# Patient Record
Sex: MALE | Race: WHITE | NOT HISPANIC OR LATINO | Employment: OTHER | URBAN - METROPOLITAN AREA
[De-identification: names, ages, dates, MRNs, and addresses within clinical notes are randomized per-mention and may not be internally consistent; named-entity substitution may affect disease eponyms.]

---

## 2018-07-23 ENCOUNTER — TRANSFERRED RECORDS (OUTPATIENT)
Dept: HEALTH INFORMATION MANAGEMENT | Facility: CLINIC | Age: 58
End: 2018-07-23

## 2018-08-08 ENCOUNTER — OFFICE VISIT (OUTPATIENT)
Dept: FAMILY MEDICINE | Facility: CLINIC | Age: 58
End: 2018-08-08
Payer: COMMERCIAL

## 2018-08-08 VITALS
DIASTOLIC BLOOD PRESSURE: 69 MMHG | RESPIRATION RATE: 17 BRPM | TEMPERATURE: 98.9 F | OXYGEN SATURATION: 96 % | HEIGHT: 69 IN | BODY MASS INDEX: 42.8 KG/M2 | HEART RATE: 92 BPM | WEIGHT: 289 LBS | SYSTOLIC BLOOD PRESSURE: 118 MMHG

## 2018-08-08 DIAGNOSIS — Z79.4 TYPE 2 DIABETES MELLITUS WITH OTHER SPECIFIED COMPLICATION, WITH LONG-TERM CURRENT USE OF INSULIN (H): Primary | ICD-10-CM

## 2018-08-08 DIAGNOSIS — E66.01 MORBID OBESITY (H): ICD-10-CM

## 2018-08-08 DIAGNOSIS — E11.69 TYPE 2 DIABETES MELLITUS WITH OTHER SPECIFIED COMPLICATION, WITH LONG-TERM CURRENT USE OF INSULIN (H): Primary | ICD-10-CM

## 2018-08-08 DIAGNOSIS — E55.9 VITAMIN D DEFICIENCY: ICD-10-CM

## 2018-08-08 DIAGNOSIS — N18.30 CKD (CHRONIC KIDNEY DISEASE) STAGE 3, GFR 30-59 ML/MIN (H): ICD-10-CM

## 2018-08-08 DIAGNOSIS — E78.1 HIGH BLOOD TRIGLYCERIDES: ICD-10-CM

## 2018-08-08 DIAGNOSIS — E78.00 HIGH BLOOD CHOLESTEROL: ICD-10-CM

## 2018-08-08 DIAGNOSIS — Z12.11 SCREEN FOR COLON CANCER: ICD-10-CM

## 2018-08-08 DIAGNOSIS — G47.33 OSA (OBSTRUCTIVE SLEEP APNEA): ICD-10-CM

## 2018-08-08 DIAGNOSIS — I10 HYPERTENSION GOAL BP (BLOOD PRESSURE) < 140/90: ICD-10-CM

## 2018-08-08 LAB
ERYTHROCYTE [DISTWIDTH] IN BLOOD BY AUTOMATED COUNT: 15.9 % (ref 10–15)
HBA1C MFR BLD: 9.7 % (ref 0–5.6)
HCT VFR BLD AUTO: 37.1 % (ref 40–53)
HGB BLD-MCNC: 12.5 G/DL (ref 13.3–17.7)
MCH RBC QN AUTO: 30.4 PG (ref 26.5–33)
MCHC RBC AUTO-ENTMCNC: 33.7 G/DL (ref 31.5–36.5)
MCV RBC AUTO: 90 FL (ref 78–100)
PLATELET # BLD AUTO: 207 10E9/L (ref 150–450)
RBC # BLD AUTO: 4.11 10E12/L (ref 4.4–5.9)
WBC # BLD AUTO: 7.1 10E9/L (ref 4–11)

## 2018-08-08 PROCEDURE — 85027 COMPLETE CBC AUTOMATED: CPT | Performed by: FAMILY MEDICINE

## 2018-08-08 PROCEDURE — 84443 ASSAY THYROID STIM HORMONE: CPT | Performed by: FAMILY MEDICINE

## 2018-08-08 PROCEDURE — 83721 ASSAY OF BLOOD LIPOPROTEIN: CPT | Mod: 59 | Performed by: FAMILY MEDICINE

## 2018-08-08 PROCEDURE — 82306 VITAMIN D 25 HYDROXY: CPT | Performed by: FAMILY MEDICINE

## 2018-08-08 PROCEDURE — 82043 UR ALBUMIN QUANTITATIVE: CPT | Performed by: FAMILY MEDICINE

## 2018-08-08 PROCEDURE — 36415 COLL VENOUS BLD VENIPUNCTURE: CPT | Performed by: FAMILY MEDICINE

## 2018-08-08 PROCEDURE — 80061 LIPID PANEL: CPT | Performed by: FAMILY MEDICINE

## 2018-08-08 PROCEDURE — 80053 COMPREHEN METABOLIC PANEL: CPT | Performed by: FAMILY MEDICINE

## 2018-08-08 PROCEDURE — 99203 OFFICE O/P NEW LOW 30 MIN: CPT | Performed by: FAMILY MEDICINE

## 2018-08-08 PROCEDURE — 83036 HEMOGLOBIN GLYCOSYLATED A1C: CPT | Performed by: FAMILY MEDICINE

## 2018-08-08 RX ORDER — LANCETS 30 GAUGE
EACH MISCELLANEOUS
COMMUNITY
Start: 2016-02-08 | End: 2019-01-02

## 2018-08-08 RX ORDER — BLOOD-GLUCOSE METER
1 EACH MISCELLANEOUS
COMMUNITY
Start: 2017-01-19 | End: 2019-08-12

## 2018-08-08 RX ORDER — CHOLECALCIFEROL (VITAMIN D3) 50 MCG
2000 TABLET ORAL
COMMUNITY
End: 2024-09-19

## 2018-08-08 RX ORDER — CARVEDILOL 12.5 MG/1
12.5 TABLET ORAL
COMMUNITY
Start: 2017-09-18 | End: 2019-04-08

## 2018-08-08 RX ORDER — INSULIN GLARGINE 100 [IU]/ML
38-48 INJECTION, SOLUTION SUBCUTANEOUS
COMMUNITY
Start: 2018-04-11 | End: 2018-12-06

## 2018-08-08 RX ORDER — FENOFIBRATE 145 MG/1
TABLET, COATED ORAL
COMMUNITY
Start: 2018-05-07 | End: 2019-01-31

## 2018-08-08 RX ORDER — ASPIRIN 81 MG/1
81 TABLET ORAL
COMMUNITY
Start: 2011-04-26 | End: 2023-02-17

## 2018-08-08 RX ORDER — AMOXICILLIN 250 MG
CAPSULE ORAL
COMMUNITY
Start: 2018-05-07 | End: 2019-08-26

## 2018-08-08 RX ORDER — ATORVASTATIN CALCIUM 40 MG/1
80 TABLET, FILM COATED ORAL
COMMUNITY
Start: 2017-01-12 | End: 2019-01-07

## 2018-08-08 RX ORDER — FENOFIBRATE 145 MG/1
1 TABLET, COATED ORAL
COMMUNITY
Start: 2017-08-21 | End: 2018-08-08

## 2018-08-08 RX ORDER — SYRINGE-NEEDLE,INSULIN,0.5 ML 28GX1/2"
SYRINGE, EMPTY DISPOSABLE MISCELLANEOUS
COMMUNITY
Start: 2016-01-13 | End: 2018-12-28

## 2018-08-08 RX ORDER — ERGOCALCIFEROL 1.25 MG/1
50000 CAPSULE, LIQUID FILLED ORAL
COMMUNITY
End: 2019-01-02

## 2018-08-08 RX ORDER — ERGOCALCIFEROL 1.25 MG/1
CAPSULE, LIQUID FILLED ORAL
COMMUNITY
Start: 2017-10-17 | End: 2019-01-02

## 2018-08-08 RX ORDER — GLUCOSAMINE HCL/CHONDROITIN SU 500-400 MG
CAPSULE ORAL
COMMUNITY
Start: 2016-02-08 | End: 2019-01-02

## 2018-08-08 RX ORDER — LANCETS 33 GAUGE
EACH MISCELLANEOUS
COMMUNITY
Start: 2018-02-12 | End: 2019-08-12

## 2018-08-08 RX ORDER — INSULIN GLARGINE 100 [IU]/ML
32 INJECTION, SOLUTION SUBCUTANEOUS
COMMUNITY
End: 2018-12-28

## 2018-08-08 RX ORDER — FUROSEMIDE 20 MG
20 TABLET ORAL
COMMUNITY
Start: 2018-05-05 | End: 2019-04-08

## 2018-08-08 RX ORDER — AMLODIPINE BESYLATE 10 MG/1
10 TABLET ORAL
COMMUNITY
Start: 2017-09-26 | End: 2019-04-08

## 2018-08-08 NOTE — PROGRESS NOTES
"  SUBJECTIVE:   Roberto Thompson is a 57 year old male who presents to clinic today for the following health issues:        Establish care, transfer from [facility]     Pt with diabetes, HTN, KELBY, high cholesterol and other issues listed in problem list  Problem list, meds updated.   Does not need refills at this time  Will update labs since he is fasting  He is on statin and asa  Not sure why he is not on an ace/arb. Will try to get old records    He is due for colonoscopy and is agreeable to scheduling    He has had two toes amputated on his right foot. He is followed by vascular for this.     Problem list and histories reviewed & adjusted, as indicated.  Additional history: as documented    Labs reviewed in EPIC    Reviewed and updated as needed this visit by clinical staff  Tobacco  Med Hx  Surg Hx  Fam Hx  Soc Hx      Reviewed and updated as needed this visit by Provider         ROS:  Constitutional, HEENT, cardiovascular, pulmonary, gi and gu systems are negative, except as otherwise noted.    OBJECTIVE:     /69  Pulse 92  Temp 98.9  F (37.2  C) (Oral)  Resp 17  Ht 5' 9\" (1.753 m)  Wt 289 lb (131.1 kg)  SpO2 96%  BMI 42.68 kg/m2  Body mass index is 42.68 kg/(m^2).  GENERAL: healthy, alert and no distress  NECK: no adenopathy, no asymmetry, masses, or scars and thyroid normal to palpation  RESP: lungs clear to auscultation - no rales, rhonchi or wheezes  CV: regular rate and rhythm, normal S1 S2, no S3 or S4, no murmur, click or rub, no peripheral edema and peripheral pulses strong  ABDOMEN: soft, nontender, no hepatosplenomegaly, no masses and bowel sounds normal  MS: no gross musculoskeletal defects noted, no edema    Diagnostic Test Results:  none     ASSESSMENT/PLAN:     1. Type 2 diabetes mellitus with other specified complication, with long-term current use of insulin (H)  As above, await labs, follow-up in 3-6 months depending on A1c at goal or not, pt to call for refills when needed  - " Hemoglobin A1c  - Albumin Random Urine Quantitative with Creat Ratio  - Comprehensive metabolic panel  - Lipid panel reflex to direct LDL Fasting  - TSH with free T4 reflex  - CBC with platelets    2. Morbid obesity (H)  Encouraged regular activity/ healthy diet    3. Toe amputation status, right (H)  Per vascular    4. Hypertension goal BP (blood pressure) < 140/90  At goal on meds    5. CKD (chronic kidney disease) stage 3, GFR 30-59 ml/min  ? Not sure why not on ace. Will need to try to get old records    6. High blood cholesterol  On statin    7. Vitamin D deficiency  Due for rechekc  - Vitamin D Deficiency    8. High blood triglycerides  On fenofibrate    9. KELBY (obstructive sleep apnea)  Pt with cpap machine    10. Screen for colon cancer  Pt agreeable to colonoscopy  - GASTROENTEROLOGY ADULT REF PROCEDURE ONLY        Jaylyn Conte MD  [facility]

## 2018-08-08 NOTE — LETTER
August 13, 2018    Roberto Thompson  [address] [place]  [place] [address]          Dear Roberto,    Your diabetes is not well controlled with an A1c of 9.7, Goal A1c should be < 7.0  I have placed an order for a visit with the diabetic educator. They will call you to set up an appointment  Your electrolytes, liver enzymes, thyroid and hemoglobin are normal  Your kidneys have been affected by your diabetes as you have protein in your urine and decreased kidney function  Your triglycerides are way too high. This is probably due to your uncontrolled diabetes.  You are still deficient in vitamin D. I have sent a prescription for vitamin D to your pharmacy. Take one tablet weekly x 8 weeks.  After you have finished your medication, please make lab appointment to have it rechecked.     Please let me know if you have any questions.     If you have any questions or concerns, please call myself or my nurse at [phone].    Sincerely,    Jaylyn Conte MD/jeniffer    Results for orders placed or performed in visit on 08/08/18   Hemoglobin A1c   Result Value Ref Range    Hemoglobin A1C 9.7 (H) 0 - 5.6 %   Albumin Random Urine Quantitative with Creat Ratio   Result Value Ref Range    Creatinine Urine 68 mg/dL    Albumin Urine mg/L 217 mg/L    Albumin Urine mg/g Cr 316.79 (H) 0 - 17 mg/g Cr   Comprehensive metabolic panel   Result Value Ref Range    Sodium 134 133 - 144 mmol/L    Potassium 4.3 3.4 - 5.3 mmol/L    Chloride 99 94 - 109 mmol/L    Carbon Dioxide 23 20 - 32 mmol/L    Anion Gap 12 3 - 14 mmol/L    Glucose 194 (H) 70 - 99 mg/dL    Urea Nitrogen 37 (H) 7 - 30 mg/dL    Creatinine 1.92 (H) 0.66 - 1.25 mg/dL    GFR Estimate 36 (L) >60 mL/min/1.7m2    GFR Estimate If Black 43 (L) >60 mL/min/1.7m2    Calcium 9.4 8.5 - 10.1 mg/dL    Bilirubin Total 0.5 0.2 - 1.3 mg/dL    Albumin 3.8 3.4 - 5.0 g/dL    Protein Total 8.8 6.8 - 8.8 g/dL    Alkaline Phosphatase 55 40 - 150 U/L    ALT 36 0 - 70 U/L    AST 31 0 - 45 U/L    Lipid panel reflex to direct LDL Fasting   Result Value Ref Range    Cholesterol 309 (H) <200 mg/dL    Triglycerides 1413 (H) <150 mg/dL    HDL Cholesterol 35 (L) >39 mg/dL    LDL Cholesterol Calculated  <100 mg/dL     Cannot estimate LDL when triglyceride exceeds 400 mg/dL    Non HDL Cholesterol 274 (H) <130 mg/dL   TSH with free T4 reflex   Result Value Ref Range    TSH 1.04 0.40 - 4.00 mU/L   CBC with platelets   Result Value Ref Range    WBC 7.1 4.0 - 11.0 10e9/L    RBC Count 4.11 (L) 4.4 - 5.9 10e12/L    Hemoglobin 12.5 (L) 13.3 - 17.7 g/dL    Hematocrit 37.1 (L) 40.0 - 53.0 %    MCV 90 78 - 100 fl    MCH 30.4 26.5 - 33.0 pg    MCHC 33.7 31.5 - 36.5 g/dL    RDW 15.9 (H) 10.0 - 15.0 %    Platelet Count 207 150 - 450 10e9/L   Vitamin D Deficiency   Result Value Ref Range    Vitamin D Deficiency screening 17 (L) 20 - 75 ug/L   LDL cholesterol direct   Result Value Ref Range    LDL Cholesterol Direct 110 (H) <100 mg/dL

## 2018-08-08 NOTE — MR AVS SNAPSHOT
After Visit Summary   8/8/2018    Roberto Thompson    MRN: [id number]           Patient Information     Date Of Birth          1960        Visit Information        Provider Department      8/8/2018 3:25 PM Jaylyn Minor MD; [place] [facility]        Today's Diagnoses     Type 2 diabetes mellitus with other specified complication, with long-term current use of insulin (H)    -  1    Morbid obesity (H)        Toe amputation status, right (H)        Hypertension goal BP (blood pressure) < 140/90        CKD (chronic kidney disease) stage 3, GFR 30-59 ml/min        High blood cholesterol        Vitamin D deficiency        High blood triglycerides        KELBY (obstructive sleep apnea)        Screen for colon cancer           Follow-ups after your visit        Additional Services     GASTROENTEROLOGY ADULT REF PROCEDURE ONLY       Last Lab Result: No results found for: CR  Body mass index is 42.68 kg/(m^2).     Needed:  Yes  Language:  Belarusian    Patient will be contacted to schedule procedure.     Please be aware that coverage of these services is subject to the terms and limitations of your health insurance plan.  Call member services at your health plan with any benefit or coverage questions.  Any procedures must be performed at a [facility] facility OR coordinated by your clinic's referral office.    Please bring the following with you to your appointment:    (1) Any X-Rays, CTs or MRIs which have been performed.  Contact the facility where they were done to arrange for  prior to your scheduled appointment.    (2) List of current medications   (3) This referral request   (4) Any documents/labs given to you for this referral                  Who to contact     If you have questions or need follow up information about today's clinic visit or your schedule please contact [facility] directly at [phone].  Normal or non-critical lab  "and imaging results will be communicated to you by MyChart, letter or phone within 4 business days after the clinic has received the results. If you do not hear from us within 7 days, please contact the clinic through MyChart or phone. If you have a critical or abnormal lab result, we will notify you by phone as soon as possible.  Submit refill requests through [employer] or call your pharmacy and they will forward the refill request to us. Please allow 3 business days for your refill to be completed.          Additional Information About Your Visit        Care EveryWhere ID     This is your Care EveryWhere ID. This could be used by other organizations to access your [facility] medical records  [id number]        Your Vitals Were     Pulse Temperature Respirations Height Pulse Oximetry BMI (Body Mass Index)    92 98.9  F (37.2  C) (Oral) 17 5' 9\" (1.753 m) 96% 42.68 kg/m2       Blood Pressure from Last 3 Encounters:   08/08/18 118/69    Weight from Last 3 Encounters:   08/08/18 289 lb (131.1 kg)              We Performed the Following     Albumin Random Urine Quantitative with Creat Ratio     CBC with platelets     Comprehensive metabolic panel     GASTROENTEROLOGY ADULT REF PROCEDURE ONLY     Hemoglobin A1c     Lipid panel reflex to direct LDL Fasting     TSH with free T4 reflex     Vitamin D Deficiency          Today's Medication Changes          These changes are accurate as of 8/8/18 11:59 PM.  If you have any questions, ask your nurse or doctor.               These medicines have changed or have updated prescriptions.        Dose/Directions    aspirin 81 MG EC tablet   This may have changed:  Another medication with the same name was removed. Continue taking this medication, and follow the directions you see here.   Changed by:  Jaylyn Minor MD        Dose:  81 mg   Take 81 mg by mouth   Refills:  0       fenofibrate 145 MG tablet   This may have changed:  Another medication with the same name was removed. " Continue taking this medication, and follow the directions you see here.   Changed by:  Jaylyn Minor MD        TAKE 1 TABLET BY MOUTH DAILY.   Refills:  0       insulin aspart 100 UNIT/ML injection   Commonly known as:  NovoLOG PEN   This may have changed:  Another medication with the same name was removed. Continue taking this medication, and follow the directions you see here.   Changed by:  Jaylyn Minor MD        Dose:  50 Units   Inject 50 Units Subcutaneous   Refills:  0                Primary Care Provider Office Phone # Fax #    Jaylyn Minor [phone] [id number] [address] [facility] [address]        Equal Access to Services     [facility]: Hadii aad ku hadasho Soomaali, waaxda luqadaha, qaybta kaalmada adeegyada, waxay idiin hayaan adeeg mouna cruz . So [facility] [phone].    ATENCIÓN: Si habla español, tiene a chino disposición servicios gratuitos de asistencia lingüística. [facility] [phone].    We comply with applicable federal civil rights laws and [place] laws. We do not discriminate on the basis of race, color, national origin, age, disability, sex, sexual orientation, or gender identity.            Thank you!     Thank you for choosing [facility]  for your care. Our goal is always to provide you with excellent care. Hearing back from our patients is one way we can continue to improve our services. Please take a few minutes to complete the written survey that you may receive in the mail after your visit with us. Thank you!             Your Updated Medication List - Protect others around you: Learn how to safely use, store and throw away your medicines at www.disposemymeds.org.          This list is accurate as of 8/8/18 11:59 PM.  Always use your most recent med list.                   Brand Name Dispense Instructions for use Diagnosis    amLODIPine 10 MG tablet    NORVASC     Take 10 mg by mouth        aspirin 81 MG EC tablet      Take 81 mg by mouth  "       atorvastatin 40 MG tablet    LIPITOR     Take 80 mg by mouth        * BASAGLAR 100 UNIT/ML injection      Inject 32 Units Subcutaneous        * BASAGLAR 100 UNIT/ML injection      Inject 38-48 Units Subcutaneous        blood glucose calibration solution    NO BRAND SPECIFIED     once as needed (With every new bottle of strips) for up to 1 dose.        blood glucose monitoring meter device kit      1 each        carvedilol 12.5 MG tablet    COREG     Take 12.5 mg by mouth        fenofibrate 145 MG tablet      TAKE 1 TABLET BY MOUTH DAILY.        furosemide 20 MG tablet    LASIX     Take 20 mg by mouth        GLOBAL EASE INJECT PEN NEEDLES 31G X 5 MM   Generic drug:  insulin pen needle      USE TO INJECT INSULIN 5 TIMES A DAY        insulin aspart 100 UNIT/ML injection    NovoLOG PEN     Inject 50 Units Subcutaneous        Insulin Syringe 30G X 1/2\" 0.5 ML Misc      Use to Inject insulin SQ four times daily as directed        * Lancets Misc      Testing blood sugars 4 time(s) a day. Pharmacist may substitute meter/supplies if insurance or patient requires specific equipment or model        * ONETOUCH DELICA LANCETS 33G Misc      TEST 4 TIMES DAILY        * BLOOD GLUCOSE TEST STRIPS Strp      4 times a day. Test strips for glucose meter qid  or as directed  insulin titration Verify type with patient        * ONETOUCH ULTRA test strip   Generic drug:  blood glucose monitoring      USE TO TEST 4 TIMES A DAY. USE AS DIRECTED. PHARMACY DISPENSE BRAND BASED ON INSURANCE.        SENEXON-S 8.6-50 MG per tablet   Generic drug:  senna-docusate      TAKE 1 TAB BY MOUTH TWO TIMES DAILY AS NEEDED FOR CONSTIPATION.        vitamin D 2000 units tablet      Take 2,000 Units by mouth        * vitamin D 25701 UNIT capsule    ERGOCALCIFEROL     Take 50,000 Units by mouth        * vitamin D 55915 UNIT capsule    ERGOCALCIFEROL     TAKE 1 CAPSULE BY MOUTH ONCE EVERY WEEK FOR 84 DAYS.        * Notice:  This list has 8 medication(s) " that are the same as other medications prescribed for you. Read the directions carefully, and ask your doctor or other care provider to review them with you.

## 2018-08-09 LAB
ALBUMIN SERPL-MCNC: 3.8 G/DL (ref 3.4–5)
ALP SERPL-CCNC: 55 U/L (ref 40–150)
ALT SERPL W P-5'-P-CCNC: 36 U/L (ref 0–70)
ANION GAP SERPL CALCULATED.3IONS-SCNC: 12 MMOL/L (ref 3–14)
AST SERPL W P-5'-P-CCNC: 31 U/L (ref 0–45)
BILIRUB SERPL-MCNC: 0.5 MG/DL (ref 0.2–1.3)
BUN SERPL-MCNC: 37 MG/DL (ref 7–30)
CALCIUM SERPL-MCNC: 9.4 MG/DL (ref 8.5–10.1)
CHLORIDE SERPL-SCNC: 99 MMOL/L (ref 94–109)
CHOLEST SERPL-MCNC: 309 MG/DL
CO2 SERPL-SCNC: 23 MMOL/L (ref 20–32)
CREAT SERPL-MCNC: 1.92 MG/DL (ref 0.66–1.25)
CREAT UR-MCNC: 68 MG/DL
DEPRECATED CALCIDIOL+CALCIFEROL SERPL-MC: 17 UG/L (ref 20–75)
GFR SERPL CREATININE-BSD FRML MDRD: 36 ML/MIN/1.7M2
GLUCOSE SERPL-MCNC: 194 MG/DL (ref 70–99)
HDLC SERPL-MCNC: 35 MG/DL
LDLC SERPL CALC-MCNC: ABNORMAL MG/DL
LDLC SERPL DIRECT ASSAY-MCNC: 110 MG/DL
MICROALBUMIN UR-MCNC: 217 MG/L
MICROALBUMIN/CREAT UR: 316.79 MG/G CR (ref 0–17)
NONHDLC SERPL-MCNC: 274 MG/DL
POTASSIUM SERPL-SCNC: 4.3 MMOL/L (ref 3.4–5.3)
PROT SERPL-MCNC: 8.8 G/DL (ref 6.8–8.8)
SODIUM SERPL-SCNC: 134 MMOL/L (ref 133–144)
TRIGL SERPL-MCNC: 1413 MG/DL
TSH SERPL DL<=0.005 MIU/L-ACNC: 1.04 MU/L (ref 0.4–4)

## 2018-08-10 ENCOUNTER — TELEPHONE (OUTPATIENT)
Dept: FAMILY MEDICINE | Facility: CLINIC | Age: 58
End: 2018-08-10

## 2018-08-10 RX ORDER — ERGOCALCIFEROL 1.25 MG/1
50000 CAPSULE, LIQUID FILLED ORAL
Qty: 8 CAPSULE | Refills: 0 | Status: SHIPPED | OUTPATIENT
Start: 2018-08-10 | End: 2018-11-06

## 2018-08-10 NOTE — TELEPHONE ENCOUNTER
I faxed an order that the PCP filled out after patients 8/8/18 appointment. Faxed to [facility] @ [phone].  Daisy Roth,

## 2018-08-31 ENCOUNTER — TRANSFERRED RECORDS (OUTPATIENT)
Dept: HEALTH INFORMATION MANAGEMENT | Facility: CLINIC | Age: 58
End: 2018-08-31

## 2018-09-05 DIAGNOSIS — Z79.4 TYPE 2 DIABETES MELLITUS WITH OTHER SPECIFIED COMPLICATION, WITH LONG-TERM CURRENT USE OF INSULIN (H): Primary | ICD-10-CM

## 2018-09-05 DIAGNOSIS — I73.9 PERIPHERAL VASCULAR DISEASE (H): ICD-10-CM

## 2018-09-05 DIAGNOSIS — G63 POLYNEUROPATHY ASSOCIATED WITH UNDERLYING DISEASE (H): ICD-10-CM

## 2018-09-05 DIAGNOSIS — E11.69 TYPE 2 DIABETES MELLITUS WITH OTHER SPECIFIED COMPLICATION, WITH LONG-TERM CURRENT USE OF INSULIN (H): Primary | ICD-10-CM

## 2018-09-05 PROBLEM — G62.9 PERIPHERAL NEUROPATHY: Status: ACTIVE | Noted: 2018-09-05

## 2018-10-05 ENCOUNTER — TRANSFERRED RECORDS (OUTPATIENT)
Dept: HEALTH INFORMATION MANAGEMENT | Facility: CLINIC | Age: 58
End: 2018-10-05

## 2018-10-12 ENCOUNTER — TRANSFERRED RECORDS (OUTPATIENT)
Dept: HEALTH INFORMATION MANAGEMENT | Facility: CLINIC | Age: 58
End: 2018-10-12

## 2018-10-22 PROBLEM — E11.3313: Status: ACTIVE | Noted: 2018-10-22

## 2018-11-06 DIAGNOSIS — E55.9 VITAMIN D DEFICIENCY: ICD-10-CM

## 2018-11-07 RX ORDER — ERGOCALCIFEROL 1.25 MG/1
CAPSULE, LIQUID FILLED ORAL
Qty: 8 CAPSULE | Refills: 0 | Status: SHIPPED | OUTPATIENT
Start: 2018-11-07 | End: 2019-01-23

## 2018-11-07 NOTE — TELEPHONE ENCOUNTER
Routing refill request to provider for review/approval because:  Drug not on the [facility] refill protocol   Kaitlyn Enriquez RN

## 2018-12-02 ENCOUNTER — MEDICAL CORRESPONDENCE (OUTPATIENT)
Dept: HEALTH INFORMATION MANAGEMENT | Facility: CLINIC | Age: 58
End: 2018-12-02

## 2018-12-06 DIAGNOSIS — E11.69 TYPE 2 DIABETES MELLITUS WITH OTHER SPECIFIED COMPLICATION, WITH LONG-TERM CURRENT USE OF INSULIN (H): Primary | ICD-10-CM

## 2018-12-06 DIAGNOSIS — Z79.4 TYPE 2 DIABETES MELLITUS WITH OTHER SPECIFIED COMPLICATION, WITH LONG-TERM CURRENT USE OF INSULIN (H): Primary | ICD-10-CM

## 2018-12-06 NOTE — TELEPHONE ENCOUNTER
Routing refill request to provider for review/approval because:  Requested medication is a historical/pt reported medication.  Hgb A1C is 9.7, future office visit scheduled 12/18/18      Requested Prescriptions   Pending Prescriptions Disp Refills     insulin glargine (BASAGLAR KWIKPEN) 100 UNIT/ML pen       Sig: Inject 38-48 Units Subcutaneous    Long Acting Insulin Protocol Failed    12/6/2018 10:00 AM       Failed - HgbA1C in past 3 or 6 months    If HgbA1C is 8 or greater, it needs to be on file within the past 3 months.  If less than 8, must be on file within the past 6 months.     Recent Labs   Lab Test  08/08/18   1740   A1C  9.7*        Ronel Grubbs RN

## 2018-12-07 RX ORDER — INSULIN GLARGINE 100 [IU]/ML
38-48 INJECTION, SOLUTION SUBCUTANEOUS DAILY
Qty: 15 ML | Refills: 3 | Status: SHIPPED | OUTPATIENT
Start: 2018-12-07 | End: 2018-12-28

## 2018-12-18 ENCOUNTER — ANCILLARY PROCEDURE (OUTPATIENT)
Dept: GENERAL RADIOLOGY | Facility: CLINIC | Age: 58
End: 2018-12-18
Attending: FAMILY MEDICINE
Payer: COMMERCIAL

## 2018-12-18 ENCOUNTER — OFFICE VISIT (OUTPATIENT)
Dept: FAMILY MEDICINE | Facility: CLINIC | Age: 58
End: 2018-12-18
Payer: COMMERCIAL

## 2018-12-18 VITALS
HEART RATE: 75 BPM | WEIGHT: 300 LBS | BODY MASS INDEX: 44.43 KG/M2 | SYSTOLIC BLOOD PRESSURE: 115 MMHG | OXYGEN SATURATION: 95 % | HEIGHT: 69 IN | DIASTOLIC BLOOD PRESSURE: 64 MMHG | TEMPERATURE: 98.2 F | RESPIRATION RATE: 18 BRPM

## 2018-12-18 DIAGNOSIS — M79.671 RIGHT FOOT PAIN: ICD-10-CM

## 2018-12-18 DIAGNOSIS — R60.0 PEDAL EDEMA: ICD-10-CM

## 2018-12-18 DIAGNOSIS — E78.00 HIGH BLOOD CHOLESTEROL: ICD-10-CM

## 2018-12-18 DIAGNOSIS — N18.30 CKD (CHRONIC KIDNEY DISEASE) STAGE 3, GFR 30-59 ML/MIN (H): ICD-10-CM

## 2018-12-18 DIAGNOSIS — E66.01 MORBID OBESITY (H): ICD-10-CM

## 2018-12-18 DIAGNOSIS — G63 POLYNEUROPATHY ASSOCIATED WITH UNDERLYING DISEASE (H): ICD-10-CM

## 2018-12-18 DIAGNOSIS — E78.1 HIGH BLOOD TRIGLYCERIDES: ICD-10-CM

## 2018-12-18 DIAGNOSIS — Z79.4 ENCOUNTER FOR LONG-TERM (CURRENT) INSULIN USE (H): ICD-10-CM

## 2018-12-18 DIAGNOSIS — E11.69 TYPE 2 DIABETES MELLITUS WITH OTHER SPECIFIED COMPLICATION, WITH LONG-TERM CURRENT USE OF INSULIN (H): Primary | ICD-10-CM

## 2018-12-18 DIAGNOSIS — Z79.4 TYPE 2 DIABETES MELLITUS WITH OTHER SPECIFIED COMPLICATION, WITH LONG-TERM CURRENT USE OF INSULIN (H): Primary | ICD-10-CM

## 2018-12-18 DIAGNOSIS — I10 HYPERTENSION GOAL BP (BLOOD PRESSURE) < 140/90: ICD-10-CM

## 2018-12-18 LAB
ALBUMIN SERPL-MCNC: 3.7 G/DL (ref 3.4–5)
ALP SERPL-CCNC: 76 U/L (ref 40–150)
ALT SERPL W P-5'-P-CCNC: 31 U/L (ref 0–70)
ANION GAP SERPL CALCULATED.3IONS-SCNC: 6 MMOL/L (ref 3–14)
AST SERPL W P-5'-P-CCNC: 21 U/L (ref 0–45)
BILIRUB SERPL-MCNC: 0.4 MG/DL (ref 0.2–1.3)
BUN SERPL-MCNC: 35 MG/DL (ref 7–30)
CALCIUM SERPL-MCNC: 8.7 MG/DL (ref 8.5–10.1)
CHLORIDE SERPL-SCNC: 101 MMOL/L (ref 94–109)
CO2 SERPL-SCNC: 29 MMOL/L (ref 20–32)
CREAT SERPL-MCNC: 2.03 MG/DL (ref 0.66–1.25)
GFR SERPL CREATININE-BSD FRML MDRD: 34 ML/MIN/1.7M2
GLUCOSE SERPL-MCNC: 238 MG/DL (ref 70–99)
HBA1C MFR BLD: 10.6 % (ref 0–5.6)
NT-PROBNP SERPL-MCNC: 162 PG/ML (ref 0–125)
POTASSIUM SERPL-SCNC: 4.6 MMOL/L (ref 3.4–5.3)
PROT SERPL-MCNC: 8.3 G/DL (ref 6.8–8.8)
SODIUM SERPL-SCNC: 136 MMOL/L (ref 133–144)

## 2018-12-18 PROCEDURE — 80053 COMPREHEN METABOLIC PANEL: CPT | Performed by: FAMILY MEDICINE

## 2018-12-18 PROCEDURE — 83880 ASSAY OF NATRIURETIC PEPTIDE: CPT | Performed by: FAMILY MEDICINE

## 2018-12-18 PROCEDURE — 99214 OFFICE O/P EST MOD 30 MIN: CPT | Performed by: FAMILY MEDICINE

## 2018-12-18 PROCEDURE — 73630 X-RAY EXAM OF FOOT: CPT | Mod: RT

## 2018-12-18 PROCEDURE — 99207 C FOOT EXAM  NO CHARGE: CPT | Performed by: FAMILY MEDICINE

## 2018-12-18 PROCEDURE — 83036 HEMOGLOBIN GLYCOSYLATED A1C: CPT | Performed by: FAMILY MEDICINE

## 2018-12-18 PROCEDURE — 36415 COLL VENOUS BLD VENIPUNCTURE: CPT | Performed by: FAMILY MEDICINE

## 2018-12-18 ASSESSMENT — MIFFLIN-ST. JEOR: SCORE: 2161.17

## 2018-12-18 NOTE — PROGRESS NOTES
SUBJECTIVE:   Roberto Thompson is a 58 year old male who presents to clinic today for the following health issues:        Concern - foot pain/ diabetic  Onset: on going for about a year     Description:   Right foot pain on outside of foot and ball of foot, swelling, hard to walk, back pain and thickening of foot     Intensity: severe    Progression of Symptoms:  same    Previous history of similar problem:   2 Toe amputation earlier this year  At different times     Precipitating factors:   Worsened by: walking and standing     Alleviating factors:  Improved by: rest and elevate     Therapies Tried and outcome: rest and elevation    Would like handicap sticker       Daily headaches, informed he may need to make additional appointment.  He will follow-up for this    Pt with diabetes, poorly controlled. Worsening.   Pt is agreeable to seeing diabetes ed and has appt made end of this month  He is on statin/ace/asa    Pt with right foot pain which has been hurting since the amputation  It does not hurt when he is resting but hurts worse when he is on it and gets worse the longer he is on it  Had middle toe removed first, then big one at beginning of this year  Now with pain in foot every since the second surgery.  Pain on lateral side of foot. As well as over 2nd metatarsal  Reviewing care everywhere can see he had MRI of right foot back in June 2018 before 2nd surgery I believe.   Rad reading of todays xray ? Osteomyelitis but MRI of right foot back in June also questioned it.   Will refer to podiatry    Pt also with pedal edema in same foot. Worse at end of day, better when he elevates it. No chest pain or sob.   Is taking lasix    Problem list and histories reviewed & adjusted, as indicated.  Additional history: as documented    Labs reviewed in EPIC    Reviewed and updated as needed this visit by clinical staff       Reviewed and updated as needed this visit by Provider         ROS:  Constitutional, HEENT,  "cardiovascular, pulmonary, gi and gu systems are negative, except as otherwise noted.    OBJECTIVE:     /64   Pulse 75   Temp 98.2  F (36.8  C) (Oral)   Resp 18   Ht 1.753 m (5' 9\")   Wt 136.1 kg (300 lb)   SpO2 95%   BMI 44.30 kg/m    Body mass index is 44.3 kg/m .  GENERAL: healthy, alert and no distress  RESP: lungs clear to auscultation - no rales, rhonchi or wheezes  CV: regular rate and rhythm, normal S1 S2, no S3 or S4, no murmur, click or rub, no peripheral edema and peripheral pulses strong  ABDOMEN: soft, nontender, no hepatosplenomegaly, no masses and bowel sounds normal  MS: right foot with pedal edema 1+, 1st and 3rd toe missing, pain to palpation over 5th metatarsal and over medial top of right foot  No ulcerations/open wounds or signs of infection    Diagnostic Test Results:  none     ASSESSMENT/PLAN:     1. Type 2 diabetes mellitus with other specified complication, with long-term current use of insulin (H)  Not at goal, agreeable to seeing diab ed  - FOOT EXAM  - Hemoglobin A1c  - Comprehensive metabolic panel  - DIABETES EDUCATOR REFERRAL    2. Encounter for long-term (current) insulin use (H)      3. Right foot pain  ? Neuropathy vs structural after surgery  - XR Foot Right G/E 3 Views; Future  - PODIATRY/FOOT & ANKLE SURGERY REFERRAL    4. Pedal edema  Suspect venous insufficiency  - BNP-N terminal pro    5. Morbid obesity (H)      6. CKD (chronic kidney disease) stage 3, GFR 30-59 ml/min (H)  On acei    7. Polyneuropathy associated with underlying disease (H)      8. High blood cholesterol  On statin    9. Hypertension goal BP (blood pressure) < 140/90  At goal on meds    10. High blood triglycerides  On statin          Jaylyn Conte MD  [facility]    "

## 2018-12-28 ENCOUNTER — ALLIED HEALTH/NURSE VISIT (OUTPATIENT)
Dept: EDUCATION SERVICES | Facility: CLINIC | Age: 58
End: 2018-12-28
Payer: COMMERCIAL

## 2018-12-28 DIAGNOSIS — E11.9 DIABETES MELLITUS WITHOUT COMPLICATION (H): ICD-10-CM

## 2018-12-28 DIAGNOSIS — Z79.4 TYPE 2 DIABETES MELLITUS WITH OTHER SPECIFIED COMPLICATION, WITH LONG-TERM CURRENT USE OF INSULIN (H): Primary | ICD-10-CM

## 2018-12-28 DIAGNOSIS — E11.69 TYPE 2 DIABETES MELLITUS WITH OTHER SPECIFIED COMPLICATION, WITH LONG-TERM CURRENT USE OF INSULIN (H): Primary | ICD-10-CM

## 2018-12-28 PROCEDURE — G0108 DIAB MANAGE TRN  PER INDIV: HCPCS

## 2018-12-28 NOTE — PATIENT INSTRUCTIONS
Diabetes Support Resources:  1. Toujeo take 100 units starting on Tuesday Jan. 1 with breakfast.  Then stop taking Basaglar insulin.    2. Novolog take 25 units with breakfast and 25 units with lunch, and 25 units with dinner    Plus the scale with each meal:  Plus the following coverage scale:  120-149=1 units  150-199=2 units  200-249=3 units  250-299=5 units  300-349=7 units  350 or more =8 units     Bring blood glucose meter and logbook with you to all doctor and follow-up appointments.     [place] [facility] for the [facility]:  For Your Diabetes Education and Nutrition Appointments Call:  [phone]   For Diabetes Education or Nutrition Related Questions:   Phone: [phone]  E-mail: [email or web address]  Fax: [phone]   If you need a medication refill please contact your pharmacy. Please allow 3 business days for your refills to be completed.    Instructions for emailing the Diabetes Educators    If you need to communicate a non-urgent message to a Diabetes Educator via email, please send to [email or web address].    Please follow the following email guidelines:    Subject line: Secure: your clinic name (example: Secure: [facility])  In the email please include: First name, middle initial, last name and date of birth.    We will be in touch with you within one (1) business day.

## 2018-12-28 NOTE — PROGRESS NOTES
"Diabetes Self-Management Education & Support    Diabetes Education Self Management & Training    SUBJECTIVE/OBJECTIVE:  Presents for: Individual review  Accompanied by: Self, Spouse  Diabetes education in the past 24mo: No  Focus of Visit: Patient Unsure, Monitoring, Taking Medication, Healthy Eating  Diabetes type: Type 2  Disease course: Worsening  Transportation concerns: No  Other concerns:: None  Cultural Influences/Ethnic Background:  [facility]      Diabetes Symptoms & Complications  Blurred vision: Yes  Fatigue: Yes  Neuropathy: Yes  Foot ulcerations: No  Polydipsia: Yes  Polyphagia: No  Polyuria: Yes  Visual change: Yes  Weakness: Yes  Weight loss: No  Slow healing wounds: Yes  Recent Infection(s): Yes  Symptom course: Worsening  Weight trend: Stable  Autonomic neuropathy: Yes  CVA: No  Heart disease: No  Nephropathy: Yes  Peripheral neuropathy: Yes  Peripheral Vascular Disease: Yes  Retinopathy: Yes    Patient Problem List and Family Medical History reviewed for relevant medical history, current medical status, and diabetes risk factors.    Vitals:  There were no vitals taken for this visit.  Estimated body mass index is 44.3 kg/m  as calculated from the following:    Height as of 12/18/18: 1.753 m (5' 9\").    Weight as of 12/18/18: 136.1 kg (300 lb).   Last 3 BP:   BP Readings from Last 3 Encounters:   12/18/18 115/64   08/08/18 118/69       History   Smoking Status     Never Smoker   Smokeless Tobacco     Never Used       Labs:  Lab Results   Component Value Date    A1C 10.6 12/18/2018     Lab Results   Component Value Date     12/18/2018     Lab Results   Component Value Date    LDL  08/08/2018     Cannot estimate LDL when triglyceride exceeds 400 mg/dL     08/08/2018     HDL Cholesterol   Date Value Ref Range Status   08/08/2018 35 (L) >39 mg/dL Final   ]  GFR Estimate   Date Value Ref Range Status   12/18/2018 34 (L) >60 mL/min/1.7m2 Final     Comment:     Non  GFR Calc "     GFR Estimate If Black   Date Value Ref Range Status   12/18/2018 41 (L) >60 mL/min/1.7m2 Final     Comment:      GFR Calc     Lab Results   Component Value Date    CR 2.03 12/18/2018     No results found for: MICROALBUMIN    Healthy Eating  Healthy Eating Assessed Today: Yes  Cultural/Adventist diet restrictions?: No  Patient on a regular basis: Eats 3 meals a day  Meal planning: Avoiding sweets  Meals include: Breakfast, Lunch, Dinner, Snacks  Breakfast: skipping , will not anymore, meat only  Lunch: skipping,   Dinner: loves potatoes  Beverages: Water, Milk, Juice, Diet soda  Has patient met with a dietitian in the past?: No    Being Active  Being Active Assessed Today: Yes  Exercise:: Currently not exercising  Barrier to exercise: Physical limitation    Monitoring  Monitoring Assessed Today: Yes  Did patient bring glucose meter to appointment? : No  Blood Glucose Meter: One Touch  Home Glucose (Sugar) Monitoring: 3-4 times per week  Low Glucose Range (mg/dL): 180-200  High Glucose Range (mg/dL): >200  Overall Range (mg/dL): >200    Did not bring in his meter    Taking Medications  Diabetes Medication(s)     Insulin Sig    insulin aspart (NOVOLOG PEN) 100 UNIT/ML pen Inject 25 Units Subcutaneous 3 times daily (with meals) Plus scale:  See scale below    insulin glargine U-300 (TOUJEO) 300 UNIT/ML injection Inject 100 Units Subcutaneous daily          Taking Medication Assessed Today: Yes  Current Treatments: Diet, Insulin Injections  Dose schedule: pre-breakfast  Given by: Patient  Injection/Infusion sites: Abdomen  Problems taking diabetes medications regularly?: No  Diabetes medication side effects?: No  Treatment Compliance: All of the time    Problem Solving  Problem Solving Assessed Today: Yes  Hypoglycemia Frequency: Never     My Goal: I will be more consistent with medications    What I need to meet my goal: Diabetes Support Resources:  1. Toujeo take 100 units starting on Tuesday Jan. 1  "with breakfast.  Then stop taking Basaglar insulin.    2. Novolog take 25 units with breakfast and 25 units with lunch, and 25 units with dinner    Plus the scale with each meal:  Plus the following coverage scale:  120-149=1 units  150-199=2 units  200-249=3 units  250-299=5 units  300-349=7 units  350 or more =8 units    I plan to meet my goal by this date: 1 months         Reducing Risks  Diabetes Risks: None, Family History, Hyperlipidemia, Sedentary Lifestyle, Ethnicity  CAD Risks: Diabetes Mellitus, Family history, Male sex, Hypertension, Obesity, Sedentary lifestyle, Dyslipidemia    Healthy Coping  Stage of change: MAINTENANCE (Working to maintain change, with risk of relapse)  Patient Activation Measure Survey Score:  No flowsheet data found.    ASSESSMENT:  Roberto comes today for diabetes, noted he does not eat all day, then eats \"large\" plate of mashed potatoes and some meat, no fruits, and can be snacking all day.  Gives large amounts of insulin if his BG is in the 4-500's and repeats injections after 1-2 hrs. If BG is not coming down.  Reviewed with him, eating 3 meals per day, follow up in Jan. And adjust insulin accordingly        Patient's most recent   Lab Results   Component Value Date    A1C 10.6 12/18/2018    is not meeting goal of <7.0    INTERVENTION:   Diabetes knowledge and skills assessment:     Patient is knowledgeable in diabetes management concepts related to: monitoring    Patient needs further education on the following diabetes management concepts: Healthy Eating, Being Active, Monitoring and Taking Medication    Based on learning assessment above, most appropriate setting for further diabetes education would be: Individual setting.    Education provided today on:  AADE Self-Care Behaviors:  Diabetes Pathophysiology  Healthy Eating: carbohydrate counting, consistency in amount, composition, and timing of food intake, weight reduction, heart healthy diet, eating out, portion control, " plate planning method and label reading  Being Active: relationship to blood glucose and describe appropriate activity program  Monitoring: purpose, proper technique, log and interpret results, individual blood glucose targets and frequency of monitoring  Taking Medication: action of prescribed medication, drawing up, administering and storing injectable diabetes medications, proper site selection and rotation for injections, side effects of prescribed medications and when to take medications    Opportunities for ongoing education and support in diabetes-self management were discussed.    Pt verbalized understanding of concepts discussed and recommendations provided today.       Education Materials Provided:  My Plate Planner    PLAN:  See Patient Instructions for co-developed, patient-stated behavior change goals.  AVS printed and provided to patient today. See Follow-Up section for recommended follow-up.    Salena Abraham RN/DEYSI [facility] Diabetes Educator    Time Spent: 60 minutes  Encounter Type: Individual    Any diabetes medication dose changes were made via the [facility] Protocol and Collaborative Practice Agreement with the patient's primary care provider. A copy of this encounter was shared with the provider.

## 2018-12-30 DIAGNOSIS — E55.9 VITAMIN D DEFICIENCY: ICD-10-CM

## 2018-12-31 ENCOUNTER — TELEPHONE (OUTPATIENT)
Dept: FAMILY MEDICINE | Facility: CLINIC | Age: 58
End: 2018-12-31

## 2018-12-31 NOTE — TELEPHONE ENCOUNTER
Prior Authorization Retail Medication Request    Medication/Dose: insulin glargine U-300 (TOUJEO) 300 UNIT/ML injection  ICD code (if different than what is on RX):    Previously Tried and Failed:    Rationale:      Insurance Name:  [employer] [phone]  Insurance ID:  [id number]      Pharmacy Information (if different than what is on RX)  Name:    Phone:

## 2019-01-02 ENCOUNTER — TELEPHONE (OUTPATIENT)
Dept: FAMILY MEDICINE | Facility: CLINIC | Age: 59
End: 2019-01-02

## 2019-01-02 ENCOUNTER — OFFICE VISIT (OUTPATIENT)
Dept: PODIATRY | Facility: CLINIC | Age: 59
End: 2019-01-02
Payer: COMMERCIAL

## 2019-01-02 VITALS
WEIGHT: 300 LBS | BODY MASS INDEX: 44.3 KG/M2 | HEART RATE: 86 BPM | DIASTOLIC BLOOD PRESSURE: 74 MMHG | SYSTOLIC BLOOD PRESSURE: 142 MMHG

## 2019-01-02 DIAGNOSIS — S92.331A CLOSED DISPLACED FRACTURE OF THIRD METATARSAL BONE OF RIGHT FOOT, INITIAL ENCOUNTER: ICD-10-CM

## 2019-01-02 DIAGNOSIS — Z79.4 TYPE 2 DIABETES MELLITUS WITH OTHER SPECIFIED COMPLICATION, WITH LONG-TERM CURRENT USE OF INSULIN (H): Primary | ICD-10-CM

## 2019-01-02 DIAGNOSIS — Z79.4 TYPE 2 DIABETES MELLITUS WITH OTHER SPECIFIED COMPLICATION, WITH LONG-TERM CURRENT USE OF INSULIN (H): ICD-10-CM

## 2019-01-02 DIAGNOSIS — E11.69 TYPE 2 DIABETES MELLITUS WITH OTHER SPECIFIED COMPLICATION, WITH LONG-TERM CURRENT USE OF INSULIN (H): ICD-10-CM

## 2019-01-02 DIAGNOSIS — E11.69 TYPE 2 DIABETES MELLITUS WITH OTHER SPECIFIED COMPLICATION, WITH LONG-TERM CURRENT USE OF INSULIN (H): Primary | ICD-10-CM

## 2019-01-02 DIAGNOSIS — E11.610 CHARCOT FOOT DUE TO DIABETES MELLITUS (H): ICD-10-CM

## 2019-01-02 DIAGNOSIS — G63 POLYNEUROPATHY ASSOCIATED WITH UNDERLYING DISEASE (H): ICD-10-CM

## 2019-01-02 PROCEDURE — 99243 OFF/OP CNSLTJ NEW/EST LOW 30: CPT | Performed by: PODIATRIST

## 2019-01-02 RX ORDER — BLOOD PRESSURE TEST KIT
1 KIT MISCELLANEOUS DAILY
Qty: 100 EACH | Refills: 11 | Status: SHIPPED | OUTPATIENT
Start: 2019-01-02 | End: 2019-12-08

## 2019-01-02 RX ORDER — INSULIN GLARGINE 100 [IU]/ML
38-48 INJECTION, SOLUTION SUBCUTANEOUS DAILY
Qty: 15 ML | Refills: 3 | Status: CANCELLED | OUTPATIENT
Start: 2019-01-02

## 2019-01-02 RX ORDER — ERGOCALCIFEROL 1.25 MG/1
CAPSULE, LIQUID FILLED ORAL
Qty: 8 CAPSULE | Refills: 0 | OUTPATIENT
Start: 2019-01-02

## 2019-01-02 NOTE — TELEPHONE ENCOUNTER
Elijah states that patient's insurance will not cover the new diabetic medication you prescribed and patient would like to go back on Novalog and Basagler and would like a prescription for those.    Thank you.

## 2019-01-02 NOTE — LETTER
1/2/2019         RE: Roberto Thomspon  [address]  [place] [address]        Dear Colleague,    Thank you for referring your patient, Roberto Thompson, to the [facility]. Please see a copy of my visit note below.    Subjective:    Pt is seen today in consult from Dr. Minor with the c/c of a full swollen right foot.  Patient has poorly controlled diabetes with peripheral neuropathy and loss of protective sensation in his foot.  Earlier this year he had a right third toe amputation and a right first ray resection.  This eventually healed.  He was written a prescription for diabetic shoes and inserts which he has been wearing.  Despite wearing these all the time he is having pain and swelling in his right foot.  Pain is aggravated by activity and relieved by rest.  Patient not working.  He has chronic kidney disease stage III.  Both his father and mother had diabetes.  He has immigrated from [place] to the [place].  He has never smoked    ROS:  A 10-point review of systems was performed and is positive for that noted in the HPI and as seen below.  All other areas are negative.          Allergies   Allergen Reactions     Doxycycline      Facial swelling     Influenza Vac Split [Flu Virus Vaccine] Itching     And red /blue arms       Current Outpatient Medications   Medication Sig Dispense Refill     ACE/ARB/ARNI NOT PRESCRIBED, INTENTIONAL, Please choose reason not prescribed, below       amLODIPine (NORVASC) 10 MG tablet Take 10 mg by mouth       aspirin 81 MG EC tablet Take 81 mg by mouth       atorvastatin (LIPITOR) 40 MG tablet Take 80 mg by mouth        blood glucose calibration (NO BRAND SPECIFIED) solution once as needed (With every new bottle of strips) for up to 1 dose.       blood glucose monitoring (ONE TOUCH ULTRA 2) meter device kit 1 each       blood glucose monitoring (ONETOUCH ULTRA) test strip USE TO TEST 4 TIMES A DAY. USE AS DIRECTED. PHARMACY DISPENSE  BRAND BASED ON INSURANCE.       carvedilol (COREG) 12.5 MG tablet Take 12.5 mg by mouth       Cholecalciferol (VITAMIN D) 2000 units tablet Take 2,000 Units by mouth       fenofibrate 145 MG tablet TAKE 1 TABLET BY MOUTH DAILY.       furosemide (LASIX) 20 MG tablet Take 20 mg by mouth       insulin aspart (NOVOLOG PEN) 100 UNIT/ML pen Inject 25 Units Subcutaneous 3 times daily (with meals) Plus scale:  See scale below 15 mL 1     insulin glargine U-300 (TOUJEO) 300 UNIT/ML injection Inject 100 Units Subcutaneous daily 15 mL 3     insulin pen needle (GLOBAL EASE INJECT PEN NEEDLES) 31G X 5 MM USE TO INJECT INSULIN 5 TIMES A DAY       ONETOUCH DELICA LANCETS 33G MISC TEST 4 TIMES DAILY       senna-docusate (SENEXON-S) 8.6-50 MG per tablet TAKE 1 TAB BY MOUTH TWO TIMES DAILY AS NEEDED FOR CONSTIPATION.       vitamin D2 (ERGOCALCIFEROL) 88702 UNIT capsule TAKE 1 CAPSULE (50,000 UNITS) BY MOUTH EVERY 7 DAYS FOR 8 DOSES 8 capsule 0     Alcohol Swabs PADS 1 Device daily 100 each 11       Patient Active Problem List   Diagnosis     Morbid obesity (H)     Type 2 diabetes mellitus with other specified complication, with long-term current use of insulin (H)     Hypertension goal BP (blood pressure) < 140/90     High blood cholesterol     Vitamin D deficiency     High blood triglycerides     CKD (chronic kidney disease) stage 3, GFR 30-59 ml/min (H)     Toe amputation status, right (H)     KELBY (obstructive sleep apnea)     Peripheral neuropathy     Peripheral vascular disease (H)     Moderate nonproliferative diabetic retinopathy of both eyes with macular edema (H)       No past medical history on file.    Past Surgical History:   Procedure Laterality Date     APPENDECTOMY       ENT SURGERY      tonsils     ORTHOPEDIC SURGERY      thumb     ORTHOPEDIC SURGERY      2 toe amputations       Family History   Problem Relation Age of Onset     Diabetes Mother      Hypertension Mother      Cerebrovascular Disease Mother      Kidney  Disease Mother      Osteoporosis Mother      Diabetes Father      Prostate Cancer Father      Dementia Father        Social History     Tobacco Use     Smoking status: Never Smoker     Smokeless tobacco: Never Used   Substance Use Topics     Alcohol use: No         Exam:    Vitals: /74 (BP Location: Right arm, Patient Position: Sitting)   Pulse 86   Wt 136.1 kg (300 lb)   BMI 44.30 kg/m     BMI: Body mass index is 44.3 kg/m .  Height: Data Unavailable    Constitutional/ general:  Pt is in no apparent distress, appears well-nourished.  Cooperative with history and physical exam.  Patient seen with wife and  today    Psych:  The patient answered questions appropriately.  Normal affect.  Seems to have reasonable expectations, in terms of treatment.     Eyes:  Visual scanning/ tracking without deficit.     Ears:  Response to auditory stimuli is normal.  negative hearing aid devices.  Auricles in proper alignment.     Lymphatic:  Popliteal lymph nodes not enlarged.     Lungs:  Non labored breathing, non labored speech. No cough.  No audible wheezing. Even, quiet breathing.       Vascular: Right ankle and foot have significant edema difficult to palpate pulses.  Much less edema on the left foot and ankle and palpable pulses noted.  Scant hair growth noted    Clinical History: Claudication.    Technique: Bilateral lower extremity arterial Duplex ultrasound with Color flow and spectral Doppler waveform analysis is performed.    Comparison: None.    Findings:    The waveforms are as follows:  Right CFA: 125 cm/s, multiphasic  Right SFA/POP:   cm/s, multiphasic  Right PTA/OZZIE: multiphasic    Left CFA: 101 cm/s, multiphasic  Left SFA/POP:   cm/s, multiphasic  Left PTA/OZZIE: multiphasic    Right ANN: 1.12   Left ANN: 1.16  ANN Range: Normal greater than 1.0; Mild 0.95-0.75; Moderate 75-0.50; Severe less than 0.50    Impression: Normal bilateral lower extremity arterial Duplex ultrasound.    Neuro:   Alert and oriented x 3. Coordinated gait.  Light touch sensation is intact to the L4, L5, S1 distributions. No obvious deficits.  No evidence of neurological-based weakness, spasticity, or contracture in the lower extremities.  Monofilament absent to knees bilaterally    Derm: Normal texture and turgor.  No erythema, ecchymosis, or cyanosis.      Musculoskeletal:    Lower extremity muscle strength is normal.  Patient is ambulatory without an assistive device or brace.  Left foot exam is unremarkable.  The skin is healthy with no signs of any breakdown.  On his right foot is an amputation of his third toe which is well-healed.  He has had a right first ray resection which is well-healed.  He has significant edema on his foot.  Hard to localize pain in any one area.  No pain with palpation around his ankle.  No pain in his calcaneus.  No signs of breakdown in the skin anywhere on his right foot.    Radiographic Exam:  X-Ray Findings:  I personally reviewed the films.  First ray resection and third toe amputation noted.  There is a fracture of the second metatarsal with heterotopic bone formation surrounding this.  Periosteal reactions are noted on all the remaining metatarsals.  Patient has a plantarflexion of his tarsal bones compared to his metatarsals.    A:  Diabetes mellitus with  LOPS  Right second metatarsal fracture  Right Charcot foot    P: X-rays of right foot personally reviewed.  Explained to patient and wife a loss of the first metatarsal has now caused overuse of his lesser metatarsals resulting in a fracture of the second metatarsal and increased stress, periosteal elevation, and subluxation of lateral 3 metatarsals.  Explained that this is what is causing his constant pain and swelling.  We will try to offload these metatarsals more now with an AFO and explained to the patient how this works.  He is in agreement with this.  We wrote him a prescription for an AFO, a new insert on his left foot and new  diabetic shoes.  Explained to him that once he gets his he needs to wear this at all the times even inside the house.  Discussed with patient that he is lost his protective sensation.  He will watch his foot daily for any erythema increased edema blistering or any new ulcerations.  If he notices any of these he will contact someone immediately.  RTC as needed.  Thank you for allowing me to participate in the care of this patient.    Bienvenido Flores DPM, FACFAS            Again, thank you for allowing me to participate in the care of your patient.        Sincerely,        Bienvenido Flores DPM

## 2019-01-02 NOTE — PATIENT INSTRUCTIONS
We wish you continued good healing. If you have any questions or concerns, please do not hesitate to contact us at [phone]    Please remember to call and schedule a follow up appointment if one was recommended at your earliest convenience.   PODIATRY CLINIC HOURS  TELEPHONE NUMBER    Dr. Bienvenido Flores D.P.M [facility]    Clinics:  [facility]    Alisa Doan [facility]   Tuesday 1PM-6PM  [employer]  Wednesday 7AM-2PM  [facility]  Thursday 10AM-6PM  [employer]  Friday 7AM-3PM  [place]  Specialty schedulers:   [phone] to make an appointment with any Specialty Provider.        Urgent Care locations:    [facility] Monday-Friday 5 pm - 9 pm. Saturday-Sunday 9 am -5pm    Monday-Friday 11 am - 9 pm Saturday 9 am - 5 pm     Monday-Sunday 12 noon-8PM [phone]     If you need a medication refill, please contact us you may need lab work and/or a follow up visit prior to your refill (i.e. Antifungal medications).    [facility] (secure e-mail communication and access to your chart) to send a message or to make an appointment.    If MRI needed please call [facility] at [phone]        Weight management plan: Patient was referred to their PCP to discuss a diet and exercise plan.   Patient to follow up with Primary Care provider regarding elevated blood pressure.

## 2019-01-02 NOTE — TELEPHONE ENCOUNTER
Prior Authorization Approval    Authorization Effective Date: 12/3/2018  Authorization Expiration Date: 1/2/2020  Medication: insulin glargine U-300 (TOUJEO) 300 UNIT/ML injection  Approved Dose/Quantity: 15ml  Reference #: [id number]   Insurance Company: [employer] - Phone [phone] Fax [phone]  Which Pharmacy is filling the prescription (Not needed for infusion/clinic administered): [facility] [place], [place] - [address] [address]  Pharmacy Notified: Yes  Patient Notified: Yes

## 2019-01-02 NOTE — TELEPHONE ENCOUNTER
PA Initiation    Medication: insulin glargine U-300 (TOUJEO) 300 UNIT/ML injection  Insurance Company: [employer] - Phone [phone] Fax [phone]  Pharmacy Filling the Rx: [facility] [place], [place] - [address]   Filling Pharmacy Phone: [phone]  Filling Pharmacy Fax:    Start Date: 1/2/2019    Central Prior Authorization Team   Phone: [phone]

## 2019-01-03 NOTE — TELEPHONE ENCOUNTER
He has an appointment with you on 1/7/19, can he have his labs drawn then?  Please advise.  Thank you.  Daisy Roth,

## 2019-01-03 NOTE — PROGRESS NOTES
Subjective:    Pt is seen today in consult from Dr. Minor with the c/c of a full swollen right foot.  Patient has poorly controlled diabetes with peripheral neuropathy and loss of protective sensation in his foot.  Earlier this year he had a right third toe amputation and a right first ray resection.  This eventually healed.  He was written a prescription for diabetic shoes and inserts which he has been wearing.  Despite wearing these all the time he is having pain and swelling in his right foot.  Pain is aggravated by activity and relieved by rest.  Patient not working.  He has chronic kidney disease stage III.  Both his father and mother had diabetes.  He has immigrated from [place] to the [place].  He has never smoked    ROS:  A 10-point review of systems was performed and is positive for that noted in the HPI and as seen below.  All other areas are negative.          Allergies   Allergen Reactions     Doxycycline      Facial swelling     Influenza Vac Split [Flu Virus Vaccine] Itching     And red /blue arms       Current Outpatient Medications   Medication Sig Dispense Refill     ACE/ARB/ARNI NOT PRESCRIBED, INTENTIONAL, Please choose reason not prescribed, below       amLODIPine (NORVASC) 10 MG tablet Take 10 mg by mouth       aspirin 81 MG EC tablet Take 81 mg by mouth       atorvastatin (LIPITOR) 40 MG tablet Take 80 mg by mouth        blood glucose calibration (NO BRAND SPECIFIED) solution once as needed (With every new bottle of strips) for up to 1 dose.       blood glucose monitoring (ONE TOUCH ULTRA 2) meter device kit 1 each       blood glucose monitoring (ONETOUCH ULTRA) test strip USE TO TEST 4 TIMES A DAY. USE AS DIRECTED. PHARMACY DISPENSE BRAND BASED ON INSURANCE.       carvedilol (COREG) 12.5 MG tablet Take 12.5 mg by mouth       Cholecalciferol (VITAMIN D) 2000 units tablet Take 2,000 Units by mouth       fenofibrate 145 MG tablet TAKE 1 TABLET BY MOUTH DAILY.       furosemide  (LASIX) 20 MG tablet Take 20 mg by mouth       insulin aspart (NOVOLOG PEN) 100 UNIT/ML pen Inject 25 Units Subcutaneous 3 times daily (with meals) Plus scale:  See scale below 15 mL 1     insulin glargine U-300 (TOUJEO) 300 UNIT/ML injection Inject 100 Units Subcutaneous daily 15 mL 3     insulin pen needle (GLOBAL EASE INJECT PEN NEEDLES) 31G X 5 MM USE TO INJECT INSULIN 5 TIMES A DAY       ONETOUCH DELICA LANCETS 33G MISC TEST 4 TIMES DAILY       senna-docusate (SENEXON-S) 8.6-50 MG per tablet TAKE 1 TAB BY MOUTH TWO TIMES DAILY AS NEEDED FOR CONSTIPATION.       vitamin D2 (ERGOCALCIFEROL) 32465 UNIT capsule TAKE 1 CAPSULE (50,000 UNITS) BY MOUTH EVERY 7 DAYS FOR 8 DOSES 8 capsule 0     Alcohol Swabs PADS 1 Device daily 100 each 11       Patient Active Problem List   Diagnosis     Morbid obesity (H)     Type 2 diabetes mellitus with other specified complication, with long-term current use of insulin (H)     Hypertension goal BP (blood pressure) < 140/90     High blood cholesterol     Vitamin D deficiency     High blood triglycerides     CKD (chronic kidney disease) stage 3, GFR 30-59 ml/min (H)     Toe amputation status, right (H)     KELBY (obstructive sleep apnea)     Peripheral neuropathy     Peripheral vascular disease (H)     Moderate nonproliferative diabetic retinopathy of both eyes with macular edema (H)       No past medical history on file.    Past Surgical History:   Procedure Laterality Date     APPENDECTOMY       ENT SURGERY      tonsils     ORTHOPEDIC SURGERY      thumb     ORTHOPEDIC SURGERY      2 toe amputations       Family History   Problem Relation Age of Onset     Diabetes Mother      Hypertension Mother      Cerebrovascular Disease Mother      Kidney Disease Mother      Osteoporosis Mother      Diabetes Father      Prostate Cancer Father      Dementia Father        Social History     Tobacco Use     Smoking status: Never Smoker     Smokeless tobacco: Never Used   Substance Use Topics      Alcohol use: No         Exam:    Vitals: /74 (BP Location: Right arm, Patient Position: Sitting)   Pulse 86   Wt 136.1 kg (300 lb)   BMI 44.30 kg/m    BMI: Body mass index is 44.3 kg/m .  Height: Data Unavailable    Constitutional/ general:  Pt is in no apparent distress, appears well-nourished.  Cooperative with history and physical exam.  Patient seen with wife and  today    Psych:  The patient answered questions appropriately.  Normal affect.  Seems to have reasonable expectations, in terms of treatment.     Eyes:  Visual scanning/ tracking without deficit.     Ears:  Response to auditory stimuli is normal.  negative hearing aid devices.  Auricles in proper alignment.     Lymphatic:  Popliteal lymph nodes not enlarged.     Lungs:  Non labored breathing, non labored speech. No cough.  No audible wheezing. Even, quiet breathing.       Vascular: Right ankle and foot have significant edema difficult to palpate pulses.  Much less edema on the left foot and ankle and palpable pulses noted.  Scant hair growth noted    Clinical History: Claudication.    Technique: Bilateral lower extremity arterial Duplex ultrasound with Color flow and spectral Doppler waveform analysis is performed.    Comparison: None.    Findings:    The waveforms are as follows:  Right CFA: 125 cm/s, multiphasic  Right SFA/POP:   cm/s, multiphasic  Right PTA/OZZIE: multiphasic    Left CFA: 101 cm/s, multiphasic  Left SFA/POP:   cm/s, multiphasic  Left PTA/OZZIE: multiphasic    Right ANN: 1.12   Left ANN: 1.16  ANN Range: Normal greater than 1.0; Mild 0.95-0.75; Moderate 75-0.50; Severe less than 0.50    Impression: Normal bilateral lower extremity arterial Duplex ultrasound.    Neuro:  Alert and oriented x 3. Coordinated gait.  Light touch sensation is intact to the L4, L5, S1 distributions. No obvious deficits.  No evidence of neurological-based weakness, spasticity, or contracture in the lower extremities.  Monofilament  absent to knees bilaterally    Derm: Normal texture and turgor.  No erythema, ecchymosis, or cyanosis.      Musculoskeletal:    Lower extremity muscle strength is normal.  Patient is ambulatory without an assistive device or brace.  Left foot exam is unremarkable.  The skin is healthy with no signs of any breakdown.  On his right foot is an amputation of his third toe which is well-healed.  He has had a right first ray resection which is well-healed.  He has significant edema on his foot.  Hard to localize pain in any one area.  No pain with palpation around his ankle.  No pain in his calcaneus.  No signs of breakdown in the skin anywhere on his right foot.    Radiographic Exam:  X-Ray Findings:  I personally reviewed the films.  First ray resection and third toe amputation noted.  There is a fracture of the second metatarsal with heterotopic bone formation surrounding this.  Periosteal reactions are noted on all the remaining metatarsals.  Patient has a plantarflexion of his tarsal bones compared to his metatarsals.    A:  Diabetes mellitus with  LOPS  Right second metatarsal fracture  Right Charcot foot    P: X-rays of right foot personally reviewed.  Explained to patient and wife a loss of the first metatarsal has now caused overuse of his lesser metatarsals resulting in a fracture of the second metatarsal and increased stress, periosteal elevation, and subluxation of lateral 3 metatarsals.  Explained that this is what is causing his constant pain and swelling.  We will try to offload these metatarsals more now with an AFO and explained to the patient how this works.  He is in agreement with this.  We wrote him a prescription for an AFO, a new insert on his left foot and new diabetic shoes.  Explained to him that once he gets his he needs to wear this at all the times even inside the house.  Discussed with patient that he is lost his protective sensation.  He will watch his foot daily for any erythema increased  edema blistering or any new ulcerations.  If he notices any of these he will contact someone immediately.  RTC as needed.  Thank you for allowing me to participate in the care of this patient.    Bienvenido Flores DPM, FACFAS

## 2019-01-03 NOTE — TELEPHONE ENCOUNTER
Needs to have vitamin D level rechecked before refill to see if he still needs that much  Orders are in please have him make a lab appt.    Jaylyn Conte

## 2019-01-04 NOTE — TELEPHONE ENCOUNTER
I verified that yes he has the same insurance as his wife, Ray/MA.  Please note that we have his current 2019 insurance information and his wifes 2018 insurance card information.  Chances are good that she is keeping the same insurance for 2019.  Daisy Roth,

## 2019-01-07 ENCOUNTER — ANCILLARY PROCEDURE (OUTPATIENT)
Dept: GENERAL RADIOLOGY | Facility: CLINIC | Age: 59
End: 2019-01-07
Payer: COMMERCIAL

## 2019-01-07 ENCOUNTER — OFFICE VISIT (OUTPATIENT)
Dept: FAMILY MEDICINE | Facility: CLINIC | Age: 59
End: 2019-01-07
Payer: COMMERCIAL

## 2019-01-07 VITALS
SYSTOLIC BLOOD PRESSURE: 112 MMHG | HEIGHT: 69 IN | HEART RATE: 77 BPM | BODY MASS INDEX: 45.18 KG/M2 | DIASTOLIC BLOOD PRESSURE: 63 MMHG | WEIGHT: 305 LBS | RESPIRATION RATE: 18 BRPM | OXYGEN SATURATION: 93 % | TEMPERATURE: 97 F

## 2019-01-07 DIAGNOSIS — E78.00 HIGH BLOOD CHOLESTEROL: ICD-10-CM

## 2019-01-07 DIAGNOSIS — G89.29 CHRONIC MIDLINE LOW BACK PAIN WITH LEFT-SIDED SCIATICA: ICD-10-CM

## 2019-01-07 DIAGNOSIS — Z79.4 TYPE 2 DIABETES MELLITUS WITH OTHER SPECIFIED COMPLICATION, WITH LONG-TERM CURRENT USE OF INSULIN (H): ICD-10-CM

## 2019-01-07 DIAGNOSIS — M54.42 CHRONIC MIDLINE LOW BACK PAIN WITH LEFT-SIDED SCIATICA: ICD-10-CM

## 2019-01-07 DIAGNOSIS — E11.69 TYPE 2 DIABETES MELLITUS WITH OTHER SPECIFIED COMPLICATION, WITH LONG-TERM CURRENT USE OF INSULIN (H): ICD-10-CM

## 2019-01-07 DIAGNOSIS — G89.29 CHRONIC MIDLINE LOW BACK PAIN WITH LEFT-SIDED SCIATICA: Primary | ICD-10-CM

## 2019-01-07 DIAGNOSIS — M54.42 CHRONIC MIDLINE LOW BACK PAIN WITH LEFT-SIDED SCIATICA: Primary | ICD-10-CM

## 2019-01-07 PROCEDURE — 99214 OFFICE O/P EST MOD 30 MIN: CPT | Performed by: FAMILY MEDICINE

## 2019-01-07 PROCEDURE — 72100 X-RAY EXAM L-S SPINE 2/3 VWS: CPT

## 2019-01-07 RX ORDER — ATORVASTATIN CALCIUM 40 MG/1
80 TABLET, FILM COATED ORAL DAILY
Qty: 180 TABLET | Refills: 3 | Status: SHIPPED | OUTPATIENT
Start: 2019-01-07 | End: 2020-04-03

## 2019-01-07 ASSESSMENT — MIFFLIN-ST. JEOR: SCORE: 2183.85

## 2019-01-07 NOTE — PROGRESS NOTES
SUBJECTIVE:   Roberto Thompson is a 58 year old male who presents to clinic today for the following health issues:        Back Pain       Duration: long time several years        Specific cause: none    Description:   Location of pain: low back both  Character of pain: shooting and dull, nagging   Pain radiation: both legs   New numbness or weakness in legs, not attributed to pain:  YES- numbness for a long time and in hands at time and muscles cramps     Intensity: varies     History:   Pain interferes with job: YES  History of back problems: recurrent self limited episodes of low back pain in the past, falls from scaffolding  Any previous MRI or X-rays: None  Sees a specialist for back pain:  No  Therapies tried without relief: chiropractor     Alleviating factors:   Improved by: none       Precipitating factors:  Worsened by: Lifting, not standing but after, better with walking around, bending is painful          Accompanying Signs & Symptoms:  Risk of Fracture:  None  Risk of Cauda Equina:  None  Risk of Infection:  None  Risk of Cancer:  None  Risk of Ankylosing Spondylitis:  Onset at age <35, male, AND morning back stiffness. no       Pain is dull in buttocks and shoots down to just above knee. Pain in lumbar area and radiates down posterior part of leg.   Does have neuropathy. 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 Was never evaluated for this.   Pt also morbidly obese, gets very little exercise. He does not work anymore    Pt recently seen for diabetes. Stated the tuojeo was not covered. RN called pharmacy and apparently rx never went through. It was now ordered.  Will reorder short acting insulin as pt will be out as he is using more than he was prescribed since unable to get the long acting insulin right away    Pt also needs refill of lipitor.     Headaches      Duration: long time     Description  Location: bilateral in the frontal area,  "bilateral in the temporal area   Character: throbbing pain  Frequency:  Almost on a daily basis   Duration:  10 minutes  To the full day     Visual changes: blurred vision      History  Head trauma: YES  Family history of migraines: no    Any changes in: no       Therapies tried and outcome: meds from [facility]     Outcome - not always   Frequent/daily pain medication use: YES    History of fall, unable to feel feet and was not looking       Will address headaches at upcoming visit    Problem list and histories reviewed & adjusted, as indicated.  Additional history: as documented    Labs reviewed in EPIC    Reviewed and updated as needed this visit by clinical staff       Reviewed and updated as needed this visit by Provider         ROS:  Constitutional, HEENT, cardiovascular, pulmonary, gi and gu systems are negative, except as otherwise noted.    OBJECTIVE:     /63   Pulse 77   Temp 97  F (36.1  C) (Oral)   Resp 18   Ht 1.753 m (5' 9\")   Wt 138.3 kg (305 lb)   SpO2 93%   BMI 45.04 kg/m    Body mass index is 45.04 kg/m .  GENERAL: healthy, alert and no distress  BACK: pain to palpation over mid lumbar spine. NO SI joint pain. Negative SLR test    Diagnostic Test Results:  Xray - lumbar spine: looks normal vertebrae and disc spaces    ASSESSMENT/PLAN:     1. Chronic midline low back pain with left-sided sciatica  Trial of PT, pt agreeable.   - XR Lumbar Spine 2/3 Views; Future  - [facility] PT, HAND, AND CHIROPRACTIC REFERRAL; Future    2. Type 2 diabetes mellitus with other specified complication, with long-term current use of insulin (H)  Pt to start this insulin to get better control  - insulin aspart (NOVOLOG PEN) 100 UNIT/ML pen; Inject 50 Units Subcutaneous 3 times daily (with meals) Plus scale:  See scale below  Dispense: 45 mL; Refill: 1    3. High blood cholesterol  Refill lipitor,    - atorvastatin (LIPITOR) 40 MG tablet; Take 2 tablets (80 mg) by mouth daily  Dispense: 180 tablet; Refill: 3  - XR " Lumbar Spine 2/3 Views; Future        Jaylyn Conte MD  [facility]

## 2019-01-07 NOTE — TELEPHONE ENCOUNTER
Per PCP, patient is being told by pharmacy that his insulin is not covered by his insurance.     This RN called [facility] pharmacy and spoke with Juju.  She said that the Toujeo IS covered.   Per verbal order from Dr Minor, this RN sent in order fro 3 month supply, 0 RF to pharmacy.     Patient going to pharmacy to  Rx after appointment.    Arianna Blum, BARONN RN

## 2019-01-21 ENCOUNTER — ALLIED HEALTH/NURSE VISIT (OUTPATIENT)
Dept: EDUCATION SERVICES | Facility: CLINIC | Age: 59
End: 2019-01-21
Payer: COMMERCIAL

## 2019-01-21 DIAGNOSIS — E11.22 TYPE 2 DIABETES MELLITUS WITH DIABETIC CHRONIC KIDNEY DISEASE (H): ICD-10-CM

## 2019-01-21 DIAGNOSIS — Z79.4 TYPE 2 DIABETES MELLITUS WITH OTHER SPECIFIED COMPLICATION, WITH LONG-TERM CURRENT USE OF INSULIN (H): ICD-10-CM

## 2019-01-21 DIAGNOSIS — I10 ESSENTIAL HYPERTENSION, MALIGNANT: ICD-10-CM

## 2019-01-21 DIAGNOSIS — N18.30 CHRONIC KIDNEY DISEASE, STAGE III (MODERATE) (H): ICD-10-CM

## 2019-01-21 DIAGNOSIS — E11.9 DIABETES MELLITUS WITHOUT COMPLICATION (H): Primary | ICD-10-CM

## 2019-01-21 DIAGNOSIS — E55.9 VITAMIN D DEFICIENCY: ICD-10-CM

## 2019-01-21 DIAGNOSIS — E11.69 TYPE 2 DIABETES MELLITUS WITH OTHER SPECIFIED COMPLICATION, WITH LONG-TERM CURRENT USE OF INSULIN (H): ICD-10-CM

## 2019-01-21 DIAGNOSIS — N17.9 ACUTE KIDNEY FAILURE, UNSPECIFIED (H): ICD-10-CM

## 2019-01-21 DIAGNOSIS — D63.8 ANEMIA IN OTHER CHRONIC DISEASES CLASSIFIED ELSEWHERE: ICD-10-CM

## 2019-01-21 LAB
ALBUMIN SERPL-MCNC: 3.8 G/DL (ref 3.4–5)
ALBUMIN UR-MCNC: ABNORMAL MG/DL
ANION GAP SERPL CALCULATED.3IONS-SCNC: 10 MMOL/L (ref 3–14)
APPEARANCE UR: CLEAR
BILIRUB UR QL STRIP: NEGATIVE
BUN SERPL-MCNC: 34 MG/DL (ref 7–30)
CALCIUM SERPL-MCNC: 8.8 MG/DL (ref 8.5–10.1)
CHLORIDE SERPL-SCNC: 99 MMOL/L (ref 94–109)
CO2 SERPL-SCNC: 28 MMOL/L (ref 20–32)
COLOR UR AUTO: YELLOW
CREAT SERPL-MCNC: 2.24 MG/DL (ref 0.66–1.25)
CREAT UR-MCNC: 32 MG/DL
ERYTHROCYTE [DISTWIDTH] IN BLOOD BY AUTOMATED COUNT: 14.1 % (ref 10–15)
GFR SERPL CREATININE-BSD FRML MDRD: 31 ML/MIN/{1.73_M2}
GLUCOSE SERPL-MCNC: 260 MG/DL (ref 70–99)
GLUCOSE UR STRIP-MCNC: >=1000 MG/DL
HCT VFR BLD AUTO: 39.8 % (ref 40–53)
HGB BLD-MCNC: 13.3 G/DL (ref 13.3–17.7)
HGB UR QL STRIP: ABNORMAL
KETONES UR STRIP-MCNC: NEGATIVE MG/DL
LEUKOCYTE ESTERASE UR QL STRIP: NEGATIVE
MCH RBC QN AUTO: 30.6 PG (ref 26.5–33)
MCHC RBC AUTO-ENTMCNC: 33.4 G/DL (ref 31.5–36.5)
MCV RBC AUTO: 92 FL (ref 78–100)
NITRATE UR QL: NEGATIVE
NON-SQ EPI CELLS #/AREA URNS LPF: ABNORMAL /LPF
PH UR STRIP: 6 PH (ref 5–7)
PHOSPHATE SERPL-MCNC: 2.6 MG/DL (ref 2.5–4.5)
PLATELET # BLD AUTO: 193 10E9/L (ref 150–450)
POTASSIUM SERPL-SCNC: 4.3 MMOL/L (ref 3.4–5.3)
PROT UR-MCNC: 0.29 G/L
PROT/CREAT 24H UR: 0.91 G/G CR (ref 0–0.2)
PTH-INTACT SERPL-MCNC: 156 PG/ML (ref 18–80)
RBC # BLD AUTO: 4.34 10E12/L (ref 4.4–5.9)
RBC #/AREA URNS AUTO: ABNORMAL /HPF
SODIUM SERPL-SCNC: 137 MMOL/L (ref 133–144)
SOURCE: ABNORMAL
SP GR UR STRIP: 1.01 (ref 1–1.03)
UROBILINOGEN UR STRIP-ACNC: 0.2 EU/DL (ref 0.2–1)
WBC # BLD AUTO: 7 10E9/L (ref 4–11)
WBC #/AREA URNS AUTO: ABNORMAL /HPF

## 2019-01-21 PROCEDURE — 36415 COLL VENOUS BLD VENIPUNCTURE: CPT | Performed by: INTERNAL MEDICINE

## 2019-01-21 PROCEDURE — G0108 DIAB MANAGE TRN  PER INDIV: HCPCS

## 2019-01-21 PROCEDURE — 80069 RENAL FUNCTION PANEL: CPT | Performed by: INTERNAL MEDICINE

## 2019-01-21 PROCEDURE — 82306 VITAMIN D 25 HYDROXY: CPT | Performed by: INTERNAL MEDICINE

## 2019-01-21 PROCEDURE — 83970 ASSAY OF PARATHORMONE: CPT | Performed by: INTERNAL MEDICINE

## 2019-01-21 PROCEDURE — 81001 URINALYSIS AUTO W/SCOPE: CPT | Performed by: INTERNAL MEDICINE

## 2019-01-21 PROCEDURE — 85027 COMPLETE CBC AUTOMATED: CPT | Performed by: INTERNAL MEDICINE

## 2019-01-21 PROCEDURE — 84156 ASSAY OF PROTEIN URINE: CPT | Performed by: INTERNAL MEDICINE

## 2019-01-21 NOTE — PROGRESS NOTES
"Diabetes Self-Management Education & Support    Diabetes Education Self Management & Training    SUBJECTIVE/OBJECTIVE:  Presents for: Individual review  Accompanied by: Self, Spouse  Diabetes education in the past 24mo: No  Focus of Visit: Patient Unsure, Monitoring, Taking Medication, Healthy Eating  Diabetes type: Type 2  Disease course: Worsening  Transportation concerns: No  Other concerns:: Language barrier  Cultural Influences/Ethnic Background:  [facility]        Diabetes Symptoms & Complications  Blurred vision: Yes  Fatigue: Yes  Neuropathy: Yes  Foot ulcerations: No  Polydipsia: Yes  Polyphagia: No  Polyuria: No  Visual change: Yes  Weakness: Yes  Weight loss: No  Slow healing wounds: Yes  Recent Infection(s): No  Symptom course: Worsening  Weight trend: Stable  Autonomic neuropathy: Yes  CVA: No  Heart disease: No  Nephropathy: Yes  Peripheral neuropathy: Yes  Peripheral Vascular Disease: Yes  Retinopathy: Yes    Patient Problem List and Family Medical History reviewed for relevant medical history, current medical status, and diabetes risk factors.    Vitals:  There were no vitals taken for this visit.  Estimated body mass index is 45.04 kg/m  as calculated from the following:    Height as of 1/7/19: 1.753 m (5' 9\").    Weight as of 1/7/19: 138.3 kg (305 lb).   Last 3 BP:   BP Readings from Last 3 Encounters:   01/07/19 112/63   01/02/19 142/74   12/18/18 115/64       History   Smoking Status     Never Smoker   Smokeless Tobacco     Never Used       Labs:  Lab Results   Component Value Date    A1C 10.6 12/18/2018     Lab Results   Component Value Date     12/18/2018     Lab Results   Component Value Date    LDL  08/08/2018     Cannot estimate LDL when triglyceride exceeds 400 mg/dL     08/08/2018     HDL Cholesterol   Date Value Ref Range Status   08/08/2018 35 (L) >39 mg/dL Final   ]  GFR Estimate   Date Value Ref Range Status   12/18/2018 34 (L) >60 mL/min/1.7m2 Final     Comment:     Non "  GFR Calc     GFR Estimate If Black   Date Value Ref Range Status   12/18/2018 41 (L) >60 mL/min/1.7m2 Final     Comment:      GFR Calc     Lab Results   Component Value Date    CR 2.03 12/18/2018     No results found for: MICROALBUMIN    Healthy Eating  Healthy Eating Assessed Today: Yes  Cultural/Presybeterian diet restrictions?: No  Meal planning: Avoiding sweets  Meals include: Breakfast, Lunch, Dinner, Snacks  Breakfast: skipping , will not anymore, meat only/ at follow up eats breakfast  Lunch: skipping,   Dinner: loves potatoes  Beverages: Water, Milk, Juice, Diet soda  Has patient met with a dietitian in the past?: No    Being Active  Being Active Assessed Today: Yes  Exercise:: Currently not exercising  Barrier to exercise: Physical limitation    Monitoring  Monitoring Assessed Today: Yes  Did patient bring glucose meter to appointment? : No  Blood Glucose Meter: One Touch  Home Glucose (Sugar) Monitoring: 3-4 times per week  Low Glucose Range (mg/dL): 180-200  High Glucose Range (mg/dL): >200  Overall Range (mg/dL): >200        Taking Medications  Diabetes Medication(s)     Insulin Sig    insulin glargine U-300 (TOUJEO) 300 UNIT/ML injection Inject 120 Units Subcutaneous daily    insulin aspart (NOVOLOG PEN) 100 UNIT/ML pen Inject 50 Units Subcutaneous 3 times daily (with meals) Plus scale:  See scale below          Taking Medication Assessed Today: Yes  Current Treatments: Diet, Insulin Injections  Dose schedule: pre-breakfast  Given by: Patient  Injection/Infusion sites: Abdomen  Problems taking diabetes medications regularly?: No  Diabetes medication side effects?: No  Treatment Compliance: All of the time    Problem Solving  Problem Solving Assessed Today: Yes  Hypoglycemia Frequency: Never              Reducing Risks  Diabetes Risks: None, Family History, Hyperlipidemia, Sedentary Lifestyle, Ethnicity  CAD Risks: Diabetes Mellitus, Family history, Male sex, Hypertension,  "Obesity, Sedentary lifestyle, Dyslipidemia    Healthy Coping  Stage of change: MAINTENANCE (Working to maintain change, with risk of relapse)  Patient Activation Measure Survey Score:  No flowsheet data found.    ASSESSMENT:  Roberto is here and has not changed any of his ways.  He has not been taking his insulin as ordered, he began taking 50 units with meals.  But he does not always eat.  His wife states he lye's in bed all day long, then gets up to eat a LARGE dinner.  He gives off the attitude that this is a joke, and will make light of the issue and make jokes.  I pulled up the heart and circulation system on the computer so he could see the damage he is doing to his heart and vessels and organs by having high triglycerides and high BG.  \"do not scare me.\"  He said, I told him I am letting him know the truth of how he is destroying his body by not following a better eating plan.  Will follow up in March    Goals Addressed as of 1/21/2019 at 5:21 PM                 Today      Healthy Eating (pt-stated)       Added 1/21/19 by Oneida Abraham RN     My Goal:f I will follow a better meal plan    What I need to meet my goal: 1. Eat 3 meals per day, and only 1 plate, not 3    I plan to meet my goal by this date: 2 months          Medication 1 (pt-stated)   20%    Added 12/28/18 by Oneida Abraham, RN     My Goal: I will be more consistent with medications    What I need to meet my goal: Diabetes Support Resources:  1. Toujeo take 100 units starting on Tuesday Jan. 1 with breakfast.  Then stop taking Basaglar insulin.    2. Novolog take 25 units with breakfast and 25 units with lunch, and 25 units with dinner    Plus the scale with each meal:  Plus the following coverage scale:  120-149=1 units  150-199=2 units  200-249=3 units  250-299=5 units  300-349=7 units  350 or more =8 units    I plan to meet my goal by this date: 1 months            Patient's most recent   Lab Results   Component Value Date    A1C 10.6 " 12/18/2018    is not meeting goal of <7.0    INTERVENTION:   Diabetes knowledge and skills assessment:     Patient is knowledgeable in diabetes management concepts related to: Monitoring    Patient needs further education on the following diabetes management concepts: Healthy Eating, Being Active, Monitoring, Taking Medication, Problem Solving, Reducing Risks and Healthy Coping    Based on learning assessment above, most appropriate setting for further diabetes education would be: Individual setting.    Education provided today on:  AADE Self-Care Behaviors:  Diabetes Pathophysiology  Healthy Eating: carbohydrate counting, consistency in amount, composition, and timing of food intake, weight reduction, heart healthy diet, eating out, portion control, plate planning method and label reading  Being Active: relationship to blood glucose and describe appropriate activity program  Taking Medication: action of prescribed medication, drawing up, administering and storing injectable diabetes medications, proper site selection and rotation for injections, side effects of prescribed medications and when to take medications  Reducing Risks: major complications of diabetes and A1C - goals, relating to blood glucose levels, how often to check  Healthy Coping: recognize feelings about diagnosis, benefits of making appropriate lifestyle changes and utilize support systems    Opportunities for ongoing education and support in diabetes-self management were discussed.    Pt verbalized understanding of concepts discussed and recommendations provided today.       Education Materials Provided:  No new materials provided today    PLAN:  See Patient Instructions for co-developed, patient-stated behavior change goals.  AVS printed and provided to patient today. See Follow-Up section for recommended follow-up.    Salena Abraham RN/DEYSI [place] Diabetes Educator    Time Spent: 60 minutes  Encounter Type: Individual    Any diabetes medication  dose changes were made via the CDE Protocol and Collaborative Practice Agreement with the patient's primary care provider. A copy of this encounter was shared with the provider.

## 2019-01-21 NOTE — LETTER
Dear Kidney specialists,    Roberto is very insulin resistant and does not portion his meals as he should.  At some point we will need to make some intense insulin changes, and then will need an Endocrinologist to follow him.    In the meantime, I am looking at starting him on a GLP-1 such as Victoza or Trulicity or Ozempic, depending on the insurance coverage.  This may be of great benefit, unfortunately I do not know how this will effect his kidney function. Since he coming to see you, could you please advise if this is an option.  Do not wish to further his kidney damage.  With the high triglycerides and high BG , fear of kidney and heart failure are in the near future.  I have pointed this out to Roberto as well.    Please advise of the appropriate medications he can use and let me or his primary DrRena Be aware of this.  Dr. Minor.      Thank you so very much    Salena Abraham RN/JHONATANE  [place] Diabetes Educator  [phone]  [facility]

## 2019-01-21 NOTE — PATIENT INSTRUCTIONS
Plus the following coverage scale:  120-149=1 units  150-199=2 units  200-249=3 units  250-299=5 units  300-349=7 units  350 or more =8 units  BREAKFAST TAKE 25 UNITS + THE ABOVE SCALE    LUNCH TAKE 25 UNITS + THE ABOVE SCALE    DINNER TAKE 25 UNITS + THE ABOVE SCALE    TOUJEO INSULIN TAKE 120 UNITS DAILY    Please take note to the kidney DOCTOR. For advise.

## 2019-01-23 DIAGNOSIS — E55.9 VITAMIN D DEFICIENCY: ICD-10-CM

## 2019-01-23 NOTE — TELEPHONE ENCOUNTER
Routing refill request to provider for review/approval because:  Drug not on the [facility] refill protocol   Mara Alfaro BSN, RN

## 2019-01-25 LAB
DEPRECATED CALCIDIOL+CALCIFEROL SERPL-MC: 55 UG/L (ref 20–75)
VITAMIN D2 SERPL-MCNC: 47 UG/L
VITAMIN D3 SERPL-MCNC: 8 UG/L

## 2019-01-25 RX ORDER — ERGOCALCIFEROL 1.25 MG/1
50000 CAPSULE, LIQUID FILLED ORAL WEEKLY
Qty: 1 CAPSULE | Refills: 0 | Status: SHIPPED | OUTPATIENT
Start: 2019-01-25 | End: 2020-10-27

## 2019-01-30 ENCOUNTER — TELEPHONE (OUTPATIENT)
Dept: FAMILY MEDICINE | Facility: CLINIC | Age: 59
End: 2019-01-30

## 2019-01-30 NOTE — TELEPHONE ENCOUNTER
Dipti at the [facility] in [place] is calling stating that Roberto is refilling both insulin glargine U-300 (TOUJEO) 300 UNIT/ML injection and basaglar.  They are worried because they are the same concentration of the same insulin and a big dose difference.  Please call to advise.  Thank you  Caller informed that calls received after 3pm may not be returned same day.

## 2019-01-31 ENCOUNTER — TRANSFERRED RECORDS (OUTPATIENT)
Dept: HEALTH INFORMATION MANAGEMENT | Facility: CLINIC | Age: 59
End: 2019-01-31

## 2019-01-31 DIAGNOSIS — E78.00 HIGH BLOOD CHOLESTEROL: Primary | ICD-10-CM

## 2019-01-31 RX ORDER — FENOFIBRATE 145 MG/1
145 TABLET, COATED ORAL DAILY
Qty: 90 TABLET | Refills: 0 | Status: SHIPPED | OUTPATIENT
Start: 2019-01-31 | End: 2019-04-08

## 2019-01-31 NOTE — TELEPHONE ENCOUNTER
Will discontinue the Basaglar, called the pharmacy to notify.    Salena Abraham RN/DEYSI  [place] Diabetes Educator

## 2019-02-05 ENCOUNTER — THERAPY VISIT (OUTPATIENT)
Dept: PHYSICAL THERAPY | Facility: CLINIC | Age: 59
End: 2019-02-05
Payer: COMMERCIAL

## 2019-02-05 DIAGNOSIS — M54.50 LUMBAGO: ICD-10-CM

## 2019-02-05 DIAGNOSIS — G89.29 CHRONIC MIDLINE LOW BACK PAIN WITH LEFT-SIDED SCIATICA: ICD-10-CM

## 2019-02-05 DIAGNOSIS — M54.42 CHRONIC MIDLINE LOW BACK PAIN WITH LEFT-SIDED SCIATICA: ICD-10-CM

## 2019-02-05 PROBLEM — M54.41 CHRONIC BILATERAL LOW BACK PAIN WITH SCIATICA: Status: ACTIVE | Noted: 2019-02-05

## 2019-02-05 PROBLEM — M54.40 CHRONIC BILATERAL LOW BACK PAIN WITH SCIATICA: Status: ACTIVE | Noted: 2019-02-05

## 2019-02-05 PROCEDURE — 97112 NEUROMUSCULAR REEDUCATION: CPT | Mod: GP | Performed by: PHYSICAL THERAPIST

## 2019-02-05 PROCEDURE — 97161 PT EVAL LOW COMPLEX 20 MIN: CPT | Mod: GP | Performed by: PHYSICAL THERAPIST

## 2019-02-05 PROCEDURE — 97110 THERAPEUTIC EXERCISES: CPT | Mod: GP | Performed by: PHYSICAL THERAPIST

## 2019-02-05 NOTE — PROGRESS NOTES
"[facility] Initial Evaluation  Subjective:  The history is provided by the patient. The history is limited by a language barrier. A  was used.   Roberto Thompson is a 58 year old male with a lumbar condition.  Condition occurred with:  Insidious onset.  Condition occurred: for unknown reasons.  This is a chronic condition  Patient was referred to physical therapy 1/07/19.    Patient reports pain:  Central lumbar spine, lumbar spine right and lumbar spine left.  Radiates to:  Gluteals left, gluteals right, thigh left and thigh right.  Pain is described as aching and is intermittent and reported as 4/10.  Associated symptoms:  Loss of motion/stiffness and other (\"occasional weakness\" in legs). Worse during: no diurnal pattern noted; symptoms variable throughout the day.  Symptoms are exacerbated by lifting, bending, standing and other (sometimes sitting; sit to stand transitions) and relieved by activity/movement and rest (laying down).  Since onset symptoms are gradually worsening (LBP episodes have increased in frequency ).  Special tests:  X-ray (negative per patient   ).  Previous treatment includes chiropractic.  There was significant improvement following previous treatment.  General health as reported by patient is poor.  Pertinent medical history includes:  Diabetes, high blood pressure, history of fractures, kidney disease, migraines/headaches, overweight, sleep disorder/apnea and other (peripheral neuropathy bilateral LE's).  Medical allergies: yes (flu vaccine).  Surgical history: 2 toes amputated on right foot due to infection   Current medications:  High blood pressure medication and other (Insulin, cholesterol meds, baby Aspirin).  Current occupation is Currently unemployed; previously worked construction for many years.        Barriers include:  Requires assistance with ADL's (wife assists with putting sock on right foot).    Red flags:  Chest pain (patient " "reports hx of occasional chest pain; had echocardiogram and is currently under care of MD for this).    Patient's Goal: \"to get better\"                    Objective:  System         Lumbar/SI Evaluation    Lumbar Myotomes:    T12-L3 (Hip Flex):  Left: 3+    Right: 3  L2-4 (Quads):  Left:  5    Right:  5  L4 (Ankle DF):  Left:  5    Right:  3+  L5 (Great Toe Ext): Left: 5              Neural Tension/Mobility:  Lumbar:  Normal        Lumbar Palpation:  not assessed                                                           Mi Lumbar Evaluation    Posture:  Sitting: poor  Standing: fair  Lordosis: Reduced  Lateral Shift: no  Correction of Posture: better    Movement Loss:  Flexion (Flex): mod and pain  Extension (EXT): major and pain  Side [place] R (SG R): major and pain  Side [place] L (SG L): major and pain  Test Movements:        EIL: During: produces    Repeat EIL: During: produces  After: better  Mechanical Response: IncROM        Conclusion: derangement  Principle of Treatment:  Posture Correction: Use of lumbar roll when sitting    Extension: Press-ups 10x every 2-3 hrs                                           ROS    Assessment/Plan:    Patient is a 58 year old male with lumbar complaints.    Patient has the following significant findings with corresponding treatment plan.                Diagnosis 1:  LBP   Pain -  self management, education, directional preference exercise and home program  Decreased ROM/flexibility - therapeutic exercise  Decreased strength - therapeutic exercise and therapeutic activities  Decreased function - home program  Impaired posture - neuro re-education    Therapy Evaluation Codes:   1) History comprised of:   Personal factors that impact the plan of care:      Social history/culture and Time since onset of symptoms.    Comorbidity factors that impact the plan of care are:      Chest pain, Diabetes, High blood pressure, Overweight and peripheral neuropathy.     Medications " impacting care: Cardiac, High blood pressure and Insulin.  2) Examination of Body Systems comprised of:   Body structures and functions that impact the plan of care:      Lumbar spine.   Activity limitations that impact the plan of care are:      Bending, Sitting and Standing.  3) Clinical presentation characteristics are:   Stable/Uncomplicated.  4) Decision-Making    Low complexity using standardized patient assessment instrument and/or measureable assessment of functional outcome.  Cumulative Therapy Evaluation is: Low complexity.    Previous and current functional limitations:  (See Goal Flow Sheet for this information)    Short term and Long term goals: (See Goal Flow Sheet for this information)     Communication ability:  Patient has an  for communication clarity.  Treatment Explanation - The following has been discussed with the patient:    RX ordered/plan of care  Anticipated outcomes  Possible risks and side effects  This patient would benefit from PT intervention to resume normal activities.   Rehab potential is fair/good    Frequency:  1 X week, once daily  Duration:  for 6 weeks  Discharge Plan:  Achieve all LTG.  Independent in home treatment program.  Reach maximal therapeutic benefit.    Please refer to the daily flowsheet for treatment today, total treatment time and time spent performing 1:1 timed codes.

## 2019-02-05 NOTE — PROGRESS NOTES
[facility] Initial Evaluation  Subjective:  HPI                    Objective:  System    Physical Exam    General     ROS    Assessment/Plan:    {REHAB NOTES:[id number]}

## 2019-02-13 ENCOUNTER — TELEPHONE (OUTPATIENT)
Dept: FAMILY MEDICINE | Facility: CLINIC | Age: 59
End: 2019-02-13

## 2019-02-13 NOTE — TELEPHONE ENCOUNTER
Panel Management Review      Patient has the following on his problem list:     Diabetes    ASA: Passed    Last A1C  Lab Results   Component Value Date    A1C 10.6 12/18/2018    A1C 9.7 08/08/2018     A1C tested: FAILED    Last LDL:    Lab Results   Component Value Date    CHOL 309 08/08/2018     Lab Results   Component Value Date    HDL 35 08/08/2018     Lab Results   Component Value Date    LDL  08/08/2018     Cannot estimate LDL when triglyceride exceeds 400 mg/dL     08/08/2018     Lab Results   Component Value Date    TRIG 1,413 08/08/2018     No results found for: CHOLHDLRATIO  Lab Results   Component Value Date    NHDL 274 08/08/2018       Is the patient on a Statin? YES             Is the patient on Aspirin? YES    Medications     HMG CoA Reductase Inhibitors    atorvastatin (LIPITOR) 40 MG tablet    Salicylates    aspirin 81 MG EC tablet          Last three blood pressure readings:  BP Readings from Last 3 Encounters:   01/07/19 112/63   01/02/19 142/74   12/18/18 115/64       Date of last diabetes office visit: 1/7/19     Tobacco History:     History   Smoking Status     Never Smoker   Smokeless Tobacco     Never Used           Composite cancer screening  Chart review shows that this patient is due/due soon for the following None  Summary:    Patient is due/failing the following:   A1C    Action needed:   None, just seen     Type of outreach:    none    Questions for provider review:    None                                                                                                                                    Solange Harris MA       Chart routed to Care Team .

## 2019-02-18 ENCOUNTER — MEDICAL CORRESPONDENCE (OUTPATIENT)
Dept: HEALTH INFORMATION MANAGEMENT | Facility: CLINIC | Age: 59
End: 2019-02-18

## 2019-02-25 ENCOUNTER — ALLIED HEALTH/NURSE VISIT (OUTPATIENT)
Dept: EDUCATION SERVICES | Facility: CLINIC | Age: 59
End: 2019-02-25
Payer: COMMERCIAL

## 2019-02-25 ENCOUNTER — TELEPHONE (OUTPATIENT)
Dept: FAMILY MEDICINE | Facility: CLINIC | Age: 59
End: 2019-02-25

## 2019-02-25 DIAGNOSIS — E11.9 DIABETES MELLITUS WITHOUT COMPLICATION (H): ICD-10-CM

## 2019-02-25 DIAGNOSIS — Z79.4 TYPE 2 DIABETES MELLITUS WITH OTHER SPECIFIED COMPLICATION, WITH LONG-TERM CURRENT USE OF INSULIN (H): Primary | ICD-10-CM

## 2019-02-25 DIAGNOSIS — E11.69 TYPE 2 DIABETES MELLITUS WITH OTHER SPECIFIED COMPLICATION, WITH LONG-TERM CURRENT USE OF INSULIN (H): Primary | ICD-10-CM

## 2019-02-25 PROCEDURE — G0108 DIAB MANAGE TRN  PER INDIV: HCPCS

## 2019-02-25 RX ORDER — LIRAGLUTIDE 6 MG/ML
1.2 INJECTION SUBCUTANEOUS DAILY
Qty: 6 ML | Refills: 3 | Status: SHIPPED | OUTPATIENT
Start: 2019-02-25 | End: 2019-03-25

## 2019-02-25 RX ORDER — LIRAGLUTIDE 6 MG/ML
1.2 INJECTION SUBCUTANEOUS DAILY
Qty: 6 ML | Refills: 3 | Status: SHIPPED | OUTPATIENT
Start: 2019-02-25 | End: 2019-02-25

## 2019-02-25 NOTE — TELEPHONE ENCOUNTER
Reason for Call:  Form, our goal is to have forms completed with 72 hours, however, some forms may require a visit or additional information.    Type of letter, form or note:  disability    Who is the form from?: Patient    Where did the form come from: Patient or family brought in       What clinic location was the form placed at?: [facility]    Where the form was placed: 's Box    What number is listed as a contact on the form?: Daughter- Nakita [phone] or can call patient's# and leave a message.       Additional comments: Patient is asking for a call when ready to  form. You can call daughter or leave a message at patient's cell#.  Thank you    Call taken on 2/25/2019 at 12:24 PM by Mercedes Lara

## 2019-02-25 NOTE — PROGRESS NOTES
"Diabetes Self-Management Education & Support    Diabetes Education Self Management & Training    SUBJECTIVE/OBJECTIVE:  Presents for: Individual review  Accompanied by: Self, Spouse  Diabetes education in the past 24mo: No  Focus of Visit: Patient Unsure, Monitoring, Taking Medication, Healthy Eating  Diabetes type: Type 2  Disease course: Worsening  Transportation concerns: No  Other concerns:: Language barrier  Cultural Influences/Ethnic Background:  [facility]        Diabetes Symptoms & Complications  Blurred vision: Yes  Fatigue: Yes  Neuropathy: Yes  Foot ulcerations: No  Polydipsia: Yes  Polyphagia: No  Polyuria: No  Visual change: Yes  Weakness: Yes  Weight loss: No  Slow healing wounds: Yes  Recent Infection(s): No  Symptom course: Worsening  Weight trend: Stable  Autonomic neuropathy: Yes  CVA: No  Heart disease: No  Nephropathy: Yes  Peripheral neuropathy: Yes  Peripheral Vascular Disease: Yes  Retinopathy: Yes    Patient Problem List and Family Medical History reviewed for relevant medical history, current medical status, and diabetes risk factors.    Vitals:  There were no vitals taken for this visit.  Estimated body mass index is 45.04 kg/m  as calculated from the following:    Height as of 1/7/19: 1.753 m (5' 9\").    Weight as of 1/7/19: 138.3 kg (305 lb).   Last 3 BP:   BP Readings from Last 3 Encounters:   01/07/19 112/63   01/02/19 142/74   12/18/18 115/64       History   Smoking Status     Never Smoker   Smokeless Tobacco     Never Used       Labs:  Lab Results   Component Value Date    A1C 10.6 12/18/2018     Lab Results   Component Value Date     01/21/2019     Lab Results   Component Value Date    LDL  08/08/2018     Cannot estimate LDL when triglyceride exceeds 400 mg/dL     08/08/2018     HDL Cholesterol   Date Value Ref Range Status   08/08/2018 35 (L) >39 mg/dL Final   ]  GFR Estimate   Date Value Ref Range Status   01/21/2019 31 (L) >60 mL/min/[1.73_m2] Final     Comment:     " Non  GFR Calc  Starting 12/18/2018, serum creatinine based estimated GFR (eGFR) will be   calculated using the Chronic Kidney Disease Epidemiology Collaboration   (CKD-EPI) equation.       GFR Estimate If Black   Date Value Ref Range Status   01/21/2019 36 (L) >60 mL/min/[1.73_m2] Final     Comment:      GFR Calc  Starting 12/18/2018, serum creatinine based estimated GFR (eGFR) will be   calculated using the Chronic Kidney Disease Epidemiology Collaboration   (CKD-EPI) equation.       Lab Results   Component Value Date    CR 2.24 01/21/2019     No results found for: MICROALBUMIN    Healthy Eating  Healthy Eating Assessed Today: Yes  Cultural/Yazidi diet restrictions?: No  Meal planning: Avoiding sweets  Meals include: Breakfast, Lunch, Dinner, Snacks  Breakfast: now eating breakfast, fish , and bread.   Lunch: sausage, bread  Dinner: loves potatoes  Other: he does not eat 3 meals per day , and it appears he will not change in his ways  Beverages: Water, Milk, Juice, Diet soda  Has patient met with a dietitian in the past?: No    Being Active  Being Active Assessed Today: Yes  Exercise:: Currently not exercising  Barrier to exercise: Physical limitation    Monitoring  Monitoring Assessed Today: Yes  Did patient bring glucose meter to appointment? : No  Blood Glucose Meter: One Touch  Home Glucose (Sugar) Monitoring: 3-4 times per week  Low Glucose Range (mg/dL): 110-130  High Glucose Range (mg/dL): >200  Overall Range (mg/dL): >200        Taking Medications  Diabetes Medication(s)     Insulin       insulin aspart (NOVOLOG PEN) 100 UNIT/ML pen    Inject 30 Units Subcutaneous 3 times daily (with meals) Plus scale:  See scale below     insulin glargine U-300 (TOUJEO) 300 UNIT/ML injection    Inject 132 Units Subcutaneous every morning    Incretin Mimetic Agents (GLP-1 Receptor Agonists)       liraglutide (VICTOZA) 18 MG/3ML solution    Inject 1.2 mg Subcutaneous daily     Semaglutide  0.25 or 0.5 MG/DOSE SOPN    Inject 0.25 mg Subcutaneous once a week          Taking Medication Assessed Today: Yes  Current Treatments: Diet, Insulin Injections(Takes 120 units of Toujeo and Novolog 25 units with meals plus coverage.)  Dose schedule: pre-breakfast  Given by: Patient  Injection/Infusion sites: Abdomen  Problems taking diabetes medications regularly?: No  Diabetes medication side effects?: No  Treatment Compliance: All of the time    Problem Solving  Problem Solving Assessed Today: Yes  Hypoglycemia Frequency: Never              Reducing Risks  Diabetes Risks: None, Family History, Hyperlipidemia, Sedentary Lifestyle, Ethnicity, Age over 45 years  CAD Risks: Diabetes Mellitus, Family history, Male sex, Hypertension, Obesity, Sedentary lifestyle, Dyslipidemia  Has dilated eye exam at least once a year?: Yes    Healthy Coping  Emotional response to diabetes: Acceptance, Uncertain  Stage of change: RELAPSE (Returned to unhealthy behavior)  Difficulty affording diabetes management supplies?: No  Patient Activation Measure Survey Score:  No flowsheet data found.    ASSESSMENT:  Roberto clearly is not interested in making any life changes.  He does not eat as he has been instructed.  It does not seem to upset him when his BG are in the 500's. I have taught him all I can about eating and how to eat.  He refuses to exercise.  Spent most of the hr. On his cell phone as if he is checked out.  I did increase his insulin per protocol and start him on a GLP-1 , Victoza 0.6 mg X 7 days and on day 8 increase to 1.2 mg daily.  Will evaluate in 1 month    Goals Addressed as of 2/25/2019 at 5:49 PM                 Today    1/21/19      Healthy Eating (pt-stated)   10%      Added 1/21/19 by Oneida Abraham, RN     My Goal:f I will follow a better meal plan    What I need to meet my goal: 1. Eat 3 meals per day, and only 1 plate, not 3    I plan to meet my goal by this date: 2 months          Medication 1 (pt-stated)    80%  20%    Added 12/28/18 by Oneida Abraham, RN     My Goal: I will be more consistent with medications    What I need to meet my goal: Diabetes Support Resources:  1. Toujeo take 132 units @ 10:00 am    2. Novolog take 30 units with breakfast and 30 units with lunch, and 30 units with dinner    Plus the scale with each meal:  Plus the following coverage scale:  120-149=1 units  150-199=2 units  200-249=3 units  250-299=5 units  300-349=7 units  350 or more =8 units    3. Take Victoza 0.6 mg every day X 7 days then on day 8 increase to 1.2 mg daily    I plan to meet my goal by this date: 1 months            Patient's most recent   Lab Results   Component Value Date    A1C 10.6 12/18/2018    is not meeting goal of <7.0    INTERVENTION:   Diabetes knowledge and skills assessment:     Patient is knowledgeable in diabetes management concepts related to: Monitoring and Taking Medication    Patient needs further education on the following diabetes management concepts: Healthy Eating, Being Active and Healthy Coping    Based on learning assessment above, most appropriate setting for further diabetes education would be: Individual setting.    Education provided today on:  AADE Self-Care Behaviors:  Diabetes Pathophysiology  Healthy Eating: carbohydrate counting, consistency in amount, composition, and timing of food intake, weight reduction, heart healthy diet, eating out, portion control, plate planning method and label reading  Being Active: relationship to blood glucose and describe appropriate activity program  Taking Medication: action of prescribed medication, drawing up, administering and storing injectable diabetes medications, proper site selection and rotation for injections, side effects of prescribed medications and when to take medications  Healthy Coping: recognize feelings about diagnosis, benefits of making appropriate lifestyle changes, utilize support systems and methods for coping with stress  GLP-1  administration technique taught today. Patient verbalized understanding and was able to perform an accurate return demonstration of administration technique. Side effects were discussed, if patient has any abdominal pain, with or without nausea and/or vomiting, stop medication, call provider, clinic or go to the emergency room.    Opportunities for ongoing education and support in diabetes-self management were discussed.    Pt verbalized understanding of concepts discussed and recommendations provided today.       Education Materials Provided:  No new materials provided today    PLAN:  See Patient Instructions for co-developed, patient-stated behavior change goals.  AVS printed and provided to patient today. See Follow-Up section for recommended follow-up.    Salena Abraham RN/DEYSI  [facility] Educator    Time Spent: 60 minutes  Encounter Type: Individual    Any diabetes medication dose changes were made via the CDE Protocol and Collaborative Practice Agreement with the patient's primary care provider. A copy of this encounter was shared with the provider.

## 2019-02-25 NOTE — PATIENT INSTRUCTIONS
Diabetes Support Resources:  Take Victoza dose at about the same time every day.     Keep unused Victoza pens in the refrigerator. After first priming dose and starting to use pen, can keep at room temperature for 30 days. Throw pen away after 30 days, even if there is still medicine left in the pen.      Call your doctor if you experience abdominal pain that is severe and will not go away while using Victoza.               Bring blood glucose meter and logbook with you to all doctor and follow-up appointments.    Diabetes Education Telephone Visit Follow-up:    We realize your time is valuable and your health is important! We offer a convenient Telephone Visit follow up! It s a quick way to check in for a medication dose adjustment without having to come back to clinic as soon.    Telephone Visits are often covered by insurance. Please check with your insurance plan to see if this type of visit is covered. If not, the cost is less expensive than an office visit:      Up to 10 minutes (Code 38198): $30    11-20 minutes (Code 54298): $59    More than 20 minutes (Code 23079): $85    Talk with your Diabetes Educator if you want to learn more.      [facility]:  For Your Diabetes Education and Nutrition Appointments Call:  [phone]   For Diabetes Education or Nutrition Related Questions:   Phone: [phone]  E-mail: [email or web address]  Fax: [phone]   If you need a medication refill please contact your pharmacy. Please allow 3 business days for your refills to be completed.

## 2019-02-26 ENCOUNTER — THERAPY VISIT (OUTPATIENT)
Dept: PHYSICAL THERAPY | Facility: CLINIC | Age: 59
End: 2019-02-26
Payer: COMMERCIAL

## 2019-02-26 ENCOUNTER — TELEPHONE (OUTPATIENT)
Dept: FAMILY MEDICINE | Facility: CLINIC | Age: 59
End: 2019-02-26

## 2019-02-26 DIAGNOSIS — G89.29 CHRONIC BILATERAL LOW BACK PAIN WITH BILATERAL SCIATICA: ICD-10-CM

## 2019-02-26 DIAGNOSIS — M54.41 CHRONIC BILATERAL LOW BACK PAIN WITH BILATERAL SCIATICA: ICD-10-CM

## 2019-02-26 DIAGNOSIS — Z79.4 TYPE 2 DIABETES MELLITUS WITH OTHER SPECIFIED COMPLICATION, WITH LONG-TERM CURRENT USE OF INSULIN (H): ICD-10-CM

## 2019-02-26 DIAGNOSIS — E11.69 TYPE 2 DIABETES MELLITUS WITH OTHER SPECIFIED COMPLICATION, WITH LONG-TERM CURRENT USE OF INSULIN (H): ICD-10-CM

## 2019-02-26 DIAGNOSIS — M54.42 CHRONIC BILATERAL LOW BACK PAIN WITH BILATERAL SCIATICA: ICD-10-CM

## 2019-02-26 PROCEDURE — 97110 THERAPEUTIC EXERCISES: CPT | Mod: GP | Performed by: PHYSICAL THERAPIST

## 2019-02-26 PROCEDURE — 97112 NEUROMUSCULAR REEDUCATION: CPT | Mod: GP | Performed by: PHYSICAL THERAPIST

## 2019-02-26 PROCEDURE — 97530 THERAPEUTIC ACTIVITIES: CPT | Mod: GP | Performed by: PHYSICAL THERAPIST

## 2019-02-26 NOTE — TELEPHONE ENCOUNTER
The form requires the patient to sign it, not the provider.  The letter states that the application for disability parking certificates the patient submitted was not signed by the patient.  I called the  Service and they called and LM for the patient informing him of this.  I called and LM for his daughter Nakita as well.  I mailed the letter back to the patient at his home address on file.  Daisy Roth,

## 2019-02-26 NOTE — TELEPHONE ENCOUNTER
"Medication clarification request.  Rx states \"see scale below\" but nothing is included. What is Roberto's sliding scale?  "

## 2019-02-26 NOTE — TELEPHONE ENCOUNTER
Prior Authorization Retail Medication Request    Medication/Dose: liraglutide (VICTOZA) 18 MG/3ML solution  ICD code (if different than what is on RX):     Previously Tried and Failed:    Rationale:      Insurance Name: [employer] [phone] Help Desk [phone]   Insurance ID:  [id number]      Pharmacy Information (if different than what is on RX)  Name:    Phone:

## 2019-02-28 NOTE — TELEPHONE ENCOUNTER
2. Novolog take 30 units with breakfast and 30 units with lunch, and 30 units with dinner     Plus the scale with each meal:  Plus the following coverage scale:  120-149=1 units  150-199=2 units  200-249=3 units  250-299=5 units  300-349=7 units  350 or more =8 units      Salena Abraham RN/CDE  [facility] Educ[facility]

## 2019-03-03 NOTE — TELEPHONE ENCOUNTER
PA Initiation    Medication: liraglutide (VICTOZA) 18 MG/3ML solution  Insurance Company: [facility] - Phone [phone] Fax [phone]  Pharm[facility] Filling the Rx: [facility] [place]  [place], [place] - [address]  Filling Pharmacy Phone: [phone]  Filling Pharmacy Fax:    Start Date: 3/2/2019      Key: [id number]

## 2019-03-04 DIAGNOSIS — E11.69 TYPE 2 DIABETES MELLITUS WITH OTHER SPECIFIED COMPLICATION, WITH LONG-TERM CURRENT USE OF INSULIN (H): ICD-10-CM

## 2019-03-04 DIAGNOSIS — Z79.4 TYPE 2 DIABETES MELLITUS WITH OTHER SPECIFIED COMPLICATION, WITH LONG-TERM CURRENT USE OF INSULIN (H): ICD-10-CM

## 2019-03-04 NOTE — TELEPHONE ENCOUNTER
Prior Authorization Approval    Authorization Effective Date: 1/31/2019  Authorization Expiration Date: 3/1/2022  Medication: liraglutide (VICTOZA) 18 MG/3ML-APPROVED  Approved Dose/Quantity:    Reference #:     Insurance Company: [employer] - Phone [phone] Fax [phone]  Expected CoPay:       CoPay Card Available:      Foundation Assistance Needed:    Which Pharmacy is filling the prescription (Not needed for infusion/clinic administered): [facility], [place] - [address]  Pharmacy Notified: Yes  Patient Notified: Yes

## 2019-03-11 ENCOUNTER — THERAPY VISIT (OUTPATIENT)
Dept: PHYSICAL THERAPY | Facility: CLINIC | Age: 59
End: 2019-03-11
Payer: COMMERCIAL

## 2019-03-11 DIAGNOSIS — M54.41 CHRONIC BILATERAL LOW BACK PAIN WITH BILATERAL SCIATICA: ICD-10-CM

## 2019-03-11 DIAGNOSIS — M54.42 CHRONIC BILATERAL LOW BACK PAIN WITH BILATERAL SCIATICA: ICD-10-CM

## 2019-03-11 DIAGNOSIS — G89.29 CHRONIC BILATERAL LOW BACK PAIN WITH BILATERAL SCIATICA: ICD-10-CM

## 2019-03-11 PROBLEM — M54.40 CHRONIC BILATERAL LOW BACK PAIN WITH SCIATICA: Status: RESOLVED | Noted: 2019-02-05 | Resolved: 2019-03-11

## 2019-03-11 PROCEDURE — 97112 NEUROMUSCULAR REEDUCATION: CPT | Mod: GP | Performed by: PHYSICAL THERAPIST

## 2019-03-11 PROCEDURE — 97110 THERAPEUTIC EXERCISES: CPT | Mod: GP | Performed by: PHYSICAL THERAPIST

## 2019-03-25 ENCOUNTER — ALLIED HEALTH/NURSE VISIT (OUTPATIENT)
Dept: EDUCATION SERVICES | Facility: CLINIC | Age: 59
End: 2019-03-25
Payer: COMMERCIAL

## 2019-03-25 DIAGNOSIS — E11.69 TYPE 2 DIABETES MELLITUS WITH OTHER SPECIFIED COMPLICATION, WITH LONG-TERM CURRENT USE OF INSULIN (H): ICD-10-CM

## 2019-03-25 DIAGNOSIS — E11.9 DIABETES MELLITUS WITHOUT COMPLICATION (H): Primary | ICD-10-CM

## 2019-03-25 DIAGNOSIS — Z79.4 TYPE 2 DIABETES MELLITUS WITH OTHER SPECIFIED COMPLICATION, WITH LONG-TERM CURRENT USE OF INSULIN (H): ICD-10-CM

## 2019-03-25 PROCEDURE — G0108 DIAB MANAGE TRN  PER INDIV: HCPCS

## 2019-03-25 RX ORDER — LIRAGLUTIDE 6 MG/ML
1.8 INJECTION SUBCUTANEOUS DAILY
Qty: 6 ML | Refills: 3 | Status: SHIPPED | OUTPATIENT
Start: 2019-03-25 | End: 2019-06-22

## 2019-03-25 NOTE — PROGRESS NOTES
"Diabetes Self-Management Education & Support    Diabetes Education Self Management & Training    SUBJECTIVE/OBJECTIVE:  Presents for: Individual review  Accompanied by: Self, Spouse  Diabetes education in the past 24mo: No  Focus of Visit: Patient Unsure, Monitoring, Taking Medication, Healthy Eating  Diabetes type: Type 2  Disease course: Worsening  Transportation concerns: No  Other concerns:: Language barrier  Cultural Influences/Ethnic Background:  [facility]      Diabetes Symptoms & Complications  Blurred vision: Yes  Fatigue: Yes  Neuropathy: Yes  Foot ulcerations: No  Polydipsia: Yes  Polyphagia: No  Polyuria: No  Visual change: Yes  Weakness: Yes  Weight loss: No  Slow healing wounds: Yes  Recent Infection(s): No  Symptom course: Worsening  Weight trend: Stable  Autonomic neuropathy: Yes  CVA: No  Heart disease: No  Nephropathy: Yes  Peripheral neuropathy: Yes  Peripheral Vascular Disease: Yes  Retinopathy: Yes    Patient Problem List and Family Medical History reviewed for relevant medical history, current medical status, and diabetes risk factors.    Vitals:  There were no vitals taken for this visit.  Estimated body mass index is 45.04 kg/m  as calculated from the following:    Height as of 1/7/19: 1.753 m (5' 9\").    Weight as of 1/7/19: 138.3 kg (305 lb).   Last 3 BP:   BP Readings from Last 3 Encounters:   01/07/19 112/63   01/02/19 142/74   12/18/18 115/64       History   Smoking Status     Never Smoker   Smokeless Tobacco     Never Used       Labs:  Lab Results   Component Value Date    A1C 10.6 12/18/2018     Lab Results   Component Value Date     01/21/2019     Lab Results   Component Value Date    LDL  08/08/2018     Cannot estimate LDL when triglyceride exceeds 400 mg/dL     08/08/2018     HDL Cholesterol   Date Value Ref Range Status   08/08/2018 35 (L) >39 mg/dL Final   ]  GFR Estimate   Date Value Ref Range Status   01/21/2019 31 (L) >60 mL/min/[1.73_m2] Final     Comment:     Non "  GFR Calc  Starting 12/18/2018, serum creatinine based estimated GFR (eGFR) will be   calculated using the Chronic Kidney Disease Epidemiology Collaboration   (CKD-EPI) equation.       GFR Estimate If Black   Date Value Ref Range Status   01/21/2019 36 (L) >60 mL/min/[1.73_m2] Final     Comment:      GFR Calc  Starting 12/18/2018, serum creatinine based estimated GFR (eGFR) will be   calculated using the Chronic Kidney Disease Epidemiology Collaboration   (CKD-EPI) equation.       Lab Results   Component Value Date    CR 2.24 01/21/2019     No results found for: MICROALBUMIN    Healthy Eating  Healthy Eating Assessed Today: Yes  Cultural/Faith diet restrictions?: No  Patient on a regular basis: Eats 3 meals a day  Meal planning: Avoiding sweets  Meals include: Breakfast, Lunch, Dinner, Snacks  Breakfast: now eating breakfast, fish , and bread.   Lunch: sausage, bread  Dinner: loves potatoes  Other: he does not eat 3 meals per day , and it appears he will not change in his ways  Beverages: Water, Milk, Juice, Diet soda  Has patient met with a dietitian in the past?: No    Being Active  Being Active Assessed Today: Yes  Exercise:: Currently not exercising  Barrier to exercise: Physical limitation    Monitoring  Monitoring Assessed Today: Yes  Did patient bring glucose meter to appointment? : No  Blood Glucose Meter: One Touch  Home Glucose (Sugar) Monitoring: 3-4 times per week  Low Glucose Range (mg/dL): 140-180  High Glucose Range (mg/dL): >200  Overall Range (mg/dL): >200        Taking Medications  Diabetes Medication(s)     Insulin       insulin aspart (NOVOLOG PEN) 100 UNIT/ML pen    Inject 40 Units Subcutaneous 3 times daily (with meals) Plus scale: . Novolog take 30 units with breakfast and 30 units with lunch, and 30 units with dinner     Plus the scale with each meal:  Plus the following coverage scale:  120-149=1 units  150-199=2 units  200-249=3 units  250-299=5  "units  300-349=7 units  350 or more =8 units     insulin glargine U-300 (TOUJEO) 300 UNIT/ML injection    Inject 140 Units Subcutaneous every morning     insulin aspart (NOVOLOG PEN) 100 UNIT/ML pen    Inject 30 Units Subcutaneous 3 times daily (with meals) Plus scale:  See scale below    Incretin Mimetic Agents (GLP-1 Receptor Agonists)       liraglutide (VICTOZA) 18 MG/3ML solution    Inject 1.8 mg Subcutaneous daily     Semaglutide 0.25 or 0.5 MG/DOSE SOPN    Inject 0.25 mg Subcutaneous once a week          Taking Medication Assessed Today: Yes  Current Treatments: Diet, Insulin Injections(Takes 130 units of Toujeo and Novolog 50 units with meals plus coverage.)  Dose schedule: pre-breakfast  Given by: Patient  Injection/Infusion sites: Abdomen  Problems taking diabetes medications regularly?: No  Diabetes medication side effects?: No  Treatment Compliance: All of the time    Problem Solving  Problem Solving Assessed Today: Yes  Hypoglycemia Frequency: Never  Patient carries a carbohydrate source: No  Medical alert: No  Severe weather/disaster plan for diabetes management?: No  DKA prevention plan?: No              Reducing Risks  Diabetes Risks: None, Family History, Hyperlipidemia, Sedentary Lifestyle, Ethnicity, Age over 45 years  CAD Risks: Diabetes Mellitus, Family history, Male sex, Hypertension, Obesity, Sedentary lifestyle, Dyslipidemia  Has dilated eye exam at least once a year?: Yes    Healthy Coping  Emotional response to diabetes: Acceptance, Uncertain  Stage of change: RELAPSE (Returned to unhealthy behavior)  Difficulty affording diabetes management supplies?: No  Patient Activation Measure Survey Score:  No flowsheet data found.    ASSESSMENT:  Roberto comes today for evaluation .  I fear that he does not follow the directions for his insulin, he was to take 132 units of insulin and he states he took 130 units \"it is easier for 130\"  Then last night instead of taking 30 units and scale, he took 40 " units. He refuses to walk or exercise.  I did increase his medications and will follow up in another month.  I did let him know that if he continued to not follow directions, he really did not need to follow up , and he is fighting with his wife when she lets us know what he does and does not do.      Goals Addressed as of 3/25/2019 at 4:10 PM                 2/25/19 1/21/19      Medication 1 (pt-stated)   80%  20%    Added 12/28/18 by Oneida Abraham RN     My Goal: I will be more consistent with medications    What I need to meet my goal: Diabetes Support Resources:  1. Toujeo take 140 units @ 10:00 am    2. Novolog take 40 units with breakfast and 40 units with lunch, and 40 units with dinner    Plus the scale with each meal:  Plus the following coverage scale:  120-149=1 units  150-199=2 units  200-249=3 units  250-299=5 units  300-349=7 units  350 or more =8 units    3. Take Victoza 0.6 mg every day X 7 days then on day 8 increase to 1.2 mg daily    I plan to meet my goal by this date: 1 months            Patient's most recent   Lab Results   Component Value Date    A1C 10.6 12/18/2018    is not meeting goal of <7.0    INTERVENTION:   Diabetes knowledge and skills assessment:     Patient is knowledgeable in diabetes management concepts related to: Healthy Eating, Being Active, Monitoring and Taking Medication    Patient needs further education on the following diabetes management concepts: Healthy Eating, Being Active, Monitoring, Taking Medication and Reducing Risks    Based on learning assessment above, most appropriate setting for further diabetes education would be: Individual setting.    Education provided today on:  AADE Self-Care Behaviors:  Diabetes Pathophysiology  Healthy Eating: carbohydrate counting, consistency in amount, composition, and timing of food intake, weight reduction, heart healthy diet, eating out, portion control, plate planning method and label reading  Being Active: relationship  to blood glucose and describe appropriate activity program  Monitoring: purpose, proper technique, log and interpret results, individual blood glucose targets and frequency of monitoring  Taking Medication: action of prescribed medication, drawing up, administering and storing injectable diabetes medications, proper site selection and rotation for injections, side effects of prescribed medications and when to take medications  Reducing Risks: A1C - goals, relating to blood glucose levels, how often to check and lipids levels and goals    Opportunities for ongoing education and support in diabetes-self management were discussed.    Pt verbalized understanding of concepts discussed and recommendations provided today.       Education Materials Provided:  No new materials provided today    PLAN:  See Patient Instructions for co-developed, patient-stated behavior change goals.  AVS printed and provided to patient today. See Follow-Up section for recommended follow-up.    Salena Abraham RN/DEYSI [facility] Diabetes Educator    Time Spent: 60 minutes  Encounter Type: Individual    Any diabetes medication dose changes were made via the [facility] Protocol and Collaborative Practice Agreement with the patient's primary care provider. A copy of this encounter was shared with the provider.

## 2019-03-25 NOTE — PATIENT INSTRUCTIONS
Diabetes Support Resources:       Bring blood glucose meter and logbook with you to all doctor and follow-up appointments.    Diabetes Education Telephone Visit Follow-up:    We realize your time is valuable and your health is important! We offer a convenient Telephone Visit follow up! It s a quick way to check in for a medication dose adjustment without having to come back to clinic as soon.    Telephone Visits are often covered by insurance. Please check with your insurance plan to see if this type of visit is covered. If not, the cost is less expensive than an office visit:      Up to 10 minutes (Code 33096): $30    11-20 minutes (Code 25768): $59    More than 20 minutes (Code 72525): $85    Talk with your Diabetes Educator if you want to learn more.      [facility]:  For Your Diabetes Education and Nutrition Appointments Call:  [phone]   For Diabetes Education or Nutrition Related Questions:   Phone: [phone]  E-mail: [email or web address]  Fax: [phone]   If you need a medication refill please contact your pharmacy. Please allow 3 business days for your refills to be completed.

## 2019-03-26 ENCOUNTER — DOCUMENTATION ONLY (OUTPATIENT)
Dept: FAMILY MEDICINE | Facility: CLINIC | Age: 59
End: 2019-03-26

## 2019-03-26 DIAGNOSIS — Z11.59 ENCOUNTER FOR HCV SCREENING TEST FOR LOW RISK PATIENT: ICD-10-CM

## 2019-03-26 DIAGNOSIS — Z79.4 TYPE 2 DIABETES MELLITUS WITH OTHER SPECIFIED COMPLICATION, WITH LONG-TERM CURRENT USE OF INSULIN (H): ICD-10-CM

## 2019-03-26 DIAGNOSIS — E11.69 TYPE 2 DIABETES MELLITUS WITH OTHER SPECIFIED COMPLICATION, WITH LONG-TERM CURRENT USE OF INSULIN (H): Primary | ICD-10-CM

## 2019-03-26 DIAGNOSIS — E11.69 TYPE 2 DIABETES MELLITUS WITH OTHER SPECIFIED COMPLICATION, WITH LONG-TERM CURRENT USE OF INSULIN (H): ICD-10-CM

## 2019-03-26 DIAGNOSIS — Z79.4 TYPE 2 DIABETES MELLITUS WITH OTHER SPECIFIED COMPLICATION, WITH LONG-TERM CURRENT USE OF INSULIN (H): Primary | ICD-10-CM

## 2019-04-01 DIAGNOSIS — Z79.4 TYPE 2 DIABETES MELLITUS WITH OTHER SPECIFIED COMPLICATION, WITH LONG-TERM CURRENT USE OF INSULIN (H): ICD-10-CM

## 2019-04-01 DIAGNOSIS — Z11.59 ENCOUNTER FOR HCV SCREENING TEST FOR LOW RISK PATIENT: ICD-10-CM

## 2019-04-01 DIAGNOSIS — E11.69 TYPE 2 DIABETES MELLITUS WITH OTHER SPECIFIED COMPLICATION, WITH LONG-TERM CURRENT USE OF INSULIN (H): ICD-10-CM

## 2019-04-01 LAB
HBA1C MFR BLD: 10.1 % (ref 0–5.6)
HCV AB SERPL QL IA: NONREACTIVE

## 2019-04-01 PROCEDURE — 86803 HEPATITIS C AB TEST: CPT | Performed by: FAMILY MEDICINE

## 2019-04-01 PROCEDURE — 83036 HEMOGLOBIN GLYCOSYLATED A1C: CPT | Performed by: FAMILY MEDICINE

## 2019-04-01 PROCEDURE — 36415 COLL VENOUS BLD VENIPUNCTURE: CPT | Performed by: FAMILY MEDICINE

## 2019-04-05 NOTE — PROGRESS NOTES
SUBJECTIVE:   Roberto Thompson is a 58 year old male who presents to clinic today for the following health issues:    Due to language barrier, an  was present during the history-taking and subsequent discussion (and for part of the physical exam) with this patient.        Diabetes Follow-up      Patient is checking blood sugars: 4-6 times a day 180-500    Diabetic concerns: None and blood sugar frequently over 200     Symptoms of hypoglycemia (low blood sugar): none     Paresthesias (numbness or burning in feet) or sores: Yes numbness      Date of last diabetic eye exam: fall 2018    Diabetes Management Resources    Hyperlipidemia Follow-Up      Rate your low fat/cholesterol diet?: unsure     Taking statin?  Yes,     Other lipid medications/supplements?:  none    Hypertension Follow-up      Outpatient blood pressures are being checked at home.  Results are above normal range.    Low Salt Diet: not monitoring salt    BP Readings from Last 2 Encounters:   04/08/19 150/68   01/07/19 112/63     Hemoglobin A1C (%)   Date Value   04/01/2019 10.1 (H)   12/18/2018 10.6 (H)     LDL Cholesterol Calculated (mg/dL)   Date Value   08/08/2018     Cannot estimate LDL when triglyceride exceeds 400 mg/dL     LDL Cholesterol Direct (mg/dL)   Date Value   08/08/2018 110 (H)       Amount of exercise or physical activity: none     Problems taking medications regularly: No    Medication side effects: none    Diet: regular (no restrictions)    Pt with uncontrolled diabetes, working with diab ed but not engaging in changes to improve blood sugars  Is on statin and asa, intolerant of ace  Has seen renal due to CKD 4 levels gfrs    Pt c/o bilateral low back pain for months to years  Dull ache, no radicular pain, no trauma or causes for pain  No bowel or bladder dysfunction,   Not willing to do PT        Problem list and histories reviewed & adjusted, as indicated.  Additional history: as documented    Labs reviewed in  "EPIC    Reviewed and updated as needed this visit by clinical staff  Tobacco  Allergies  Med Hx  Surg Hx  Fam Hx  Soc Hx      Reviewed and updated as needed this visit by Provider         ROS:  Constitutional, HEENT, cardiovascular, pulmonary, gi and gu systems are negative, except as otherwise noted.    OBJECTIVE:     /68   Pulse 83   Temp 98.7  F (37.1  C) (Oral)   Resp 17   Ht 1.753 m (5' 9\")   Wt 137.4 kg (303 lb)   SpO2 93%   BMI 44.75 kg/m    Body mass index is 44.75 kg/m .  GENERAL: healthy, alert and no distress  NECK: no adenopathy, no asymmetry, masses, or scars and thyroid normal to palpation  RESP: lungs clear to auscultation - no rales, rhonchi or wheezes  CV: regular rate and rhythm, normal S1 S2, no S3 or S4, no murmur, click or rub, no peripheral edema and peripheral pulses strong  ABDOMEN: soft, nontender, no hepatosplenomegaly, no masses and bowel sounds normal  MS: no gross musculoskeletal defects noted, no edema  BACK: no lumbar spine tenderness, does have pain to palpation bilaterally over both SI joints    Diagnostic Test Results:  none     ASSESSMENT/PLAN:     1. Type 2 diabetes mellitus with other specified complication, with long-term current use of insulin (H)  Poorly controlled, poor compliance, continue working with diab ed    2. Encounter for long-term (current) insulin use (H)      3. Morbid obesity (H)  Encourage exercsise as able    4. CKD (chronic kidney disease) stage 3, GFR 30-59 ml/min (H)  Intolerant of ace    5. Hypertension goal BP (blood pressure) < 140/90  Usually at goal, a bit elevated today  - amLODIPine (NORVASC) 10 MG tablet; Take 1 tablet (10 mg) by mouth daily  Dispense: 90 tablet; Refill: 3  - carvedilol (COREG) 12.5 MG tablet; Take 1 tablet (12.5 mg) by mouth 2 times daily (with meals)  Dispense: 180 tablet; Refill: 3    6. High blood triglycerides  On statin    7. High blood cholesterol  On statin  - fenofibrate (TRICOR) 145 MG tablet; Take 1 " tablet (145 mg) by mouth daily  Dispense: 90 tablet; Refill: 3    8. SI (sacroiliac) joint dysfunction  Recommend PT if willing          Jaylyn Conte MD  [facility] [place]

## 2019-04-06 PROBLEM — Z79.4 ENCOUNTER FOR LONG-TERM (CURRENT) INSULIN USE (H): Status: ACTIVE | Noted: 2019-04-06

## 2019-04-08 ENCOUNTER — OFFICE VISIT (OUTPATIENT)
Dept: FAMILY MEDICINE | Facility: CLINIC | Age: 59
End: 2019-04-08
Payer: COMMERCIAL

## 2019-04-08 ENCOUNTER — TELEPHONE (OUTPATIENT)
Dept: FAMILY MEDICINE | Facility: CLINIC | Age: 59
End: 2019-04-08

## 2019-04-08 VITALS
WEIGHT: 303 LBS | TEMPERATURE: 98.7 F | OXYGEN SATURATION: 93 % | SYSTOLIC BLOOD PRESSURE: 150 MMHG | DIASTOLIC BLOOD PRESSURE: 68 MMHG | BODY MASS INDEX: 44.88 KG/M2 | HEIGHT: 69 IN | RESPIRATION RATE: 17 BRPM | HEART RATE: 83 BPM

## 2019-04-08 DIAGNOSIS — E66.01 MORBID OBESITY (H): ICD-10-CM

## 2019-04-08 DIAGNOSIS — M53.3 SI (SACROILIAC) JOINT DYSFUNCTION: ICD-10-CM

## 2019-04-08 DIAGNOSIS — Z79.4 ENCOUNTER FOR LONG-TERM (CURRENT) INSULIN USE (H): ICD-10-CM

## 2019-04-08 DIAGNOSIS — I10 HYPERTENSION GOAL BP (BLOOD PRESSURE) < 140/90: ICD-10-CM

## 2019-04-08 DIAGNOSIS — E11.69 TYPE 2 DIABETES MELLITUS WITH OTHER SPECIFIED COMPLICATION, WITH LONG-TERM CURRENT USE OF INSULIN (H): Primary | ICD-10-CM

## 2019-04-08 DIAGNOSIS — N18.30 CKD (CHRONIC KIDNEY DISEASE) STAGE 3, GFR 30-59 ML/MIN (H): ICD-10-CM

## 2019-04-08 DIAGNOSIS — E78.1 HIGH BLOOD TRIGLYCERIDES: ICD-10-CM

## 2019-04-08 DIAGNOSIS — E78.00 HIGH BLOOD CHOLESTEROL: ICD-10-CM

## 2019-04-08 DIAGNOSIS — Z79.4 TYPE 2 DIABETES MELLITUS WITH OTHER SPECIFIED COMPLICATION, WITH LONG-TERM CURRENT USE OF INSULIN (H): Primary | ICD-10-CM

## 2019-04-08 PROCEDURE — 99215 OFFICE O/P EST HI 40 MIN: CPT | Performed by: FAMILY MEDICINE

## 2019-04-08 RX ORDER — CARVEDILOL 12.5 MG/1
12.5 TABLET ORAL 2 TIMES DAILY WITH MEALS
Qty: 180 TABLET | Refills: 3 | Status: SHIPPED | OUTPATIENT
Start: 2019-04-08 | End: 2020-04-03

## 2019-04-08 RX ORDER — AMLODIPINE BESYLATE 10 MG/1
10 TABLET ORAL DAILY
Qty: 90 TABLET | Refills: 3 | Status: SHIPPED | OUTPATIENT
Start: 2019-04-08 | End: 2019-08-26

## 2019-04-08 RX ORDER — FENOFIBRATE 145 MG/1
145 TABLET, COATED ORAL DAILY
Qty: 90 TABLET | Refills: 3 | Status: SHIPPED | OUTPATIENT
Start: 2019-04-08 | End: 2020-04-03

## 2019-04-08 RX ORDER — FUROSEMIDE 20 MG
20 TABLET ORAL DAILY
Qty: 180 TABLET | Refills: 1 | Status: SHIPPED | OUTPATIENT
Start: 2019-04-08 | End: 2019-04-08

## 2019-04-08 RX ORDER — FUROSEMIDE 20 MG
20 TABLET ORAL 2 TIMES DAILY
Qty: 180 TABLET | Refills: 1 | Status: SHIPPED | OUTPATIENT
Start: 2019-04-08 | End: 2019-08-26

## 2019-04-08 ASSESSMENT — MIFFLIN-ST. JEOR: SCORE: 2174.78

## 2019-04-08 NOTE — TELEPHONE ENCOUNTER
Pharmacy calling received a script for furosemide, directions and qty do not match. Please call to advise.

## 2019-04-08 NOTE — TELEPHONE ENCOUNTER
RN not able to tell what patient is supposed to take.     Rx states 1 tab daily but quantity is for 2 tabs daily.    Routing to PCP to reorder.  Arianna Blum RN, BSN

## 2019-05-01 ENCOUNTER — TELEPHONE (OUTPATIENT)
Dept: FAMILY MEDICINE | Facility: CLINIC | Age: 59
End: 2019-05-01

## 2019-05-01 NOTE — TELEPHONE ENCOUNTER
Per pharmacy, wife walked in trying to get a refill of patient's Novolog Pen. Pharmacy states it is too soon to fill, most recent prescription on file is for Novolog pen 40 units TID sent 3/26/19.  Per wife dose was increased to 50 units TID as that is what patient has been doing and running out.  Patient seen diabetic ed    Will forward to diabetic ed to contact patient and pharmacy for further clarification      Katie DRAPERN, RN, CPN

## 2019-05-02 DIAGNOSIS — E11.69 TYPE 2 DIABETES MELLITUS WITH OTHER SPECIFIED COMPLICATION, WITH LONG-TERM CURRENT USE OF INSULIN (H): ICD-10-CM

## 2019-05-02 DIAGNOSIS — Z79.4 TYPE 2 DIABETES MELLITUS WITH OTHER SPECIFIED COMPLICATION, WITH LONG-TERM CURRENT USE OF INSULIN (H): ICD-10-CM

## 2019-05-07 ENCOUNTER — TELEPHONE (OUTPATIENT)
Dept: FAMILY MEDICINE | Facility: CLINIC | Age: 59
End: 2019-05-07

## 2019-05-07 ENCOUNTER — ALLIED HEALTH/NURSE VISIT (OUTPATIENT)
Dept: EDUCATION SERVICES | Facility: CLINIC | Age: 59
End: 2019-05-07
Payer: COMMERCIAL

## 2019-05-07 DIAGNOSIS — Z79.4 TYPE 2 DIABETES MELLITUS WITH OTHER SPECIFIED COMPLICATION, WITH LONG-TERM CURRENT USE OF INSULIN (H): Primary | ICD-10-CM

## 2019-05-07 DIAGNOSIS — E11.69 TYPE 2 DIABETES MELLITUS WITH OTHER SPECIFIED COMPLICATION, WITH LONG-TERM CURRENT USE OF INSULIN (H): ICD-10-CM

## 2019-05-07 DIAGNOSIS — Z79.4 TYPE 2 DIABETES MELLITUS WITH OTHER SPECIFIED COMPLICATION, WITH LONG-TERM CURRENT USE OF INSULIN (H): ICD-10-CM

## 2019-05-07 DIAGNOSIS — E11.9 DIABETES MELLITUS WITHOUT COMPLICATION (H): Primary | ICD-10-CM

## 2019-05-07 DIAGNOSIS — E11.69 TYPE 2 DIABETES MELLITUS WITH OTHER SPECIFIED COMPLICATION, WITH LONG-TERM CURRENT USE OF INSULIN (H): Primary | ICD-10-CM

## 2019-05-07 PROCEDURE — G0108 DIAB MANAGE TRN  PER INDIV: HCPCS

## 2019-05-07 NOTE — PATIENT INSTRUCTIONS
Diabetes Support Resources:    Must be on schedule with meals, breakfast , lunch and dinner    Take Toujeo Max 150 units daily  Victoza 1.8 mg daily  Novolog 60 units with breakfast, lunch and dinner plus the scale coverage.         Bring blood glucose meter and logbook with you to all doctor and follow-up appointments.    Diabetes Education Telephone Visit Follow-up:    We realize your time is valuable and your health is important! We offer a convenient Telephone Visit follow up! It s a quick way to check in for a medication dose adjustment without having to come back to clinic as soon.    Telephone Visits are often covered by insurance. Please check with your insurance plan to see if this type of visit is covered. If not, the cost is less expensive than an office visit:      Up to 10 minutes (Code 01870): $30    11-20 minutes (Code 08442): $59    More than 20 minutes (Code 38805): $85    Talk with your Diabetes Educator if you want to learn more.      [facility]:  For Your Diabetes Education and Nutrition Appointments Call:  [phone]   For Diabetes Education or Nutrition Related Questions:   Phone: [phone]  E-mail: [email or web address]  Fax: [phone]   If you need a medication refill please contact your pharmacy. Please allow 3 business days for your refills to be completed.

## 2019-05-07 NOTE — TELEPHONE ENCOUNTER
Needs new rx for blood pressure meter.  Please call when ready or with questions.  Call pt with questions.  Thank you  Caller informed that calls received after 3pm may not be returned same day.

## 2019-05-07 NOTE — PROGRESS NOTES
"Diabetes Self-Management Education & Support    Diabetes Education Self Management & Training    SUBJECTIVE/OBJECTIVE:  Presents for: Individual review  Accompanied by: Self, Spouse  Diabetes education in the past 24mo: No  Focus of Visit: Patient Unsure, Monitoring, Taking Medication, Healthy Eating  Diabetes type: Type 2  Disease course: Worsening  Transportation concerns: No  Other concerns:: Language barrier  Cultural Influences/Ethnic Background:  [facility]      Diabetes Symptoms & Complications  Blurred vision: Yes  Fatigue: Yes  Neuropathy: Yes  Foot ulcerations: No  Polydipsia: Yes  Polyphagia: No  Polyuria: No  Visual change: Yes  Weakness: Yes  Weight loss: No  Slow healing wounds: Yes  Recent Infection(s): No  Symptom course: Worsening  Weight trend: Stable  Autonomic neuropathy: Yes  CVA: No  Heart disease: No  Nephropathy: Yes  Peripheral neuropathy: Yes  Peripheral Vascular Disease: Yes  Retinopathy: Yes    Patient Problem List and Family Medical History reviewed for relevant medical history, current medical status, and diabetes risk factors.    Vitals:  There were no vitals taken for this visit.  Estimated body mass index is 44.75 kg/m  as calculated from the following:    Height as of 4/8/19: 1.753 m (5' 9\").    Weight as of 4/8/19: 137.4 kg (303 lb).   Last 3 BP:   BP Readings from Last 3 Encounters:   04/08/19 150/68   01/07/19 112/63   01/02/19 142/74       History   Smoking Status     Never Smoker   Smokeless Tobacco     Never Used       Labs:  Lab Results   Component Value Date    A1C 10.1 04/01/2019     Lab Results   Component Value Date     01/21/2019     Lab Results   Component Value Date    LDL  08/08/2018     Cannot estimate LDL when triglyceride exceeds 400 mg/dL     08/08/2018     HDL Cholesterol   Date Value Ref Range Status   08/08/2018 35 (L) >39 mg/dL Final   ]  GFR Estimate   Date Value Ref Range Status   01/21/2019 31 (L) >60 mL/min/[1.73_m2] Final     Comment:     Non "  GFR Calc  Starting 12/18/2018, serum creatinine based estimated GFR (eGFR) will be   calculated using the Chronic Kidney Disease Epidemiology Collaboration   (CKD-EPI) equation.       GFR Estimate If Black   Date Value Ref Range Status   01/21/2019 36 (L) >60 mL/min/[1.73_m2] Final     Comment:      GFR Calc  Starting 12/18/2018, serum creatinine based estimated GFR (eGFR) will be   calculated using the Chronic Kidney Disease Epidemiology Collaboration   (CKD-EPI) equation.       Lab Results   Component Value Date    CR 2.24 01/21/2019     No results found for: MICROALBUMIN    Healthy Eating  Healthy Eating Assessed Today: Yes  Cultural/Jewish diet restrictions?: No  Meal planning: Avoiding sweets  Meals include: Breakfast, Lunch, Dinner, Snacks  Breakfast: now eating breakfast, fish , and bread.   Lunch: sausage, bread  Dinner: loves potatoes  Other: he does not eat 3 meals per day , and it appears he will not change in his ways  Beverages: Water, Milk, Juice, Diet soda  Has patient met with a dietitian in the past?: No    Being Active  Being Active Assessed Today: Yes  Exercise:: Currently not exercising  Barrier to exercise: Physical limitation    Monitoring  Monitoring Assessed Today: Yes  Did patient bring glucose meter to appointment? : No  Blood Glucose Meter: One Touch  Home Glucose (Sugar) Monitoring: 3-4 times per week  Low Glucose Range (mg/dL): 110-130  High Glucose Range (mg/dL): >200  Overall Range (mg/dL): >200        Taking Medications  Diabetes Medication(s)     Insulin       insulin aspart (NOVOLOG PEN) 100 UNIT/ML pen    Inject 60 Units Subcutaneous 3 times daily (with meals) Plus scale:  See scale below     insulin aspart (NOVOLOG PEN) 100 UNIT/ML pen    Inject 60 Units Subcutaneous 3 times daily (with meals) Plus scale: .    Plus the scale with each meal:  Plus the following coverage scale:  120-149=1 units  150-199=2 units  200-249=3 units  250-299=5  units  300-349=7 units  350 or more =8 units    Max dose of 200 units/day     insulin glargine U-300 (TOUJEO MAX SOLOSTAR) 300 UNIT/ML injection    Inject 150 Units Subcutaneous At Bedtime    Incretin Mimetic Agents (GLP-1 Receptor Agonists)       liraglutide (VICTOZA) 18 MG/3ML solution    Inject 1.8 mg Subcutaneous daily          Taking Medication Assessed Today: Yes  Current Treatments: Diet, Insulin Injections(Takes 130 units of Toujeo and Novolog 50 units with meals plus coverage.)  Dose schedule: pre-breakfast  Given by: Patient  Injection/Infusion sites: Abdomen  Problems taking diabetes medications regularly?: No  Diabetes medication side effects?: No  Treatment Compliance: All of the time    Problem Solving  Problem Solving Assessed Today: Yes  Hypoglycemia Frequency: Never  Patient carries a carbohydrate source: No  Medical alert: No  Severe weather/disaster plan for diabetes management?: No  DKA prevention plan?: No              Reducing Risks  Diabetes Risks: None, Family History, Hyperlipidemia, Sedentary Lifestyle, Ethnicity, Age over 45 years  CAD Risks: Diabetes Mellitus, Family history, Male sex, Hypertension, Obesity, Sedentary lifestyle, Dyslipidemia  Has dilated eye exam at least once a year?: Yes    Healthy Coping  Emotional response to diabetes: Acceptance, Uncertain  Stage of change: RELAPSE (Returned to unhealthy behavior)  Difficulty affording diabetes management supplies?: No  Patient Activation Measure Survey Score:  No flowsheet data found.    ASSESSMENT:  Roberto is not interested in making any lifestyle changes.  We did discuss the side effects of him not taking care of himself, and how devastating his health will become, he just jokes about it, and does what he wants.  Victoza is helping him somewhat, but refuses to exercise as well.  At this point I am not sure what more I can do for him, may need to send him to a specialist for diabetes for other ideas, he could be a good candidate  for U500 insulin, but fear of his compliance at this time.    Goals Addressed as of 5/7/2019 at 3:15 PM                 2/25/19 1/21/19      Medication 1 (pt-stated)   80%  20%    Added 12/28/18 by Oneida Abraham RN     My Goal: I will be more consistent with medications    What I need to meet my goal: Diabetes Support Resources:  1. Toujeo take 150 units @ 10:00 am    2. Novolog take 60 units with breakfast and 60 units with lunch, and 60 units with dinner    Plus the scale with each meal:  Plus the following coverage scale:  120-149=1 units  150-199=2 units  200-249=3 units  250-299=5 units  300-349=7 units  350 or more =8 units    3. Take Victoza 1.8 mg daily    I plan to meet my goal by this date: 3 months            Patient's most recent   Lab Results   Component Value Date    A1C 10.1 04/01/2019    is not meeting goal of <7.0    INTERVENTION:   Diabetes knowledge and skills assessment:     Patient is knowledgeable in diabetes management concepts related to: Healthy Eating, Being Active, Monitoring and Taking Medication    Patient needs further education on the following diabetes management concepts: Healthy Eating, Being Active, Monitoring, Taking Medication and Healthy Coping    Based on learning assessment above, most appropriate setting for further diabetes education would be: Individual setting.    Education provided today on:  AADE Self-Care Behaviors:  Diabetes Pathophysiology  Healthy Eating: carbohydrate counting, consistency in amount, composition, and timing of food intake, weight reduction, heart healthy diet, eating out, portion control, plate planning method and label reading  Being Active: relationship to blood glucose and describe appropriate activity program  Monitoring: purpose, proper technique, log and interpret results, individual blood glucose targets and frequency of monitoring  Taking Medication: action of prescribed medication, drawing up, administering and storing injectable  diabetes medications, proper site selection and rotation for injections, side effects of prescribed medications and when to take medications  Healthy Coping: recognize feelings about diagnosis, benefits of making appropriate lifestyle changes and utilize support systems    Opportunities for ongoing education and support in diabetes-self management were discussed.    Pt verbalized understanding of concepts discussed and recommendations provided today.       Education Materials Provided:  No new materials provided today    PLAN:  See Patient Instructions for co-developed, patient-stated behavior change goals.  AVS printed and provided to patient today. See Follow-Up section for recommended follow-up.    Salena Abraham RN/DEYSI  [place] Diabetes Educator    Time Spent: 30 minutes  Encounter Type: Individual    Any diabetes medication dose changes were made via the CDE Protocol and Collaborative Practice Agreement with the patient's primary care provider. A copy of this encounter was shared with the provider.

## 2019-05-16 DIAGNOSIS — L97.519 ULCER OF RIGHT FOOT (H): ICD-10-CM

## 2019-05-16 DIAGNOSIS — N18.6 TYPE 2 DIABETES MELLITUS WITH ESRD (END-STAGE RENAL DISEASE) (H): ICD-10-CM

## 2019-05-16 DIAGNOSIS — E11.22 TYPE 2 DIABETES MELLITUS WITH ESRD (END-STAGE RENAL DISEASE) (H): ICD-10-CM

## 2019-05-16 DIAGNOSIS — L97.511 RIGHT FOOT ULCER, LIMITED TO BREAKDOWN OF SKIN (H): ICD-10-CM

## 2019-05-16 DIAGNOSIS — N17.9 ACUTE KIDNEY FAILURE, UNSPECIFIED (H): Primary | ICD-10-CM

## 2019-05-16 DIAGNOSIS — B99.9 ANEMIA OF INFECTION AND CHRONIC DISEASE: ICD-10-CM

## 2019-05-16 DIAGNOSIS — I12.9 MALIGNANT HYPERTENSIVE KIDNEY DISEASE WITH CHRONIC KIDNEY DISEASE STAGE I THROUGH STAGE IV, OR UNSPECIFIED(403.00): ICD-10-CM

## 2019-05-16 DIAGNOSIS — D63.8 ANEMIA OF INFECTION AND CHRONIC DISEASE: ICD-10-CM

## 2019-05-16 DIAGNOSIS — N18.30 CHRONIC KIDNEY DISEASE, STAGE III (MODERATE) (H): ICD-10-CM

## 2019-05-16 DIAGNOSIS — E11.40 DIABETIC NEUROPATHY (H): ICD-10-CM

## 2019-05-16 DIAGNOSIS — I10 ESSENTIAL HYPERTENSION, MALIGNANT: ICD-10-CM

## 2019-06-06 ENCOUNTER — TELEPHONE (OUTPATIENT)
Dept: FAMILY MEDICINE | Facility: CLINIC | Age: 59
End: 2019-06-06

## 2019-06-06 NOTE — LETTER
[facility]  [address] [facility] [address]  Phone: [phone]    06/06/19    Roberto Thompson  [address] [place] [facility] [address]      To whom it may concern:     Your blood pressure was elevated the last time you were in clinic. Please make an appointment with the ancillary nurse to have it monitored. This will help us better manage you hypertension.    Sincerely,      Jaylyn Conte MD/ct

## 2019-06-06 NOTE — TELEPHONE ENCOUNTER
Panel Management Review      Patient has the following on his problem list:     Diabetes    ASA: Passed    Last A1C  Lab Results   Component Value Date    A1C 10.1 04/01/2019    A1C 10.6 12/18/2018    A1C 9.7 08/08/2018     A1C tested: FAILED    Last LDL:    Lab Results   Component Value Date    CHOL 309 08/08/2018     Lab Results   Component Value Date    HDL 35 08/08/2018     Lab Results   Component Value Date    LDL  08/08/2018     Cannot estimate LDL when triglyceride exceeds 400 mg/dL     08/08/2018     Lab Results   Component Value Date    TRIG 1,413 08/08/2018     No results found for: CHOLHDLRATIO  Lab Results   Component Value Date    NHDL 274 08/08/2018       Is the patient on a Statin? YES             Is the patient on Aspirin? YES    Medications     HMG CoA Reductase Inhibitors     atorvastatin (LIPITOR) 40 MG tablet       Salicylates     aspirin 81 MG EC tablet             Last three blood pressure readings:  BP Readings from Last 3 Encounters:   04/08/19 150/68   01/07/19 112/63   01/02/19 142/74       Date of last diabetes office visit: 4/8/19     Tobacco History:     History   Smoking Status     Never Smoker   Smokeless Tobacco     Never Used         Hypertension   Last three blood pressure readings:  BP Readings from Last 3 Encounters:   04/08/19 150/68   01/07/19 112/63   01/02/19 142/74     Blood pressure: FAILED    HTN Guidelines:  Less than 140/90      Composite cancer screening  Chart review shows that this patient is due/due soon for the following None  Summary:    Patient is due/failing the following:   A1C and BP CHECK    Action needed:   Patient needs office visit for blood pressure check.    Type of outreach:    Sent letter.    Questions for provider review:    None                                                                                                                                    Solange Harris MA       Chart routed to Care Team .

## 2019-06-14 DIAGNOSIS — D63.8 ANEMIA OF INFECTION AND CHRONIC DISEASE: ICD-10-CM

## 2019-06-14 DIAGNOSIS — I10 ESSENTIAL HYPERTENSION, MALIGNANT: ICD-10-CM

## 2019-06-14 DIAGNOSIS — B99.9 ANEMIA OF INFECTION AND CHRONIC DISEASE: ICD-10-CM

## 2019-06-14 DIAGNOSIS — N17.9 ACUTE KIDNEY FAILURE, UNSPECIFIED (H): ICD-10-CM

## 2019-06-14 DIAGNOSIS — I12.9 MALIGNANT HYPERTENSIVE KIDNEY DISEASE WITH CHRONIC KIDNEY DISEASE STAGE I THROUGH STAGE IV, OR UNSPECIFIED(403.00): ICD-10-CM

## 2019-06-14 DIAGNOSIS — N18.30 CHRONIC KIDNEY DISEASE, STAGE III (MODERATE) (H): ICD-10-CM

## 2019-06-14 DIAGNOSIS — E11.22 TYPE 2 DIABETES MELLITUS WITH ESRD (END-STAGE RENAL DISEASE) (H): ICD-10-CM

## 2019-06-14 DIAGNOSIS — E11.40 DIABETIC NEUROPATHY (H): ICD-10-CM

## 2019-06-14 DIAGNOSIS — L97.511 RIGHT FOOT ULCER, LIMITED TO BREAKDOWN OF SKIN (H): ICD-10-CM

## 2019-06-14 DIAGNOSIS — N18.6 TYPE 2 DIABETES MELLITUS WITH ESRD (END-STAGE RENAL DISEASE) (H): ICD-10-CM

## 2019-06-14 DIAGNOSIS — L97.519 ULCER OF RIGHT FOOT (H): ICD-10-CM

## 2019-06-14 LAB
ALBUMIN SERPL-MCNC: 4.1 G/DL (ref 3.4–5)
ALBUMIN UR-MCNC: NEGATIVE MG/DL
ANION GAP SERPL CALCULATED.3IONS-SCNC: 11 MMOL/L (ref 3–14)
APPEARANCE UR: CLEAR
BILIRUB UR QL STRIP: NEGATIVE
BUN SERPL-MCNC: 41 MG/DL (ref 7–30)
CALCIUM SERPL-MCNC: 9.6 MG/DL (ref 8.5–10.1)
CHLORIDE SERPL-SCNC: 102 MMOL/L (ref 94–109)
CO2 SERPL-SCNC: 25 MMOL/L (ref 20–32)
COLOR UR AUTO: YELLOW
CREAT SERPL-MCNC: 2.36 MG/DL (ref 0.66–1.25)
CREAT UR-MCNC: 41 MG/DL
ERYTHROCYTE [DISTWIDTH] IN BLOOD BY AUTOMATED COUNT: 14.1 % (ref 10–15)
GFR SERPL CREATININE-BSD FRML MDRD: 29 ML/MIN/{1.73_M2}
GLUCOSE SERPL-MCNC: 122 MG/DL (ref 70–99)
GLUCOSE UR STRIP-MCNC: 100 MG/DL
HCT VFR BLD AUTO: 39.1 % (ref 40–53)
HGB BLD-MCNC: 13.1 G/DL (ref 13.3–17.7)
HGB UR QL STRIP: ABNORMAL
KETONES UR STRIP-MCNC: NEGATIVE MG/DL
LEUKOCYTE ESTERASE UR QL STRIP: NEGATIVE
MCH RBC QN AUTO: 31.2 PG (ref 26.5–33)
MCHC RBC AUTO-ENTMCNC: 33.5 G/DL (ref 31.5–36.5)
MCV RBC AUTO: 93 FL (ref 78–100)
NITRATE UR QL: NEGATIVE
NON-SQ EPI CELLS #/AREA URNS LPF: NORMAL /LPF
PH UR STRIP: 5.5 PH (ref 5–7)
PHOSPHATE SERPL-MCNC: 2.8 MG/DL (ref 2.5–4.5)
PLATELET # BLD AUTO: 178 10E9/L (ref 150–450)
POTASSIUM SERPL-SCNC: 4.2 MMOL/L (ref 3.4–5.3)
PROT UR-MCNC: 0.2 G/L
PROT/CREAT 24H UR: 0.49 G/G CR (ref 0–0.2)
PTH-INTACT SERPL-MCNC: 95 PG/ML (ref 18–80)
RBC # BLD AUTO: 4.2 10E12/L (ref 4.4–5.9)
RBC #/AREA URNS AUTO: NORMAL /HPF
SODIUM SERPL-SCNC: 138 MMOL/L (ref 133–144)
SOURCE: ABNORMAL
SP GR UR STRIP: 1.01 (ref 1–1.03)
UROBILINOGEN UR STRIP-ACNC: 0.2 EU/DL (ref 0.2–1)
WBC # BLD AUTO: 6.1 10E9/L (ref 4–11)
WBC #/AREA URNS AUTO: NORMAL /HPF

## 2019-06-14 PROCEDURE — 84156 ASSAY OF PROTEIN URINE: CPT | Performed by: INTERNAL MEDICINE

## 2019-06-14 PROCEDURE — 81001 URINALYSIS AUTO W/SCOPE: CPT | Performed by: INTERNAL MEDICINE

## 2019-06-14 PROCEDURE — 83970 ASSAY OF PARATHORMONE: CPT | Performed by: INTERNAL MEDICINE

## 2019-06-14 PROCEDURE — 85027 COMPLETE CBC AUTOMATED: CPT | Performed by: INTERNAL MEDICINE

## 2019-06-14 PROCEDURE — 36415 COLL VENOUS BLD VENIPUNCTURE: CPT | Performed by: INTERNAL MEDICINE

## 2019-06-14 PROCEDURE — 80069 RENAL FUNCTION PANEL: CPT | Performed by: INTERNAL MEDICINE

## 2019-06-22 DIAGNOSIS — Z79.4 TYPE 2 DIABETES MELLITUS WITH OTHER SPECIFIED COMPLICATION, WITH LONG-TERM CURRENT USE OF INSULIN (H): ICD-10-CM

## 2019-06-22 DIAGNOSIS — E11.69 TYPE 2 DIABETES MELLITUS WITH OTHER SPECIFIED COMPLICATION, WITH LONG-TERM CURRENT USE OF INSULIN (H): ICD-10-CM

## 2019-06-25 ENCOUNTER — ALLIED HEALTH/NURSE VISIT (OUTPATIENT)
Dept: NURSING | Facility: CLINIC | Age: 59
End: 2019-06-25
Payer: COMMERCIAL

## 2019-06-25 VITALS — HEART RATE: 80 BPM | DIASTOLIC BLOOD PRESSURE: 83 MMHG | SYSTOLIC BLOOD PRESSURE: 137 MMHG

## 2019-06-25 DIAGNOSIS — Z01.30 BP CHECK: Primary | ICD-10-CM

## 2019-06-25 PROCEDURE — 99207 ZZC NO CHARGE NURSE ONLY: CPT

## 2019-06-25 RX ORDER — INSULIN ASPART 100 [IU]/ML
INJECTION, SOLUTION INTRAVENOUS; SUBCUTANEOUS
Qty: 45 ML | Refills: 0 | Status: SHIPPED | OUTPATIENT
Start: 2019-06-25 | End: 2019-07-30

## 2019-06-25 RX ORDER — LIRAGLUTIDE 6 MG/ML
INJECTION SUBCUTANEOUS
Qty: 6 ML | Refills: 0 | Status: SHIPPED | OUTPATIENT
Start: 2019-06-25 | End: 2019-07-30

## 2019-06-25 NOTE — TELEPHONE ENCOUNTER
"Medication is being filled for 1 time refill only due to:  Patient needs to be seen because due for recheck. Message sent to pharmacy.      Requested Prescriptions   Pending Prescriptions Disp Refills     NOVOLOG FLEXPEN 100 UNIT/ML soln [Pharmacy Med Name: NOVOLOG FLEXPEN SYRINGE 100 SOPN] 45 mL 1     Sig: INJECT 60 UNITS SUBCUTANEOUS 3 TIMES DAILY (WITH MEALS) PLUS SCALE: SEE SCALE BELOW        Short Acting Insulin Protocol Failed - 6/22/2019  1:31 PM        Failed - Blood pressure less than 140/90 in past 6 months     BP Readings from Last 3 Encounters:   06/25/19 137/83   04/08/19 150/68   01/07/19 112/63           Passed - LDL on file in past 12 months     Recent Labs   Lab Test 08/08/18  1740   LDL Cannot estimate LDL when triglyceride exceeds 400 mg/dL  110*           Passed - Microalbumin on file in past 12 months     Recent Labs   Lab Test 08/08/18  1751   MICROL 217   UMALCR 316.79*           Passed - Serum creatinine on file in past 12 months     Recent Labs   Lab Test 06/14/19  1025   CR 2.36*           Passed - HgbA1C in past 3 or 6 months     If HgbA1C is 8 or greater, it needs to be on file within the past 3 months.  If less than 8, must be on file within the past 6 months.     Recent Labs   Lab Test 04/01/19  0903   A1C 10.1*           Passed - Medication is active on med list        Passed - Patient is age 18 or older        Passed - Recent (6 mo) or future (30 days) visit within the authorizing provider's specialty     Patient had office visit in the last 6 months or has a visit in the next 30 days with authorizing provider or within the authorizing provider's specialty.  See \"Patient Info\" tab in inbasket, or \"Choose Columns\" in Meds & Orders section of the refill encounter.            VICTOZA PEN 18 MG/3ML soln [Pharmacy Med Name: VICTOZA 18 MG/3 ML INJECT P 18 SOPN] 6 mL 3     Sig: INJECT 1.8 MG SUBCUTANEOUS DAILY       GLP-1 Agonists Protocol Failed - 6/22/2019  1:31 PM        Failed - Blood " pressure less than 140/90 in past 6 months     BP Readings from Last 3 Encounters:   06/25/19 137/83   04/08/19 150/68   01/07/19 112/63           Failed - Normal serum creatinine on file in past 12 months     Recent Labs   Lab Test 06/14/19  1025   CR 2.36*           Passed - LDL on file in past 12 months     Recent Labs   Lab Test 08/08/18  1740   LDL Cannot estimate LDL when triglyceride exceeds 400 mg/dL  110*           Passed - Microalbumin on file in past 12 months     Recent Labs   Lab Test 08/08/18  1751   MICROL 217   UMALCR 316.79*           Passed - HgbA1C in past 3 or 6 months     If HgbA1C is 8 or greater, it needs to be on file within the past 3 months.  If less than 8, must be on file within the past 6 months.     Recent Labs   Lab Test 04/01/19  0903   A1C 10.1*        Ronel Grubbs, BARONN, RN

## 2019-06-25 NOTE — PROGRESS NOTES
SUBJECTIVE:  Roberto Thompson is an 58 year old male who presents for a blood pressure check.    The reason for the visit is:  a recent medication change    The patient reports that he IS taking the medication as prescribed.     Current Outpatient Medications   Medication     ACE/ARB/ARNI NOT PRESCRIBED (INTENTIONAL)     Alcohol Swabs PADS     amLODIPine (NORVASC) 10 MG tablet     aspirin 81 MG EC tablet     atorvastatin (LIPITOR) 40 MG tablet     blood glucose calibration (NO BRAND SPECIFIED) solution     blood glucose monitoring (ONE TOUCH ULTRA 2) meter device kit     blood glucose monitoring (ONETOUCH ULTRA) test strip     carvedilol (COREG) 12.5 MG tablet     Cholecalciferol (VITAMIN D) 2000 units tablet     fenofibrate (TRICOR) 145 MG tablet     furosemide (LASIX) 20 MG tablet     insulin aspart (NOVOLOG PEN) 100 UNIT/ML pen     insulin aspart (NOVOLOG PEN) 100 UNIT/ML pen     insulin glargine U-300 (TOUJEO MAX SOLOSTAR) 300 UNIT/ML injection     insulin pen needle (GLOBAL EASE INJECT PEN NEEDLES) 31G X 5 MM     liraglutide (VICTOZA) 18 MG/3ML solution     ONETOUCH DELICA LANCETS 33G MISC     order for DME     senna-docusate (SENEXON-S) 8.6-50 MG per tablet     vitamin D2 (ERGOCALCIFEROL) 92989 units (1250 mcg) capsule     No current facility-administered medications for this visit.        Allergies   Allergen Reactions     Doxycycline      Facial swelling     Influenza Vac Split [Flu Virus Vaccine] Itching     And red /blue arms         OBJECTIVE:  Please get a blood pressure AND a pulse.  A height is also needed if has not been done in the past year.    /83   Pulse 80         If patient has diagnosis of HYPERTENSION, is there a goal on the problem list? Yes  Patient Active Problem List   Diagnosis     Morbid obesity (H)     Type 2 diabetes mellitus with other specified complication, with long-term current use of insulin (H)     Hypertension goal BP (blood pressure) < 140/90     High blood cholesterol      Vitamin D deficiency     High blood triglycerides     CKD (chronic kidney disease) stage 3, GFR 30-59 ml/min (H)     Toe amputation status, right (H)     KELBY (obstructive sleep apnea)     Peripheral neuropathy     Peripheral vascular disease (H)     Moderate nonproliferative diabetic retinopathy of both eyes with macular edema (H)     Encounter for long-term (current) insulin use (H)       Plan:    Systolic BP    Greater than or equal to 100  OR Less than  140    Diastolic BP    Greater than or equal to 70 OR less than 90    Pulse    Pulse greater than 60 or less than 100      If all the above three parameters are met, patient to go home. Chart CC to Primary Care Provider  If any one of the above three parameters is not met notify RN or provider.

## 2019-07-16 DIAGNOSIS — Z79.4 TYPE 2 DIABETES MELLITUS WITH OTHER SPECIFIED COMPLICATION, WITH LONG-TERM CURRENT USE OF INSULIN (H): ICD-10-CM

## 2019-07-16 DIAGNOSIS — E11.69 TYPE 2 DIABETES MELLITUS WITH OTHER SPECIFIED COMPLICATION, WITH LONG-TERM CURRENT USE OF INSULIN (H): ICD-10-CM

## 2019-07-18 RX ORDER — INSULIN ASPART 100 [IU]/ML
INJECTION, SOLUTION INTRAVENOUS; SUBCUTANEOUS
Qty: 45 ML | Refills: 0 | OUTPATIENT
Start: 2019-07-18

## 2019-07-18 NOTE — TELEPHONE ENCOUNTER
Please call pt and have him schedule appt.  Okay to refill medication once appt made.    Jaylyn Conte MD

## 2019-07-18 NOTE — TELEPHONE ENCOUNTER
Routing refill request to provider for review/approval because:  Carin given x1 and patient did not follow up, please advise      Katie DRAPERN, RN, CPN

## 2019-07-18 NOTE — TELEPHONE ENCOUNTER
Attempted call to patient. Connection was lost before I could explain what was needed. When I retried the number, it went to voicemail.   Sheyla ClementeTC

## 2019-07-19 NOTE — TELEPHONE ENCOUNTER
Also pharmacy states per insurance patient needs a new meter and supplies.  Patient will be making an appt.  Thank You.

## 2019-07-30 DIAGNOSIS — E11.69 TYPE 2 DIABETES MELLITUS WITH OTHER SPECIFIED COMPLICATION, WITH LONG-TERM CURRENT USE OF INSULIN (H): ICD-10-CM

## 2019-07-30 DIAGNOSIS — Z79.4 TYPE 2 DIABETES MELLITUS WITH OTHER SPECIFIED COMPLICATION, WITH LONG-TERM CURRENT USE OF INSULIN (H): ICD-10-CM

## 2019-07-30 NOTE — TELEPHONE ENCOUNTER
Rx sig is too long to E-prescribe.  Routing to PCP to print and sign so it can be faxed.    Arianna DRAPERN, RN

## 2019-07-30 NOTE — TELEPHONE ENCOUNTER
I spoke to the patients daughter who told me to call Roberto's  to schedule the appointment.  I spoke to his  Elijah @[phone] and I scheduled an appointment for the patient on 8/26/19. Refill needed please.  Daisy Roth,

## 2019-07-31 RX ORDER — INSULIN ASPART 100 [IU]/ML
INJECTION, SOLUTION INTRAVENOUS; SUBCUTANEOUS
Qty: 45 ML | Refills: 0 | Status: SHIPPED | OUTPATIENT
Start: 2019-07-31 | End: 2019-08-12

## 2019-07-31 RX ORDER — LIRAGLUTIDE 6 MG/ML
INJECTION SUBCUTANEOUS
Qty: 6 ML | Refills: 0 | Status: SHIPPED | OUTPATIENT
Start: 2019-07-31 | End: 2019-08-12

## 2019-07-31 NOTE — TELEPHONE ENCOUNTER
"Lab Results   Component Value Date    A1C 10.1 04/01/2019    A1C 10.6 12/18/2018    A1C 9.7 08/08/2018     Next 5 appointments (look out 90 days)    Aug 26, 2019  3:20 PM CDT  [facility]  [facility] [address] [facility] [address] [phone]            Requested Prescriptions   Pending Prescriptions Disp Refills     NOVOLOG FLEXPEN 100 UNIT/ML soln [Pharmacy Med Name: NOVOLOG FLEXPEN SYRINGE 100 SOPN] 45 mL 0     Sig: INJECT 60 UNITS SUBCUTANEOUS 3 TIMES DAILY (WITH MEALS) PLUS SCALE: SEE SCALE BELOW        Short Acting Insulin Protocol Failed - 7/30/2019  1:20 PM        Failed - HgbA1C in past 3 or 6 months     If HgbA1C is 8 or greater, it needs to be on file within the past 3 months.  If less than 8, must be on file within the past 6 months.     Recent Labs   Lab Test 04/01/19  0903   A1C 10.1*             Passed - Blood pressure less than 140/90 in past 6 months     BP Readings from Last 3 Encounters:   06/25/19 137/83   04/08/19 150/68   01/07/19 112/63                 Passed - LDL on file in past 12 months     Recent Labs   Lab Test 08/08/18  1740   LDL Cannot estimate LDL when triglyceride exceeds 400 mg/dL  110*             Passed - Microalbumin on file in past 12 months     Recent Labs   Lab Test 08/08/18  1751   MICROL 217   UMALCR 316.79*             Passed - Serum creatinine on file in past 12 months     Recent Labs   Lab Test 06/14/19  1025   CR 2.36*             Passed - Medication is active on med list        Passed - Patient is age 18 or older        Passed - Recent (6 mo) or future (30 days) visit within the authorizing provider's specialty     Patient had office visit in the last 6 months or has a visit in the next 30 days with authorizing provider or within the authorizing provider's specialty.  See \"Patient Info\" tab in inbasket, or \"Choose Columns\" in Meds & Orders section of the refill encounter.            " "VICTOZA PEN 18 MG/3ML soln [Pharmacy Med Name: VICTOZA 18 MG/3 ML INJECT P 18 SOPN] 6 mL 0     Sig: INJECT 1.8 MG SUBCUTANEOUS DAILY       GLP-1 Agonists Protocol Failed - 7/30/2019  1:20 PM        Failed - HgbA1C in past 3 or 6 months     If HgbA1C is 8 or greater, it needs to be on file within the past 3 months.  If less than 8, must be on file within the past 6 months.     Recent Labs   Lab Test 04/01/19  0903   A1C 10.1*             Failed - Normal serum creatinine on file in past 12 months     Recent Labs   Lab Test 06/14/19  1025   CR 2.36*             Passed - Blood pressure less than 140/90 in past 6 months     BP Readings from Last 3 Encounters:   06/25/19 137/83   04/08/19 150/68   01/07/19 112/63                 Passed - LDL on file in past 12 months     Recent Labs   Lab Test 08/08/18  1740   LDL Cannot estimate LDL when triglyceride exceeds 400 mg/dL  110*             Passed - Microalbumin on file in past 12 months     Recent Labs   Lab Test 08/08/18  1751   MICROL 217   UMALCR 316.79*             Passed - Medication is active on med list        Passed - Patient is age 18 or older        Passed - Recent (6 mo) or future (30 days) visit within the authorizing provider's specialty     Patient had office visit in the last 6 months or has a visit in the next 30 days with authorizing provider.  See \"Patient Info\" tab in inbasket, or \"Choose Columns\" in Meds & Orders section of the refill encounter.              "

## 2019-08-12 ENCOUNTER — ALLIED HEALTH/NURSE VISIT (OUTPATIENT)
Dept: EDUCATION SERVICES | Facility: CLINIC | Age: 59
End: 2019-08-12
Payer: COMMERCIAL

## 2019-08-12 DIAGNOSIS — E11.9 DIABETES MELLITUS WITHOUT COMPLICATION (H): ICD-10-CM

## 2019-08-12 DIAGNOSIS — Z79.4 TYPE 2 DIABETES MELLITUS WITH OTHER SPECIFIED COMPLICATION, WITH LONG-TERM CURRENT USE OF INSULIN (H): Primary | ICD-10-CM

## 2019-08-12 DIAGNOSIS — E11.69 TYPE 2 DIABETES MELLITUS WITH OTHER SPECIFIED COMPLICATION, WITH LONG-TERM CURRENT USE OF INSULIN (H): Primary | ICD-10-CM

## 2019-08-12 LAB — HBA1C MFR BLD: 8.2 % (ref 0–5.6)

## 2019-08-12 PROCEDURE — 83036 HEMOGLOBIN GLYCOSYLATED A1C: CPT | Performed by: FAMILY MEDICINE

## 2019-08-12 PROCEDURE — G0108 DIAB MANAGE TRN  PER INDIV: HCPCS

## 2019-08-12 PROCEDURE — 99207 ZZC DROP WITH A PROCEDURE: CPT

## 2019-08-12 PROCEDURE — 36415 COLL VENOUS BLD VENIPUNCTURE: CPT | Performed by: FAMILY MEDICINE

## 2019-08-12 RX ORDER — LIRAGLUTIDE 6 MG/ML
1.8 INJECTION SUBCUTANEOUS DAILY
Qty: 9 ML | Refills: 3 | Status: SHIPPED | OUTPATIENT
Start: 2019-08-12 | End: 2019-12-08

## 2019-08-12 NOTE — PROGRESS NOTES
"Diabetes Self-Management Education & Support    Diabetes Education Self Management & Training    SUBJECTIVE/OBJECTIVE:  Presents for: Individual review  Accompanied by: Self, Spouse  Diabetes education in the past 24mo: No  Focus of Visit: Patient Unsure, Monitoring, Taking Medication, Healthy Eating  Diabetes type: Type 2  Disease course: Worsening  Transportation concerns: No  Other concerns:: Language barrier  Cultural Influences/Ethnic Background:  [facility]      Diabetes Symptoms & Complications  Blurred vision: Yes  Fatigue: Yes  Neuropathy: Yes  Foot ulcerations: No  Polydipsia: Yes  Polyphagia: No  Polyuria: No  Visual change: Yes  Weakness: Yes  Weight loss: No  Slow healing wounds: Yes  Recent Infection(s): No  Symptom course: Worsening  Weight trend: Stable  Autonomic neuropathy: Yes  CVA: No  Heart disease: No  Nephropathy: Yes  Peripheral neuropathy: Yes  Peripheral Vascular Disease: Yes  Retinopathy: Yes    Patient Problem List and Family Medical History reviewed for relevant medical history, current medical status, and diabetes risk factors.    Vitals:  There were no vitals taken for this visit.  Estimated body mass index is 44.75 kg/m  as calculated from the following:    Height as of 4/8/19: 1.753 m (5' 9\").    Weight as of 4/8/19: 137.4 kg (303 lb).   Last 3 BP:   BP Readings from Last 3 Encounters:   06/25/19 137/83   04/08/19 150/68   01/07/19 112/63       History   Smoking Status     Never Smoker   Smokeless Tobacco     Never Used       Labs:  Lab Results   Component Value Date    A1C 8.2 08/12/2019     Lab Results   Component Value Date     06/14/2019     Lab Results   Component Value Date    LDL  08/08/2018     Cannot estimate LDL when triglyceride exceeds 400 mg/dL     08/08/2018     HDL Cholesterol   Date Value Ref Range Status   08/08/2018 35 (L) >39 mg/dL Final   ]  GFR Estimate   Date Value Ref Range Status   06/14/2019 29 (L) >60 mL/min/[1.73_m2] Final     Comment:     Non "  GFR Calc  Starting 12/18/2018, serum creatinine based estimated GFR (eGFR) will be   calculated using the Chronic Kidney Disease Epidemiology Collaboration   (CKD-EPI) equation.       GFR Estimate If Black   Date Value Ref Range Status   06/14/2019 33 (L) >60 mL/min/[1.73_m2] Final     Comment:      GFR Calc  Starting 12/18/2018, serum creatinine based estimated GFR (eGFR) will be   calculated using the Chronic Kidney Disease Epidemiology Collaboration   (CKD-EPI) equation.       Lab Results   Component Value Date    CR 2.36 06/14/2019     No results found for: MICROALBUMIN    Healthy Eating  Healthy Eating Assessed Today: Yes  Cultural/Lutheran diet restrictions?: No  Patient on a regular basis: Skips meals regularly, Has an inconsistent intake of carbohydrates  Meal planning: Avoiding sweets  Meals include: Breakfast, Lunch, Dinner, Snacks  Breakfast: now eating breakfast, fish , and bread.   Lunch: sausage, bread  Dinner: loves potatoes  Other: he does not eat 3 meals per day , and it appears he will not change in his ways  Beverages: Water, Milk, Juice, Diet soda  Has patient met with a dietitian in the past?: No    Being Active  Being Active Assessed Today: Yes  Exercise:: Currently not exercising  Barrier to exercise: Physical limitation    Monitoring  Monitoring Assessed Today: Yes  Did patient bring glucose meter to appointment? : No  Blood Glucose Meter: One Touch  Home Glucose (Sugar) Monitoring: 3-4 times per week  Low Glucose Range (mg/dL): 110-130  High Glucose Range (mg/dL): >200  Overall Range (mg/dL): >200        Taking Medications  Diabetes Medication(s)     Insulin       insulin aspart (NOVOLOG PEN) 100 UNIT/ML pen    Inject 60 Units Subcutaneous 3 times daily (with meals) Plus scale: .    Plus the scale with each meal:  Plus the following coverage scale:  120-149=1 units  150-199=2 units  200-249=3 units  250-299=5 units  300-349=7 units  350 or more =8  units    Max dose of 200 units/day     insulin glargine U-300 (TOUJEO MAX SOLOSTAR) 300 UNIT/ML injection    Inject 150 Units Subcutaneous At Bedtime    Incretin Mimetic Agents (GLP-1 Receptor Agonists)       liraglutide (VICTOZA PEN) 18 MG/3ML solution    Inject 1.8 mg Subcutaneous daily          Taking Medication Assessed Today: Yes  Current Treatments: Diet, Insulin Injections(Takes 130 units of Toujeo and Novolog 50 units with meals plus coverage.)  Dose schedule: pre-breakfast  Given by: Patient  Injection/Infusion sites: Abdomen  Problems taking diabetes medications regularly?: No  Diabetes medication side effects?: No  Treatment Compliance: All of the time    Problem Solving  Problem Solving Assessed Today: Yes  Hypoglycemia Frequency: Never  Patient carries a carbohydrate source: No  Medical alert: No  Severe weather/disaster plan for diabetes management?: No  DKA prevention plan?: No              Reducing Risks  Diabetes Risks: None, Family History, Hyperlipidemia, Sedentary Lifestyle, Ethnicity, Age over 45 years  CAD Risks: Diabetes Mellitus, Family history, Male sex, Hypertension, Obesity, Sedentary lifestyle, Dyslipidemia  Has dilated eye exam at least once a year?: Yes    Healthy Coping  Emotional response to diabetes: Acceptance, Uncertain  Stage of change: RELAPSE (Returned to unhealthy behavior)  Difficulty affording diabetes management supplies?: No  Patient Activation Measure Survey Score:  No flowsheet data found.    ASSESSMENT:  Roberto appears to be doing much better on Victoza and has cut down his BG and appetite.  Will increase his insulin some, and called his pharmacy to be sure we are ordering exactly what he needs to have to manage his BG levels.  Pharmacy was not opening up his boxes of insulin and getting him what he really needs to manage.  There is a Galilea who seems to be working best with him.  Again, he and his wife argue about who fault everything is.      Goals Addressed as of  8/12/2019 at 4:31 PM                 2/25/19      Healthy Eating (pt-stated)   10%    Added 1/21/19 by Oneida Abraham RN     My Goal:f I will follow a better meal plan    What I need to meet my goal: 1. Eat 3 meals per day, and only 1 plate, not 3  2. Same amount of food for all 3 meals    I plan to meet my goal by this date: 2 months            Patient's most recent   Lab Results   Component Value Date    A1C 8.2 08/12/2019    is not meeting goal of <8.0    INTERVENTION:   Diabetes knowledge and skills assessment:     Patient is knowledgeable in diabetes management concepts related to: Healthy Eating, Being Active, Monitoring and Taking Medication    Patient needs further education on the following diabetes management concepts: Taking Medication    Based on learning assessment above, most appropriate setting for further diabetes education would be: Individual setting.    Education provided today on:  AADE Self-Care Behaviors:  Healthy Eating: carbohydrate counting, consistency in amount, composition, and timing of food intake, weight reduction, heart healthy diet, eating out, portion control, plate planning method and label reading  Monitoring: purpose, proper technique, log and interpret results, individual blood glucose targets and frequency of monitoring  Taking Medication: action of prescribed medication, drawing up, administering and storing injectable diabetes medications, proper site selection and rotation for injections, side effects of prescribed medications and when to take medications    Opportunities for ongoing education and support in diabetes-self management were discussed.    Pt verbalized understanding of concepts discussed and recommendations provided today.       Education Materials Provided:  Carbohydrate Counting    PLAN:  See Patient Instructions for co-developed, patient-stated behavior change goals.  AVS printed and provided to patient today. See Follow-Up section for recommended  follow-up.    Salena Abraham RN/DEYSI [facility] Diabetes Educator    Time Spent: 60 minutes  Encounter Type: Individual    Any diabetes medication dose changes were made via the CDE Protocol and Collaborative Practice Agreement with the patient's primary care provider. A copy of this encounter was shared with the provider.

## 2019-08-12 NOTE — PATIENT INSTRUCTIONS
Diabetes Support Resources:       Bring blood glucose meter and logbook with you to all doctor and follow-up appointments.    Diabetes Education Telephone Visit Follow-up:    We realize your time is valuable and your health is important! We offer a convenient Telephone Visit follow up! It s a quick way to check in for a medication dose adjustment without having to come back to clinic as soon.    Telephone Visits are often covered by insurance. Please check with your insurance plan to see if this type of visit is covered. If not, the cost is less expensive than an office visit:      Up to 10 minutes (Code 07170): $30    11-20 minutes (Code 80229): $59    More than 20 minutes (Code 76218): $85    Talk with your Diabetes Educator if you want to learn more.      [facility]:  For Your Diabetes Education and Nutrition Appointments Call:  [phone]   For Diabetes Education or Nutrition Related Questions:   Phone: [phone]  E-mail: [email or web address]  Fax: [phone]   If you need a medication refill please contact your pharmacy. Please allow 3 business days for your refills to be completed.

## 2019-08-13 ENCOUNTER — PATIENT OUTREACH (OUTPATIENT)
Dept: EDUCATION SERVICES | Facility: CLINIC | Age: 59
End: 2019-08-13
Payer: COMMERCIAL

## 2019-08-13 DIAGNOSIS — Z79.4 TYPE 2 DIABETES MELLITUS WITH OTHER SPECIFIED COMPLICATION, WITH LONG-TERM CURRENT USE OF INSULIN (H): ICD-10-CM

## 2019-08-13 DIAGNOSIS — E11.69 TYPE 2 DIABETES MELLITUS WITH OTHER SPECIFIED COMPLICATION, WITH LONG-TERM CURRENT USE OF INSULIN (H): ICD-10-CM

## 2019-08-13 NOTE — PROGRESS NOTES
Dorothy at [facility] called saying the pt needs a meter update due to insurance formulary. Please send scripts for Accuchek Guide.

## 2019-08-13 NOTE — PROGRESS NOTES
Per chart review patient has had this medication refilled 1 time and is due for PCP visit scheduled 8/26/19.  Unclear if patient is out of medication.  Dose update required volume update.  Pharmacy will require new rx to be faxed to them as erx does not have enough room for sliding scale.      Recommend PCP team fax new medication order OR wait until visit as appropriate.    Debby Alvarado MS, RD, LD, CDE

## 2019-08-14 DIAGNOSIS — Z79.4 TYPE 2 DIABETES MELLITUS WITH OTHER SPECIFIED COMPLICATION, WITH LONG-TERM CURRENT USE OF INSULIN (H): Primary | ICD-10-CM

## 2019-08-14 DIAGNOSIS — E11.69 TYPE 2 DIABETES MELLITUS WITH OTHER SPECIFIED COMPLICATION, WITH LONG-TERM CURRENT USE OF INSULIN (H): Primary | ICD-10-CM

## 2019-08-14 RX ORDER — INSULIN ASPART 100 [IU]/ML
INJECTION, SOLUTION INTRAVENOUS; SUBCUTANEOUS
Qty: 45 ML | Refills: 0 | OUTPATIENT
Start: 2019-08-14

## 2019-08-14 RX ORDER — LANCETS
1 EACH MISCELLANEOUS
Qty: 102 EACH | Refills: 11 | Status: SHIPPED | OUTPATIENT
Start: 2019-08-14 | End: 2020-07-02

## 2019-08-15 ENCOUNTER — TELEPHONE (OUTPATIENT)
Dept: FAMILY MEDICINE | Facility: CLINIC | Age: 59
End: 2019-08-15

## 2019-08-15 NOTE — TELEPHONE ENCOUNTER
Reason for Call:  Medication or medication refill:    Do you use a [facility]?  Name of the pharmacy and phone number for the current request:  [facility]([place]) fax number [phone]    Name of the medication requested: Novolog pen    Other request: pharmacy states they have requested that the prescription be faxed but nothing has come through    Can we leave a detailed message on this number? YES    Phone number patient can be reached at: Other phone number:  [phone]    Best Time: any    Call taken on 8/15/2019 at 8:57 AM by Yue Sahni

## 2019-08-26 ENCOUNTER — OFFICE VISIT (OUTPATIENT)
Dept: FAMILY MEDICINE | Facility: CLINIC | Age: 59
End: 2019-08-26
Payer: COMMERCIAL

## 2019-08-26 VITALS
HEART RATE: 82 BPM | OXYGEN SATURATION: 96 % | BODY MASS INDEX: 44.89 KG/M2 | SYSTOLIC BLOOD PRESSURE: 138 MMHG | RESPIRATION RATE: 18 BRPM | WEIGHT: 304 LBS | DIASTOLIC BLOOD PRESSURE: 80 MMHG | TEMPERATURE: 98.5 F

## 2019-08-26 DIAGNOSIS — Z79.4 TYPE 2 DIABETES MELLITUS WITH OTHER SPECIFIED COMPLICATION, WITH LONG-TERM CURRENT USE OF INSULIN (H): Primary | ICD-10-CM

## 2019-08-26 DIAGNOSIS — I10 HYPERTENSION GOAL BP (BLOOD PRESSURE) < 140/90: ICD-10-CM

## 2019-08-26 DIAGNOSIS — K59.00 CONSTIPATION, UNSPECIFIED CONSTIPATION TYPE: ICD-10-CM

## 2019-08-26 DIAGNOSIS — E11.69 TYPE 2 DIABETES MELLITUS WITH OTHER SPECIFIED COMPLICATION, WITH LONG-TERM CURRENT USE OF INSULIN (H): Primary | ICD-10-CM

## 2019-08-26 DIAGNOSIS — Z79.4 ENCOUNTER FOR LONG-TERM (CURRENT) INSULIN USE (H): ICD-10-CM

## 2019-08-26 DIAGNOSIS — G63 POLYNEUROPATHY ASSOCIATED WITH UNDERLYING DISEASE (H): ICD-10-CM

## 2019-08-26 DIAGNOSIS — N18.30 CKD (CHRONIC KIDNEY DISEASE) STAGE 3, GFR 30-59 ML/MIN (H): ICD-10-CM

## 2019-08-26 DIAGNOSIS — E66.01 MORBID OBESITY (H): ICD-10-CM

## 2019-08-26 DIAGNOSIS — E78.1 HIGH BLOOD TRIGLYCERIDES: ICD-10-CM

## 2019-08-26 PROCEDURE — 99214 OFFICE O/P EST MOD 30 MIN: CPT | Performed by: FAMILY MEDICINE

## 2019-08-26 RX ORDER — AMOXICILLIN 250 MG
CAPSULE ORAL
Qty: 60 TABLET | Refills: 3 | Status: SHIPPED | OUTPATIENT
Start: 2019-08-26 | End: 2019-12-15

## 2019-08-26 RX ORDER — FUROSEMIDE 20 MG
20 TABLET ORAL 2 TIMES DAILY
Qty: 180 TABLET | Refills: 1 | Status: SHIPPED | OUTPATIENT
Start: 2019-08-26 | End: 2020-04-03

## 2019-08-26 NOTE — PROGRESS NOTES
Subjective     Roberto Thompson is a 58 year old male who presents to clinic today for the following health issues:    HPI     Diabetes Follow-up      How often are you checking your blood sugar? Three times daily    What time of day are you checking your blood sugars (select all that apply)?  Checks it before or prior to eating    Have you had any blood sugars above 200?  Yes     Have you had any blood sugars below 70?  Yes a couple of times per pt    What symptoms do you notice when your blood sugar is low?  Shaky    What concerns do you have today about your diabetes? Other: Wanted to discuss senokot medication is not covered from insurance and wondering if PCP can refill med. Per pt requesting for a new BP cuff as well, Per pt requesting hard copy for med supply.     Do you have any of these symptoms? (Select all that apply)  Numbness in feet     Have you had a diabetic eye exam in the last 12 months? Yes- Date of last eye exam: 07/01/2019    Diabetes Management Resources    Hyperlipidemia Follow-Up      Are you having any of the following symptoms? (Select all that apply)  Shortness of breath    Are you regularly taking any medication or supplement to lower your cholesterol?   Yes- atorvastatin    Are you having muscle aches or other side effects that you think could be caused by your cholesterol lowering medication?  No    Hypertension Follow-up      Do you check your blood pressure regularly outside of the clinic? Yes     Are you following a low salt diet? No    Are your blood pressures ever more than 140 on the top number (systolic) OR more   than 90 on the bottom number (diastolic), for example 140/90? Yes    BP Readings from Last 2 Encounters:   08/26/19 138/80   06/25/19 137/83     Hemoglobin A1C (%)   Date Value   08/12/2019 8.2 (H)   04/01/2019 10.1 (H)     LDL Cholesterol Calculated (mg/dL)   Date Value   08/08/2018     Cannot estimate LDL when triglyceride exceeds 400 mg/dL     LDL Cholesterol Direct  (mg/dL)   Date Value   08/08/2018 110 (H)         How many servings of fruits and vegetables do you eat daily?      On average, how many sweetened beverages do you drink each day (soda, juice, sweet tea, etc)?       How many days per week do you miss taking your medication? 0      Diabetes much improved on appropriate dose of insulin.  On statin and aspirin. Intolerant of ace  Has seen eye doctor and dentist  Would like home BP cuff to check at home      Reviewed and updated as needed this visit by Provider         Review of Systems   ROS COMP: Constitutional, HEENT, cardiovascular, pulmonary, gi and gu systems are negative, except as otherwise noted.      Objective    /80   Pulse 82   Temp 98.5  F (36.9  C) (Oral)   Resp 18   Wt 137.9 kg (304 lb)   SpO2 96%   BMI 44.89 kg/m    Body mass index is 44.89 kg/m .  Physical Exam   GENERAL: healthy, alert and no distress  NECK: no adenopathy, no asymmetry, masses, or scars and thyroid normal to palpation  RESP: lungs clear to auscultation - no rales, rhonchi or wheezes  CV: regular rate and rhythm, normal S1 S2, no S3 or S4, no murmur, click or rub, no peripheral edema and peripheral pulses strong  ABDOMEN: soft, nontender, no hepatosplenomegaly, no masses and bowel sounds normal  MS: no gross musculoskeletal defects noted, no edema  Diabetic foot exam: normal DP and PT pulses, no trophic changes or ulcerative lesions and normal sensory exam    Diagnostic Test Results:  Labs reviewed in Epic        Assessment & Plan     1. Type 2 diabetes mellitus with other specified complication, with long-term current use of insulin (H)  Much improved with help from diab ed. Recheck in 3 months  - Albumin Random Urine Quantitative with Creat Ratio; Future    2. Encounter for long-term (current) insulin use (H)  Refill as needed    3. CKD (chronic kidney disease) stage 3, GFR 30-59 ml/min (H)  stable    4. Morbid obesity (H)  Encourage regular exercise, healthy diet    5.  "Polyneuropathy associated with underlying disease (H)  stable    6. Hypertension goal BP (blood pressure) < 140/90  At goal on meds  - furosemide (LASIX) 20 MG tablet; Take 1 tablet (20 mg) by mouth 2 times daily  Dispense: 180 tablet; Refill: 1  - order for DME; Equipment being ordered: Digital home blood pressure monitor kit  Dispense: 1 Device; Refill: 1    7. Constipation, unspecified constipation type  Pt states insurance no longer covers this. Will resend rx and see if we can do a PA  - senna-docusate (SENEXON-S) 8.6-50 MG tablet; TAKE 1 TAB BY MOUTH TWO TIMES DAILY AS NEEDED FOR CONSTIPATION.  Dispense: 60 tablet; Refill: 3    8. High blood triglycerides  On statin  - Lipid panel reflex to direct LDL Fasting; Future     BMI:   Estimated body mass index is 44.89 kg/m  as calculated from the following:    Height as of 4/8/19: 1.753 m (5' 9\").    Weight as of this encounter: 137.9 kg (304 lb).   Weight management plan: Discussed healthy diet and exercise guidelines            No follow-ups on file.    Jaylyn Conte MD  [facility]    "

## 2019-09-04 DIAGNOSIS — E11.69 TYPE 2 DIABETES MELLITUS WITH OTHER SPECIFIED COMPLICATION, WITH LONG-TERM CURRENT USE OF INSULIN (H): ICD-10-CM

## 2019-09-04 DIAGNOSIS — E78.1 HIGH BLOOD TRIGLYCERIDES: ICD-10-CM

## 2019-09-04 DIAGNOSIS — Z79.4 TYPE 2 DIABETES MELLITUS WITH OTHER SPECIFIED COMPLICATION, WITH LONG-TERM CURRENT USE OF INSULIN (H): ICD-10-CM

## 2019-09-04 LAB
CHOLEST SERPL-MCNC: 218 MG/DL
CREAT UR-MCNC: 45 MG/DL
HDLC SERPL-MCNC: 39 MG/DL
LDLC SERPL CALC-MCNC: 99 MG/DL
MICROALBUMIN UR-MCNC: 50 MG/L
MICROALBUMIN/CREAT UR: 111.63 MG/G CR (ref 0–17)
NONHDLC SERPL-MCNC: 179 MG/DL
TRIGL SERPL-MCNC: 398 MG/DL

## 2019-09-04 PROCEDURE — 80061 LIPID PANEL: CPT | Performed by: FAMILY MEDICINE

## 2019-09-04 PROCEDURE — 36415 COLL VENOUS BLD VENIPUNCTURE: CPT | Performed by: FAMILY MEDICINE

## 2019-09-04 PROCEDURE — 82043 UR ALBUMIN QUANTITATIVE: CPT | Performed by: FAMILY MEDICINE

## 2019-09-16 ENCOUNTER — TELEPHONE (OUTPATIENT)
Dept: FAMILY MEDICINE | Facility: CLINIC | Age: 59
End: 2019-09-16

## 2019-09-16 NOTE — TELEPHONE ENCOUNTER
Panel Management Review      Patient has the following on his problem list:     Diabetes    ASA: Passed    Last A1C  Lab Results   Component Value Date    A1C 8.2 08/12/2019    A1C 10.1 04/01/2019    A1C 10.6 12/18/2018    A1C 9.7 08/08/2018     A1C tested: FAILED    Last LDL:    Lab Results   Component Value Date    CHOL 218 09/04/2019     Lab Results   Component Value Date    HDL 39 09/04/2019     Lab Results   Component Value Date    LDL 99 09/04/2019     Lab Results   Component Value Date    TRIG 398 09/04/2019     No results found for: CHOLHDLRATIO  Lab Results   Component Value Date    NHDL 179 09/04/2019       Is the patient on a Statin? YES             Is the patient on Aspirin? YES    Medications     HMG CoA Reductase Inhibitors     atorvastatin (LIPITOR) 40 MG tablet       Salicylates     aspirin 81 MG EC tablet             Last three blood pressure readings:  BP Readings from Last 3 Encounters:   08/26/19 138/80   06/25/19 137/83   04/08/19 150/68       Date of last diabetes office visit: 4/8/19     Tobacco History:     History   Smoking Status     Never Smoker   Smokeless Tobacco     Never Used         Hypertension   Last three blood pressure readings:  BP Readings from Last 3 Encounters:   08/26/19 138/80   06/25/19 137/83   04/08/19 150/68     Blood pressure: Passed    HTN Guidelines:  Less than 140/90      Composite cancer screening  Chart review shows that this patient is due/due soon for the following None  Summary:    Patient is due/failing the following:   A1C    Action needed:   Patient needs office visit for dm check in nov    Type of outreach:    none    Questions for provider review:    None                                                                                                                                    Solange Harris MA     Chart routed to Care Team .

## 2019-09-24 ENCOUNTER — TELEPHONE (OUTPATIENT)
Dept: FAMILY MEDICINE | Facility: CLINIC | Age: 59
End: 2019-09-24

## 2019-09-24 ENCOUNTER — ALLIED HEALTH/NURSE VISIT (OUTPATIENT)
Dept: EDUCATION SERVICES | Facility: CLINIC | Age: 59
End: 2019-09-24
Payer: COMMERCIAL

## 2019-09-24 DIAGNOSIS — E11.9 DIABETES MELLITUS WITHOUT COMPLICATION (H): Primary | ICD-10-CM

## 2019-09-24 PROCEDURE — G0108 DIAB MANAGE TRN  PER INDIV: HCPCS

## 2019-09-24 PROCEDURE — 99207 ZZC DROP WITH A PROCEDURE: CPT

## 2019-09-24 NOTE — PROGRESS NOTES
"Diabetes Self-Management Education & Support    Diabetes Education Self Management & Training    SUBJECTIVE/OBJECTIVE:  Presents for: Individual review  Accompanied by: Self, Spouse  Diabetes education in the past 24mo: No  Focus of Visit: Patient Unsure, Monitoring, Taking Medication, Healthy Eating  Diabetes type: Type 2  Disease course: Worsening  Diabetes management related comments/concerns: due to her stroke she forgets to take her insulin  Transportation concerns: No  Other concerns:: Language barrier  Cultural Influences/Ethnic Background:  [facility]      Diabetes Symptoms & Complications  Blurred vision: Yes  Fatigue: Yes  Neuropathy: Yes  Foot ulcerations: No  Polydipsia: Yes  Polyphagia: No  Polyuria: No  Visual change: Yes  Weakness: Yes  Weight loss: No  Slow healing wounds: Yes  Recent Infection(s): No  Symptom course: Worsening  Weight trend: Stable  Autonomic neuropathy: Yes  CVA: No  Heart disease: No  Nephropathy: Yes  Peripheral neuropathy: Yes  Peripheral Vascular Disease: Yes  Retinopathy: Yes    Patient Problem List and Family Medical History reviewed for relevant medical history, current medical status, and diabetes risk factors.    Vitals:  There were no vitals taken for this visit.  Estimated body mass index is 44.89 kg/m  as calculated from the following:    Height as of 4/8/19: 1.753 m (5' 9\").    Weight as of 8/26/19: 137.9 kg (304 lb).   Last 3 BP:   BP Readings from Last 3 Encounters:   08/26/19 138/80   06/25/19 137/83   04/08/19 150/68       History   Smoking Status     Never Smoker   Smokeless Tobacco     Never Used       Labs:  Lab Results   Component Value Date    A1C 8.2 08/12/2019     Lab Results   Component Value Date     06/14/2019     Lab Results   Component Value Date    LDL 99 09/04/2019     HDL Cholesterol   Date Value Ref Range Status   09/04/2019 39 (L) >39 mg/dL Final   ]  GFR Estimate   Date Value Ref Range Status   06/14/2019 29 (L) >60 mL/min/[1.73_m2] Final     " Comment:     Non  GFR Calc  Starting 12/18/2018, serum creatinine based estimated GFR (eGFR) will be   calculated using the Chronic Kidney Disease Epidemiology Collaboration   (CKD-EPI) equation.       GFR Estimate If Black   Date Value Ref Range Status   06/14/2019 33 (L) >60 mL/min/[1.73_m2] Final     Comment:      GFR Calc  Starting 12/18/2018, serum creatinine based estimated GFR (eGFR) will be   calculated using the Chronic Kidney Disease Epidemiology Collaboration   (CKD-EPI) equation.       Lab Results   Component Value Date    CR 2.36 06/14/2019     No results found for: MICROALBUMIN    Healthy Eating  Healthy Eating Assessed Today: Yes  Cultural/Restoration diet restrictions?: No  Patient on a regular basis: Has an inconsistent intake of carbohydrates  Meal planning: Avoiding sweets  Meals include: Breakfast, Lunch, Dinner, Snacks  Breakfast: now eating breakfast, fish , and bread.   Lunch: sausage, bread  Dinner: loves potatoes  Other: he does not eat 3 meals per day , and it appears he will not change in his ways  Beverages: Water, Milk, Juice, Diet soda  Has patient met with a dietitian in the past?: No    Being Active  Being Active Assessed Today: Yes  Exercise:: Currently not exercising  Barrier to exercise: Physical limitation    Monitoring  Monitoring Assessed Today: Yes  Did patient bring glucose meter to appointment? : No  Blood Glucose Meter: One Touch  Home Glucose (Sugar) Monitoring: 3-4 times per week  Low Glucose Range (mg/dL): <70  High Glucose Range (mg/dL): >200  Overall Range (mg/dL): 140-180        Taking Medications  Diabetes Medication(s)     Insulin       insulin aspart (NOVOLOG PEN) 100 UNIT/ML pen    Inject 60 Units Subcutaneous 3 times daily (with meals) Plus scale: .    Plus the scale with each meal:  Plus the following coverage scale:  120-149=1 units  150-199=2 units  200-249=3 units  250-299=5 units  300-349=7 units  350 or more =8 units    Max dose  of 200 units/day     insulin glargine U-300 (TOUJEO MAX SOLOSTAR) 300 UNIT/ML injection    Inject 150 Units Subcutaneous At Bedtime    Incretin Mimetic Agents (GLP-1 Receptor Agonists)       liraglutide (VICTOZA PEN) 18 MG/3ML solution    Inject 1.8 mg Subcutaneous daily          Taking Medication Assessed Today: Yes  Current Treatments: Diet, Insulin Injections(Takes 150 units of Toujeo and Novolog 60 units with meals plus coverage.)  Dose schedule: pre-breakfast  Given by: Patient  Injection/Infusion sites: Abdomen  Problems taking diabetes medications regularly?: No  Diabetes medication side effects?: No  Treatment Compliance: All of the time    Problem Solving  Problem Solving Assessed Today: Yes  Hypoglycemia Frequency: Never  Patient carries a carbohydrate source: No  Medical alert: No  Severe weather/disaster plan for diabetes management?: No  DKA prevention plan?: No              Reducing Risks  Diabetes Risks: None, Family History, Hyperlipidemia, Sedentary Lifestyle, Ethnicity, Age over 45 years  CAD Risks: Diabetes Mellitus, Family history, Male sex, Hypertension, Obesity, Sedentary lifestyle, Dyslipidemia  Has dilated eye exam at least once a year?: Yes    Healthy Coping  Emotional response to diabetes: Acceptance, Uncertain  Informal Support system:: Family  Stage of change: ACTION (Actively working towards change)  Difficulty affording diabetes management supplies?: No  Patient Activation Measure Survey Score:  NASRA Score (Last Two) 8/26/2019   NASRA Raw Score 40   Activation Score 100   NASRA Level 4       ASSESSMENT:  Roberto has improved with the usage of Victoza.  He still likes to adjust his insulin himself , have asked him many times to not do this, and why he needs to stay on the current regime.  But he does what he wants.  No changes to be made with his insulin doses.        Patient's most recent   Lab Results   Component Value Date    A1C 8.2 08/12/2019    is not meeting goal of  <7.0    INTERVENTION:   Diabetes knowledge and skills assessment:     Patient is knowledgeable in diabetes management concepts related to: Monitoring    Patient needs further education on the following diabetes management concepts: Monitoring, Taking Medication and Problem Solving    Based on learning assessment above, most appropriate setting for further diabetes education would be: Individual setting.    Education provided today on:  AADE Self-Care Behaviors:  Monitoring: purpose, proper technique, log and interpret results, individual blood glucose targets and frequency of monitoring  Taking Medication: action of prescribed medication, drawing up, administering and storing injectable diabetes medications, proper site selection and rotation for injections, side effects of prescribed medications and when to take medications  Problem Solving: high blood glucose - causes, signs/symptoms, treatment and prevention, low blood glucose - causes, signs/symptoms, treatment and prevention, carrying a carbohydrate source at all times, safe travel and when to call health care provider    Opportunities for ongoing education and support in diabetes-self management were discussed.    Pt verbalized understanding of concepts discussed and recommendations provided today.       Education Materials Provided:  No new materials provided today    PLAN:  See Patient Instructions for co-developed, patient-stated behavior change goals.  AVS printed and provided to patient today. See Follow-Up section for recommended follow-up.  Follow the current plan for medications and encourage not to self adjust medications    Salena Abraham RN/DEYSI [place] Diabetes Educator    Time Spent: 60 minutes  Encounter Type: Individual    Any diabetes medication dose changes were made via the [facility] Protocol and Collaborative Practice Agreement with the patient's primary care provider. A copy of this encounter was shared with the provider.

## 2019-09-24 NOTE — PATIENT INSTRUCTIONS
Diabetes Support Resources:  Toujeo Max take 150 units daily  Novolog 60 units plus the following scale:             insulin aspart (NOVOLOG PEN) 100 UNIT/ML pen    Inject 60 Units Subcutaneous 3 times daily (with meals) Plus scale: .    Plus the scale with each meal:  Plus the following coverage scale:  120-149=1 units  150-199=2 units  200-249=3 units  250-299=5 units  300-349=7 units  350 or more =8 units     Victoza 1.8 mg daily     Bring blood glucose meter and logbook with you to all doctor and follow-up appointments.    Diabetes Education Telephone Visit Follow-up:    We realize your time is valuable and your health is important! We offer a convenient Telephone Visit follow up! It s a quick way to check in for a medication dose adjustment without having to come back to clinic as soon.    Telephone Visits are often covered by insurance. Please check with your insurance plan to see if this type of visit is covered. If not, the cost is less expensive than an office visit:      Up to 10 minutes (Code 26225): $30    11-20 minutes (Code 05026): $59    More than 20 minutes (Code 20902): $85    Talk with your Diabetes Educator if you want to learn more.      [facility]:  For Your Diabetes Education and Nutrition Appointments Call:  [phone]   For Diabetes Education or Nutrition Related Questions:   Phone: [phone]  E-mail: [email or web address]  Fax: [phone]   If you need a medication refill please contact your pharmacy. Please allow 3 business days for your refills to be completed.

## 2019-09-24 NOTE — TELEPHONE ENCOUNTER
Panel Management Review      Patient has the following on his problem list:     Diabetes    ASA: Passed    Last A1C  Lab Results   Component Value Date    A1C 8.2 08/12/2019    A1C 10.1 04/01/2019    A1C 10.6 12/18/2018    A1C 9.7 08/08/2018     A1C tested: FAILED    Last LDL:    Lab Results   Component Value Date    CHOL 218 09/04/2019     Lab Results   Component Value Date    HDL 39 09/04/2019     Lab Results   Component Value Date    LDL 99 09/04/2019     Lab Results   Component Value Date    TRIG 398 09/04/2019     No results found for: CHOLHDLRATIO  Lab Results   Component Value Date    NHDL 179 09/04/2019       Is the patient on a Statin? YES             Is the patient on Aspirin? YES    Medications     HMG CoA Reductase Inhibitors     atorvastatin (LIPITOR) 40 MG tablet       Salicylates     aspirin 81 MG EC tablet             Last three blood pressure readings:  BP Readings from Last 3 Encounters:   08/26/19 138/80   06/25/19 137/83   04/08/19 150/68       Date of last diabetes office visit: 8/26/19     Tobacco History:     History   Smoking Status     Never Smoker   Smokeless Tobacco     Never Used           Composite cancer screening  Chart review shows that this patient is due/due soon for the following None  Summary:    Patient is due/failing the following:   A1C    Action needed:   No action needed just seen     Type of outreach:    none    Questions for provider review:    None                                                                                                                                    Solange Harris MA       Chart routed to Care Team .

## 2019-10-11 ENCOUNTER — TELEPHONE (OUTPATIENT)
Dept: FAMILY MEDICINE | Facility: CLINIC | Age: 59
End: 2019-10-11

## 2019-10-14 ENCOUNTER — TELEPHONE (OUTPATIENT)
Dept: FAMILY MEDICINE | Facility: CLINIC | Age: 59
End: 2019-10-14

## 2019-10-14 DIAGNOSIS — Z79.4 TYPE 2 DIABETES MELLITUS WITH OTHER SPECIFIED COMPLICATION, WITH LONG-TERM CURRENT USE OF INSULIN (H): Primary | ICD-10-CM

## 2019-10-14 DIAGNOSIS — E11.69 TYPE 2 DIABETES MELLITUS WITH OTHER SPECIFIED COMPLICATION, WITH LONG-TERM CURRENT USE OF INSULIN (H): Primary | ICD-10-CM
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
## 2019-11-04 ENCOUNTER — TELEPHONE (OUTPATIENT)
Dept: EDUCATION SERVICES | Facility: CLINIC | Age: 59
End: 2019-11-04

## 2019-11-04 NOTE — TELEPHONE ENCOUNTER
Called Elijah for change of appointment time from 12/3-12/12/2019    Salena Abraham RN/DEYSI  [place] Diabetes Educator

## 2019-11-21 DIAGNOSIS — I12.9 MALIGNANT HYPERTENSIVE KIDNEY DISEASE WITH CHRONIC KIDNEY DISEASE STAGE I THROUGH STAGE IV, OR UNSPECIFIED(403.00): ICD-10-CM

## 2019-11-21 DIAGNOSIS — N18.6 TYPE 2 DIABETES MELLITUS WITH ESRD (END-STAGE RENAL DISEASE) (H): ICD-10-CM

## 2019-11-21 DIAGNOSIS — E11.22 TYPE 2 DIABETES MELLITUS WITH DIABETIC CHRONIC KIDNEY DISEASE (H): Primary | ICD-10-CM

## 2019-11-21 DIAGNOSIS — I10 ESSENTIAL HYPERTENSION, MALIGNANT: ICD-10-CM

## 2019-11-21 DIAGNOSIS — L97.519 ULCER OF RIGHT FOOT (H): ICD-10-CM

## 2019-11-21 DIAGNOSIS — N17.9 ACUTE KIDNEY FAILURE, UNSPECIFIED (H): ICD-10-CM

## 2019-11-21 DIAGNOSIS — E11.22 TYPE 2 DIABETES MELLITUS WITH ESRD (END-STAGE RENAL DISEASE) (H): ICD-10-CM

## 2019-11-21 DIAGNOSIS — D63.8 ANEMIA IN OTHER CHRONIC DISEASES CLASSIFIED ELSEWHERE: ICD-10-CM

## 2019-11-25 DIAGNOSIS — N17.9 ACUTE KIDNEY FAILURE, UNSPECIFIED (H): ICD-10-CM

## 2019-11-25 DIAGNOSIS — I10 ESSENTIAL HYPERTENSION, MALIGNANT: ICD-10-CM

## 2019-11-25 DIAGNOSIS — D63.8 ANEMIA IN OTHER CHRONIC DISEASES CLASSIFIED ELSEWHERE: ICD-10-CM

## 2019-11-25 DIAGNOSIS — N18.6 TYPE 2 DIABETES MELLITUS WITH ESRD (END-STAGE RENAL DISEASE) (H): ICD-10-CM

## 2019-11-25 DIAGNOSIS — L97.519 ULCER OF RIGHT FOOT (H): ICD-10-CM

## 2019-11-25 DIAGNOSIS — E11.22 TYPE 2 DIABETES MELLITUS WITH ESRD (END-STAGE RENAL DISEASE) (H): ICD-10-CM

## 2019-11-25 DIAGNOSIS — E11.22 TYPE 2 DIABETES MELLITUS WITH DIABETIC CHRONIC KIDNEY DISEASE (H): ICD-10-CM

## 2019-11-25 DIAGNOSIS — I12.9 MALIGNANT HYPERTENSIVE KIDNEY DISEASE WITH CHRONIC KIDNEY DISEASE STAGE I THROUGH STAGE IV, OR UNSPECIFIED(403.00): ICD-10-CM

## 2019-11-25 LAB
ALBUMIN SERPL-MCNC: 3.8 G/DL (ref 3.4–5)
ANION GAP SERPL CALCULATED.3IONS-SCNC: 6 MMOL/L (ref 3–14)
BUN SERPL-MCNC: 53 MG/DL (ref 7–30)
CALCIUM SERPL-MCNC: 9 MG/DL (ref 8.5–10.1)
CHLORIDE SERPL-SCNC: 105 MMOL/L (ref 94–109)
CO2 SERPL-SCNC: 25 MMOL/L (ref 20–32)
CREAT SERPL-MCNC: 2.34 MG/DL (ref 0.66–1.25)
ERYTHROCYTE [DISTWIDTH] IN BLOOD BY AUTOMATED COUNT: 13.8 % (ref 10–15)
GFR SERPL CREATININE-BSD FRML MDRD: 29 ML/MIN/{1.73_M2}
GLUCOSE SERPL-MCNC: 189 MG/DL (ref 70–99)
HCT VFR BLD AUTO: 36.3 % (ref 40–53)
HGB BLD-MCNC: 11.9 G/DL (ref 13.3–17.7)
MCH RBC QN AUTO: 31 PG (ref 26.5–33)
MCHC RBC AUTO-ENTMCNC: 32.8 G/DL (ref 31.5–36.5)
MCV RBC AUTO: 95 FL (ref 78–100)
PHOSPHATE SERPL-MCNC: 2.4 MG/DL (ref 2.5–4.5)
PLATELET # BLD AUTO: 163 10E9/L (ref 150–450)
POTASSIUM SERPL-SCNC: 4.2 MMOL/L (ref 3.4–5.3)
PTH-INTACT SERPL-MCNC: 104 PG/ML (ref 18–80)
RBC # BLD AUTO: 3.84 10E12/L (ref 4.4–5.9)
SODIUM SERPL-SCNC: 136 MMOL/L (ref 133–144)
WBC # BLD AUTO: 5.8 10E9/L (ref 4–11)

## 2019-11-25 PROCEDURE — 85027 COMPLETE CBC AUTOMATED: CPT | Performed by: INTERNAL MEDICINE

## 2019-11-25 PROCEDURE — 36415 COLL VENOUS BLD VENIPUNCTURE: CPT | Performed by: INTERNAL MEDICINE

## 2019-11-25 PROCEDURE — 80069 RENAL FUNCTION PANEL: CPT | Performed by: INTERNAL MEDICINE

## 2019-11-25 PROCEDURE — 83970 ASSAY OF PARATHORMONE: CPT | Performed by: INTERNAL MEDICINE

## 2019-12-08 DIAGNOSIS — Z79.4 TYPE 2 DIABETES MELLITUS WITH OTHER SPECIFIED COMPLICATION, WITH LONG-TERM CURRENT USE OF INSULIN (H): ICD-10-CM

## 2019-12-08 DIAGNOSIS — E11.69 TYPE 2 DIABETES MELLITUS WITH OTHER SPECIFIED COMPLICATION, WITH LONG-TERM CURRENT USE OF INSULIN (H): ICD-10-CM

## 2019-12-09 ENCOUNTER — TELEPHONE (OUTPATIENT)
Dept: FAMILY MEDICINE | Facility: CLINIC | Age: 59
End: 2019-12-09

## 2019-12-09 RX ORDER — LIRAGLUTIDE 6 MG/ML
INJECTION SUBCUTANEOUS
Qty: 9 ML | Refills: 3 | Status: SHIPPED | OUTPATIENT
Start: 2019-12-09 | End: 2020-06-01

## 2019-12-09 RX ORDER — INSULIN GLARGINE 300 U/ML
INJECTION, SOLUTION SUBCUTANEOUS
Qty: 15 ML | Refills: 0 | Status: SHIPPED | OUTPATIENT
Start: 2019-12-09 | End: 2020-04-03

## 2019-12-09 RX ORDER — UBIQUINOL 100 MG
CAPSULE ORAL
Qty: 100 EACH | Refills: 3 | Status: SHIPPED | OUTPATIENT
Start: 2019-12-09 | End: 2020-04-30

## 2019-12-09 RX ORDER — INSULIN ASPART 100 [IU]/ML
INJECTION, SOLUTION INTRAVENOUS; SUBCUTANEOUS
Qty: 60 ML | Refills: 3 | Status: SHIPPED | OUTPATIENT
Start: 2019-12-09 | End: 2020-04-08

## 2019-12-09 NOTE — TELEPHONE ENCOUNTER
Patient was due for diabetes OV with PCP the end of November.      No appointment pending at this time.  Routing to provider to advise.    Arianna DRAPERN, RN

## 2019-12-10 NOTE — TELEPHONE ENCOUNTER
Central Prior Authorization Team   Phone: [phone]    PA Initiation    Medication: VICTOZA PEN 18 MG/3ML soln  Insurance Company: [employer] - Phone [phone] Fax [phone]  [facility]illing the Rx: [facility] [place], [place] - [address] [address]   Filling Pharmacy Phone: [phone]  Filling Pharmacy Fax: [phone]  Start Date: 12/10/2019

## 2019-12-10 NOTE — TELEPHONE ENCOUNTER
Prior Authorization Retail Medication Request    Medication/Dose:VICTOZA PEN 18 MG/3ML soln   ICD code (if different than what is on RX):  [E11.69, Z79.4]       Cover My Meds  Key # [id number]

## 2019-12-12 ENCOUNTER — ALLIED HEALTH/NURSE VISIT (OUTPATIENT)
Dept: EDUCATION SERVICES | Facility: CLINIC | Age: 59
End: 2019-12-12
Payer: COMMERCIAL

## 2019-12-12 DIAGNOSIS — E11.9 DIABETES MELLITUS WITHOUT COMPLICATION (H): Primary | ICD-10-CM

## 2019-12-12 PROCEDURE — 99207 ZZC DROP WITH A PROCEDURE: CPT

## 2019-12-12 PROCEDURE — G0108 DIAB MANAGE TRN  PER INDIV: HCPCS

## 2019-12-12 NOTE — PATIENT INSTRUCTIONS
Diabetes Support Resources:  Toujeo take 150 units daily    Novolog take 60 units with each meal    Victoza 1.8 mg daily     Bring blood glucose meter and logbook with you to all doctor and follow-up appointments.    Diabetes Education Telephone Visit Follow-up:    We realize your time is valuable and your health is important! We offer a convenient Telephone Visit follow up! It s a quick way to check in for a medication dose adjustment without having to come back to clinic as soon.    Telephone Visits are often covered by insurance. Please check with your insurance plan to see if this type of visit is covered. If not, the cost is less expensive than an office visit:      Up to 10 minutes (Code 98063): $30    11-20 minutes (Code 58520): $59    More than 20 minutes (Code 08557): $85    Talk with your Diabetes Educator if you want to learn more.      [facility]:  For Your Diabetes Education and Nutrition Appointments Call:  [phone]   For Diabetes Education or Nutrition Related Questions:   Phone: [phone]  E-mail: [email or web address]  Fax: [phone]   If you need a medication refill please contact your pharmacy. Please allow 3 business days for your refills to be completed.

## 2019-12-12 NOTE — PROGRESS NOTES
"Diabetes Self-Management Education & Support    Diabetes Education Self Management & Training    SUBJECTIVE/OBJECTIVE:  Presents for: Individual review  Accompanied by: Self, Spouse  Diabetes education in the past 24mo: No  Focus of Visit: Patient Unsure, Monitoring, Taking Medication, Healthy Eating  Diabetes type: Type 2  Disease course: Improving  Diabetes management related comments/concerns: does not move /walk, and sleeps most of the day, with legs elevated  Transportation concerns: No  Other concerns:: Language barrier  Cultural Influences/Ethnic Background:  [facility]      Diabetes Symptoms & Complications  Blurred vision: Yes  Fatigue: Yes  Neuropathy: Yes  Foot ulcerations: No  Polydipsia: Yes  Polyphagia: No  Polyuria: No  Visual change: Yes  Weakness: Yes  Weight loss: No  Slow healing wounds: Yes  Recent Infection(s): No  Symptom course: Stable  Weight trend: Stable  Autonomic neuropathy: Yes  CVA: No  Heart disease: No  Nephropathy: Yes  Peripheral neuropathy: Yes  Peripheral Vascular Disease: Yes  Retinopathy: Yes    Patient Problem List and Family Medical History reviewed for relevant medical history, current medical status, and diabetes risk factors.    Vitals:  There were no vitals taken for this visit.  Estimated body mass index is 44.89 kg/m  as calculated from the following:    Height as of 4/8/19: 1.753 m (5' 9\").    Weight as of 8/26/19: 137.9 kg (304 lb).   Last 3 BP:   BP Readings from Last 3 Encounters:   08/26/19 138/80   06/25/19 137/83   04/08/19 150/68       History   Smoking Status     Never Smoker   Smokeless Tobacco     Never Used       Labs:  Lab Results   Component Value Date    A1C 8.2 08/12/2019     Lab Results   Component Value Date     11/25/2019     Lab Results   Component Value Date    LDL 99 09/04/2019     HDL Cholesterol   Date Value Ref Range Status   09/04/2019 39 (L) >39 mg/dL Final   ]  GFR Estimate   Date Value Ref Range Status   11/25/2019 29 (L) >60 " mL/min/[1.73_m2] Final     Comment:     Non  GFR Calc  Starting 12/18/2018, serum creatinine based estimated GFR (eGFR) will be   calculated using the Chronic Kidney Disease Epidemiology Collaboration   (CKD-EPI) equation.       GFR Estimate If Black   Date Value Ref Range Status   11/25/2019 34 (L) >60 mL/min/[1.73_m2] Final     Comment:      GFR Calc  Starting 12/18/2018, serum creatinine based estimated GFR (eGFR) will be   calculated using the Chronic Kidney Disease Epidemiology Collaboration   (CKD-EPI) equation.       Lab Results   Component Value Date    CR 2.34 11/25/2019     No results found for: MICROALBUMIN    Healthy Eating  Healthy Eating Assessed Today: Yes  Cultural/Jewish diet restrictions?: No  Meal planning: Avoiding sweets  Meals include: Breakfast, Lunch, Dinner, Snacks  Breakfast: now eating breakfast, fish , and bread.  now eating , oatmeal some days  Lunch: sausage, bread, some days skips  Dinner: loves potatoes, larger portions  Other: he does not eat 3 meals per day , and it appears he will not change in his ways  Beverages: Water, Milk, Juice, Diet soda  Has patient met with a dietitian in the past?: No    Being Active  Being Active Assessed Today: Yes  Exercise:: Currently not exercising  Barrier to exercise: Physical limitation    Monitoring  Monitoring Assessed Today: Yes  Did patient bring glucose meter to appointment? : No  Blood Glucose Meter: One Touch  Home Glucose (Sugar) Monitoring: 3-4 times per week  Low Glucose Range (mg/dL): 70-90  High Glucose Range (mg/dL): >200  Overall Range (mg/dL): 180-200        Taking Medications      Taking Medication Assessed Today: Yes  Current Treatments: Diet, Insulin Injections(Takes 150 units of Toujeo and Novolog 60 units with meals plus coverage.)  Dose schedule: pre-breakfast  Given by: Patient  Injection/Infusion sites: Abdomen  Problems taking diabetes medications regularly?: No  Diabetes medication side  effects?: No  Treatment Compliance: All of the time    Problem Solving  Problem Solving Assessed Today: Yes  Hypoglycemia Frequency: Never  Patient carries a carbohydrate source: No  Medical alert: No  Severe weather/disaster plan for diabetes management?: No  DKA prevention plan?: No              Reducing Risks  Diabetes Risks: None, Family History, Hyperlipidemia, Sedentary Lifestyle, Ethnicity, Age over 45 years  CAD Risks: Diabetes Mellitus, Family history, Male sex, Hypertension, Obesity, Sedentary lifestyle, Dyslipidemia  Has dilated eye exam at least once a year?: Yes    Healthy Coping  Emotional response to diabetes: Acceptance, Uncertain  Informal Support system:: Family  Stage of change: ACTION (Actively working towards change)  Difficulty affording diabetes management supplies?: No  Patient Activation Measure Survey Score:  NASRA Score (Last Two) 8/26/2019   NASRA Raw Score 40   Activation Score 100   NASRA Level 4       ASSESSMENT:  Roberto is here for evaluation of his BG and medications.  Victoza appears to be the medication working for him, so will not make any changes.  He will not exercise nor make any diet changes.  Will see in 3 months        Patient's most recent   Lab Results   Component Value Date    A1C 8.2 08/12/2019    is not meeting goal of <7.0    INTERVENTION:   Diabetes knowledge and skills assessment:     Patient is knowledgeable in diabetes management concepts related to: Healthy Eating, Being Active, Monitoring and Taking Medication    Patient needs further education on the following diabetes management concepts: Healthy Eating, Being Active and Taking Medication    Based on learning assessment above, most appropriate setting for further diabetes education would be: Individual setting.    Education provided today on:  AADE Self-Care Behaviors:  Healthy Eating: carbohydrate counting, consistency in amount, composition, and timing of food intake, weight reduction, heart healthy diet, eating  out, portion control, plate planning method and label reading  Being Active: relationship to blood glucose and describe appropriate activity program  Taking Medication: action of prescribed medication, drawing up, administering and storing injectable diabetes medications, proper site selection and rotation for injections, side effects of prescribed medications and when to take medications    Opportunities for ongoing education and support in diabetes-self management were discussed.    Pt verbalized understanding of concepts discussed and recommendations provided today.       Education Materials Provided:  No new materials provided today    PLAN:  See Patient Instructions for co-developed, patient-stated behavior change goals.  AVS printed and provided to patient today. See Follow-Up section for recommended follow-up.    Salena Abraham RN/DEYSI  [facility] Diabetes Educator    Time Spent: 30 minutes  Encounter Type: Individual    Any diabetes medication dose changes were made via the [facility] Protocol and Collaborative Practice Agreement with the patient's primary care provider. A copy of this encounter was shared with the provider.

## 2019-12-15 DIAGNOSIS — E11.69 TYPE 2 DIABETES MELLITUS WITH OTHER SPECIFIED COMPLICATION, WITH LONG-TERM CURRENT USE OF INSULIN (H): ICD-10-CM

## 2019-12-15 DIAGNOSIS — K59.00 CONSTIPATION, UNSPECIFIED CONSTIPATION TYPE: ICD-10-CM

## 2019-12-15 DIAGNOSIS — Z79.4 TYPE 2 DIABETES MELLITUS WITH OTHER SPECIFIED COMPLICATION, WITH LONG-TERM CURRENT USE OF INSULIN (H): ICD-10-CM

## 2019-12-16 RX ORDER — UBIQUINOL 100 MG
CAPSULE ORAL
Refills: 11 | OUTPATIENT
Start: 2019-12-16

## 2019-12-16 RX ORDER — INSULIN GLARGINE 300 U/ML
INJECTION, SOLUTION SUBCUTANEOUS
Qty: 12 ML | Refills: 0 | OUTPATIENT
Start: 2019-12-16

## 2019-12-16 RX ORDER — AMOXICILLIN 250 MG
CAPSULE ORAL
Qty: 60 TABLET | Refills: 3 | Status: SHIPPED | OUTPATIENT
Start: 2019-12-16 | End: 2020-05-11

## 2020-01-06 ENCOUNTER — TELEPHONE (OUTPATIENT)
Dept: FAMILY MEDICINE | Facility: CLINIC | Age: 60
End: 2020-01-06

## 2020-01-06 NOTE — TELEPHONE ENCOUNTER
Prior Authorization Retail Medication Request    Medication/Dose: TOUJEO MAX SOLOSTAR 300 UNIT/ML SOPN  ICD code (if different than what is on RX):  Type 2 diabetes mellitus with other specified complication, with long-term current use of insulin (H) [E11.69, Z79.4]   Previously Tried and Failed:    Rationale:      Covermymeds Key: [id number]

## 2020-01-06 NOTE — TELEPHONE ENCOUNTER
Central Prior Authorization Team   Phone: [phone]      PA Initiation    Medication: TOUJEO MAX SOLOSTAR 300 UNIT/ML SOPN-Initiated  Insurance Company: [employer] - Phone [phone] Fax [phone]  Pharmacy Filling the Rx: [facility] [place] - [address]   Filling Pharmacy Phone: [phone]  Filling Pharmacy Fax:    Start Date: 1/6/2020

## 2020-01-07 NOTE — TELEPHONE ENCOUNTER
Prior Authorization Approval    Authorization Effective Date: 12/7/2019  Authorization Expiration Date: 1/5/2021  Medication: TOUJEO MAX SOLOSTAR 300 UNIT/ML SOPN-APPROVED  Approved Dose/Quantity:   Reference #:     Insurance Company: [employer] - Phone [phone] Fax [phone]  Expected CoPay:       CoPay Card Available:      Foundation Assistance Needed:    Which Pharmacy is filling the prescription (Not needed for infusion/clinic administered): [facility], [place] - [address] [address] [facility]  Pharmacy Notified: Yes  Patient Notified: No    Pharmacy will notify patient when medication is ready.

## 2020-03-19 ENCOUNTER — TELEPHONE (OUTPATIENT)
Dept: EDUCATION SERVICES | Facility: CLINIC | Age: 60
End: 2020-03-19

## 2020-03-19 NOTE — TELEPHONE ENCOUNTER
Called  to either cancel and reschedule at a later time, or telephone visit, it was decided to cancel and they will call in a couple of months and reschedule    Salena Abraham RN/DEYSI  [place] Diabetes Educator

## 2020-03-31 DIAGNOSIS — Z79.4 TYPE 2 DIABETES MELLITUS WITH OTHER SPECIFIED COMPLICATION, WITH LONG-TERM CURRENT USE OF INSULIN (H): ICD-10-CM

## 2020-03-31 DIAGNOSIS — E11.69 TYPE 2 DIABETES MELLITUS WITH OTHER SPECIFIED COMPLICATION, WITH LONG-TERM CURRENT USE OF INSULIN (H): ICD-10-CM

## 2020-03-31 DIAGNOSIS — E78.00 HIGH BLOOD CHOLESTEROL: ICD-10-CM

## 2020-03-31 DIAGNOSIS — I10 HYPERTENSION GOAL BP (BLOOD PRESSURE) < 140/90: ICD-10-CM

## 2020-04-01 RX ORDER — UBIQUINOL 100 MG
CAPSULE ORAL
Refills: 3 | OUTPATIENT
Start: 2020-04-01

## 2020-04-01 RX ORDER — LIRAGLUTIDE 6 MG/ML
INJECTION SUBCUTANEOUS
Qty: 9 ML | Refills: 3 | OUTPATIENT
Start: 2020-04-01

## 2020-04-01 RX ORDER — CARVEDILOL 12.5 MG/1
12.5 TABLET ORAL 2 TIMES DAILY WITH MEALS
Qty: 180 TABLET | Refills: 3 | OUTPATIENT
Start: 2020-04-01

## 2020-04-01 RX ORDER — ATORVASTATIN CALCIUM 80 MG/1
TABLET, FILM COATED ORAL
Qty: 180 TABLET | Refills: 3 | OUTPATIENT
Start: 2020-04-01

## 2020-04-01 RX ORDER — INSULIN ASPART 100 [IU]/ML
INJECTION, SOLUTION INTRAVENOUS; SUBCUTANEOUS
Qty: 60 ML | Refills: 3 | OUTPATIENT
Start: 2020-04-01

## 2020-04-01 NOTE — TELEPHONE ENCOUNTER
Routing refill request to provider for review/approval because:  Failed some protocols and was due to come in around 11/26/2019 and this never happened.  Please advise. Do you want him to come in for an essential visit? Primary language Ugandan.  Thank you. Fadumo Viramontes R.N.    Per OV note dated 8/26/2019 from  Dr. Conte is as follows:    Return in about 3 months (around 11/26/2019) for Diabetes.

## 2020-04-01 NOTE — TELEPHONE ENCOUNTER
I spoke to the patients  Percy and I scheduled a fasting lab appointment for the patient on 4/3/20.    Dr. Conte can you add/sign lab orders for his lab visit?  Thank you.  Daisy Roth,

## 2020-04-01 NOTE — TELEPHONE ENCOUNTER
Needs to schedule lab appt for further refills  Will be difficult to do phone visit due to need for interpretor.  I would be okay for refills and no office visit for now as long as his labs are stable. Once I see his lab results, I will refill. Please forward message back to me after contacting patient.     Jaylyn Conte MD

## 2020-04-03 DIAGNOSIS — E11.69 TYPE 2 DIABETES MELLITUS WITH OTHER SPECIFIED COMPLICATION, WITH LONG-TERM CURRENT USE OF INSULIN (H): ICD-10-CM

## 2020-04-03 DIAGNOSIS — I10 HYPERTENSION GOAL BP (BLOOD PRESSURE) < 140/90: ICD-10-CM

## 2020-04-03 DIAGNOSIS — Z79.4 TYPE 2 DIABETES MELLITUS WITH OTHER SPECIFIED COMPLICATION, WITH LONG-TERM CURRENT USE OF INSULIN (H): ICD-10-CM

## 2020-04-03 LAB
ANION GAP SERPL CALCULATED.3IONS-SCNC: 5 MMOL/L (ref 3–14)
BUN SERPL-MCNC: 35 MG/DL (ref 7–30)
CALCIUM SERPL-MCNC: 9.2 MG/DL (ref 8.5–10.1)
CHLORIDE SERPL-SCNC: 101 MMOL/L (ref 94–109)
CO2 SERPL-SCNC: 26 MMOL/L (ref 20–32)
CREAT SERPL-MCNC: 1.97 MG/DL (ref 0.66–1.25)
GFR SERPL CREATININE-BSD FRML MDRD: 36 ML/MIN/{1.73_M2}
GLUCOSE SERPL-MCNC: 267 MG/DL (ref 70–99)
HBA1C MFR BLD: 9.3 % (ref 0–5.6)
POTASSIUM SERPL-SCNC: 4.3 MMOL/L (ref 3.4–5.3)
SODIUM SERPL-SCNC: 132 MMOL/L (ref 133–144)

## 2020-04-03 PROCEDURE — 80048 BASIC METABOLIC PNL TOTAL CA: CPT | Performed by: FAMILY MEDICINE

## 2020-04-03 PROCEDURE — 83036 HEMOGLOBIN GLYCOSYLATED A1C: CPT | Performed by: FAMILY MEDICINE

## 2020-04-03 PROCEDURE — 36415 COLL VENOUS BLD VENIPUNCTURE: CPT | Performed by: FAMILY MEDICINE

## 2020-04-03 RX ORDER — INSULIN GLARGINE 300 U/ML
150 INJECTION, SOLUTION SUBCUTANEOUS EVERY EVENING
Qty: 15 ML | Refills: 0 | Status: SHIPPED | OUTPATIENT
Start: 2020-04-03 | End: 2020-07-02

## 2020-04-03 RX ORDER — ATORVASTATIN CALCIUM 40 MG/1
80 TABLET, FILM COATED ORAL DAILY
Qty: 180 TABLET | Refills: 0 | Status: SHIPPED | OUTPATIENT
Start: 2020-04-03 | End: 2020-05-11

## 2020-04-03 RX ORDER — FUROSEMIDE 20 MG
20 TABLET ORAL 2 TIMES DAILY
Qty: 180 TABLET | Refills: 0 | Status: SHIPPED | OUTPATIENT
Start: 2020-04-03 | End: 2020-05-11

## 2020-04-03 RX ORDER — CARVEDILOL 12.5 MG/1
12.5 TABLET ORAL 2 TIMES DAILY WITH MEALS
Qty: 180 TABLET | Refills: 0 | Status: SHIPPED | OUTPATIENT
Start: 2020-04-03 | End: 2020-05-11

## 2020-04-03 RX ORDER — FENOFIBRATE 145 MG/1
145 TABLET, COATED ORAL DAILY
Qty: 90 TABLET | Refills: 0 | Status: SHIPPED | OUTPATIENT
Start: 2020-04-03 | End: 2020-05-11

## 2020-04-08 ENCOUNTER — TELEPHONE (OUTPATIENT)
Dept: FAMILY MEDICINE | Facility: CLINIC | Age: 60
End: 2020-04-08

## 2020-04-08 DIAGNOSIS — E11.69 TYPE 2 DIABETES MELLITUS WITH OTHER SPECIFIED COMPLICATION, WITH LONG-TERM CURRENT USE OF INSULIN (H): ICD-10-CM

## 2020-04-08 DIAGNOSIS — Z79.4 TYPE 2 DIABETES MELLITUS WITH OTHER SPECIFIED COMPLICATION, WITH LONG-TERM CURRENT USE OF INSULIN (H): ICD-10-CM

## 2020-04-08 RX ORDER — INSULIN ASPART 100 [IU]/ML
INJECTION, SOLUTION INTRAVENOUS; SUBCUTANEOUS
Qty: 60 ML | Refills: 2 | Status: SHIPPED | OUTPATIENT
Start: 2020-04-08 | End: 2020-07-02

## 2020-04-08 NOTE — TELEPHONE ENCOUNTER
Reason for Call:  Medication or medication refill:    Do you use a [facility]?  Name of the pharmacy and phone number for the current request:  [facility] [place] [address] [address] [facility]  [phone]    Name of the medication requested: NOVOLOG FLEXPEN 100 UNIT/ML soln    Other request: patient is calling stating called pharmacy for refill, but was told to call pcp to request. Thank you.    Can we leave a detailed message on this number? YES    Phone number patient can be reached at: Home number on file [phone] (home)    Best Time:     Call taken on 4/8/2020 at 12:27 PM by Brenna Mccallum

## 2020-04-30 DIAGNOSIS — Z79.4 TYPE 2 DIABETES MELLITUS WITH OTHER SPECIFIED COMPLICATION, WITH LONG-TERM CURRENT USE OF INSULIN (H): ICD-10-CM

## 2020-04-30 DIAGNOSIS — I10 HYPERTENSION GOAL BP (BLOOD PRESSURE) < 140/90: ICD-10-CM

## 2020-04-30 DIAGNOSIS — E11.69 TYPE 2 DIABETES MELLITUS WITH OTHER SPECIFIED COMPLICATION, WITH LONG-TERM CURRENT USE OF INSULIN (H): ICD-10-CM

## 2020-04-30 DIAGNOSIS — K59.00 CONSTIPATION, UNSPECIFIED CONSTIPATION TYPE: ICD-10-CM

## 2020-04-30 RX ORDER — UBIQUINOL 100 MG
CAPSULE ORAL
Qty: 600 EACH | Refills: 3 | Status: SHIPPED | OUTPATIENT
Start: 2020-04-30 | End: 2021-05-11

## 2020-04-30 RX ORDER — AMOXICILLIN 250 MG
CAPSULE ORAL
Qty: 60 TABLET | Refills: 3 | OUTPATIENT
Start: 2020-04-30

## 2020-04-30 RX ORDER — AMLODIPINE BESYLATE 10 MG/1
10 TABLET ORAL DAILY
Qty: 90 TABLET | Refills: 3 | OUTPATIENT
Start: 2020-04-30

## 2020-04-30 NOTE — TELEPHONE ENCOUNTER
Patient needs either in person or video visit.     Telephone last resort, would be hard with his uncontrolled diabetes.     Needs labs also.     Montserrat Santana PA-C

## 2020-04-30 NOTE — TELEPHONE ENCOUNTER
"Requested Prescriptions   Pending Prescriptions Disp Refills     senna-docusate (SENEXON-S) 8.6-50 MG tablet 60 tablet 3     Sig: TAKE ONE TABLET BY MOUTH TWICE DAILY AS NEEDED FOR CONSTIPATION       Laxatives Protocol Passed - 4/30/2020 12:56 PM        Passed - Patient is age 6 or older        Passed - Recent (12 mo) or future (30 days) visit within the authorizing provider's specialty     Patient has had an office visit with the authorizing provider or a provider within the authorizing providers department within the previous 12 mos or has a future within next 30 days. See \"Patient Info\" tab in inbasket, or \"Choose Columns\" in Meds & Orders section of the refill encounter.              Passed - Medication is active on med list           Alcohol Swabs (ALCOHOL PREP) 70 % PADS [Pharmacy Med Name: ALCOHOL PREP 70 % PADS 70 PADS] 600 each 3     Sig: USE TO WIPE SKIN BEFORE INJECTING INSULIN       There is no refill protocol information for this order        amLODIPine (NORVASC) 10 MG tablet [Pharmacy Med Name: AMLODIPINE BESYLATE 10 MG T 10 TAB] 90 tablet 3     Sig: TAKE 1 TABLET (10 MG) BY MOUTH DAILY       Calcium Channel Blockers Protocol  Failed - 4/30/2020 12:56 PM        Failed - Medication is active on med list        Failed - Normal serum creatinine on file in past 12 months     Recent Labs   Lab Test 04/03/20  0923   CR 1.97*       Ok to refill medication if creatinine is low          Passed - Blood pressure under 140/90 in past 12 months     BP Readings from Last 3 Encounters:   08/26/19 138/80   06/25/19 137/83   04/08/19 150/68                 Passed - Recent (12 mo) or future (30 days) visit within the authorizing provider's specialty     Patient has had an office visit with the authorizing provider or a provider within the authorizing providers department within the previous 12 mos or has a future within next 30 days. See \"Patient Info\" tab in inbasket, or \"Choose Columns\" in Meds & Orders section of the " refill encounter.              Passed - Patient is age 18 or older

## 2020-05-01 NOTE — TELEPHONE ENCOUNTER
I spoke to the patients  Percy @[phone] and I scheduled a clinic appointment for the patient on 5/11/20.  Percy will come to the appointment with the patient.  He declined a video visit.    Daisy Roth,

## 2020-05-11 ENCOUNTER — OFFICE VISIT (OUTPATIENT)
Dept: FAMILY MEDICINE | Facility: CLINIC | Age: 60
End: 2020-05-11
Payer: COMMERCIAL

## 2020-05-11 VITALS
BODY MASS INDEX: 46.07 KG/M2 | HEART RATE: 74 BPM | DIASTOLIC BLOOD PRESSURE: 79 MMHG | SYSTOLIC BLOOD PRESSURE: 135 MMHG | TEMPERATURE: 97.7 F | WEIGHT: 312 LBS

## 2020-05-11 DIAGNOSIS — E66.01 MORBID OBESITY (H): ICD-10-CM

## 2020-05-11 DIAGNOSIS — E78.00 HIGH BLOOD CHOLESTEROL: ICD-10-CM

## 2020-05-11 DIAGNOSIS — E11.69 TYPE 2 DIABETES MELLITUS WITH OTHER SPECIFIED COMPLICATION, WITH LONG-TERM CURRENT USE OF INSULIN (H): Primary | ICD-10-CM

## 2020-05-11 DIAGNOSIS — K59.00 CONSTIPATION, UNSPECIFIED CONSTIPATION TYPE: ICD-10-CM

## 2020-05-11 DIAGNOSIS — E78.1 HIGH BLOOD TRIGLYCERIDES: ICD-10-CM

## 2020-05-11 DIAGNOSIS — Z79.4 TYPE 2 DIABETES MELLITUS WITH OTHER SPECIFIED COMPLICATION, WITH LONG-TERM CURRENT USE OF INSULIN (H): Primary | ICD-10-CM

## 2020-05-11 DIAGNOSIS — Z79.4 ENCOUNTER FOR LONG-TERM (CURRENT) INSULIN USE (H): ICD-10-CM

## 2020-05-11 DIAGNOSIS — I10 HYPERTENSION GOAL BP (BLOOD PRESSURE) < 140/90: ICD-10-CM

## 2020-05-11 DIAGNOSIS — H69.92 DYSFUNCTION OF LEFT EUSTACHIAN TUBE: ICD-10-CM

## 2020-05-11 PROCEDURE — 99214 OFFICE O/P EST MOD 30 MIN: CPT | Performed by: FAMILY MEDICINE

## 2020-05-11 RX ORDER — FUROSEMIDE 20 MG
20 TABLET ORAL 2 TIMES DAILY
Qty: 180 TABLET | Refills: 0 | Status: CANCELLED | OUTPATIENT
Start: 2020-05-11

## 2020-05-11 RX ORDER — ATORVASTATIN CALCIUM 40 MG/1
80 TABLET, FILM COATED ORAL DAILY
Qty: 180 TABLET | Refills: 1 | Status: SHIPPED | OUTPATIENT
Start: 2020-05-11 | End: 2020-10-27

## 2020-05-11 RX ORDER — FENOFIBRATE 145 MG/1
145 TABLET, COATED ORAL DAILY
Qty: 90 TABLET | Refills: 1 | Status: SHIPPED | OUTPATIENT
Start: 2020-05-11 | End: 2020-10-27

## 2020-05-11 RX ORDER — SPIRONOLACTONE 25 MG/1
25 TABLET ORAL DAILY
Qty: 30 TABLET | Refills: 0 | Status: SHIPPED | OUTPATIENT
Start: 2020-05-11 | End: 2020-06-12

## 2020-05-11 RX ORDER — AMOXICILLIN 250 MG
CAPSULE ORAL
Qty: 60 TABLET | Refills: 3 | Status: SHIPPED | OUTPATIENT
Start: 2020-05-11 | End: 2020-12-16

## 2020-05-11 RX ORDER — CARVEDILOL 12.5 MG/1
12.5 TABLET ORAL 2 TIMES DAILY WITH MEALS
Qty: 180 TABLET | Refills: 1 | Status: SHIPPED | OUTPATIENT
Start: 2020-05-11 | End: 2020-10-27

## 2020-05-11 NOTE — PROGRESS NOTES
Subjective     Roberto Thompson is a 59 year old male who presents to clinic today for the following health issues:    HPI   Hyperlipidemia Follow-Up      Are you regularly taking any medication or supplement to lower your cholesterol?   Yes- atorvastatin    Are you having muscle aches or other side effects that you think could be caused by your cholesterol lowering medication?  No    Hypertension Follow-up      Do you check your blood pressure regularly outside of the clinic? Yes     Are you following a low salt diet? No    Are your blood pressures ever more than 140 on the top number (systolic) OR more   than 90 on the bottom number (diastolic), for example 140/90? Yes   180/110    Pt with diabetes, poorly controlled due to non-compliance.   Is on insulin short and long acting and victoza  Pt is very noncompliant with diet    He is on statin and aspirin, no ace but he sees nephrology for CKD 4  Overdue for eye exam    Pt with HTN on coreg and lasix     today, up to 500 when drinking juice  Now got correct amount of insulin and seems to be helping  In regards to exercise, was at gym but now closed due to covid-19.   Was on amlodipine but stopped it due to pedal edema.    He is due to see nephrology beginning of next month  Per last nephrology note, recommended spironolactone instead of lasix if BP not controlled  Will switch spironolactone for lasix.  Pt to follow-up in one week for recheck of potassium and then with nephrology couple of weeks after that    Declines pneumonia shot today    Pt has issue with left ear. Pt c/o losing his hearing intermittently  Gets popping sound in left ear. Discussed most likely eustachian tube dysfunction related to sinus congestion. He will monitor for now    Reviewed and updated as needed this visit by Provider         Review of Systems   Constitutional, HEENT, cardiovascular, pulmonary, gi and gu systems are negative, except as otherwise noted.      Objective    /79    Pulse 74   Temp 97.7  F (36.5  C) (Oral)   Wt 141.5 kg (312 lb)   BMI 46.07 kg/m    Body mass index is 46.07 kg/m .  Physical Exam   GENERAL: healthy, alert and no distress  EYES: Eyes grossly normal to inspection, PERRL and conjunctivae and sclerae normal  HENT: ear canals and TM's normal, nose and mouth without ulcers or lesions  NECK: no adenopathy, no asymmetry, masses, or scars and thyroid normal to palpation  RESP: lungs clear to auscultation - no rales, rhonchi or wheezes  CV: regular rate and rhythm, normal S1 S2, no S3 or S4, no murmur, click or rub, no peripheral edema and peripheral pulses strong  ABDOMEN: soft, nontender, no hepatosplenomegaly, no masses and bowel sounds normal  MS: no gross musculoskeletal defects noted, no edema    Diagnostic Test Results:  Labs reviewed in Epic        Assessment & Plan     1. Type 2 diabetes mellitus with other specified complication, with long-term current use of insulin (H)  Not well controlled. Pt non compliant, recommended diab ed but declines. Follow-up in 3 months    2. Encounter for long-term (current) insulin use (H)  refilled    3. Morbid obesity (H)  Encourage regular exercise and healthy diet    4. Hypertension goal BP (blood pressure) < 140/90  Did improve on recheck. Will change to spironolactone and pt to follow-up with nephrology  - carvedilol (COREG) 12.5 MG tablet; Take 1 tablet (12.5 mg) by mouth 2 times daily (with meals)  Dispense: 180 tablet; Refill: 1  - spironolactone (ALDACTONE) 25 MG tablet; Take 1 tablet (25 mg) by mouth daily  Dispense: 30 tablet; Refill: 0  - Basic metabolic panel; Future    5. High blood cholesterol  On statin  - atorvastatin (LIPITOR) 40 MG tablet; Take 2 tablets (80 mg) by mouth daily  Dispense: 180 tablet; Refill: 1  - fenofibrate (TRICOR) 145 MG tablet; Take 1 tablet (145 mg) by mouth daily  Dispense: 90 tablet; Refill: 1    6. High blood triglycerides  On fenofibrate    7. Constipation, unspecified constipation  type  Refill as needed  - senna-docusate (SENNA-PLUS) 8.6-50 MG tablet; TAKE 1 TAB BY MOUTH TWO TIMES DAILY AS NEEDED FOR CONSTIPATION.  Dispense: 60 tablet; Refill: 3    8. Dysfunction of left eustachian tube  Symptomatic treatment             No follow-ups on file.    Jaylyn Conte MD  [facility]

## 2020-05-18 DIAGNOSIS — N25.81 SECONDARY HYPERPARATHYROIDISM OF RENAL ORIGIN (H): ICD-10-CM

## 2020-05-18 DIAGNOSIS — E11.21 DIABETIC NEPHROPATHY ASSOCIATED WITH TYPE 2 DIABETES MELLITUS (H): Primary | ICD-10-CM

## 2020-05-18 DIAGNOSIS — Z86.2 H/O: IRON DEFICIENCY ANEMIA: ICD-10-CM

## 2020-05-19 ENCOUNTER — ALLIED HEALTH/NURSE VISIT (OUTPATIENT)
Dept: NURSING | Facility: CLINIC | Age: 60
End: 2020-05-19
Payer: COMMERCIAL

## 2020-05-19 VITALS
DIASTOLIC BLOOD PRESSURE: 72 MMHG | WEIGHT: 307 LBS | BODY MASS INDEX: 45.34 KG/M2 | SYSTOLIC BLOOD PRESSURE: 118 MMHG | HEART RATE: 68 BPM

## 2020-05-19 DIAGNOSIS — Z86.2 H/O: IRON DEFICIENCY ANEMIA: ICD-10-CM

## 2020-05-19 DIAGNOSIS — E11.21 DIABETIC NEPHROPATHY ASSOCIATED WITH TYPE 2 DIABETES MELLITUS (H): ICD-10-CM

## 2020-05-19 DIAGNOSIS — Z01.30 BP CHECK: Primary | ICD-10-CM

## 2020-05-19 DIAGNOSIS — N25.81 SECONDARY HYPERPARATHYROIDISM OF RENAL ORIGIN (H): ICD-10-CM

## 2020-05-19 LAB
ALBUMIN SERPL-MCNC: 3.9 G/DL (ref 3.4–5)
ALBUMIN UR-MCNC: 30 MG/DL
ANION GAP SERPL CALCULATED.3IONS-SCNC: 10 MMOL/L (ref 3–14)
APPEARANCE UR: CLEAR
BILIRUB UR QL STRIP: NEGATIVE
BUN SERPL-MCNC: 47 MG/DL (ref 7–30)
CALCIUM SERPL-MCNC: 9.5 MG/DL (ref 8.5–10.1)
CHLORIDE SERPL-SCNC: 103 MMOL/L (ref 94–109)
CO2 SERPL-SCNC: 24 MMOL/L (ref 20–32)
COLOR UR AUTO: YELLOW
CREAT SERPL-MCNC: 2.45 MG/DL (ref 0.66–1.25)
CREAT UR-MCNC: 182 MG/DL
ERYTHROCYTE [DISTWIDTH] IN BLOOD BY AUTOMATED COUNT: 13.4 % (ref 10–15)
GFR SERPL CREATININE-BSD FRML MDRD: 27 ML/MIN/{1.73_M2}
GLUCOSE SERPL-MCNC: 141 MG/DL (ref 70–99)
GLUCOSE UR STRIP-MCNC: 250 MG/DL
HCT VFR BLD AUTO: 38.4 % (ref 40–53)
HGB BLD-MCNC: 12.7 G/DL (ref 13.3–17.7)
HGB UR QL STRIP: ABNORMAL
IRON SATN MFR SERPL: 29 % (ref 15–46)
IRON SERPL-MCNC: 94 UG/DL (ref 35–180)
KETONES UR STRIP-MCNC: NEGATIVE MG/DL
LEUKOCYTE ESTERASE UR QL STRIP: NEGATIVE
MCH RBC QN AUTO: 30.7 PG (ref 26.5–33)
MCHC RBC AUTO-ENTMCNC: 33.1 G/DL (ref 31.5–36.5)
MCV RBC AUTO: 93 FL (ref 78–100)
NITRATE UR QL: NEGATIVE
NON-SQ EPI CELLS #/AREA URNS LPF: ABNORMAL /LPF
PH UR STRIP: 6 PH (ref 5–7)
PHOSPHATE SERPL-MCNC: 3.3 MG/DL (ref 2.5–4.5)
PLATELET # BLD AUTO: 166 10E9/L (ref 150–450)
POTASSIUM SERPL-SCNC: 4.7 MMOL/L (ref 3.4–5.3)
PROT UR-MCNC: 0.83 G/L
PROT/CREAT 24H UR: 0.46 G/G CR (ref 0–0.2)
RBC # BLD AUTO: 4.14 10E12/L (ref 4.4–5.9)
RBC #/AREA URNS AUTO: ABNORMAL /HPF
SODIUM SERPL-SCNC: 137 MMOL/L (ref 133–144)
SOURCE: ABNORMAL
SP GR UR STRIP: >1.03 (ref 1–1.03)
TIBC SERPL-MCNC: 323 UG/DL (ref 240–430)
UROBILINOGEN UR STRIP-ACNC: 0.2 EU/DL (ref 0.2–1)
WBC # BLD AUTO: 6.1 10E9/L (ref 4–11)
WBC #/AREA URNS AUTO: ABNORMAL /HPF

## 2020-05-19 PROCEDURE — 83540 ASSAY OF IRON: CPT | Performed by: INTERNAL MEDICINE

## 2020-05-19 PROCEDURE — 80069 RENAL FUNCTION PANEL: CPT | Performed by: INTERNAL MEDICINE

## 2020-05-19 PROCEDURE — 85027 COMPLETE CBC AUTOMATED: CPT | Performed by: INTERNAL MEDICINE

## 2020-05-19 PROCEDURE — 36415 COLL VENOUS BLD VENIPUNCTURE: CPT | Performed by: INTERNAL MEDICINE

## 2020-05-19 PROCEDURE — 81001 URINALYSIS AUTO W/SCOPE: CPT | Performed by: INTERNAL MEDICINE

## 2020-05-19 PROCEDURE — 83550 IRON BINDING TEST: CPT | Performed by: INTERNAL MEDICINE

## 2020-05-19 PROCEDURE — 82306 VITAMIN D 25 HYDROXY: CPT | Performed by: INTERNAL MEDICINE

## 2020-05-19 PROCEDURE — 84156 ASSAY OF PROTEIN URINE: CPT | Performed by: INTERNAL MEDICINE

## 2020-05-19 PROCEDURE — 99207 ZZC DROP WITH A PROCEDURE: CPT | Mod: 25

## 2020-05-20 LAB — DEPRECATED CALCIDIOL+CALCIFEROL SERPL-MC: 14 UG/L (ref 20–75)

## 2020-05-30 DIAGNOSIS — I10 HYPERTENSION GOAL BP (BLOOD PRESSURE) < 140/90: ICD-10-CM

## 2020-05-30 DIAGNOSIS — E11.69 TYPE 2 DIABETES MELLITUS WITH OTHER SPECIFIED COMPLICATION, WITH LONG-TERM CURRENT USE OF INSULIN (H): ICD-10-CM

## 2020-05-30 DIAGNOSIS — Z79.4 TYPE 2 DIABETES MELLITUS WITH OTHER SPECIFIED COMPLICATION, WITH LONG-TERM CURRENT USE OF INSULIN (H): ICD-10-CM

## 2020-06-01 RX ORDER — FUROSEMIDE 20 MG
20 TABLET ORAL 2 TIMES DAILY
Qty: 180 TABLET | Refills: 0 | Status: SHIPPED | OUTPATIENT
Start: 2020-06-01 | End: 2020-10-27

## 2020-06-01 RX ORDER — LIRAGLUTIDE 6 MG/ML
INJECTION SUBCUTANEOUS
Qty: 9 ML | Refills: 2 | Status: SHIPPED | OUTPATIENT
Start: 2020-06-01 | End: 2020-08-21

## 2020-06-01 NOTE — TELEPHONE ENCOUNTER
"Requested Prescriptions   Pending Prescriptions Disp Refills     furosemide (LASIX) 20 MG tablet [Pharmacy Med Name: FUROSEMIDE 20 MG TABLET 20 TAB] 180 tablet 1     Sig: TAKE 1 TABLET (20 MG) BY MOUTH 2 TIMES DAILY       Diuretics (Including Combos) Protocol Failed - 5/30/2020  1:11 PM        Failed - Medication is active on med list        Failed - Normal serum creatinine on file in past 12 months     Recent Labs   Lab Test 05/19/20  1046   CR 2.45*              Passed - Blood pressure under 140/90 in past 12 months     BP Readings from Last 3 Encounters:   05/19/20 118/72   05/11/20 135/79   08/26/19 138/80                 Passed - Recent (12 mo) or future (30 days) visit within the authorizing provider's specialty     Patient has had an office visit with the authorizing provider or a provider within the authorizing providers department within the previous 12 mos or has a future within next 30 days. See \"Patient Info\" tab in inbasket, or \"Choose Columns\" in Meds & Orders section of the refill encounter.              Passed - Patient is age 18 or older        Passed - Normal serum potassium on file in past 12 months     Recent Labs   Lab Test 05/19/20  1046   POTASSIUM 4.7                    Passed - Normal serum sodium on file in past 12 months     Recent Labs   Lab Test 05/19/20  1046                    VICTOZA PEN 18 MG/3ML soln [Pharmacy Med Name: VICTOZA 18 MG/3 ML INJECT P 18 SOPN] 9 mL 3     Sig: INJECT 1.8 MG SUBCUTANEOUS DAILY       GLP-1 Agonists Protocol Failed - 5/30/2020  1:11 PM        Failed - Normal serum creatinine on file in past 12 months     Recent Labs   Lab Test 05/19/20  1046   CR 2.45*       Ok to refill medication if creatinine is low          Passed - HgbA1C in past 3 or 6 months     If HgbA1C is 8 or greater, it needs to be on file within the past 3 months.  If less than 8, must be on file within the past 6 months.     Recent Labs   Lab Test 04/03/20  0923   A1C 9.3*             " "Passed - Medication is active on med list        Passed - Patient is age 18 or older        Passed - Recent (6 mo) or future (30 days) visit within the authorizing provider's specialty     Patient had office visit in the last 6 months or has a visit in the next 30 days with authorizing provider.  See \"Patient Info\" tab in inbasket, or \"Choose Columns\" in Meds & Orders section of the refill encounter.                       "

## 2020-06-09 DIAGNOSIS — I10 HYPERTENSION GOAL BP (BLOOD PRESSURE) < 140/90: ICD-10-CM

## 2020-06-12 RX ORDER — SPIRONOLACTONE 25 MG/1
25 TABLET ORAL DAILY
Qty: 90 TABLET | Refills: 0 | Status: SHIPPED | OUTPATIENT
Start: 2020-06-12 | End: 2020-10-19

## 2020-06-12 NOTE — TELEPHONE ENCOUNTER
Routing refill request to provider for review/approval because:  Labs out of range:  Cr  Mara Alfaro BSN, RN

## 2020-06-30 DIAGNOSIS — Z79.4 TYPE 2 DIABETES MELLITUS WITH OTHER SPECIFIED COMPLICATION, WITH LONG-TERM CURRENT USE OF INSULIN (H): ICD-10-CM

## 2020-06-30 DIAGNOSIS — E11.69 TYPE 2 DIABETES MELLITUS WITH OTHER SPECIFIED COMPLICATION, WITH LONG-TERM CURRENT USE OF INSULIN (H): ICD-10-CM

## 2020-07-02 ENCOUNTER — TRANSFERRED RECORDS (OUTPATIENT)
Dept: HEALTH INFORMATION MANAGEMENT | Facility: CLINIC | Age: 60
End: 2020-07-02

## 2020-07-02 RX ORDER — INSULIN ASPART 100 [IU]/ML
INJECTION, SOLUTION INTRAVENOUS; SUBCUTANEOUS
Qty: 60 ML | Refills: 5 | Status: SHIPPED | OUTPATIENT
Start: 2020-07-02 | End: 2021-02-11

## 2020-07-02 RX ORDER — LANCETS
EACH MISCELLANEOUS
Qty: 102 EACH | Refills: 11 | Status: SHIPPED | OUTPATIENT
Start: 2020-07-02 | End: 2021-07-09

## 2020-07-02 RX ORDER — INSULIN GLARGINE 300 U/ML
150 INJECTION, SOLUTION SUBCUTANEOUS EVERY EVENING
Qty: 12 ML | Refills: 5 | Status: SHIPPED | OUTPATIENT
Start: 2020-07-02 | End: 2021-02-11

## 2020-08-09 ENCOUNTER — TRANSFERRED RECORDS (OUTPATIENT)
Dept: HEALTH INFORMATION MANAGEMENT | Facility: CLINIC | Age: 60
End: 2020-08-09

## 2020-08-09 LAB — HBA1C MFR BLD: 9.6 % (ref 0–5.7)

## 2020-08-11 LAB
GLUCOSE SERPL-MCNC: 201 MG/DL (ref 65–100)
POTASSIUM SERPL-SCNC: 4.9 MMOL/L (ref 3.5–5)

## 2020-08-14 LAB — AST SERPL-CCNC: 29 IU/L (ref 2–40)

## 2020-08-15 LAB
CREAT SERPL-MCNC: 2.46 MG/DL (ref 0.72–1.25)
GFR SERPL CREATININE-BSD FRML MDRD: 27 ML/MIN/1.73M2

## 2020-08-21 DIAGNOSIS — E11.69 TYPE 2 DIABETES MELLITUS WITH OTHER SPECIFIED COMPLICATION, WITH LONG-TERM CURRENT USE OF INSULIN (H): ICD-10-CM

## 2020-08-21 DIAGNOSIS — Z79.4 TYPE 2 DIABETES MELLITUS WITH OTHER SPECIFIED COMPLICATION, WITH LONG-TERM CURRENT USE OF INSULIN (H): ICD-10-CM

## 2020-08-21 RX ORDER — LIRAGLUTIDE 6 MG/ML
INJECTION SUBCUTANEOUS
Qty: 9 ML | Refills: 0 | Status: SHIPPED | OUTPATIENT
Start: 2020-08-21 | End: 2020-10-19

## 2020-08-21 RX ORDER — PEN NEEDLE, DIABETIC 31 GX3/16"
NEEDLE, DISPOSABLE MISCELLANEOUS
Qty: 500 EACH | Refills: 3 | Status: SHIPPED | OUTPATIENT
Start: 2020-08-21 | End: 2021-09-10

## 2020-08-21 NOTE — TELEPHONE ENCOUNTER
Routing refill request to provider for review/approval because:  Labs not current:  Creatinine    Arianna Blum BSN, RN

## 2020-10-17 ENCOUNTER — TRANSFERRED RECORDS (OUTPATIENT)
Dept: HEALTH INFORMATION MANAGEMENT | Facility: CLINIC | Age: 60
End: 2020-10-17

## 2020-10-20 DIAGNOSIS — Z79.4 TYPE 2 DIABETES MELLITUS WITH OTHER SPECIFIED COMPLICATION, WITH LONG-TERM CURRENT USE OF INSULIN (H): ICD-10-CM

## 2020-10-20 DIAGNOSIS — E11.69 TYPE 2 DIABETES MELLITUS WITH OTHER SPECIFIED COMPLICATION, WITH LONG-TERM CURRENT USE OF INSULIN (H): ICD-10-CM

## 2020-10-21 RX ORDER — BLOOD SUGAR DIAGNOSTIC
STRIP MISCELLANEOUS
Qty: 150 STRIP | Refills: 11 | OUTPATIENT
Start: 2020-10-21

## 2020-10-21 RX ORDER — BLOOD SUGAR DIAGNOSTIC
STRIP MISCELLANEOUS
Qty: 150 STRIP | Refills: 0 | Status: SHIPPED | OUTPATIENT
Start: 2020-10-21 | End: 2020-11-13

## 2020-10-22 NOTE — PROGRESS NOTES
Subjective     Roberto Thompson is a 60 year old male who presents to clinic today for the following health issues:    HPI         Diabetes Follow-up    How often are you checking your blood sugar? Four or more times daily  Blood sugar testing frequency justification:  Uncontrolled diabetes  What time of day are you checking your blood sugars (select all that apply)?  Before meals  Have you had any blood sugars above 200?  Yes   Have you had any blood sugars below 70?  No    What symptoms do you notice when your blood sugar is low?  None    What concerns do you have today about your diabetes? None     Do you have any of these symptoms? (Select all that apply)  Numbness in feet, recent surgery on foot     Have you had a diabetic eye exam in the last 12 months? No                Hyperlipidemia Follow-Up      Are you regularly taking any medication or supplement to lower your cholesterol?   Yes- fenofibrate     Are you having muscle aches or other side effects that you think could be caused by your cholesterol lowering medication?  Yes- possible    Hypertension Follow-up      Do you check your blood pressure regularly outside of the clinic? Yes     Are you following a low salt diet? Yes    Are your blood pressures ever more than 140 on the top number (systolic) OR more   than 90 on the bottom number (diastolic), for example 140/90? Yes    BP Readings from Last 2 Encounters:   10/27/20 136/73   05/19/20 118/72     Hemoglobin A1C (%)   Date Value   10/27/2020 9.2 (H)   08/09/2020 9.6 (H)     LDL Cholesterol Calculated (mg/dL)   Date Value   10/27/2020     Cannot estimate LDL when triglyceride exceeds 400 mg/dL   09/04/2019 99     LDL Cholesterol Direct (mg/dL)   Date Value   10/27/2020 95       Pt with diabetes, poorly controlled.    Recent hospitalization for infection of the left  foot. 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 Left foot is healing nicely. Pt with known PVD.  Sees vascular. Still has pain walking  "on it but pt also with known peripheral neuropathy    Recently diagnosed with pneumonia and has finished antiobiotics and feeling better    Diabetes on insulin  BSs are running between 122 to 250  Is on insulin and victoza and  Is on aspirin and statin. Intolerant of acei      Pt with HTN, controlled today on spironolactone and carvedilol    Pt with elevated LDL and triglycerides on statin, fish oil and femofibrate.     Pt with h/o vitamin D deficiency. Will recheck level. Will need refill of vitamin D once gets labs back.     Review of Systems   Constitutional, HEENT, cardiovascular, pulmonary, gi and gu systems are negative, except as otherwise noted.      Objective    /73   Pulse 75   Temp 97.9  F (36.6  C) (Oral)   Ht 1.803 m (5' 11\")   Wt 136.5 kg (301 lb)   BMI 41.98 kg/m    Body mass index is 41.98 kg/m .  Physical Exam   GENERAL: healthy, alert and no distress  NECK: no adenopathy, no asymmetry, masses, or scars and thyroid normal to palpation  RESP: lungs clear to auscultation - no rales, rhonchi or wheezes  CV: regular rate and rhythm, normal S1 S2, no S3 or S4, no murmur, click or rub, no peripheral edema and peripheral pulses strong  ABDOMEN: soft, nontender, no hepatosplenomegaly, no masses and bowel sounds normal  MS: left foot with well healing incisions on medial and plantar surface, no sensation to light touch over most of foot. , decrease DP pulses    Results for orders placed or performed in visit on 10/27/20 (from the past 24 hour(s))   Lipid panel reflex to direct LDL Non-fasting   Result Value Ref Range    Cholesterol 212 (H) <200 mg/dL    Triglycerides 652 (H) <150 mg/dL    HDL Cholesterol 35 (L) >39 mg/dL    LDL Cholesterol Calculated  <100 mg/dL     Cannot estimate LDL when triglyceride exceeds 400 mg/dL    Non HDL Cholesterol 177 (H) <130 mg/dL   Hemoglobin A1c   Result Value Ref Range    Hemoglobin A1C 9.2 (H) 0 - 5.6 %   Basic metabolic panel   Result Value Ref Range    Sodium " 134 133 - 144 mmol/L    Potassium 5.1 3.4 - 5.3 mmol/L    Chloride 103 94 - 109 mmol/L    Carbon Dioxide 23 20 - 32 mmol/L    Anion Gap 8 3 - 14 mmol/L    Glucose 290 (H) 70 - 99 mg/dL    Urea Nitrogen 50 (H) 7 - 30 mg/dL    Creatinine 2.53 (H) 0.66 - 1.25 mg/dL    GFR Estimate 26 (L) >60 mL/min/[1.73_m2]    GFR Estimate If Black 30 (L) >60 mL/min/[1.73_m2]    Calcium 10.2 (H) 8.5 - 10.1 mg/dL   CBC with platelets   Result Value Ref Range    WBC 7.3 4.0 - 11.0 10e9/L    RBC Count 3.56 (L) 4.4 - 5.9 10e12/L    Hemoglobin 11.1 (L) 13.3 - 17.7 g/dL    Hematocrit 33.5 (L) 40.0 - 53.0 %    MCV 94 78 - 100 fl    MCH 31.2 26.5 - 33.0 pg    MCHC 33.1 31.5 - 36.5 g/dL    RDW 14.4 10.0 - 15.0 %    Platelet Count 335 150 - 450 10e9/L   LDL cholesterol direct   Result Value Ref Range    LDL Cholesterol Direct 95 <100 mg/dL           Assessment & Plan     Type 2 diabetes mellitus with other specified complication, with long-term current use of insulin (H)  Poorly controlled, has seen diabetes educator, very little exercise, on insulin.   - liraglutide (VICTOZA PEN) 18 MG/3ML solution; INJECT 1.8 MG SUBCUTANEOUS DAILY  - Hemoglobin A1c  - Basic metabolic panel  - FOOT EXAM  - CBC with platelets  - LDL cholesterol direct    Encounter for long-term (current) insulin use (H)  Refill as needed    Morbid obesity (H)  Encourage regular exercise    Polyneuropathy associated with underlying disease (H)  Stable     Peripheral vascular disease (H)  Per vascular    Stage 3b chronic kidney disease  Intolerant of ace    High blood cholesterol  Already on statin, fish oil and fenofibrate  - atorvastatin (LIPITOR) 40 MG tablet; Take 2 tablets (80 mg) by mouth daily  - fenofibrate (TRICOR) 145 MG tablet; Take 1 tablet (145 mg) by mouth daily  - Lipid panel reflex to direct LDL Non-fasting    Hypertension goal BP (blood pressure) < 140/90  Well controlled on meds  - carvedilol (COREG) 12.5 MG tablet; Take 1 tablet (12.5 mg) by mouth 2 times daily  "(with meals)  - spironolactone (ALDACTONE) 25 MG tablet; Take 1 tablet (25 mg) by mouth daily    Vitamin D deficiency  Due for recheck of level before refill  - Vitamin D Deficiency    High blood triglycerides  On meds above       BMI:   Estimated body mass index is 41.98 kg/m  as calculated from the following:    Height as of this encounter: 1.803 m (5' 11\").    Weight as of this encounter: 136.5 kg (301 lb).   Weight management plan: Discussed healthy diet and exercise guidelines             No follow-ups on file.    Jaylyn Bellamy MD  [facility]    "

## 2020-10-27 ENCOUNTER — OFFICE VISIT (OUTPATIENT)
Dept: FAMILY MEDICINE | Facility: CLINIC | Age: 60
End: 2020-10-27
Payer: COMMERCIAL

## 2020-10-27 VITALS
WEIGHT: 301 LBS | SYSTOLIC BLOOD PRESSURE: 136 MMHG | HEIGHT: 71 IN | BODY MASS INDEX: 42.14 KG/M2 | TEMPERATURE: 97.9 F | DIASTOLIC BLOOD PRESSURE: 73 MMHG | HEART RATE: 75 BPM

## 2020-10-27 DIAGNOSIS — E11.69 TYPE 2 DIABETES MELLITUS WITH OTHER SPECIFIED COMPLICATION, WITH LONG-TERM CURRENT USE OF INSULIN (H): Primary | ICD-10-CM

## 2020-10-27 DIAGNOSIS — E66.01 MORBID OBESITY (H): ICD-10-CM

## 2020-10-27 DIAGNOSIS — Z79.4 ENCOUNTER FOR LONG-TERM (CURRENT) INSULIN USE (H): ICD-10-CM

## 2020-10-27 DIAGNOSIS — E55.9 VITAMIN D DEFICIENCY: ICD-10-CM

## 2020-10-27 DIAGNOSIS — G63 POLYNEUROPATHY ASSOCIATED WITH UNDERLYING DISEASE (H): ICD-10-CM

## 2020-10-27 DIAGNOSIS — N18.32 STAGE 3B CHRONIC KIDNEY DISEASE (H): ICD-10-CM

## 2020-10-27 DIAGNOSIS — E78.1 HIGH BLOOD TRIGLYCERIDES: ICD-10-CM

## 2020-10-27 DIAGNOSIS — E78.00 HIGH BLOOD CHOLESTEROL: ICD-10-CM

## 2020-10-27 DIAGNOSIS — Z79.4 TYPE 2 DIABETES MELLITUS WITH OTHER SPECIFIED COMPLICATION, WITH LONG-TERM CURRENT USE OF INSULIN (H): Primary | ICD-10-CM

## 2020-10-27 DIAGNOSIS — I10 HYPERTENSION GOAL BP (BLOOD PRESSURE) < 140/90: ICD-10-CM

## 2020-10-27 DIAGNOSIS — I73.9 PERIPHERAL VASCULAR DISEASE (H): ICD-10-CM

## 2020-10-27 LAB
ANION GAP SERPL CALCULATED.3IONS-SCNC: 8 MMOL/L (ref 3–14)
BUN SERPL-MCNC: 50 MG/DL (ref 7–30)
CALCIUM SERPL-MCNC: 10.2 MG/DL (ref 8.5–10.1)
CHLORIDE SERPL-SCNC: 103 MMOL/L (ref 94–109)
CHOLEST SERPL-MCNC: 212 MG/DL
CO2 SERPL-SCNC: 23 MMOL/L (ref 20–32)
CREAT SERPL-MCNC: 2.53 MG/DL (ref 0.66–1.25)
ERYTHROCYTE [DISTWIDTH] IN BLOOD BY AUTOMATED COUNT: 14.4 % (ref 10–15)
GFR SERPL CREATININE-BSD FRML MDRD: 26 ML/MIN/{1.73_M2}
GLUCOSE SERPL-MCNC: 290 MG/DL (ref 70–99)
HBA1C MFR BLD: 9.2 % (ref 0–5.6)
HCT VFR BLD AUTO: 33.5 % (ref 40–53)
HDLC SERPL-MCNC: 35 MG/DL
HGB BLD-MCNC: 11.1 G/DL (ref 13.3–17.7)
LDLC SERPL CALC-MCNC: ABNORMAL MG/DL
LDLC SERPL DIRECT ASSAY-MCNC: 95 MG/DL
MCH RBC QN AUTO: 31.2 PG (ref 26.5–33)
MCHC RBC AUTO-ENTMCNC: 33.1 G/DL (ref 31.5–36.5)
MCV RBC AUTO: 94 FL (ref 78–100)
NONHDLC SERPL-MCNC: 177 MG/DL
PLATELET # BLD AUTO: 335 10E9/L (ref 150–450)
POTASSIUM SERPL-SCNC: 5.1 MMOL/L (ref 3.4–5.3)
RBC # BLD AUTO: 3.56 10E12/L (ref 4.4–5.9)
SODIUM SERPL-SCNC: 134 MMOL/L (ref 133–144)
TRIGL SERPL-MCNC: 652 MG/DL
WBC # BLD AUTO: 7.3 10E9/L (ref 4–11)

## 2020-10-27 PROCEDURE — 82306 VITAMIN D 25 HYDROXY: CPT | Performed by: FAMILY MEDICINE

## 2020-10-27 PROCEDURE — 80048 BASIC METABOLIC PNL TOTAL CA: CPT | Performed by: FAMILY MEDICINE

## 2020-10-27 PROCEDURE — 83036 HEMOGLOBIN GLYCOSYLATED A1C: CPT | Performed by: FAMILY MEDICINE

## 2020-10-27 PROCEDURE — 83721 ASSAY OF BLOOD LIPOPROTEIN: CPT | Mod: 59 | Performed by: FAMILY MEDICINE

## 2020-10-27 PROCEDURE — 36415 COLL VENOUS BLD VENIPUNCTURE: CPT | Performed by: FAMILY MEDICINE

## 2020-10-27 PROCEDURE — 80061 LIPID PANEL: CPT | Performed by: FAMILY MEDICINE

## 2020-10-27 PROCEDURE — 85027 COMPLETE CBC AUTOMATED: CPT | Performed by: FAMILY MEDICINE

## 2020-10-27 PROCEDURE — 99214 OFFICE O/P EST MOD 30 MIN: CPT | Performed by: FAMILY MEDICINE

## 2020-10-27 PROCEDURE — 99207 PR FOOT EXAM NO CHARGE: CPT | Performed by: FAMILY MEDICINE

## 2020-10-27 RX ORDER — LIRAGLUTIDE 6 MG/ML
INJECTION SUBCUTANEOUS
Qty: 9 ML | Refills: 4 | Status: SHIPPED | OUTPATIENT
Start: 2020-10-27 | End: 2021-04-13

## 2020-10-27 RX ORDER — SPIRONOLACTONE 25 MG/1
25 TABLET ORAL DAILY
Qty: 90 TABLET | Refills: 1 | Status: SHIPPED | OUTPATIENT
Start: 2020-10-27 | End: 2021-05-26

## 2020-10-27 RX ORDER — AMOXICILLIN 500 MG
1200 CAPSULE ORAL DAILY
COMMUNITY
End: 2022-02-23

## 2020-10-27 RX ORDER — ATORVASTATIN CALCIUM 40 MG/1
80 TABLET, FILM COATED ORAL DAILY
Qty: 180 TABLET | Refills: 1 | Status: SHIPPED | OUTPATIENT
Start: 2020-10-27 | End: 2021-05-26

## 2020-10-27 RX ORDER — FUROSEMIDE 20 MG
20 TABLET ORAL 2 TIMES DAILY
Qty: 180 TABLET | Refills: 1 | Status: CANCELLED | OUTPATIENT
Start: 2020-10-27

## 2020-10-27 RX ORDER — FENOFIBRATE 145 MG/1
145 TABLET, COATED ORAL DAILY
Qty: 90 TABLET | Refills: 1 | Status: SHIPPED | OUTPATIENT
Start: 2020-10-27 | End: 2021-05-11

## 2020-10-27 RX ORDER — CARVEDILOL 12.5 MG/1
12.5 TABLET ORAL 2 TIMES DAILY WITH MEALS
Qty: 180 TABLET | Refills: 1 | Status: SHIPPED | OUTPATIENT
Start: 2020-10-27 | End: 2021-04-13

## 2020-10-27 ASSESSMENT — MIFFLIN-ST. JEOR: SCORE: 2187.46

## 2020-10-27 NOTE — LETTER
November 2, 2020      Robertorancho Thompson  [address] [place]  [place] [address]        Dear ,    Your vitamin D level is still low. I have sent a prescription to your pharmacy for vitamin D, take one tablet per week   Please have your vitamin D level checked in 8 weeks after you finish your prescription via a lab appointment       Jaylyn Bellamy MD     Resulted Orders   Lipid panel reflex to direct LDL Non-fasting   Result Value Ref Range    Cholesterol 212 (H) <200 mg/dL      Comment:      Desirable:       <200 mg/dl    Triglycerides 652 (H) <150 mg/dL      Comment:      Borderline high:  150-199 mg/dl  High:             200-499 mg/dl  Very high:       >499 mg/dl  Non Fasting      HDL Cholesterol 35 (L) >39 mg/dL    LDL Cholesterol Calculated  <100 mg/dL     Cannot estimate LDL when triglyceride exceeds 400 mg/dL    Non HDL Cholesterol 177 (H) <130 mg/dL      Comment:      Above Desirable:  130-159 mg/dl  Borderline high:  160-189 mg/dl  High:             190-219 mg/dl  Very high:       >219 mg/dl     Hemoglobin A1c   Result Value Ref Range    Hemoglobin A1C 9.2 (H) 0 - 5.6 %      Comment:      Normal <5.7% Prediabetes 5.7-6.4%  Diabetes 6.5% or higher - adopted from ADA   consensus guidelines.     Basic metabolic panel   Result Value Ref Range    Sodium 134 133 - 144 mmol/L    Potassium 5.1 3.4 - 5.3 mmol/L    Chloride 103 94 - 109 mmol/L    Carbon Dioxide 23 20 - 32 mmol/L    Anion Gap 8 3 - 14 mmol/L    Glucose 290 (H) 70 - 99 mg/dL      Comment:      Non Fasting    Urea Nitrogen 50 (H) 7 - 30 mg/dL    Creatinine 2.53 (H) 0.66 - 1.25 mg/dL    GFR Estimate 26 (L) >60 mL/min/[1.73_m2]      Comment:      Non  GFR Calc  Starting 12/18/2018, serum creatinine based estimated GFR (eGFR) will be   calculated using the Chronic Kidney Disease Epidemiology Collaboration   (CKD-EPI) equation.      GFR Estimate If Black 30 (L) >60 mL/min/[1.73_m2]      Comment:        GFR Calc  Starting 12/18/2018, serum creatinine based estimated GFR (eGFR) will be   calculated using the Chronic Kidney Disease Epidemiology Collaboration   (CKD-EPI) equation.      Calcium 10.2 (H) 8.5 - 10.1 mg/dL   CBC with platelets   Result Value Ref Range    WBC 7.3 4.0 - 11.0 10e9/L    RBC Count 3.56 (L) 4.4 - 5.9 10e12/L    Hemoglobin 11.1 (L) 13.3 - 17.7 g/dL    Hematocrit 33.5 (L) 40.0 - 53.0 %    MCV 94 78 - 100 fl    MCH 31.2 26.5 - 33.0 pg    MCHC 33.1 31.5 - 36.5 g/dL    RDW 14.4 10.0 - 15.0 %    Platelet Count 335 150 - 450 10e9/L   Vitamin D Deficiency   Result Value Ref Range    Vitamin D Deficiency screening 16 (L) 20 - 75 ug/L      Comment:      Season, race, dietary intake, and treatment affect the concentration of   25-hydroxy-Vitamin D. Values may decrease during winter months and increase   during summer months. Values 20-29 ug/L may indicate Vitamin D insufficiency   and values <20 ug/L may indicate Vitamin D deficiency.  Vitamin D determination is routinely performed by an immunoassay specific for   25 hydroxyvitamin D3.  If an individual is on vitamin D2 (ergocalciferol)   supplementation, please specify 25 OH vitamin D2 and D3 level determination by   LCMSMS test [id number].     LDL cholesterol direct   Result Value Ref Range    LDL Cholesterol Direct 95 <100 mg/dL      Comment:      Desirable:       <100 mg/dl

## 2020-10-28 LAB — DEPRECATED CALCIDIOL+CALCIFEROL SERPL-MC: 16 UG/L (ref 20–75)

## 2020-10-28 RX ORDER — ERGOCALCIFEROL 1.25 MG/1
50000 CAPSULE, LIQUID FILLED ORAL WEEKLY
Qty: 8 CAPSULE | Refills: 0 | Status: SHIPPED | OUTPATIENT
Start: 2020-10-28 | End: 2020-12-16

## 2020-11-02 ENCOUNTER — TELEPHONE (OUTPATIENT)
Dept: FAMILY MEDICINE | Facility: CLINIC | Age: 60
End: 2020-11-02

## 2020-11-02 NOTE — TELEPHONE ENCOUNTER
Patient's daughter called back and put patient on a 3 way call with this RN.  I reviewed the provider notes below and patient states he understands this.   He is not interested in seeing the diabetic educator at this time.     Arianna DRAPERN, RN

## 2020-11-02 NOTE — TELEPHONE ENCOUNTER
Jaylyn Bellamy MD   10/28/2020  8:50 PM      Your vitamin D level is still low. I have sent a prescription to your pharmacy for vitamin D, take one tablet per week  Please have your vitamin D level checked in 8 weeks after you finish your prescription via a lab appointment        MD Mia De Jesus Laurie, MD   10/27/2020  3:51 PM      Your cholesterol is too high. Your LDL should be < 70 given your peripheral vascular disease.  Your triglycerides are way too high. Please make sure you are taking your lipitor, fenofibrate and fish oil to help lower it. If you are, please consider a referral to the lipid clinic to help manage your triglycerides  Your electrolytes and kidney function are stable  Your diabetes is not at goal as you already know. Please let me know if you are agreeable to seeing the diabetes educator again.     Jaylyn Bellamy MD

## 2020-11-13 DIAGNOSIS — Z79.4 TYPE 2 DIABETES MELLITUS WITH OTHER SPECIFIED COMPLICATION, WITH LONG-TERM CURRENT USE OF INSULIN (H): ICD-10-CM

## 2020-11-13 DIAGNOSIS — E11.69 TYPE 2 DIABETES MELLITUS WITH OTHER SPECIFIED COMPLICATION, WITH LONG-TERM CURRENT USE OF INSULIN (H): ICD-10-CM

## 2020-11-13 RX ORDER — BLOOD SUGAR DIAGNOSTIC
STRIP MISCELLANEOUS
Qty: 600 STRIP | Refills: 3 | Status: SHIPPED | OUTPATIENT
Start: 2020-11-13 | End: 2021-01-11

## 2020-11-16 ENCOUNTER — TRANSFERRED RECORDS (OUTPATIENT)
Dept: HEALTH INFORMATION MANAGEMENT | Facility: CLINIC | Age: 60
End: 2020-11-16

## 2020-11-16 LAB — RETINOPATHY: POSITIVE

## 2020-12-10 DIAGNOSIS — E11.22 TYPE 2 DIABETES MELLITUS WITH ESRD (END-STAGE RENAL DISEASE) (H): ICD-10-CM

## 2020-12-10 DIAGNOSIS — N18.6 TYPE 2 DIABETES MELLITUS WITH ESRD (END-STAGE RENAL DISEASE) (H): ICD-10-CM

## 2020-12-10 DIAGNOSIS — N18.4 CHRONIC KIDNEY DISEASE, STAGE IV (SEVERE) (H): Primary | ICD-10-CM

## 2020-12-14 DIAGNOSIS — I10 HYPERTENSION GOAL BP (BLOOD PRESSURE) < 140/90: ICD-10-CM

## 2020-12-14 DIAGNOSIS — E55.9 VITAMIN D DEFICIENCY: ICD-10-CM

## 2020-12-14 DIAGNOSIS — K59.00 CONSTIPATION, UNSPECIFIED CONSTIPATION TYPE: ICD-10-CM

## 2020-12-16 RX ORDER — FUROSEMIDE 20 MG
20 TABLET ORAL 2 TIMES DAILY
Qty: 60 TABLET | Refills: 0 | Status: SHIPPED | OUTPATIENT
Start: 2020-12-16 | End: 2021-01-11

## 2020-12-16 RX ORDER — ERGOCALCIFEROL 1.25 MG/1
50000 CAPSULE, LIQUID FILLED ORAL WEEKLY
Qty: 4 CAPSULE | Refills: 0 | Status: SHIPPED | OUTPATIENT
Start: 2020-12-16 | End: 2021-01-11

## 2020-12-16 RX ORDER — AMOXICILLIN 250 MG
CAPSULE ORAL
Qty: 60 TABLET | Refills: 9 | Status: SHIPPED | OUTPATIENT
Start: 2020-12-16 | End: 2021-11-20

## 2020-12-16 NOTE — TELEPHONE ENCOUNTER
"Routing refill request to provider for review/approval because:  Requested Prescriptions   Pending Prescriptions Disp Refills    vitamin D2 (ERGOCALCIFEROL) 60138 units (1250 mcg) capsule [Pharmacy Med Name: VIT D2 1.25 MG (50,000 UNIT 1.25 MG Capsule] 4 capsule 0     Sig: TAKE 1 CAPSULE (50,000 UNITS) BY MOUTH ONCE A WEEK       There is no refill protocol information for this order       furosemide (LASIX) 20 MG tablet [Pharmacy Med Name: FUROSEMIDE 20 MG TABS 20 Tablet] 60 tablet 0     Sig: TAKE 1 TABLET (20 MG) BY MOUTH 2 TIMES DAILY       Diuretics (Including Combos) Protocol Failed - 12/14/2020 10:08 AM        Failed - Medication is active on med list        Failed - Normal serum creatinine on file in past 12 months     Recent Labs   Lab Test 10/27/20  0856   CR 2.53*              Passed - Blood pressure under 140/90 in past 12 months     BP Readings from Last 3 Encounters:   10/27/20 136/73   05/19/20 118/72   05/11/20 135/79                 Passed - Recent (12 mo) or future (30 days) visit within the authorizing provider's specialty     Patient has had an office visit with the authorizing provider or a provider within the authorizing providers department within the previous 12 mos or has a future within next 30 days. See \"Patient Info\" tab in inbasket, or \"Choose Columns\" in Meds & Orders section of the refill encounter.              Passed - Patient is age 18 or older        Passed - Normal serum potassium on file in past 12 months     Recent Labs   Lab Test 10/27/20  0856   POTASSIUM 5.1                    Passed - Normal serum sodium on file in past 12 months     Recent Labs   Lab Test 10/27/20  0856                  Signed Prescriptions Disp Refills    senna-docusate (SENEXON-S) 8.6-50 MG tablet 60 tablet 9     Sig: TAKE 1 TAB BY MOUTH TWO TIMES DAILY AS NEEDED FOR CONSTIPATION.       Laxatives Protocol Passed - 12/14/2020 10:08 AM        Passed - Patient is age 6 or older        Passed - Recent (12 " "mo) or future (30 days) visit within the authorizing provider's specialty     Patient has had an office visit with the authorizing provider or a provider within the authorizing providers department within the previous 12 mos or has a future within next 30 days. See \"Patient Info\" tab in inbasket, or \"Choose Columns\" in Meds & Orders section of the refill encounter.              Passed - Medication is active on med list                 Katie SOSA, RN, CPN          "

## 2020-12-29 DIAGNOSIS — E11.22 TYPE 2 DIABETES MELLITUS WITH ESRD (END-STAGE RENAL DISEASE) (H): ICD-10-CM

## 2020-12-29 DIAGNOSIS — N18.4 CHRONIC KIDNEY DISEASE, STAGE IV (SEVERE) (H): ICD-10-CM

## 2020-12-29 DIAGNOSIS — N18.6 TYPE 2 DIABETES MELLITUS WITH ESRD (END-STAGE RENAL DISEASE) (H): ICD-10-CM

## 2020-12-29 LAB
ALBUMIN SERPL-MCNC: 3.9 G/DL (ref 3.4–5)
ALBUMIN UR-MCNC: ABNORMAL MG/DL
ANION GAP SERPL CALCULATED.3IONS-SCNC: 8 MMOL/L (ref 3–14)
APPEARANCE UR: CLEAR
BACTERIA #/AREA URNS HPF: ABNORMAL /HPF
BILIRUB UR QL STRIP: NEGATIVE
BUN SERPL-MCNC: 38 MG/DL (ref 7–30)
CALCIUM SERPL-MCNC: 9 MG/DL (ref 8.5–10.1)
CHLORIDE SERPL-SCNC: 98 MMOL/L (ref 94–109)
CO2 SERPL-SCNC: 27 MMOL/L (ref 20–32)
COLOR UR AUTO: YELLOW
CREAT SERPL-MCNC: 2.51 MG/DL (ref 0.66–1.25)
CREAT UR-MCNC: 61 MG/DL
ERYTHROCYTE [DISTWIDTH] IN BLOOD BY AUTOMATED COUNT: 13.4 % (ref 10–15)
GFR SERPL CREATININE-BSD FRML MDRD: 26 ML/MIN/{1.73_M2}
GLUCOSE SERPL-MCNC: 217 MG/DL (ref 70–99)
GLUCOSE UR STRIP-MCNC: 500 MG/DL
HCT VFR BLD AUTO: 38.7 % (ref 40–53)
HGB BLD-MCNC: 13.2 G/DL (ref 13.3–17.7)
HGB UR QL STRIP: ABNORMAL
HYALINE CASTS #/AREA URNS LPF: ABNORMAL /LPF
KETONES UR STRIP-MCNC: NEGATIVE MG/DL
LEUKOCYTE ESTERASE UR QL STRIP: NEGATIVE
MCH RBC QN AUTO: 31 PG (ref 26.5–33)
MCHC RBC AUTO-ENTMCNC: 34.1 G/DL (ref 31.5–36.5)
MCV RBC AUTO: 91 FL (ref 78–100)
NITRATE UR QL: NEGATIVE
NON-SQ EPI CELLS #/AREA URNS LPF: ABNORMAL /LPF
PH UR STRIP: 5.5 PH (ref 5–7)
PHOSPHATE SERPL-MCNC: 3.3 MG/DL (ref 2.5–4.5)
PLATELET # BLD AUTO: 187 10E9/L (ref 150–450)
POTASSIUM SERPL-SCNC: 4.3 MMOL/L (ref 3.4–5.3)
PROT UR-MCNC: 0.4 G/L
PROT/CREAT 24H UR: 0.65 G/G CR (ref 0–0.2)
PTH-INTACT SERPL-MCNC: 124 PG/ML (ref 18–80)
RBC # BLD AUTO: 4.26 10E12/L (ref 4.4–5.9)
RBC #/AREA URNS AUTO: ABNORMAL /HPF
SODIUM SERPL-SCNC: 133 MMOL/L (ref 133–144)
SOURCE: ABNORMAL
SP GR UR STRIP: 1.02 (ref 1–1.03)
UROBILINOGEN UR STRIP-ACNC: 0.2 EU/DL (ref 0.2–1)
WBC # BLD AUTO: 6.5 10E9/L (ref 4–11)
WBC #/AREA URNS AUTO: ABNORMAL /HPF

## 2020-12-29 PROCEDURE — 85027 COMPLETE CBC AUTOMATED: CPT | Performed by: INTERNAL MEDICINE

## 2020-12-29 PROCEDURE — 80069 RENAL FUNCTION PANEL: CPT | Performed by: INTERNAL MEDICINE

## 2020-12-29 PROCEDURE — 84156 ASSAY OF PROTEIN URINE: CPT | Performed by: INTERNAL MEDICINE

## 2020-12-29 PROCEDURE — 81001 URINALYSIS AUTO W/SCOPE: CPT | Performed by: INTERNAL MEDICINE

## 2020-12-29 PROCEDURE — 36415 COLL VENOUS BLD VENIPUNCTURE: CPT | Performed by: INTERNAL MEDICINE

## 2020-12-29 PROCEDURE — 83970 ASSAY OF PARATHORMONE: CPT | Performed by: INTERNAL MEDICINE

## 2021-01-11 DIAGNOSIS — E11.69 TYPE 2 DIABETES MELLITUS WITH OTHER SPECIFIED COMPLICATION, WITH LONG-TERM CURRENT USE OF INSULIN (H): ICD-10-CM

## 2021-01-11 DIAGNOSIS — E55.9 VITAMIN D DEFICIENCY: ICD-10-CM

## 2021-01-11 DIAGNOSIS — Z79.4 TYPE 2 DIABETES MELLITUS WITH OTHER SPECIFIED COMPLICATION, WITH LONG-TERM CURRENT USE OF INSULIN (H): ICD-10-CM

## 2021-01-11 DIAGNOSIS — I10 HYPERTENSION GOAL BP (BLOOD PRESSURE) < 140/90: ICD-10-CM

## 2021-01-11 RX ORDER — ERGOCALCIFEROL 1.25 MG/1
50000 CAPSULE, LIQUID FILLED ORAL WEEKLY
Qty: 4 CAPSULE | Refills: 0 | Status: SHIPPED | OUTPATIENT
Start: 2021-01-11 | End: 2021-02-11

## 2021-01-11 RX ORDER — FUROSEMIDE 20 MG
20 TABLET ORAL 2 TIMES DAILY
Qty: 60 TABLET | Refills: 0 | Status: SHIPPED | OUTPATIENT
Start: 2021-01-11 | End: 2021-02-11

## 2021-01-11 RX ORDER — BLOOD SUGAR DIAGNOSTIC
1 STRIP MISCELLANEOUS 4 TIMES DAILY
Qty: 600 STRIP | Refills: 3 | Status: SHIPPED | OUTPATIENT
Start: 2021-01-11 | End: 2023-05-23

## 2021-01-11 NOTE — TELEPHONE ENCOUNTER
Routing refill request to provider for review/approval because:  Drug not on the [facility] refill protocol   Labs out of range:  Mitchell DRAPERN, RN

## 2021-01-20 ENCOUNTER — TRANSFERRED RECORDS (OUTPATIENT)
Dept: HEALTH INFORMATION MANAGEMENT | Facility: CLINIC | Age: 61
End: 2021-01-20

## 2021-01-25 ENCOUNTER — MEDICAL CORRESPONDENCE (OUTPATIENT)
Dept: HEALTH INFORMATION MANAGEMENT | Facility: CLINIC | Age: 61
End: 2021-01-25

## 2021-02-11 DIAGNOSIS — I10 HYPERTENSION GOAL BP (BLOOD PRESSURE) < 140/90: ICD-10-CM

## 2021-02-11 DIAGNOSIS — E55.9 VITAMIN D DEFICIENCY: ICD-10-CM

## 2021-02-11 DIAGNOSIS — Z79.4 TYPE 2 DIABETES MELLITUS WITH OTHER SPECIFIED COMPLICATION, WITH LONG-TERM CURRENT USE OF INSULIN (H): ICD-10-CM

## 2021-02-11 DIAGNOSIS — E11.69 TYPE 2 DIABETES MELLITUS WITH OTHER SPECIFIED COMPLICATION, WITH LONG-TERM CURRENT USE OF INSULIN (H): ICD-10-CM

## 2021-02-11 RX ORDER — INSULIN ASPART 100 [IU]/ML
INJECTION, SOLUTION INTRAVENOUS; SUBCUTANEOUS
Qty: 60 ML | Refills: 5 | Status: SHIPPED | OUTPATIENT
Start: 2021-02-11 | End: 2021-08-11

## 2021-02-11 RX ORDER — FUROSEMIDE 20 MG
20 TABLET ORAL 2 TIMES DAILY
Qty: 180 TABLET | Refills: 0 | Status: SHIPPED | OUTPATIENT
Start: 2021-02-11 | End: 2021-03-12

## 2021-02-11 RX ORDER — INSULIN GLARGINE 300 U/ML
150 INJECTION, SOLUTION SUBCUTANEOUS EVERY EVENING
Qty: 12 ML | Refills: 5 | Status: SHIPPED | OUTPATIENT
Start: 2021-02-11 | End: 2021-04-13

## 2021-02-11 RX ORDER — ERGOCALCIFEROL 1.25 MG/1
50000 CAPSULE, LIQUID FILLED ORAL WEEKLY
Qty: 4 CAPSULE | Refills: 0 | Status: SHIPPED | OUTPATIENT
Start: 2021-02-11 | End: 2021-03-12

## 2021-02-11 NOTE — TELEPHONE ENCOUNTER
Routing refill request to provider for review/approval because:  Labs not current:  A1C    Arianna DRAPERN, RN

## 2021-02-18 ENCOUNTER — TRANSFERRED RECORDS (OUTPATIENT)
Dept: HEALTH INFORMATION MANAGEMENT | Facility: CLINIC | Age: 61
End: 2021-02-18

## 2021-03-12 DIAGNOSIS — I10 HYPERTENSION GOAL BP (BLOOD PRESSURE) < 140/90: ICD-10-CM

## 2021-03-12 DIAGNOSIS — E55.9 VITAMIN D DEFICIENCY: ICD-10-CM

## 2021-03-12 RX ORDER — ERGOCALCIFEROL 1.25 MG/1
50000 CAPSULE, LIQUID FILLED ORAL WEEKLY
Qty: 4 CAPSULE | Refills: 0 | Status: SHIPPED | OUTPATIENT
Start: 2021-03-12 | End: 2021-04-13

## 2021-03-12 RX ORDER — FUROSEMIDE 20 MG
20 TABLET ORAL 2 TIMES DAILY
Qty: 180 TABLET | Refills: 0 | Status: SHIPPED | OUTPATIENT
Start: 2021-03-12 | End: 2021-05-26

## 2021-03-12 NOTE — TELEPHONE ENCOUNTER
Routing refill request to provider for review/approval because:  Drug not active on patient's medication list  Labs out of range:    Creatinine   Date Value Ref Range Status   12/29/2020 2.51 (H) 0.66 - 1.25 mg/dL Final     Kaitlyn Enriquez RN

## 2021-03-18 ENCOUNTER — ALLIED HEALTH/NURSE VISIT (OUTPATIENT)
Dept: NURSING | Facility: CLINIC | Age: 61
End: 2021-03-18
Payer: COMMERCIAL

## 2021-03-18 ENCOUNTER — OFFICE VISIT (OUTPATIENT)
Dept: FAMILY MEDICINE | Facility: CLINIC | Age: 61
End: 2021-03-18
Payer: COMMERCIAL

## 2021-03-18 VITALS
TEMPERATURE: 98.8 F | BODY MASS INDEX: 42.54 KG/M2 | SYSTOLIC BLOOD PRESSURE: 126 MMHG | HEART RATE: 86 BPM | DIASTOLIC BLOOD PRESSURE: 76 MMHG | OXYGEN SATURATION: 96 % | WEIGHT: 305 LBS

## 2021-03-18 VITALS
WEIGHT: 305 LBS | SYSTOLIC BLOOD PRESSURE: 126 MMHG | OXYGEN SATURATION: 96 % | TEMPERATURE: 98.8 F | BODY MASS INDEX: 42.54 KG/M2 | DIASTOLIC BLOOD PRESSURE: 76 MMHG | HEART RATE: 86 BPM

## 2021-03-18 DIAGNOSIS — T30.0 BURN OF SKIN: ICD-10-CM

## 2021-03-18 DIAGNOSIS — T30.0 BURN: Primary | ICD-10-CM

## 2021-03-18 DIAGNOSIS — M79.662 PAIN OF LEFT LOWER LEG: ICD-10-CM

## 2021-03-18 PROCEDURE — 99215 OFFICE O/P EST HI 40 MIN: CPT | Performed by: FAMILY MEDICINE

## 2021-03-18 PROCEDURE — 99207 PR NO CHARGE NURSE ONLY: CPT

## 2021-03-18 RX ORDER — SILVER SULFADIAZINE 10 MG/G
CREAM TOPICAL ONCE
Status: ACTIVE | OUTPATIENT
Start: 2021-03-18

## 2021-03-18 RX ORDER — SILVER SULFADIAZINE 10 MG/G
CREAM TOPICAL DAILY
Qty: 50 G | Refills: 0 | Status: SHIPPED | OUTPATIENT
Start: 2021-03-18
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
## 2021-04-12 DIAGNOSIS — I10 HYPERTENSION GOAL BP (BLOOD PRESSURE) < 140/90: ICD-10-CM

## 2021-04-12 DIAGNOSIS — Z79.4 TYPE 2 DIABETES MELLITUS WITH OTHER SPECIFIED COMPLICATION, WITH LONG-TERM CURRENT USE OF INSULIN (H): ICD-10-CM

## 2021-04-12 DIAGNOSIS — E55.9 VITAMIN D DEFICIENCY: ICD-10-CM

## 2021-04-12 DIAGNOSIS — E11.69 TYPE 2 DIABETES MELLITUS WITH OTHER SPECIFIED COMPLICATION, WITH LONG-TERM CURRENT USE OF INSULIN (H): ICD-10-CM

## 2021-04-13 RX ORDER — ERGOCALCIFEROL 1.25 MG/1
50000 CAPSULE, LIQUID FILLED ORAL WEEKLY
Qty: 4 CAPSULE | Refills: 0 | Status: SHIPPED | OUTPATIENT
Start: 2021-04-13 | End: 2021-05-11

## 2021-04-13 RX ORDER — LIRAGLUTIDE 6 MG/ML
INJECTION SUBCUTANEOUS
Qty: 9 ML | Refills: 0 | Status: SHIPPED
Start: 2021-04-13 | End: 2021-05-11

## 2021-04-13 RX ORDER — INSULIN GLARGINE 300 U/ML
150 INJECTION, SOLUTION SUBCUTANEOUS EVERY EVENING
Qty: 5 ML | Refills: 0 | Status: SHIPPED | OUTPATIENT
Start: 2021-04-13 | End: 2021-05-26

## 2021-04-13 RX ORDER — CARVEDILOL 12.5 MG/1
12.5 TABLET ORAL 2 TIMES DAILY WITH MEALS
Qty: 60 TABLET | Refills: 0 | Status: SHIPPED | OUTPATIENT
Start: 2021-04-13 | End: 2021-05-11

## 2021-04-13 NOTE — TELEPHONE ENCOUNTER
To Dr. Jaylyn Conte for medication refill requests  Last OV Dr. Jaylyn Conte was 10/27/20 for diabetes  Hemoglobin A1C   Date Value Ref Range Status   10/27/2020 9.2 (H) 0 - 5.6 % Final     Comment:     Normal <5.7% Prediabetes 5.7-6.4%  Diabetes 6.5% or higher - adopted from ADA   consensus guidelines.       Vitamin D Deficiency Screening Results:  Lab Results   Component Value Date    VITDT 16 (L) 10/27/2020    VITDT 14 (L) 05/19/2020    VITDT 17 (L) 08/08/2018   per 10/27 labwork was to have rechecked in 8 weeks.   Routing refill request to provider for review/approval because:  Patient needs to be seen because:  Diabetes  Patient needs Vit D labwork.

## 2021-05-11 DIAGNOSIS — E55.9 VITAMIN D DEFICIENCY: ICD-10-CM

## 2021-05-11 DIAGNOSIS — Z79.4 TYPE 2 DIABETES MELLITUS WITH OTHER SPECIFIED COMPLICATION, WITH LONG-TERM CURRENT USE OF INSULIN (H): ICD-10-CM

## 2021-05-11 DIAGNOSIS — E78.00 HIGH BLOOD CHOLESTEROL: ICD-10-CM

## 2021-05-11 DIAGNOSIS — E11.69 TYPE 2 DIABETES MELLITUS WITH OTHER SPECIFIED COMPLICATION, WITH LONG-TERM CURRENT USE OF INSULIN (H): ICD-10-CM

## 2021-05-11 DIAGNOSIS — I10 HYPERTENSION GOAL BP (BLOOD PRESSURE) < 140/90: ICD-10-CM

## 2021-05-11 RX ORDER — CARVEDILOL 12.5 MG/1
12.5 TABLET ORAL 2 TIMES DAILY WITH MEALS
Qty: 60 TABLET | Refills: 0 | Status: SHIPPED | OUTPATIENT
Start: 2021-05-11 | End: 2021-05-26

## 2021-05-11 RX ORDER — UBIQUINOL 100 MG
CAPSULE ORAL
Qty: 100 EACH | Refills: 3 | Status: SHIPPED | OUTPATIENT
Start: 2021-05-11 | End: 2021-09-10

## 2021-05-11 RX ORDER — LIRAGLUTIDE 6 MG/ML
INJECTION SUBCUTANEOUS
Qty: 9 ML | Refills: 0 | Status: SHIPPED | OUTPATIENT
Start: 2021-05-11 | End: 2021-05-26

## 2021-05-11 RX ORDER — FENOFIBRATE 145 MG/1
145 TABLET, COATED ORAL DAILY
Qty: 30 TABLET | Refills: 1 | Status: SHIPPED | OUTPATIENT
Start: 2021-05-11 | End: 2021-05-26

## 2021-05-11 RX ORDER — ERGOCALCIFEROL 1.25 MG/1
50000 CAPSULE, LIQUID FILLED ORAL WEEKLY
Qty: 1 CAPSULE | Refills: 0 | Status: SHIPPED | OUTPATIENT
Start: 2021-05-11 | End: 2022-02-07

## 2021-05-11 NOTE — TELEPHONE ENCOUNTER
Routing refill request to provider for review/approval because:  Drug not on the [facility] refill protocol   Labs out of range:    Creatinine   Date Value Ref Range Status   12/29/2020 2.51 (H) 0.66 - 1.25 mg/dL Final     Lab Results   Component Value Date    A1C 9.2 10/27/2020    A1C 9.6 08/09/2020    A1C 9.3 04/03/2020    A1C 8.2 08/12/2019    A1C 10.1 04/01/2019       Kaitlyn Enriquez RN

## 2021-05-12 ENCOUNTER — TELEPHONE (OUTPATIENT)
Dept: FAMILY MEDICINE | Facility: CLINIC | Age: 61
End: 2021-05-12

## 2021-05-12 NOTE — TELEPHONE ENCOUNTER
Prior Authorization Retail Medication Request    Medication/Dose: Insulin Glargine, 2 Unit Dial, (TOUJEO MAX SOLOSTAR) 300 UNIT/ML SOPN  ICD code (if different than what is on RX):  Type 2 diabetes mellitus with other specified complication, with long-term current use of insulin (H) [E11.69, Z79.4]       Pharmacy Information (if different than what is on RX)  Name:  [facility]  Phone:  [phone]

## 2021-05-13 NOTE — TELEPHONE ENCOUNTER
PA Initiation    Medication: TOUJEO MAX SOLOSTAR 300 UNIT/ML SOPN  Insurance Company: [employer] - Phone [phone] Fax [phone]  Pharmacy Filling the Rx: [facility] [place], [place] - [address]   Filling Pharmacy Phone: [phone]  Filling Pharmacy Fax: [phone]  Start Date: 5/13/2021

## 2021-05-17 NOTE — TELEPHONE ENCOUNTER
Prior Authorization Approval    Authorization Effective Date: 4/13/2021  Authorization Expiration Date: 5/14/2022  Medication: TOUJEO MAX SOLOSTAR 300 UNIT/ML SOPN  Approved Dose/Quantity:   Reference #: [id number]   Insurance Company: [employer] - Phone [phone] Fax [phone]  Expected CoPay:       CoPay Card Available:      Foundation Assistance Needed:    Which Pharmacy is filling the prescription (Not needed for infusion/clinic administered): [facility] - [facility], [place] - [address] [address]  Pharmacy Notified: Yes  Patient Notified: Yes, **Instructed pharmacy to notify patient when script is ready to /ship.**

## 2021-05-26 ENCOUNTER — OFFICE VISIT (OUTPATIENT)
Dept: FAMILY MEDICINE | Facility: CLINIC | Age: 61
End: 2021-05-26
Payer: COMMERCIAL

## 2021-05-26 ENCOUNTER — ANCILLARY PROCEDURE (OUTPATIENT)
Dept: GENERAL RADIOLOGY | Facility: CLINIC | Age: 61
End: 2021-05-26
Attending: FAMILY MEDICINE
Payer: COMMERCIAL

## 2021-05-26 VITALS
WEIGHT: 292 LBS | SYSTOLIC BLOOD PRESSURE: 114 MMHG | TEMPERATURE: 98.5 F | HEIGHT: 71 IN | DIASTOLIC BLOOD PRESSURE: 63 MMHG | HEART RATE: 76 BPM | BODY MASS INDEX: 40.88 KG/M2

## 2021-05-26 DIAGNOSIS — E78.00 HIGH BLOOD CHOLESTEROL: ICD-10-CM

## 2021-05-26 DIAGNOSIS — E11.69 TYPE 2 DIABETES MELLITUS WITH OTHER SPECIFIED COMPLICATION, WITH LONG-TERM CURRENT USE OF INSULIN (H): Primary | ICD-10-CM

## 2021-05-26 DIAGNOSIS — I10 HYPERTENSION GOAL BP (BLOOD PRESSURE) < 140/90: ICD-10-CM

## 2021-05-26 DIAGNOSIS — M25.562 ACUTE PAIN OF LEFT KNEE: ICD-10-CM

## 2021-05-26 DIAGNOSIS — E66.01 MORBID OBESITY (H): ICD-10-CM

## 2021-05-26 DIAGNOSIS — E78.1 HIGH BLOOD TRIGLYCERIDES: ICD-10-CM

## 2021-05-26 DIAGNOSIS — Z79.4 TYPE 2 DIABETES MELLITUS WITH OTHER SPECIFIED COMPLICATION, WITH LONG-TERM CURRENT USE OF INSULIN (H): Primary | ICD-10-CM

## 2021-05-26 DIAGNOSIS — Z79.4 ENCOUNTER FOR LONG-TERM (CURRENT) INSULIN USE (H): ICD-10-CM

## 2021-05-26 DIAGNOSIS — N18.4 CKD (CHRONIC KIDNEY DISEASE) STAGE 4, GFR 15-29 ML/MIN (H): ICD-10-CM

## 2021-05-26 DIAGNOSIS — G63 POLYNEUROPATHY ASSOCIATED WITH UNDERLYING DISEASE (H): ICD-10-CM

## 2021-05-26 LAB — HBA1C MFR BLD: 8.7 % (ref 0–5.6)

## 2021-05-26 PROCEDURE — 80048 BASIC METABOLIC PNL TOTAL CA: CPT | Performed by: FAMILY MEDICINE

## 2021-05-26 PROCEDURE — 73560 X-RAY EXAM OF KNEE 1 OR 2: CPT | Mod: 26 | Performed by: RADIOLOGY

## 2021-05-26 PROCEDURE — 99214 OFFICE O/P EST MOD 30 MIN: CPT | Performed by: FAMILY MEDICINE

## 2021-05-26 PROCEDURE — 83036 HEMOGLOBIN GLYCOSYLATED A1C: CPT | Performed by: FAMILY MEDICINE

## 2021-05-26 PROCEDURE — 36415 COLL VENOUS BLD VENIPUNCTURE: CPT | Performed by: FAMILY MEDICINE

## 2021-05-26 RX ORDER — FENOFIBRATE 145 MG/1
145 TABLET, COATED ORAL DAILY
Qty: 90 TABLET | Refills: 1 | Status: SHIPPED | OUTPATIENT
Start: 2021-05-26 | End: 2021-11-02

## 2021-05-26 RX ORDER — INSULIN GLARGINE 300 U/ML
160 INJECTION, SOLUTION SUBCUTANEOUS EVERY EVENING
Qty: 48 ML | Refills: 1 | Status: SHIPPED | OUTPATIENT
Start: 2021-05-26 | End: 2021-11-02

## 2021-05-26 RX ORDER — LIRAGLUTIDE 6 MG/ML
INJECTION SUBCUTANEOUS
Qty: 27 ML | Refills: 1 | Status: SHIPPED | OUTPATIENT
Start: 2021-05-26 | End: 2021-11-02

## 2021-05-26 RX ORDER — ATORVASTATIN CALCIUM 40 MG/1
80 TABLET, FILM COATED ORAL DAILY
Qty: 180 TABLET | Refills: 1 | Status: SHIPPED | OUTPATIENT
Start: 2021-05-26 | End: 2021-11-02

## 2021-05-26 RX ORDER — FUROSEMIDE 20 MG
20 TABLET ORAL 2 TIMES DAILY
Qty: 180 TABLET | Refills: 1 | Status: SHIPPED | OUTPATIENT
Start: 2021-05-26 | End: 2021-11-02

## 2021-05-26 RX ORDER — CARVEDILOL 12.5 MG/1
12.5 TABLET ORAL 2 TIMES DAILY WITH MEALS
Qty: 180 TABLET | Refills: 1 | Status: SHIPPED | OUTPATIENT
Start: 2021-05-26 | End: 2021-11-02

## 2021-05-26 RX ORDER — SPIRONOLACTONE 25 MG/1
25 TABLET ORAL DAILY
Qty: 90 TABLET | Refills: 1 | Status: SHIPPED | OUTPATIENT
Start: 2021-05-26 | End: 2021-11-02

## 2021-05-26 ASSESSMENT — MIFFLIN-ST. JEOR: SCORE: 2146.63

## 2021-05-26 NOTE — PROGRESS NOTES
"    Assessment & Plan     Type 2 diabetes mellitus with other specified complication, with long-term current use of insulin (H)  Poorly controlled but is improving slowly. On insulin and victoza  - Hemoglobin A1c  - Basic metabolic panel  - Insulin Glargine, 2 Unit Dial, (TOUJEO MAX SOLOSTAR) 300 UNIT/ML SOPN; Inject 160 Units Subcutaneous every evening  - liraglutide (VICTOZA PEN) 18 MG/3ML solution; INJECT 1.8 MG SUBCUTANEOUS DAILY,    Encounter for long-term (current) insulin use (H)  Refill as needed    CKD (chronic kidney disease) stage 4, GFR 15-29 ml/min (H)  Is seeing nephrology to manage    Polyneuropathy associated with underlying disease (H)  Not on meds at this time for this    Morbid obesity (H)  Encourage regular exercise and healthy diet    Hypertension goal BP (blood pressure) < 140/90  At goal on meds, follow-up   - carvedilol (COREG) 12.5 MG tablet; Take 1 tablet (12.5 mg) by mouth 2 times daily (with meals)  - furosemide (LASIX) 20 MG tablet; Take 1 tablet (20 mg) by mouth 2 times daily  - spironolactone (ALDACTONE) 25 MG tablet; Take 1 tablet (25 mg) by mouth daily    High blood cholesterol  On statin, and tricor. Last LDL was 95 so not quite at goal  - atorvastatin (LIPITOR) 40 MG tablet; Take 2 tablets (80 mg) by mouth daily  - fenofibrate (TRICOR) 145 MG tablet; Take 1 tablet (145 mg) by mouth daily    Acute pain of left knee  Refer to sports med for possible injection of bursitis, tender over pes anserine  - XR Knee Standing Left 2 Views  - Orthopedic & Spine  Referral; Future    High blood triglycerides  On fenofibrate               BMI:   Estimated body mass index is 40.73 kg/m  as calculated from the following:    Height as of this encounter: 1.803 m (5' 11\").    Weight as of this encounter: 132.5 kg (292 lb).   Weight management plan: Discussed healthy diet and exercise guidelines        No follow-ups on file.    Jaylyn Bellamy MD  [facility] " [place] Me[place] is a 60 year old who presents for the following health issues     HPI     Diabetes Follow-up    How often are you checking your blood sugar? Three times daily  Blood sugar testing frequency justification:  Uncontrolled diabetes  What time of day are you checking your blood sugars (select all that apply)?  Before meals  Have you had any blood sugars above 200?  Yes   Have you had any blood sugars below 70?  No    What symptoms do you notice when your blood sugar is low?  None    What concerns do you have today about your diabetes? None     Do you have any of these symptoms? (Select all that apply)  Excessive thirst leg numbness x 5 years            Hyperlipidemia Follow-Up      Are you regularly taking any medication or supplement to lower your cholesterol?   Yes- atorvastatin     Are you having muscle aches or other side effects that you think could be caused by your cholesterol lowering medication?  Yes- more at night     Hypertension Follow-up      Do you check your blood pressure regularly outside of the clinic? Yes     Are you following a low salt diet? No    Are your blood pressures ever more than 140 on the top number (systolic) OR more   than 90 on the bottom number (diastolic), for example 140/90? Yes     Pt with diabetes on insulin and victoza. Has been poorly controlled but is slowly improving  His I on aspirin, statin and is not on ace per nephrology as he has ckd4 now with gfr of 26    Pt with htn on meds, it is at goal. Labs due  Pt with elevated cholesterol and triglycerides on statin and tricor.   Pt with neuropathy but not needing pain meds yet  Pt with pain in left knee  Hurts more anteriorly  Denies any traumas. Has been       Pt with left knee pain, hurts on the front part of the knee.   Did not injure it other than was walking and he felt a pop and pain came right away  Pain is sharp initially and now sharp still  It has been hurting a few months. It is getting  "worse.   Tylenol and gel helps it a bit  When laying down more of an ache      BP Readings from Last 2 Encounters:   05/26/21 114/63   03/18/21 126/76     Hemoglobin A1C (%)   Date Value   05/26/2021 8.7 (H)   10/27/2020 9.2 (H)     LDL Cholesterol Calculated (mg/dL)   Date Value   10/27/2020     Cannot estimate LDL when triglyceride exceeds 400 mg/dL   09/04/2019 99     LDL Cholesterol Direct (mg/dL)   Date Value   10/27/2020 95               Review of Systems   Constitutional, HEENT, cardiovascular, pulmonary, gi and gu systems are negative, except as otherwise noted.      Objective    /63   Pulse 76   Temp 98.5  F (36.9  C) (Oral)   Ht 1.803 m (5' 11\")   Wt 132.5 kg (292 lb)   BMI 40.73 kg/m    Body mass index is 40.73 kg/m .  Physical Exam   GENERAL: healthy, alert and no distress  NECK: no adenopathy, no asymmetry, masses, or scars and thyroid normal to palpation  RESP: lungs clear to auscultation - no rales, rhonchi or wheezes  CV: regular rate and rhythm, normal S1 S2, no S3 or S4, no murmur, click or rub, no peripheral edema and peripheral pulses strong  ABDOMEN: soft, nontender, no hepatosplenomegaly, no masses and bowel sounds normal  MS: no gross musculoskeletal defects noted, no edema, pain to palpation over medial joint line of left knee and over pes anserine bursa    Results for orders placed or performed in visit on 05/26/21 (from the past 24 hour(s))   Hemoglobin A1c   Result Value Ref Range    Hemoglobin A1C 8.7 (H) 0 - 5.6 %       Xray of left knee with some lateral djd        "

## 2021-05-26 NOTE — LETTER
May 27, 2021      Roberto HOUSTON Donna  [address] [place]  [place] [address]        Dear ,    We are writing to inform you of your test results.    Your electrolytes and kidney function are stable   Your diabetes is slowly improving.  Good job   The xray of your left knee shows mild arthritis and then fluid around the knee cap   Please follow-up with sports medicine as we discussed.     If you have any questions or concerns, please call the clinic at the number listed above.       Sincerely,      Jaylyn Bellamy MD/[facility]        Resulted Orders   Hemoglobin A1c   Result Value Ref Range    Hemoglobin A1C 8.7 (H) 0 - 5.6 %      Comment:      Normal <5.7% Prediabetes 5.7-6.4%  Diabetes 6.5% or higher - adopted from ADA   consensus guidelines.     Basic metabolic panel   Result Value Ref Range    Sodium 136 133 - 144 mmol/L    Potassium 4.4 3.4 - 5.3 mmol/L    Chloride 101 94 - 109 mmol/L    Carbon Dioxide 30 20 - 32 mmol/L    Anion Gap 5 3 - 14 mmol/L    Glucose 101 (H) 70 - 99 mg/dL      Comment:      Non Fasting    Urea Nitrogen 39 (H) 7 - 30 mg/dL    Creatinine 2.34 (H) 0.66 - 1.25 mg/dL    GFR Estimate 29 (L) >60 mL/min/[1.73_m2]      Comment:      Non  GFR Calc  Starting 12/18/2018, serum creatinine based estimated GFR (eGFR) will be   calculated using the Chronic Kidney Disease Epidemiology Collaboration   (CKD-EPI) equation.      GFR Estimate If Black 33 (L) >60 mL/min/[1.73_m2]      Comment:       GFR Calc  Starting 12/18/2018, serum creatinine based estimated GFR (eGFR) will be   calculated using the Chronic Kidney Disease Epidemiology Collaboration   (CKD-EPI) equation.      Calcium 9.3 8.5 - 10.1 mg/dL

## 2021-05-27 LAB
ANION GAP SERPL CALCULATED.3IONS-SCNC: 5 MMOL/L (ref 3–14)
BUN SERPL-MCNC: 39 MG/DL (ref 7–30)
CALCIUM SERPL-MCNC: 9.3 MG/DL (ref 8.5–10.1)
CHLORIDE SERPL-SCNC: 101 MMOL/L (ref 94–109)
CO2 SERPL-SCNC: 30 MMOL/L (ref 20–32)
CREAT SERPL-MCNC: 2.34 MG/DL (ref 0.66–1.25)
GFR SERPL CREATININE-BSD FRML MDRD: 29 ML/MIN/{1.73_M2}
GLUCOSE SERPL-MCNC: 101 MG/DL (ref 70–99)
POTASSIUM SERPL-SCNC: 4.4 MMOL/L (ref 3.4–5.3)
SODIUM SERPL-SCNC: 136 MMOL/L (ref 133–144)
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
## 2021-06-01 ENCOUNTER — OFFICE VISIT (OUTPATIENT)
Dept: ORTHOPEDICS | Facility: CLINIC | Age: 61
End: 2021-06-01
Attending: FAMILY MEDICINE
Payer: COMMERCIAL

## 2021-06-01 VITALS — WEIGHT: 292 LBS | BODY MASS INDEX: 40.73 KG/M2 | SYSTOLIC BLOOD PRESSURE: 128 MMHG | DIASTOLIC BLOOD PRESSURE: 68 MMHG

## 2021-06-01 DIAGNOSIS — M25.562 ACUTE PAIN OF LEFT KNEE: ICD-10-CM

## 2021-06-01 DIAGNOSIS — M17.12 PRIMARY OSTEOARTHRITIS OF LEFT KNEE: Primary | ICD-10-CM

## 2021-06-01 PROCEDURE — 20610 DRAIN/INJ JOINT/BURSA W/O US: CPT | Mod: LT | Performed by: PHYSICAL MEDICINE & REHABILITATION

## 2021-06-01 PROCEDURE — 99243 OFF/OP CNSLTJ NEW/EST LOW 30: CPT | Mod: 25 | Performed by: PHYSICAL MEDICINE & REHABILITATION

## 2021-06-01 RX ORDER — TRIAMCINOLONE ACETONIDE 40 MG/ML
40 INJECTION, SUSPENSION INTRA-ARTICULAR; INTRAMUSCULAR
Status: DISCONTINUED | OUTPATIENT
Start: 2021-06-01 | End: 2024-08-19

## 2021-06-01 RX ADMIN — TRIAMCINOLONE ACETONIDE 40 MG: 40 INJECTION, SUSPENSION INTRA-ARTICULAR; INTRAMUSCULAR at 18:11

## 2021-06-01 ASSESSMENT — PAIN SCALES - GENERAL: PAINLEVEL: EXTREME PAIN (9)

## 2021-06-01 NOTE — PATIENT INSTRUCTIONS
-Steroid injection performed today.  Take it easy over the next few days. Keep in mind that the steroid may take up to 3 days to start working and up to 2 weeks to reach maximal effect.  Advised that steroids can temporarily raise the blood glucose and he should closely monitor his blood sugars over the next week.  Last Hgb A1C 8.7 on 5/26/2021.  -Ice for 15-20 minutes as needed for soreness and swelling.  -Patient's preferred over the counter medications as needed for soreness.    -Follow up as needed if symptoms fail to improve or worsen.  Please call with questions or concerns.

## 2021-06-01 NOTE — LETTER
6/1/2021         RE: Roberto Thompson  [address] [place]  [place] [facility] [address]        Dear Colleague,    Thank you for referring your patient, Roberto Thompson, to the [facility]. Please see a copy of my visit note below.    Sports Medicine Clinic Visit    PCP: Jaylyn Bellmay    CC: Patient presents with:  Left Knee - Pain      HPI:  Roberto Thompson is a 60 year old male who is seen in consultation at the request of Jaylyn Bellamy M.D.  He notes left knee pain that started 5 months ago when he felt a popping in his medial knee. He rates the pain at a 20/10 at its worst and a 7/10 currently.  Symptoms are not relieved with topicals, ice, oral pain killers.  Symptoms are worsened by extension, flexion, stairs and walking. He endorses catching and numbness.  He denies locking.  Wakes ups at night multiple times to relieve pain.     He has not been working since 2016.    History reviewed. No pertinent past surgical/medical/family/social history other than as mentioned in HPI.  Review of systems negative except per HPI.      Patient Active Problem List   Diagnosis     Morbid obesity (H)     Type 2 diabetes mellitus with other specified complication, with long-term current use of insulin (H)     Hypertension goal BP (blood pressure) < 140/90     High blood cholesterol     Vitamin D deficiency     High blood triglycerides     CKD (chronic kidney disease) stage 3, GFR 30-59 ml/min     Toe amputation status, right     KELBY (obstructive sleep apnea)     Peripheral neuropathy     Peripheral vascular disease (H)     Moderate nonproliferative diabetic retinopathy of both eyes with macular edema (H)     Encounter for long-term (current) insulin use (H)     Burn of skin     CKD (chronic kidney disease) stage 4, GFR 15-29 ml/min (H)     Past Medical History:   Diagnosis Date     Diabetes (H)      Past Surgical History:   Procedure Laterality Date     APPENDECTOMY        ENT SURGERY      tonsils     ORTHOPEDIC SURGERY      thumb     ORTHOPEDIC SURGERY      2 toe amputations     Family History   Problem Relation Age of Onset     Diabetes Mother      Hypertension Mother      Cerebrovascular Disease Mother      Kidney Disease Mother      Osteoporosis Mother      Diabetes Father      Prostate Cancer Father      Dementia Father      Social History     Socioeconomic History     Marital status:      Spouse name: Not on file     Number of children: Not on file     Years of education: Not on file     Highest education level: Not on file   Occupational History     Not on file   Social Needs     Financial resource strain: Not on file     Food insecurity     Worry: Not on file     Inability: Not on file     Transportation needs     Medical: Not on file     Non-medical: Not on file   Tobacco Use     Smoking status: Never Smoker     Smokeless tobacco: Never Used   Substance and Sexual Activity     Alcohol use: No     Drug use: No     Sexual activity: Yes     Partners: Female     Birth control/protection: None   Lifestyle     Physical activity     Days per week: Not on file     Minutes per session: Not on file     Stress: Not on file   Relationships     Social connections     Talks on phone: Not on file     Gets together: Not on file     Attends Caodaism service: Not on file     Active member of club or organization: Not on file     Attends meetings of clubs or organizations: Not on file     Relationship status: Not on file     Intimate partner violence     Fear of current or ex partner: Not on file     Emotionally abused: Not on file     Physically abused: Not on file     Forced sexual activity: Not on file   Other Topics Concern     Parent/sibling w/ CABG, MI or angioplasty before 65F 55M? Not Asked   Social History Narrative     Not on file         Current Outpatient Medications   Medication     ACE/ARB/ARNI NOT PRESCRIBED (INTENTIONAL)     Alcohol Swabs (ALCOHOL PREP) 70 % PADS      aspirin 81 MG EC tablet     atorvastatin (LIPITOR) 40 MG tablet     blood glucose (ACCU-CHEK GUIDE) test strip     blood glucose monitoring (ACCU-CHEK FASTCLIX) lancets     carvedilol (COREG) 12.5 MG tablet     Cholecalciferol (VITAMIN D) 2000 units tablet     fenofibrate (TRICOR) 145 MG tablet     furosemide (LASIX) 20 MG tablet     GLOBAL EASE INJECT PEN NEEDLES 31G X 5 MM miscellaneous     Insulin Glargine, 2 Unit Dial, (TOUJEO MAX SOLOSTAR) 300 UNIT/ML SOPN     liraglutide (VICTOZA PEN) 18 MG/3ML solution     NOVOLOG FLEXPEN 100 UNIT/ML soln     Omega-3 Fatty Acids (FISH OIL) 1200 MG capsule     order for DME     senna-docusate (SENEXON-S) 8.6-50 MG tablet     silver sulfADIAZINE (SILVADENE) 1 % external cream     spironolactone (ALDACTONE) 25 MG tablet     vitamin D2 (ERGOCALCIFEROL) 90367 units (1250 mcg) capsule     Current Facility-Administered Medications   Medication     silver sulfADIAZINE (SILVADENE) 1 % cream     Allergies   Allergen Reactions     Doxycycline      Facial swelling     Influenza Vac Split [Flu Virus Vaccine] Itching     And red /blue arms         Objective:  /68 (BP Location: Right arm, Patient Position: Sitting, Cuff Size: Adult Regular)   Wt 132.5 kg (292 lb)   BMI 40.73 kg/m      General: Alert and in no distress    Head: Normocephalic, atraumatic  Eyes: no scleral icterus or conjunctival erythema   Skin: no erythema, petechiae, or jaundice  Resp: normal respiratory effort without conversational dyspnea   Psych: normal mood and affect    Gait: Non-antalgic, appropriate coordination and balance     Musculoskeletal:    Bilateral Knee exam    Inspection:  No erythema, ecchymosis, or warmth.  Left knee swelling.      Palpation: Tender over the left anterior knee, medial joint line, and pes anserine bursa.    Knee ROM: Full seated active knee extension and flexion bilaterally.  Left knee extension is painful.    Strength:    Left:  5/5 hip flexion/abduction/adduction, 5/5 knee  extension/flexion, 5/5 ankle dorsiflexion/plantarflexion  Right:  5/5 hip flexion/abduction/adduction, 5/5 knee extension/flexion, 5/5 ankle dorsiflexion/plantarflexion      Radiology:  Independent visualization of images performed.      XR KNEE STANDING LT 2 VIEWS  5/26/2021 6:17 PM      HISTORY: Acute pain of left knee     COMPARISON: None.                                                                      IMPRESSION:  Moderate size knee joint effusion. Mild narrowing in the  medial compartment. Minimal hypertrophic changes in patellofemoral  compartment. Atheromatous calcification.     MELVI CANCINO MD    Large Joint Injection/Arthocentesis: L knee joint    Date/Time: 6/1/2021 6:11 PM  Performed by: Julienne Queen MD  Authorized by: Julienne Queen MD     Indications:  Pain  Needle Size:  25 G  Guidance: landmark guided    Approach:  Anterolateral  Location:  Knee      Medications:  40 mg triamcinolone 40 MG/ML  Medications comment:  4 mL 0.5% Bupivacaine  NDC: 70223-264-80  CBU: [id number]  Exp: 11/30/2021    Outcome:  Tolerated well, no immediate complications  Procedure discussed: discussed risks, benefits, and alternatives    Consent Given by:  Patient        Assessment:  1. Primary osteoarthritis of left knee    2. Acute pain of left knee        Plan:  Discussed the assessment with the patient and developed a plan together:  -Left knee joint (not pes anserine bursa) steroid injection performed today.  Take it easy over the next few days. Keep in mind that the steroid may take up to 3 days to start working and up to 2 weeks to reach maximal effect.  Advised that steroids can temporarily raise the blood glucose and he should closely monitor his blood sugars over the next week.  Last Hgb A1C 8.7 on 5/26/2021.  -Ice for 15-20 minutes as needed for soreness and swelling.  -Patient's preferred over the counter medications as needed for soreness.    -Follow up as needed if symptoms fail to  improve or worsen.  Please call with questions or concerns.      Ciera Queen MD, [facility] Sports Medicine  [facility]        Again, thank you for allowing me to participate in the care of your patient.        Sincerely,        Julienne Queen MD

## 2021-07-09 DIAGNOSIS — Z79.4 TYPE 2 DIABETES MELLITUS WITH OTHER SPECIFIED COMPLICATION, WITH LONG-TERM CURRENT USE OF INSULIN (H): ICD-10-CM

## 2021-07-09 DIAGNOSIS — E11.69 TYPE 2 DIABETES MELLITUS WITH OTHER SPECIFIED COMPLICATION, WITH LONG-TERM CURRENT USE OF INSULIN (H): ICD-10-CM

## 2021-07-09 RX ORDER — LANCETS
EACH MISCELLANEOUS
Qty: 600 EACH | Refills: 0 | Status: SHIPPED | OUTPATIENT
Start: 2021-07-09 | End: 2023-10-02

## 2021-08-09 DIAGNOSIS — E11.69 TYPE 2 DIABETES MELLITUS WITH OTHER SPECIFIED COMPLICATION, WITH LONG-TERM CURRENT USE OF INSULIN (H): ICD-10-CM

## 2021-08-09 DIAGNOSIS — Z79.4 TYPE 2 DIABETES MELLITUS WITH OTHER SPECIFIED COMPLICATION, WITH LONG-TERM CURRENT USE OF INSULIN (H): ICD-10-CM

## 2021-08-11 RX ORDER — INSULIN ASPART 100 [IU]/ML
INJECTION, SOLUTION INTRAVENOUS; SUBCUTANEOUS
Qty: 60 ML | Refills: 0 | Status: SHIPPED | OUTPATIENT
Start: 2021-08-11 | End: 2021-09-10

## 2021-08-11 NOTE — TELEPHONE ENCOUNTER
Prescription approved per [facility] Refill Protocol.        Eliane Carpenter RN  [facility]

## 2021-09-09 DIAGNOSIS — Z79.4 TYPE 2 DIABETES MELLITUS WITH OTHER SPECIFIED COMPLICATION, WITH LONG-TERM CURRENT USE OF INSULIN (H): ICD-10-CM

## 2021-09-09 DIAGNOSIS — E11.69 TYPE 2 DIABETES MELLITUS WITH OTHER SPECIFIED COMPLICATION, WITH LONG-TERM CURRENT USE OF INSULIN (H): ICD-10-CM

## 2021-09-10 RX ORDER — UBIQUINOL 100 MG
CAPSULE ORAL
Qty: 100 EACH | Refills: 3 | Status: SHIPPED | OUTPATIENT
Start: 2021-09-10 | End: 2022-03-11

## 2021-09-10 RX ORDER — PEN NEEDLE, DIABETIC 31 GX3/16"
NEEDLE, DISPOSABLE MISCELLANEOUS
Qty: 100 EACH | Refills: 3 | Status: SHIPPED | OUTPATIENT
Start: 2021-09-10 | End: 2022-01-19

## 2021-09-10 RX ORDER — INSULIN ASPART 100 [IU]/ML
INJECTION, SOLUTION INTRAVENOUS; SUBCUTANEOUS
Qty: 60 ML | Refills: 1 | Status: SHIPPED | OUTPATIENT
Start: 2021-09-10 | End: 2021-10-20

## 2021-09-10 NOTE — TELEPHONE ENCOUNTER
"Routing refill request to provider for review/approval because:  Drug not on the [facility] refill protocol   Requested Prescriptions   Pending Prescriptions Disp Refills    NOVOLOG FLEXPEN 100 UNIT/ML soln [Pharmacy Med Name: NOVOLOG FLEXPEN SYRINGE 100 Solution Pen-injector] 60 mL 0     Sig: INJECT 60 UNITS 3 TIMES DAILY WITH MEALS PLUS THE SCALE WITH EACH MEAL. PLUS THE FOLLOWING COVERAGE SCALE        Short Acting Insulin Protocol Failed - 9/9/2021  3:22 PM        Failed - HgbA1C in past 3 or 6 months     If HgbA1C is 8 or greater, it needs to be on file within the past 3 months.  If less than 8, must be on file within the past 6 months.     Recent Labs   Lab Test 05/26/21 1742   A1C 8.7*             Passed - Serum creatinine on file in past 12 months     Recent Labs   Lab Test 05/26/21 1742   CR 2.34*       Ok to refill medication if creatinine is low          Passed - Medication is active on med list        Passed - Patient is age 18 or older        Passed - Recent (6 mo) or future (30 days) visit within the authorizing provider's specialty     Patient had office visit in the last 6 months or has a visit in the next 30 days with authorizing provider or within the authorizing provider's specialty.  See \"Patient Info\" tab in inbasket, or \"Choose Columns\" in Meds & Orders section of the refill encounter.               Alcohol Swabs (ALCOHOL PREP) 70 % PADS [Pharmacy Med Name: ALCOHOL PREP 70 % PADS 70 Pad]  3     Sig: USE TO WIPE SKIN BEFORE INJECTING INSULIN        There is no refill protocol information for this order       Signed Prescriptions Disp Refills    GLOBAL EASE INJECT PEN NEEDLES 31G X 5 MM miscellaneous 100 each 3     Sig: USE TO INJECT INSULIN 5 TIMES A DAY        Diabetic Supplies Protocol Passed - 9/9/2021  3:22 PM        Passed - Medication is active on med list        Passed - Patient is 18 years of age or older        Passed - Recent (6 mo) or future (30 days) visit within the authorizing provider's " "specialty     Patient had office visit in the last 6 months or has a visit in the next 30 days with authorizing provider.  See \"Patient Info\" tab in inbasket, or \"Choose Columns\" in Meds & Orders section of the refill encounter.                    Katie DRAPERN, RN        "

## 2021-10-09 ENCOUNTER — TELEPHONE (OUTPATIENT)
Dept: FAMILY MEDICINE | Facility: CLINIC | Age: 61
End: 2021-10-09

## 2021-10-09 NOTE — TELEPHONE ENCOUNTER
Fax message:    1. Prescribed: Fenofibrate. Ins prefers Gemfibrozil, start PA?    2. Prescribed: Victoza 18mg/3ml inject P. Ins prefers Bydureon, start PA?    3. Prescribed Novolog Flexpen Syringe. Ins requires Prior Auth.

## 2021-10-11 NOTE — TELEPHONE ENCOUNTER
He is due to be seen next month. Have him schedule an appointment so we can discuss his options.     Jaylyn Bellamy MD

## 2021-10-19 ENCOUNTER — TELEPHONE (OUTPATIENT)
Dept: FAMILY MEDICINE | Facility: CLINIC | Age: 61
End: 2021-10-19

## 2021-10-19 DIAGNOSIS — E11.69 TYPE 2 DIABETES MELLITUS WITH OTHER SPECIFIED COMPLICATION, WITH LONG-TERM CURRENT USE OF INSULIN (H): ICD-10-CM

## 2021-10-19 DIAGNOSIS — Z79.4 TYPE 2 DIABETES MELLITUS WITH OTHER SPECIFIED COMPLICATION, WITH LONG-TERM CURRENT USE OF INSULIN (H): ICD-10-CM

## 2021-10-19 NOTE — TELEPHONE ENCOUNTER
Reason for Call:  Medication or medication refill: tip from your pharmacist.    Do you use a [facility]?  Name of the pharmacy and phone number for the current request:  [facility] [place]    Name of the medication requested: Novolog - requires a Prior Auth, but pharmacist says that Humalog is covered by pt's insurance.    Other request: Humalog is covered by patient's insurance for future reference, perhaps future refills, if it's helpful information.    Phone number patient can be reached at: Other phone number:  [phone]    Best Time: Business hours    Call taken on 10/19/2021 at 4:13 PM by Lucie Clay

## 2021-10-20 RX ORDER — INSULIN LISPRO 200 [IU]/ML
INJECTION, SOLUTION SUBCUTANEOUS
Qty: 60 ML | Refills: 1 | Status: SHIPPED | OUTPATIENT
Start: 2021-10-20 | End: 2022-02-09

## 2021-10-28 PROBLEM — N18.30 CKD (CHRONIC KIDNEY DISEASE) STAGE 3, GFR 30-59 ML/MIN (H): Status: RESOLVED | Noted: 2018-08-08 | Resolved: 2021-10-28

## 2021-11-01 NOTE — PROGRESS NOTES
Assessment & Plan     Type 2 diabetes mellitus with other specified complication, with long-term current use of insulin (H)  Not well controlled. Not getting enough insulin per pt from pharmacy. Based on dosing, the prescription sent is correct.   Have sent rx with not to pharmacy about correct insulin to be given  - Insulin Glargine, 2 Unit Dial, (TOUJEO MAX SOLOSTAR) 300 UNIT/ML SOPN; Inject 160 Units Subcutaneous every evening Please dispense full amount. Pt states only receiving 4 pens x 900 units = 3600 units. Should be 4800 units for 1 months  - liraglutide (VICTOZA PEN) 18 MG/3ML solution; INJECT 1.8 MG SUBCUTANEOUS DAILY,  - **A1C FUTURE 3mo; Future  - FOOT EXAM  - Adult Eye Referral; Future  - blood glucose monitoring (NO BRAND SPECIFIED) meter device kit; Use to test blood sugar 4 times daily or as directed. Preferred blood glucose meter OR supplies to accompany: Blood Glucose Monitor Brands: per insurance.  - blood glucose (NO BRAND SPECIFIED) test strip; Use to test blood sugar 4 times daily or as directed. To accompany: Blood Glucose Monitor Brands: per insurance.  - blood glucose calibration (NO BRAND SPECIFIED) solution; To accompany: Blood Glucose Monitor Brands: per insurance.  - thin (NO BRAND SPECIFIED) lancets; Use with lanceting device. To accompany: Blood Glucose Monitor Brands: per insurance.  - alcohol swab prep pads; Use to swab area of injection/mary kay as directed.  - **A1C FUTURE 3mo    CKD (chronic kidney disease) stage 4, GFR 15-29 ml/min (H)  Needs to see nephrology. Discussed with interpretor. He will try to schedule an in office appt as the virtual ones with interpretor are not working well for the patient    Polyneuropathy associated with underlying disease (H)  On meds as tolerated    Hypertension goal BP (blood pressure) < 140/90  At goal on meds  - carvedilol (COREG) 12.5 MG tablet; Take 1 tablet (12.5 mg) by mouth 2 times daily (with meals)  - furosemide (LASIX) 20 MG tablet;  Take 1 tablet (20 mg) by mouth 2 times daily  - spironolactone (ALDACTONE) 25 MG tablet; Take 1 tablet (25 mg) by mouth daily  - **Basic metabolic panel FUTURE 2mo; Future  - **Basic metabolic panel FUTURE 2mo    High blood cholesterol  On statin  - atorvastatin (LIPITOR) 40 MG tablet; Take 2 tablets (80 mg) by mouth daily  - fenofibrate (TRICOR) 145 MG tablet; Take 1 tablet (145 mg) by mouth daily  - Lipid panel reflex to direct LDL Fasting; Future  - Lipid panel reflex to direct LDL Fasting  - LDL cholesterol direct    High blood triglycerides  On statin    Morbid obesity (H)  Encourage regular exercise and healthy diet    Chronic pain of left knee  Follow-up with ortho. Not happy with injection from sports med  - Orthopedic  Referral; Future                 No follow-ups on file.    Jaylyn Bellamy MD  [facility]   Roberto is a 61 year old who presents for the following health issues  accompanied by his spouse.    HPI     Diabetes Follow-up      How often are you checking your blood sugar? Not at all, told to take 3 times a day  Might do it 1 time every 2 days     What concerns do you have today about your diabetes? Other: elevated sugars, thinks may need to increase toujao      Do you have any of these symptoms? (Select all that apply)  Numbness in feet and Excessive thirst    Have you had a diabetic eye exam in the last 12 months? Yes- Date of last eye exam: nov 2020,  Location: [facility] eye      May need a new glucose meter, that is covered        Hyperlipidemia Follow-Up      Are you regularly taking any medication or supplement to lower your cholesterol?   Yes- atorvastatin and (not taking) fenofibrate     Are you having muscle aches or other side effects that you think could be caused by your cholesterol lowering medication?  Yes- possible from medications but has them every night    Hypertension Follow-up      Do you check your blood pressure  "regularly outside of the clinic? Yes     Are you following a low salt diet? No    Are your blood pressures ever more than 140 on the top number (systolic) OR more   than 90 on the bottom number (diastolic), for example 140/90? Yes    BP Readings from Last 2 Encounters:   11/02/21 108/62   06/01/21 128/68     Hemoglobin A1C (%)   Date Value   11/02/2021 11.0 (H)   05/26/2021 8.7 (H)   10/27/2020 9.2 (H)     LDL Cholesterol Calculated   Date Value   11/02/2021      Comment:     Cannot estimate LDL when triglyceride exceeds 400 mg/dL   10/27/2020     Cannot estimate LDL when triglyceride exceeds 400 mg/dL mg/dL   09/04/2019 99 mg/dL     LDL Cholesterol Direct (mg/dL)   Date Value   11/02/2021 88   10/27/2020 95       Had injection into knee into June.   Never got better. Will have him follow-up with sports medicine for next step.   Is thinking he would like to try another ortho as pain not better with injection.   Will give referral if needed.     Has eye appt on dec 13  Overdue for nephrology appt. Interpretor will help with scheduling appointment  Has been trying to do virtual but difficult without interpretor    Diabetes not well controlled. Needs new monitor as insurance no longer covers strips for the one he has  Is on insulin but states is shorted each month from pharmacy. He is missing one pen each month  Note written to pharmacy.   Is on aspirin, statin but no ace/arb per renal    Pt with elevated cholesterol and triglycerides on statin and fenofibrate    Pt with bilateral foot ulcers monitored daily by home health nurse        Review of Systems   Constitutional, HEENT, cardiovascular, pulmonary, gi and gu systems are negative, except as otherwise noted.      Objective    /62   Pulse 80   Temp 97.5  F (36.4  C) (Oral)   Resp 17   Ht 1.803 m (5' 11\")   Wt 134.7 kg (297 lb)   SpO2 95%   BMI 41.42 kg/m    Body mass index is 41.42 kg/m .  Physical Exam   GENERAL: healthy, alert and no distress  NECK: " no adenopathy, no asymmetry, masses, or scars and thyroid normal to palpation  RESP: lungs clear to auscultation - no rales, rhonchi or wheezes  CV: regular rate and rhythm, normal S1 S2, no S3 or S4, no murmur, click or rub, no peripheral edema and peripheral pulses strong  ABDOMEN: soft, nontender, no hepatosplenomegaly, no masses and bowel sounds normal  Diabetic foot exam: examined right foot. Has ulcer with foam padding in place. Bandage removed. No redness or signs of infection.     Results for orders placed or performed in visit on 11/02/21 (from the past 24 hour(s))   **A1C FUTURE 3mo   Result Value Ref Range    Hemoglobin A1C 11.0 (H) 0.0 - 5.6 %    Narrative    Repeat result A1C 11.0, HM   **Basic metabolic panel FUTURE 2mo   Result Value Ref Range    Sodium 132 (L) 133 - 144 mmol/L    Potassium 4.5 3.4 - 5.3 mmol/L    Chloride 101 94 - 109 mmol/L    Carbon Dioxide (CO2) 25 20 - 32 mmol/L    Anion Gap 6 3 - 14 mmol/L    Urea Nitrogen 49 (H) 7 - 30 mg/dL    Creatinine 2.44 (H) 0.66 - 1.25 mg/dL    Calcium 9.4 8.5 - 10.1 mg/dL    Glucose 233 (H) 70 - 99 mg/dL    GFR Estimate 27 (L) >60 mL/min/1.73m2   Lipid panel reflex to direct LDL Fasting   Result Value Ref Range    Cholesterol 261 (H) <200 mg/dL    Triglycerides 750 (H) <150 mg/dL    Direct Measure HDL 36 (L) >=40 mg/dL    LDL Cholesterol Calculated      Non HDL Cholesterol 225 (H) <130 mg/dL    Patient Fasting > 8hrs? Yes     Narrative    Cholesterol  Desirable:  <200 mg/dL    Triglycerides  Normal:  Less than 150 mg/dL  Borderline High:  150-199 mg/dL  High:  200-499 mg/dL  Very High:  Greater than or equal to 500 mg/dL    Direct Measure HDL  Female:  Greater than or equal to 50 mg/dL   Male:  Greater than or equal to 40 mg/dL    LDL Cholesterol  Desirable:  <100mg/dL  Above Desirable:  100-129 mg/dL   Borderline High:  130-159 mg/dL   High:  160-189 mg/dL   Very High:  >= 190 mg/dL    Non HDL Cholesterol  Desirable:  130 mg/dL  Above Desirable:   130-159 mg/dL  Borderline High:  160-189 mg/dL  High:  190-219 mg/dL  Very High:  Greater than or equal to 220 mg/dL   LDL cholesterol direct   Result Value Ref Range    LDL Cholesterol Direct 88 <100 mg/dL

## 2021-11-02 ENCOUNTER — OFFICE VISIT (OUTPATIENT)
Dept: FAMILY MEDICINE | Facility: CLINIC | Age: 61
End: 2021-11-02
Payer: MEDICARE

## 2021-11-02 VITALS
TEMPERATURE: 97.5 F | HEART RATE: 80 BPM | WEIGHT: 297 LBS | BODY MASS INDEX: 41.58 KG/M2 | RESPIRATION RATE: 17 BRPM | SYSTOLIC BLOOD PRESSURE: 108 MMHG | DIASTOLIC BLOOD PRESSURE: 62 MMHG | OXYGEN SATURATION: 95 % | HEIGHT: 71 IN

## 2021-11-02 DIAGNOSIS — E66.01 MORBID OBESITY (H): ICD-10-CM

## 2021-11-02 DIAGNOSIS — I10 HYPERTENSION GOAL BP (BLOOD PRESSURE) < 140/90: ICD-10-CM

## 2021-11-02 DIAGNOSIS — E11.69 TYPE 2 DIABETES MELLITUS WITH OTHER SPECIFIED COMPLICATION, WITH LONG-TERM CURRENT USE OF INSULIN (H): Primary | ICD-10-CM

## 2021-11-02 DIAGNOSIS — M25.562 CHRONIC PAIN OF LEFT KNEE: ICD-10-CM

## 2021-11-02 DIAGNOSIS — G89.29 CHRONIC PAIN OF LEFT KNEE: ICD-10-CM

## 2021-11-02 DIAGNOSIS — E78.00 HIGH BLOOD CHOLESTEROL: ICD-10-CM

## 2021-11-02 DIAGNOSIS — G63 POLYNEUROPATHY ASSOCIATED WITH UNDERLYING DISEASE (H): ICD-10-CM

## 2021-11-02 DIAGNOSIS — E78.1 HIGH BLOOD TRIGLYCERIDES: ICD-10-CM

## 2021-11-02 DIAGNOSIS — N18.4 CKD (CHRONIC KIDNEY DISEASE) STAGE 4, GFR 15-29 ML/MIN (H): ICD-10-CM

## 2021-11-02 DIAGNOSIS — Z79.4 TYPE 2 DIABETES MELLITUS WITH OTHER SPECIFIED COMPLICATION, WITH LONG-TERM CURRENT USE OF INSULIN (H): Primary | ICD-10-CM

## 2021-11-02 LAB
ANION GAP SERPL CALCULATED.3IONS-SCNC: 6 MMOL/L (ref 3–14)
BUN SERPL-MCNC: 49 MG/DL (ref 7–30)
CALCIUM SERPL-MCNC: 9.4 MG/DL (ref 8.5–10.1)
CHLORIDE BLD-SCNC: 101 MMOL/L (ref 94–109)
CHOLEST SERPL-MCNC: 261 MG/DL
CO2 SERPL-SCNC: 25 MMOL/L (ref 20–32)
CREAT SERPL-MCNC: 2.44 MG/DL (ref 0.66–1.25)
FASTING STATUS PATIENT QL REPORTED: YES
GFR SERPL CREATININE-BSD FRML MDRD: 27 ML/MIN/1.73M2
GLUCOSE BLD-MCNC: 233 MG/DL (ref 70–99)
HBA1C MFR BLD: 11 % (ref 0–5.6)
HDLC SERPL-MCNC: 36 MG/DL
LDLC SERPL CALC-MCNC: 88 MG/DL
LDLC SERPL CALC-MCNC: ABNORMAL MG/DL
NONHDLC SERPL-MCNC: 225 MG/DL
POTASSIUM BLD-SCNC: 4.5 MMOL/L (ref 3.4–5.3)
SODIUM SERPL-SCNC: 132 MMOL/L (ref 133–144)
TRIGL SERPL-MCNC: 750 MG/DL

## 2021-11-02 PROCEDURE — 36415 COLL VENOUS BLD VENIPUNCTURE: CPT | Performed by: FAMILY MEDICINE

## 2021-11-02 PROCEDURE — 99207 PR FOOT EXAM NO CHARGE: CPT | Performed by: FAMILY MEDICINE

## 2021-11-02 PROCEDURE — 80048 BASIC METABOLIC PNL TOTAL CA: CPT | Performed by: FAMILY MEDICINE

## 2021-11-02 PROCEDURE — 83721 ASSAY OF BLOOD LIPOPROTEIN: CPT | Mod: 59 | Performed by: FAMILY MEDICINE

## 2021-11-02 PROCEDURE — 83036 HEMOGLOBIN GLYCOSYLATED A1C: CPT | Performed by: FAMILY MEDICINE

## 2021-11-02 PROCEDURE — 80061 LIPID PANEL: CPT | Performed by: FAMILY MEDICINE

## 2021-11-02 PROCEDURE — 99214 OFFICE O/P EST MOD 30 MIN: CPT | Performed by: FAMILY MEDICINE

## 2021-11-02 RX ORDER — GLUCOSAMINE HCL/CHONDROITIN SU 500-400 MG
CAPSULE ORAL
Qty: 100 EACH | Refills: 3 | Status: SHIPPED | OUTPATIENT
Start: 2021-11-02 | End: 2023-05-18

## 2021-11-02 RX ORDER — CARVEDILOL 12.5 MG/1
12.5 TABLET ORAL 2 TIMES DAILY WITH MEALS
Qty: 180 TABLET | Refills: 1 | Status: SHIPPED | OUTPATIENT
Start: 2021-11-02 | End: 2022-05-23

## 2021-11-02 RX ORDER — SPIRONOLACTONE 25 MG/1
25 TABLET ORAL DAILY
Qty: 90 TABLET | Refills: 1 | Status: SHIPPED | OUTPATIENT
Start: 2021-11-02 | End: 2022-06-16

## 2021-11-02 RX ORDER — LIRAGLUTIDE 6 MG/ML
INJECTION SUBCUTANEOUS
Qty: 27 ML | Refills: 1 | Status: SHIPPED | OUTPATIENT
Start: 2021-11-02 | End: 2023-02-17

## 2021-11-02 RX ORDER — INSULIN GLARGINE 300 U/ML
160 INJECTION, SOLUTION SUBCUTANEOUS EVERY EVENING
Qty: 48 ML | Refills: 1 | Status: SHIPPED | OUTPATIENT
Start: 2021-11-02 | End: 2022-02-09

## 2021-11-02 RX ORDER — FUROSEMIDE 20 MG
20 TABLET ORAL 2 TIMES DAILY
Qty: 180 TABLET | Refills: 1 | Status: SHIPPED | OUTPATIENT
Start: 2021-11-02 | End: 2022-06-16

## 2021-11-02 RX ORDER — ATORVASTATIN CALCIUM 40 MG/1
80 TABLET, FILM COATED ORAL DAILY
Qty: 180 TABLET | Refills: 1 | Status: SHIPPED | OUTPATIENT
Start: 2021-11-02 | End: 2023-02-10

## 2021-11-02 RX ORDER — LANCETS
EACH MISCELLANEOUS
Qty: 100 EACH | Refills: 11 | Status: SHIPPED | OUTPATIENT
Start: 2021-11-02 | End: 2022-02-09

## 2021-11-02 RX ORDER — FENOFIBRATE 145 MG/1
145 TABLET, COATED ORAL DAILY
Qty: 90 TABLET | Refills: 1 | Status: SHIPPED | OUTPATIENT
Start: 2021-11-02 | End: 2021-12-06

## 2021-11-02 ASSESSMENT — MIFFLIN-ST. JEOR: SCORE: 2164.31

## 2021-11-02 ASSESSMENT — PAIN SCALES - GENERAL: PAINLEVEL: WORST PAIN (10)

## 2021-11-02 NOTE — LETTER
November 3, 2021      Roberto HOUSTON Donna  [address] [place]  [place] [address]        Dear ,    We are writing to inform you of your test results.    Your LDL cholesterol looks good   Your triglycerides are way too high   A lot of that is due to your uncontrolled diabetes   If you can get your blood sugars down, then your triglycerides should improve.   Please let me know if pharmacy does not give you enough insulin   Your electrolytes and kidney function are stable but please follow-up with the kidney doctor as we discussed   Your A1c is 11. It is way too high. Hopefully with the correct amount of insulin, it should improve     Resulted Orders   **A1C FUTURE 3mo   Result Value Ref Range    Hemoglobin A1C 11.0 (H) 0.0 - 5.6 %      Comment:      Normal <5.7%   Prediabetes 5.7-6.4%    Diabetes 6.5% or higher     Note: Adopted from ADA consensus guidelines.    Narrative    Repeat result A1C 11.0, HM   **Basic metabolic panel FUTURE 2mo   Result Value Ref Range    Sodium 132 (L) 133 - 144 mmol/L    Potassium 4.5 3.4 - 5.3 mmol/L    Chloride 101 94 - 109 mmol/L    Carbon Dioxide (CO2) 25 20 - 32 mmol/L    Anion Gap 6 3 - 14 mmol/L    Urea Nitrogen 49 (H) 7 - 30 mg/dL    Creatinine 2.44 (H) 0.66 - 1.25 mg/dL    Calcium 9.4 8.5 - 10.1 mg/dL    Glucose 233 (H) 70 - 99 mg/dL    GFR Estimate 27 (L) >60 mL/min/1.73m2      Comment:      As of July 11, 2021, eGFR is calculated by the CKD-EPI creatinine equation, without race adjustment. eGFR can be influenced by muscle mass, exercise, and diet. The reported eGFR is an estimation only and is only applicable if the renal function is stable.   Lipid panel reflex to direct LDL Fasting   Result Value Ref Range    Cholesterol 261 (H) <200 mg/dL    Triglycerides 750 (H) <150 mg/dL    Direct Measure HDL 36 (L) >=40 mg/dL    LDL Cholesterol Calculated        Comment:      Cannot estimate LDL when triglyceride exceeds 400 mg/dL    Non HDL Cholesterol 225 (H) <130 mg/dL     Patient Fasting > 8hrs? Yes     Narrative    Cholesterol  Desirable:  <200 mg/dL    Triglycerides  Normal:  Less than 150 mg/dL  Borderline High:  150-199 mg/dL  High:  200-499 mg/dL  Very High:  Greater than or equal to 500 mg/dL    Direct Measure HDL  Female:  Greater than or equal to 50 mg/dL   Male:  Greater than or equal to 40 mg/dL    LDL Cholesterol  Desirable:  <100mg/dL  Above Desirable:  100-129 mg/dL   Borderline High:  130-159 mg/dL   High:  160-189 mg/dL   Very High:  >= 190 mg/dL    Non HDL Cholesterol  Desirable:  130 mg/dL  Above Desirable:  130-159 mg/dL  Borderline High:  160-189 mg/dL  High:  190-219 mg/dL  Very High:  Greater than or equal to 220 mg/dL   LDL cholesterol direct   Result Value Ref Range    LDL Cholesterol Direct 88 <100 mg/dL      Comment:      Age 0-19 years:  Desirable: 0-110 mg/dL   Borderline high: 110-129 mg/dL   High: >= 130 mg/dL    Age 20 years and older:  Desirable: <100mg/dL  Above desirable: 100-129 mg/dL   Borderline high: 130-159 mg/dL   High: 160-189 mg/dL   Very high: >= 190 mg/dL       If you have any questions or concerns, please call the clinic at the number listed above.       Sincerely,      Jaylyn Bellamy MD

## 2021-11-19 DIAGNOSIS — K59.00 CONSTIPATION, UNSPECIFIED CONSTIPATION TYPE: ICD-10-CM

## 2021-11-20 RX ORDER — AMOXICILLIN 250 MG
CAPSULE ORAL
Qty: 60 TABLET | Status: SHIPPED | OUTPATIENT
Start: 2021-11-20 | End: 2022-12-16

## 2021-12-02 ENCOUNTER — TELEPHONE (OUTPATIENT)
Dept: FAMILY MEDICINE | Facility: CLINIC | Age: 61
End: 2021-12-02
Payer: MEDICAID

## 2021-12-02 DIAGNOSIS — E11.69 TYPE 2 DIABETES MELLITUS WITH OTHER SPECIFIED COMPLICATION, WITH LONG-TERM CURRENT USE OF INSULIN (H): Primary | ICD-10-CM

## 2021-12-02 DIAGNOSIS — E78.00 HIGH BLOOD CHOLESTEROL: Primary | ICD-10-CM

## 2021-12-02 DIAGNOSIS — Z79.4 TYPE 2 DIABETES MELLITUS WITH OTHER SPECIFIED COMPLICATION, WITH LONG-TERM CURRENT USE OF INSULIN (H): Primary | ICD-10-CM

## 2021-12-02 DIAGNOSIS — E78.1 HIGH BLOOD TRIGLYCERIDES: ICD-10-CM

## 2021-12-02 NOTE — TELEPHONE ENCOUNTER
This PA is denied.  Please advise and route back to TC.  Thank you.  Daisy Roth,  [facility]    Denial Rational: Insurance requiring patient to try/fail covered Fenofibrate strengths 54mg or 160mg and Gemfibrozil    Appeal Information: This medication was denied. If physician would like to appeal because patient has contraindication or allergy to covered medication please write letter of medical necessity and route back to PA team to initiate.  If no further action is needed please close encounter thank you.

## 2021-12-02 NOTE — TELEPHONE ENCOUNTER
This PA is denied.  Please advise and route back to TC.  Thank you.  Daisy Roth,  [facility]    Denial Rational: Insurance requiring patient to try/fail both Trulicity and Bydureon

## 2021-12-02 NOTE — TELEPHONE ENCOUNTER
PRIOR AUTHORIZATION DENIED    Medication: fenofibrate (TRICOR) 145 MG tablet    Denial Date: 12/2/2021    Denial Rational: Insurance requiring patient to try/fail covered Fenofibrate strengths 54mg or 160mg and Gemfibrozil      Appeal Information: This medication was denied. If physician would like to appeal because patient has contraindication or allergy to covered medication please write letter of medical necessity and route back to PA team to initiate.  If no further action is needed please close encounter thank you.

## 2021-12-02 NOTE — TELEPHONE ENCOUNTER
Central Prior Authorization Team   Phone: [phone]    PA Initiation    Medication: fenofibrate (TRICOR) 145 MG tablet  Insurance Company: [employer] Part [employer] - Phone [phone] Fax [phone]  Pharmacy Filling the Rx: [place], [place] - [address] [place] [address]  Filling Pharmacy Phone: [phone]  Filling Pharmacy Fax:    Start Date: 12/2/2021

## 2021-12-02 NOTE — TELEPHONE ENCOUNTER
Prior Authorization Retail Medication Request    Medication/Dose: fenofibrate (TRICOR) 145 MG tablet  ICD code (if different than what is on RX):    Previously Tried and Failed:    Rationale:  This patient has been out of this medication for 2 months, can the PA team please expedite this request?  Thank you.    Insurance Name:  [employer] [phone]  Insurance ID:  [id number]      Pharmacy Information (if different than what is on RX)  Name:    Phone:

## 2021-12-02 NOTE — TELEPHONE ENCOUNTER
Central Prior Authorization Team   Phone: [phone]    PA Initiation    Medication: liraglutide (VICTOZA PEN) 18 MG/3ML solution  Insurance Company: [employer] ([facility]) - Phone [phone] Fax [phone]  Pharmacy Filling the Rx: [facility] [place], [place] - [facility] [address] [facility]  Filling Pharmacy Phone: [phone]  Filling Pharmacy Fax:    Start Date: 12/2/2021

## 2021-12-02 NOTE — TELEPHONE ENCOUNTER
PRIOR AUTHORIZATION DENIED    Medication: liraglutide (VICTOZA PEN) 18 MG/3ML solution    Denial Date: 12/2/2021    Denial Rational: Insurance requiring patient to try/fail both Trulicity and Bydureon         Appeal Information: This medication was denied. If physician would like to appeal because patient has contraindication or allergy to covered medication please write letter of medical necessity and route back to PA team to initiate.  If no further action is needed please close encounter thank you.

## 2021-12-02 NOTE — TELEPHONE ENCOUNTER
Prior Authorization Retail Medication Request    Medication/Dose: liraglutide (VICTOZA PEN) 18 MG/3ML solution  ICD code (if different than what is on RX):    Previously Tried and Failed:    Rationale:  The patient has been out of this medication for 2 months, can the PA team please expedite this request?  Thank you.    Insurance Name:  [employer] [phone]  Insurance ID:  [id number]      Pharmacy Information (if different than what is on RX)  Name:    Phone:

## 2021-12-06 RX ORDER — FENOFIBRATE 160 MG/1
160 TABLET ORAL DAILY
Qty: 90 TABLET | Refills: 1 | Status: SHIPPED | OUTPATIENT
Start: 2021-12-06 | End: 2022-05-23

## 2021-12-13 ENCOUNTER — TRANSFERRED RECORDS (OUTPATIENT)
Dept: HEALTH INFORMATION MANAGEMENT | Facility: CLINIC | Age: 61
End: 2021-12-13
Payer: MEDICAID

## 2021-12-13 LAB — RETINOPATHY: POSITIVE

## 2022-01-18 DIAGNOSIS — E11.69 TYPE 2 DIABETES MELLITUS WITH OTHER SPECIFIED COMPLICATION, WITH LONG-TERM CURRENT USE OF INSULIN (H): ICD-10-CM

## 2022-01-18 DIAGNOSIS — Z79.4 TYPE 2 DIABETES MELLITUS WITH OTHER SPECIFIED COMPLICATION, WITH LONG-TERM CURRENT USE OF INSULIN (H): ICD-10-CM

## 2022-01-19 RX ORDER — PEN NEEDLE, DIABETIC 31 GX3/16"
NEEDLE, DISPOSABLE MISCELLANEOUS
Qty: 500 EACH | Refills: 1 | Status: SHIPPED | OUTPATIENT
Start: 2022-01-19 | End: 2022-06-16

## 2022-01-25 ENCOUNTER — TRANSFERRED RECORDS (OUTPATIENT)
Dept: HEALTH INFORMATION MANAGEMENT | Facility: CLINIC | Age: 62
End: 2022-01-25
Payer: MEDICARE

## 2022-02-09 ENCOUNTER — OFFICE VISIT (OUTPATIENT)
Dept: FAMILY MEDICINE | Facility: CLINIC | Age: 62
End: 2022-02-09
Payer: MEDICARE

## 2022-02-09 VITALS
OXYGEN SATURATION: 97 % | DIASTOLIC BLOOD PRESSURE: 70 MMHG | RESPIRATION RATE: 16 BRPM | SYSTOLIC BLOOD PRESSURE: 134 MMHG | BODY MASS INDEX: 42 KG/M2 | TEMPERATURE: 98 F | HEART RATE: 82 BPM | HEIGHT: 71 IN | WEIGHT: 300 LBS

## 2022-02-09 DIAGNOSIS — G63 POLYNEUROPATHY ASSOCIATED WITH UNDERLYING DISEASE (H): ICD-10-CM

## 2022-02-09 DIAGNOSIS — E55.9 VITAMIN D DEFICIENCY: ICD-10-CM

## 2022-02-09 DIAGNOSIS — E78.00 HIGH BLOOD CHOLESTEROL: ICD-10-CM

## 2022-02-09 DIAGNOSIS — N18.4 CKD (CHRONIC KIDNEY DISEASE) STAGE 4, GFR 15-29 ML/MIN (H): ICD-10-CM

## 2022-02-09 DIAGNOSIS — E11.3313 MODERATE NONPROLIFERATIVE DIABETIC RETINOPATHY OF BOTH EYES WITH MACULAR EDEMA ASSOCIATED WITH TYPE 2 DIABETES MELLITUS (H): ICD-10-CM

## 2022-02-09 DIAGNOSIS — E66.01 MORBID OBESITY (H): ICD-10-CM

## 2022-02-09 DIAGNOSIS — Z79.4 TYPE 2 DIABETES MELLITUS WITH OTHER SPECIFIED COMPLICATION, WITH LONG-TERM CURRENT USE OF INSULIN (H): Primary | ICD-10-CM

## 2022-02-09 DIAGNOSIS — I10 HYPERTENSION GOAL BP (BLOOD PRESSURE) < 140/90: ICD-10-CM

## 2022-02-09 DIAGNOSIS — E78.1 HIGH BLOOD TRIGLYCERIDES: ICD-10-CM

## 2022-02-09 DIAGNOSIS — Z79.4 ENCOUNTER FOR LONG-TERM (CURRENT) INSULIN USE (H): ICD-10-CM

## 2022-02-09 DIAGNOSIS — E11.69 TYPE 2 DIABETES MELLITUS WITH OTHER SPECIFIED COMPLICATION, WITH LONG-TERM CURRENT USE OF INSULIN (H): Primary | ICD-10-CM

## 2022-02-09 LAB
ANION GAP SERPL CALCULATED.3IONS-SCNC: 7 MMOL/L (ref 3–14)
BUN SERPL-MCNC: 45 MG/DL (ref 7–30)
CALCIUM SERPL-MCNC: 9.4 MG/DL (ref 8.5–10.1)
CHLORIDE BLD-SCNC: 100 MMOL/L (ref 94–109)
CO2 SERPL-SCNC: 26 MMOL/L (ref 20–32)
CREAT SERPL-MCNC: 2.48 MG/DL (ref 0.66–1.25)
CREAT UR-MCNC: 84 MG/DL
DEPRECATED CALCIDIOL+CALCIFEROL SERPL-MC: 17 UG/L (ref 20–75)
ERYTHROCYTE [DISTWIDTH] IN BLOOD BY AUTOMATED COUNT: 13.7 % (ref 10–15)
GFR SERPL CREATININE-BSD FRML MDRD: 28 ML/MIN/1.73M2
GLUCOSE BLD-MCNC: 291 MG/DL (ref 70–99)
HBA1C MFR BLD: 11.2 % (ref 0–5.6)
HCT VFR BLD AUTO: 40.3 % (ref 40–53)
HGB BLD-MCNC: 13.1 G/DL (ref 13.3–17.7)
MCH RBC QN AUTO: 30.5 PG (ref 26.5–33)
MCHC RBC AUTO-ENTMCNC: 32.5 G/DL (ref 31.5–36.5)
MCV RBC AUTO: 94 FL (ref 78–100)
MICROALBUMIN UR-MCNC: 193 MG/L
MICROALBUMIN/CREAT UR: 229.76 MG/G CR (ref 0–17)
PLATELET # BLD AUTO: 142 10E3/UL (ref 150–450)
POTASSIUM BLD-SCNC: 4.9 MMOL/L (ref 3.4–5.3)
RBC # BLD AUTO: 4.3 10E6/UL (ref 4.4–5.9)
SODIUM SERPL-SCNC: 133 MMOL/L (ref 133–144)
WBC # BLD AUTO: 6.4 10E3/UL (ref 4–11)

## 2022-02-09 PROCEDURE — 80048 BASIC METABOLIC PNL TOTAL CA: CPT | Performed by: FAMILY MEDICINE

## 2022-02-09 PROCEDURE — 83036 HEMOGLOBIN GLYCOSYLATED A1C: CPT | Performed by: FAMILY MEDICINE

## 2022-02-09 PROCEDURE — 99214 OFFICE O/P EST MOD 30 MIN: CPT | Performed by: FAMILY MEDICINE

## 2022-02-09 PROCEDURE — 82043 UR ALBUMIN QUANTITATIVE: CPT | Performed by: FAMILY MEDICINE

## 2022-02-09 PROCEDURE — 85027 COMPLETE CBC AUTOMATED: CPT | Performed by: FAMILY MEDICINE

## 2022-02-09 PROCEDURE — 82306 VITAMIN D 25 HYDROXY: CPT | Performed by: FAMILY MEDICINE

## 2022-02-09 PROCEDURE — 36415 COLL VENOUS BLD VENIPUNCTURE: CPT | Performed by: FAMILY MEDICINE

## 2022-02-09 RX ORDER — INSULIN LISPRO 200 [IU]/ML
INJECTION, SOLUTION SUBCUTANEOUS
Qty: 180 ML | Refills: 1 | Status: SHIPPED | OUTPATIENT
Start: 2022-02-09 | End: 2023-02-17

## 2022-02-09 RX ORDER — LANCETS
EACH MISCELLANEOUS
Qty: 200 EACH | Refills: 11 | Status: SHIPPED | OUTPATIENT
Start: 2022-02-09 | End: 2023-03-20

## 2022-02-09 RX ORDER — INSULIN GLARGINE 300 U/ML
160 INJECTION, SOLUTION SUBCUTANEOUS EVERY EVENING
Qty: 48 ML | Refills: 1 | Status: SHIPPED | OUTPATIENT
Start: 2022-02-09 | End: 2022-09-18

## 2022-02-09 RX ORDER — GLUCOSAMINE HCL/CHONDROITIN SU 500-400 MG
CAPSULE ORAL
Qty: 100 EACH | Refills: 3 | Status: SHIPPED | OUTPATIENT
Start: 2022-02-09 | End: 2022-09-18

## 2022-02-09 ASSESSMENT — MIFFLIN-ST. JEOR: SCORE: 2177.92

## 2022-02-09 ASSESSMENT — PAIN SCALES - GENERAL: PAINLEVEL: MODERATE PAIN (5)

## 2022-02-09 NOTE — PATIENT INSTRUCTIONS
Vaibhav Solomon MD (Attending)  [phone] (Work)  [phone] (Fax)  [address] [address]  Nephrology Jan. 06, 2021January 06, 2021     Please call number above to schedule with the kidney doctor

## 2022-02-09 NOTE — PROGRESS NOTES
Assessment & Plan     Type 2 diabetes mellitus with other specified complication, with long-term current use of insulin (H)  Poorly controlled, noncompliant. Encourage exercise, diabetic diet, checking BSs  - Albumin Random Urine Quantitative with Creat Ratio; Future  - **CBC with platelets FUTURE 2mo; Future  - **Basic metabolic panel FUTURE 2mo; Future  - **A1C FUTURE 3mo; Future  - dulaglutide (TRULICITY) 0.75 MG/0.5ML pen; Inject 0.75 mg Subcutaneous every 7 days  - blood glucose monitoring (NO BRAND SPECIFIED) meter device kit; Use to test blood sugar 4 times daily or as directed. Preferred blood glucose meter OR supplies to accompany: Blood Glucose Monitor Brands: per insurance.  - blood glucose (NO BRAND SPECIFIED) test strip; Use to test blood sugar 4 times daily or as directed. To accompany: Blood Glucose Monitor Brands: per insurance.  - blood glucose calibration (NO BRAND SPECIFIED) solution; To accompany: Blood Glucose Monitor Brands: per insurance.  - thin (NO BRAND SPECIFIED) lancets; Use with lanceting device. To accompany: Blood Glucose Monitor Brands: per insurance.  - alcohol swab prep pads; Use to swab area of injection/mary kay as directed.  - Insulin Glargine, 2 Unit Dial, (TOUJEO MAX SOLOSTAR) 300 UNIT/ML SOPN; Inject 160 Units Subcutaneous every evening Please dispense full amount. Pt states only receiving 4 pens x 900 units = 3600 units. Should be 4800 units for 1 months  - Insulin Lispro (HUMALOG KWIKPEN) 200 UNIT/ML soln; INJECT 60 UNITS 3 TIMES DAILY WITH MEALS PLUS THE SCALE WITH EACH MEAL.  - Albumin Random Urine Quantitative with Creat Ratio  - **CBC with platelets FUTURE 2mo  - **Basic metabolic panel FUTURE 2mo  - **A1C FUTURE 3mo  - ACE/ARB/ARNI NOT PRESCRIBED (INTENTIONAL); Please choose reason not prescribed from choices below.    Encounter for long-term (current) insulin use (H)      Moderate nonproliferative diabetic retinopathy of both eyes with macular edema associated with type  "2 diabetes mellitus (H)  See eye MD    CKD (chronic kidney disease) stage 4, GFR 15-29 ml/min (H)  Encouraged to see nephrologist. Phone number given    Hypertension goal BP (blood pressure) < 140/90  To start checking at home  - Home Blood Pressure Monitor Order for DME - ONLY FOR DME    Vitamin D deficiency  Due for level check  - **Vitamin D Deficiency FUTURE 2mo; Future  - **Vitamin D Deficiency FUTURE 2mo    Morbid obesity (H)  Encourage regular exercise and healthy diet    Polyneuropathy associated with underlying disease (H)  stable    High blood cholesterol  On statin at goal    High blood triglycerides  On fenofibrate, out of control despite meds               BMI:   Estimated body mass index is 41.84 kg/m  as calculated from the following:    Height as of this encounter: 1.803 m (5' 11\").    Weight as of this encounter: 136.1 kg (300 lb).   Weight management plan: Discussed healthy diet and exercise guidelines        No follow-ups on file.    Jaylyn Bellamy MD  [facility]   Roberto is a 61 year old who presents for the following health issues  accompanied by his daughter.    HPI     Diabetes Follow-up    How often are you checking your blood sugar? Three times daily  Blood sugar testing frequency justification:  Uncontrolled diabetes  What time of day are you checking your blood sugars (select all that apply)?  Before meals  Have you had any blood sugars above 200?  Yes   Have you had any blood sugars below 70?  No    What symptoms do you notice when your blood sugar is low?  Feels it in his head    What concerns do you have today about your diabetes? Other: says new glucose meter doesn't work well, doesn't get enough strips to test 3-4 times a day      Do you have any of these symptoms? (Select all that apply)  Numbness in feet, Redness, sores, or blisters on feet and Excessive thirst              Hyperlipidemia Follow-Up      Are you regularly taking any " medication or supplement to lower your cholesterol?   Yes- atorvastatin    Are you having muscle aches or other side effects that you think could be caused by your cholesterol lowering medication?  No    Hypertension Follow-up      Do you check your blood pressure regularly outside of the clinic? Yes     Are you following a low salt diet? No    Are your blood pressures ever more than 140 on the top number (systolic) OR more   than 90 on the bottom number (diastolic), for example 140/90? Yes    BP Readings from Last 2 Encounters:   02/09/22 134/70   11/02/21 108/62     Hemoglobin A1C POCT (%)   Date Value   05/26/2021 8.7 (H)   10/27/2020 9.2 (H)     Hemoglobin A1C (%)   Date Value   11/02/2021 11.0 (H)     LDL Cholesterol Calculated   Date Value   11/02/2021      Comment:     Cannot estimate LDL when triglyceride exceeds 400 mg/dL   10/27/2020     Cannot estimate LDL when triglyceride exceeds 400 mg/dL mg/dL   09/04/2019 99 mg/dL     LDL Cholesterol Direct (mg/dL)   Date Value   11/02/2021 88   10/27/2020 95     Pt here with daughter today. Insurance change and pt no longer has option for in person   Pt does not want video  and prefers daughter    Pt with uncontrolled diabetes. Pt states BSs are around 200 but A1c would suggest otherwise  He has seen diabetes educator and is noncompliant with diet so difficult to control  Is on aspirin and statin  He has ckd4 and was encouraged and given number to call to follow-up with renal  He is on insulin x 2    Pt with HTN, at goal on meds  He has h/o vitamin D deficiency to for check of levels  Pt with diabetic eye disease and seeing eye MD    Pt with neuropathy and tolerating  Pt with elevated chol and triglycerides on medication  LDL at goal per diabetic standards  Trig out of control most likely due to diabetes being out of control          Review of Systems   Constitutional, HEENT, cardiovascular, pulmonary, gi and gu systems are negative, except as  "otherwise noted.      Objective    /70   Pulse 82   Temp 98  F (36.7  C) (Oral)   Resp 16   Ht 1.803 m (5' 11\")   Wt 136.1 kg (300 lb)   SpO2 97%   BMI 41.84 kg/m    Body mass index is 41.84 kg/m .  Physical Exam   GENERAL: healthy, alert and no distress  NECK: no adenopathy, no asymmetry, masses, or scars and thyroid normal to palpation  RESP: lungs clear to auscultation - no rales, rhonchi or wheezes  CV: regular rate and rhythm, normal S1 S2, no S3 or S4, no murmur, click or rub, no peripheral edema and peripheral pulses strong  ABDOMEN: soft, nontender, no hepatosplenomegaly, no masses and bowel sounds normal  MS: refused foot exam claiming they are dirty    Results for orders placed or performed in visit on 02/09/22 (from the past 24 hour(s))   **CBC with platelets FUTURE 2mo   Result Value Ref Range    WBC Count 6.4 4.0 - 11.0 10e3/uL    RBC Count 4.30 (L) 4.40 - 5.90 10e6/uL    Hemoglobin 13.1 (L) 13.3 - 17.7 g/dL    Hematocrit 40.3 40.0 - 53.0 %    MCV 94 78 - 100 fL    MCH 30.5 26.5 - 33.0 pg    MCHC 32.5 31.5 - 36.5 g/dL    RDW 13.7 10.0 - 15.0 %    Platelet Count 142 (L) 150 - 450 10e3/uL   **A1C FUTURE 3mo   Result Value Ref Range    Hemoglobin A1C 11.2 (H) 0.0 - 5.6 %               "

## 2022-02-23 ENCOUNTER — TELEPHONE (OUTPATIENT)
Dept: FAMILY MEDICINE | Facility: CLINIC | Age: 62
End: 2022-02-23
Payer: MEDICARE

## 2022-02-23 DIAGNOSIS — E78.1 HIGH BLOOD TRIGLYCERIDES: Primary | ICD-10-CM

## 2022-02-23 RX ORDER — AMOXICILLIN 500 MG
CAPSULE ORAL
Qty: 180 CAPSULE | Refills: 3 | Status: SHIPPED | OUTPATIENT
Start: 2022-02-23 | End: 2024-09-19

## 2022-02-23 NOTE — TELEPHONE ENCOUNTER
Refill request received within 30 days of last office visit with pcp.  Prescription is routed to the provider to please address refill.   Kaitlyn Enriquez RN

## 2022-02-23 NOTE — TELEPHONE ENCOUNTER
"Famotidine, has never been prescribe by provider.  Pharmacy has never dispensed medication.  Per pharmacy, \"wife insisted he needs this medication\".    is obtained for Finnish language, patient reports he has never taken medication for GERD.  States did not request Famotidine.  Was looking for their insulin, advised has refills from 2/9/22, wife insisted no refills.  Conference called to [facility] pharmacy.   Patient does have refills, ? Language barrier unclear what patient wanted.  Pharmacy will get prescription ready for patient.  If has additional questions will need to address this at pharmacy.     Verbalized good understanding.   Kaitlyn Enriquez RN     "

## 2022-03-11 DIAGNOSIS — E11.69 TYPE 2 DIABETES MELLITUS WITH OTHER SPECIFIED COMPLICATION, WITH LONG-TERM CURRENT USE OF INSULIN (H): ICD-10-CM

## 2022-03-11 DIAGNOSIS — Z79.4 TYPE 2 DIABETES MELLITUS WITH OTHER SPECIFIED COMPLICATION, WITH LONG-TERM CURRENT USE OF INSULIN (H): ICD-10-CM

## 2022-03-11 RX ORDER — UBIQUINOL 100 MG
CAPSULE ORAL
Qty: 200 EACH | Refills: 3 | Status: SHIPPED | OUTPATIENT
Start: 2022-03-11 | End: 2023-05-18

## 2022-03-11 NOTE — TELEPHONE ENCOUNTER
Routing refill request to provider for review/approval because:  Drug not on the [facility] refill protocol     Arianna DRAPERN, RN

## 2022-04-11 DIAGNOSIS — E78.00 HIGH BLOOD CHOLESTEROL: Primary | ICD-10-CM

## 2022-04-12 RX ORDER — ATORVASTATIN CALCIUM 80 MG/1
TABLET, FILM COATED ORAL
Qty: 90 TABLET | Refills: 1 | Status: SHIPPED | OUTPATIENT
Start: 2022-04-12 | End: 2022-10-19

## 2022-05-20 DIAGNOSIS — E78.1 HIGH BLOOD TRIGLYCERIDES: ICD-10-CM

## 2022-05-20 DIAGNOSIS — I10 HYPERTENSION GOAL BP (BLOOD PRESSURE) < 140/90: ICD-10-CM

## 2022-05-20 DIAGNOSIS — E78.00 HIGH BLOOD CHOLESTEROL: ICD-10-CM

## 2022-05-23 RX ORDER — FENOFIBRATE 160 MG/1
160 TABLET ORAL DAILY
Qty: 90 TABLET | Refills: 0 | Status: SHIPPED | OUTPATIENT
Start: 2022-05-23 | End: 2022-08-19

## 2022-05-23 RX ORDER — CARVEDILOL 12.5 MG/1
12.5 TABLET ORAL 2 TIMES DAILY WITH MEALS
Qty: 180 TABLET | Refills: 0 | Status: SHIPPED | OUTPATIENT
Start: 2022-05-23 | End: 2022-08-19

## 2022-05-26 DIAGNOSIS — E55.9 VITAMIN D DEFICIENCY: Primary | ICD-10-CM

## 2022-05-26 RX ORDER — ERGOCALCIFEROL 1.25 MG/1
CAPSULE, LIQUID FILLED ORAL
Qty: 8 CAPSULE | Refills: 0 | Status: SHIPPED | OUTPATIENT
Start: 2022-05-26 | End: 2022-09-18

## 2022-06-16 DIAGNOSIS — Z79.4 TYPE 2 DIABETES MELLITUS WITH OTHER SPECIFIED COMPLICATION, WITH LONG-TERM CURRENT USE OF INSULIN (H): ICD-10-CM

## 2022-06-16 DIAGNOSIS — I10 HYPERTENSION GOAL BP (BLOOD PRESSURE) < 140/90: ICD-10-CM

## 2022-06-16 DIAGNOSIS — E11.69 TYPE 2 DIABETES MELLITUS WITH OTHER SPECIFIED COMPLICATION, WITH LONG-TERM CURRENT USE OF INSULIN (H): ICD-10-CM

## 2022-06-16 RX ORDER — FUROSEMIDE 20 MG
20 TABLET ORAL 2 TIMES DAILY
Qty: 180 TABLET | Refills: 0 | Status: SHIPPED | OUTPATIENT
Start: 2022-06-16 | End: 2022-09-18

## 2022-06-16 RX ORDER — PEN NEEDLE, DIABETIC 31 GX3/16"
NEEDLE, DISPOSABLE MISCELLANEOUS
Qty: 200 EACH | Refills: 11 | Status: SHIPPED | OUTPATIENT
Start: 2022-06-16

## 2022-06-16 RX ORDER — SPIRONOLACTONE 25 MG/1
25 TABLET ORAL DAILY
Qty: 90 TABLET | Refills: 0 | Status: SHIPPED | OUTPATIENT
Start: 2022-06-16 | End: 2022-09-18

## 2022-06-16 NOTE — TELEPHONE ENCOUNTER
"Requested Prescriptions   Pending Prescriptions Disp Refills    furosemide (LASIX) 20 MG tablet [Pharmacy Med Name: furosemide 20 mg tablet] 180 tablet 10     Sig: Take 1 tablet (20 mg) by mouth 2 times daily        Diuretics (Including Combos) Protocol Failed - 6/16/2022  9:29 AM        Failed - Normal serum creatinine on file in past 12 months       Recent Labs   Lab Test 02/09/22  0900   CR 2.48*              Passed - Blood pressure under 140/90 in past 12 months       BP Readings from Last 3 Encounters:   02/09/22 134/70   11/02/21 108/62   06/01/21 128/68                 Passed - Recent (12 mo) or future (30 days) visit within the authorizing provider's specialty     Patient has had an office visit with the authorizing provider or a provider within the authorizing providers department within the previous 12 mos or has a future within next 30 days. See \"Patient Info\" tab in inbasket, or \"Choose Columns\" in Meds & Orders section of the refill encounter.              Passed - Medication is active on med list        Passed - Patient is age 18 or older        Passed - Normal serum potassium on file in past 12 months       Recent Labs   Lab Test 02/09/22  0900   POTASSIUM 4.9                    Passed - Normal serum sodium on file in past 12 months       Recent Labs   Lab Test 02/09/22  0900                    spironolactone (ALDACTONE) 25 MG tablet [Pharmacy Med Name: spironolactone 25 mg tablet] 90 tablet 10     Sig: Take 1 tablet (25 mg) by mouth daily        Diuretics (Including Combos) Protocol Failed - 6/16/2022  9:29 AM        Failed - Normal serum creatinine on file in past 12 months       Recent Labs   Lab Test 02/09/22  0900   CR 2.48*              Passed - Blood pressure under 140/90 in past 12 months       BP Readings from Last 3 Encounters:   02/09/22 134/70   11/02/21 108/62   06/01/21 128/68                 Passed - Recent (12 mo) or future (30 days) visit within the authorizing provider's " "specialty     Patient has had an office visit with the authorizing provider or a provider within the authorizing providers department within the previous 12 mos or has a future within next 30 days. See \"Patient Info\" tab in inbasket, or \"Choose Columns\" in Meds & Orders section of the refill encounter.              Passed - Medication is active on med list        Passed - Patient is age 18 or older        Passed - Normal serum potassium on file in past 12 months       Recent Labs   Lab Test 02/09/22  0900   POTASSIUM 4.9                    Passed - Normal serum sodium on file in past 12 months       Recent Labs   Lab Test 02/09/22  0900                    GLOBAL EASE INJECT PEN NEEDLES 31G X 5 MM miscellaneous [Pharmacy Med Name: Pen Needle 31 gauge x 3/16\"]  10     Sig: USE TO INJECT INSULIN 5 TIMES A DAY        Diabetic Supplies Protocol Passed - 6/16/2022  9:29 AM        Passed - Medication is active on med list        Passed - Patient is 18 years of age or older        Passed - Recent (6 mo) or future (30 days) visit within the authorizing provider's specialty     Patient had office visit in the last 6 months or has a visit in the next 30 days with authorizing provider.  See \"Patient Info\" tab in inbasket, or \"Choose Columns\" in Meds & Orders section of the refill encounter.                  "

## 2022-08-19 DIAGNOSIS — I10 HYPERTENSION GOAL BP (BLOOD PRESSURE) < 140/90: ICD-10-CM

## 2022-08-19 DIAGNOSIS — E78.1 HIGH BLOOD TRIGLYCERIDES: ICD-10-CM

## 2022-08-19 DIAGNOSIS — E78.00 HIGH BLOOD CHOLESTEROL: ICD-10-CM

## 2022-08-19 DIAGNOSIS — E55.9 VITAMIN D DEFICIENCY: ICD-10-CM

## 2022-08-19 RX ORDER — FENOFIBRATE 160 MG/1
160 TABLET ORAL DAILY
Qty: 90 TABLET | Refills: 0 | Status: SHIPPED | OUTPATIENT
Start: 2022-08-19 | End: 2022-11-18

## 2022-08-19 RX ORDER — ERGOCALCIFEROL 1.25 MG/1
CAPSULE, LIQUID FILLED ORAL
Qty: 8 CAPSULE | Refills: 0 | OUTPATIENT
Start: 2022-08-19

## 2022-08-19 RX ORDER — CARVEDILOL 12.5 MG/1
12.5 TABLET ORAL 2 TIMES DAILY WITH MEALS
Qty: 180 TABLET | Refills: 0 | Status: SHIPPED | OUTPATIENT
Start: 2022-08-19 | End: 2022-11-18

## 2022-08-19 NOTE — TELEPHONE ENCOUNTER
Pt due for wellness exam and diabetes check. Please help schedule within 30 days  Needs previsit lab too

## 2022-09-16 DIAGNOSIS — Z79.4 TYPE 2 DIABETES MELLITUS WITH OTHER SPECIFIED COMPLICATION, WITH LONG-TERM CURRENT USE OF INSULIN (H): ICD-10-CM

## 2022-09-16 DIAGNOSIS — E11.69 TYPE 2 DIABETES MELLITUS WITH OTHER SPECIFIED COMPLICATION, WITH LONG-TERM CURRENT USE OF INSULIN (H): ICD-10-CM

## 2022-09-16 DIAGNOSIS — I10 HYPERTENSION GOAL BP (BLOOD PRESSURE) < 140/90: ICD-10-CM

## 2022-09-16 DIAGNOSIS — E55.9 VITAMIN D DEFICIENCY: ICD-10-CM

## 2022-09-18 RX ORDER — ERGOCALCIFEROL 1.25 MG/1
CAPSULE, LIQUID FILLED ORAL
Qty: 8 CAPSULE | Refills: 0 | Status: SHIPPED | OUTPATIENT
Start: 2022-09-18 | End: 2022-11-18

## 2022-09-18 RX ORDER — ALCOHOL ANTISEPTIC PADS
PADS, MEDICATED (EA) TOPICAL
Qty: 90 EACH | Refills: 0 | Status: SHIPPED | OUTPATIENT
Start: 2022-09-18 | End: 2022-10-19

## 2022-09-18 RX ORDER — INSULIN GLARGINE 300 U/ML
160 INJECTION, SOLUTION SUBCUTANEOUS EVERY EVENING
Qty: 48 ML | Refills: 0 | Status: SHIPPED | OUTPATIENT
Start: 2022-09-18 | End: 2022-11-18

## 2022-09-18 RX ORDER — SPIRONOLACTONE 25 MG/1
25 TABLET ORAL DAILY
Qty: 90 TABLET | Refills: 0 | Status: SHIPPED | OUTPATIENT
Start: 2022-09-18 | End: 2022-12-16

## 2022-09-18 RX ORDER — FUROSEMIDE 20 MG
20 TABLET ORAL 2 TIMES DAILY
Qty: 60 TABLET | Refills: 0 | Status: SHIPPED | OUTPATIENT
Start: 2022-09-18 | End: 2022-10-19

## 2022-10-18 DIAGNOSIS — E78.00 HIGH BLOOD CHOLESTEROL: ICD-10-CM

## 2022-10-18 DIAGNOSIS — E11.69 TYPE 2 DIABETES MELLITUS WITH OTHER SPECIFIED COMPLICATION, WITH LONG-TERM CURRENT USE OF INSULIN (H): ICD-10-CM

## 2022-10-18 DIAGNOSIS — I10 HYPERTENSION GOAL BP (BLOOD PRESSURE) < 140/90: ICD-10-CM

## 2022-10-18 DIAGNOSIS — Z79.4 TYPE 2 DIABETES MELLITUS WITH OTHER SPECIFIED COMPLICATION, WITH LONG-TERM CURRENT USE OF INSULIN (H): ICD-10-CM

## 2022-10-19 RX ORDER — ATORVASTATIN CALCIUM 80 MG/1
80 TABLET, FILM COATED ORAL DAILY
Qty: 30 TABLET | Refills: 0 | Status: SHIPPED | OUTPATIENT
Start: 2022-10-19 | End: 2022-11-18

## 2022-10-19 RX ORDER — ALCOHOL ANTISEPTIC PADS
PADS, MEDICATED (EA) TOPICAL
Qty: 100 EACH | Refills: 11 | Status: SHIPPED | OUTPATIENT
Start: 2022-10-19 | End: 2023-05-18

## 2022-10-19 RX ORDER — FUROSEMIDE 20 MG
20 TABLET ORAL 2 TIMES DAILY
Qty: 60 TABLET | Refills: 0 | Status: SHIPPED | OUTPATIENT
Start: 2022-10-19 | End: 2022-11-18

## 2022-10-19 RX ORDER — BLOOD SUGAR DIAGNOSTIC
STRIP MISCELLANEOUS
Qty: 100 STRIP | Refills: 1 | Status: SHIPPED | OUTPATIENT
Start: 2022-10-19 | End: 2022-12-16

## 2022-11-17 DIAGNOSIS — E78.1 HIGH BLOOD TRIGLYCERIDES: ICD-10-CM

## 2022-11-17 DIAGNOSIS — Z79.4 TYPE 2 DIABETES MELLITUS WITH OTHER SPECIFIED COMPLICATION, WITH LONG-TERM CURRENT USE OF INSULIN (H): ICD-10-CM

## 2022-11-17 DIAGNOSIS — E11.69 TYPE 2 DIABETES MELLITUS WITH OTHER SPECIFIED COMPLICATION, WITH LONG-TERM CURRENT USE OF INSULIN (H): ICD-10-CM

## 2022-11-17 DIAGNOSIS — I10 HYPERTENSION GOAL BP (BLOOD PRESSURE) < 140/90: ICD-10-CM

## 2022-11-17 DIAGNOSIS — E78.00 HIGH BLOOD CHOLESTEROL: ICD-10-CM

## 2022-11-17 DIAGNOSIS — E55.9 VITAMIN D DEFICIENCY: ICD-10-CM

## 2022-11-18 RX ORDER — ERGOCALCIFEROL 1.25 MG/1
CAPSULE, LIQUID FILLED ORAL
Qty: 8 CAPSULE | Refills: 0 | Status: SHIPPED | OUTPATIENT
Start: 2022-11-18 | End: 2022-12-16

## 2022-11-18 RX ORDER — INSULIN GLARGINE 300 U/ML
INJECTION, SOLUTION SUBCUTANEOUS
Qty: 48 ML | Refills: 0 | Status: SHIPPED | OUTPATIENT
Start: 2022-11-18 | End: 2023-01-17

## 2022-11-18 RX ORDER — ATORVASTATIN CALCIUM 80 MG/1
80 TABLET, FILM COATED ORAL DAILY
Qty: 30 TABLET | Refills: 0 | Status: SHIPPED | OUTPATIENT
Start: 2022-11-18 | End: 2022-12-16

## 2022-11-18 RX ORDER — FUROSEMIDE 20 MG
20 TABLET ORAL 2 TIMES DAILY
Qty: 60 TABLET | Refills: 0 | Status: SHIPPED | OUTPATIENT
Start: 2022-11-18 | End: 2022-12-16

## 2022-11-18 RX ORDER — CARVEDILOL 12.5 MG/1
12.5 TABLET ORAL 2 TIMES DAILY WITH MEALS
Qty: 180 TABLET | Refills: 0 | Status: SHIPPED | OUTPATIENT
Start: 2022-11-18 | End: 2023-02-17

## 2022-11-18 RX ORDER — FENOFIBRATE 160 MG/1
160 TABLET ORAL DAILY
Qty: 30 TABLET | Refills: 0 | Status: SHIPPED | OUTPATIENT
Start: 2022-11-18 | End: 2022-12-16

## 2022-11-23 ENCOUNTER — TRANSFERRED RECORDS (OUTPATIENT)
Dept: HEALTH INFORMATION MANAGEMENT | Facility: CLINIC | Age: 62
End: 2022-11-23

## 2022-11-23 ENCOUNTER — TELEPHONE (OUTPATIENT)
Dept: FAMILY MEDICINE | Facility: CLINIC | Age: 62
End: 2022-11-23

## 2022-11-23 NOTE — TELEPHONE ENCOUNTER
Reason for Call:  Form, our goal is to have forms completed with 72 hours, however, some forms may require a visit or additional information.    Type of letter, form or note:  medical Statement of Certifying Physician for [facility]    Who is the form from?: [facility] (if other please explain)    Where did the form come from: form was faxed in    What clinic location was the form placed at?: [facility]    Where the form was placed: Given to physician    What number is listed as a contact on the form?:   P; [phone]  F; [phone]       Additional comments: placed in provider box    Call taken on 11/23/2022 at 4:27 PM by Diamond Marin

## 2022-12-16 DIAGNOSIS — E55.9 VITAMIN D DEFICIENCY: ICD-10-CM

## 2022-12-16 DIAGNOSIS — E78.1 HIGH BLOOD TRIGLYCERIDES: ICD-10-CM

## 2022-12-16 DIAGNOSIS — K59.00 CONSTIPATION, UNSPECIFIED CONSTIPATION TYPE: ICD-10-CM

## 2022-12-16 DIAGNOSIS — E78.00 HIGH BLOOD CHOLESTEROL: ICD-10-CM

## 2022-12-16 DIAGNOSIS — I10 HYPERTENSION GOAL BP (BLOOD PRESSURE) < 140/90: ICD-10-CM

## 2022-12-16 DIAGNOSIS — E11.69 TYPE 2 DIABETES MELLITUS WITH OTHER SPECIFIED COMPLICATION, WITH LONG-TERM CURRENT USE OF INSULIN (H): ICD-10-CM

## 2022-12-16 DIAGNOSIS — Z79.4 TYPE 2 DIABETES MELLITUS WITH OTHER SPECIFIED COMPLICATION, WITH LONG-TERM CURRENT USE OF INSULIN (H): ICD-10-CM

## 2022-12-16 RX ORDER — FENOFIBRATE 160 MG/1
160 TABLET ORAL DAILY
Qty: 30 TABLET | Refills: 0 | Status: SHIPPED | OUTPATIENT
Start: 2022-12-16 | End: 2023-01-17

## 2022-12-16 RX ORDER — ERGOCALCIFEROL 1.25 MG/1
CAPSULE, LIQUID FILLED ORAL
Qty: 1 CAPSULE | Refills: 10 | Status: SHIPPED | OUTPATIENT
Start: 2022-12-16 | End: 2024-09-19

## 2022-12-16 RX ORDER — AMOXICILLIN 250 MG
CAPSULE ORAL
Qty: 60 TABLET | Refills: 0 | Status: SHIPPED | OUTPATIENT
Start: 2022-12-16 | End: 2023-01-17

## 2022-12-16 RX ORDER — ATORVASTATIN CALCIUM 80 MG/1
80 TABLET, FILM COATED ORAL DAILY
Qty: 30 TABLET | Refills: 0 | Status: SHIPPED | OUTPATIENT
Start: 2022-12-16 | End: 2023-01-17

## 2022-12-16 RX ORDER — BLOOD SUGAR DIAGNOSTIC
STRIP MISCELLANEOUS
Qty: 100 STRIP | Refills: 10 | Status: SHIPPED | OUTPATIENT
Start: 2022-12-16 | End: 2023-05-18

## 2022-12-16 RX ORDER — SPIRONOLACTONE 25 MG/1
25 TABLET ORAL DAILY
Qty: 90 TABLET | Refills: 0 | Status: SHIPPED | OUTPATIENT
Start: 2022-12-16 | End: 2023-03-22

## 2022-12-16 RX ORDER — FUROSEMIDE 20 MG
20 TABLET ORAL 2 TIMES DAILY
Qty: 60 TABLET | Refills: 0 | Status: SHIPPED | OUTPATIENT
Start: 2022-12-16 | End: 2023-01-17

## 2023-01-16 DIAGNOSIS — Z79.4 TYPE 2 DIABETES MELLITUS WITH OTHER SPECIFIED COMPLICATION, WITH LONG-TERM CURRENT USE OF INSULIN (H): ICD-10-CM

## 2023-01-16 DIAGNOSIS — K59.00 CONSTIPATION, UNSPECIFIED CONSTIPATION TYPE: ICD-10-CM

## 2023-01-16 DIAGNOSIS — E78.1 HIGH BLOOD TRIGLYCERIDES: ICD-10-CM

## 2023-01-16 DIAGNOSIS — E78.00 HIGH BLOOD CHOLESTEROL: ICD-10-CM

## 2023-01-16 DIAGNOSIS — I10 HYPERTENSION GOAL BP (BLOOD PRESSURE) < 140/90: ICD-10-CM

## 2023-01-16 DIAGNOSIS — E11.69 TYPE 2 DIABETES MELLITUS WITH OTHER SPECIFIED COMPLICATION, WITH LONG-TERM CURRENT USE OF INSULIN (H): ICD-10-CM

## 2023-01-17 RX ORDER — FENOFIBRATE 160 MG/1
160 TABLET ORAL DAILY
Qty: 15 TABLET | Refills: 0 | Status: SHIPPED | OUTPATIENT
Start: 2023-01-17 | End: 2023-02-17

## 2023-01-17 RX ORDER — INSULIN GLARGINE 300 U/ML
INJECTION, SOLUTION SUBCUTANEOUS
Qty: 48 ML | Refills: 0 | Status: SHIPPED | OUTPATIENT
Start: 2023-01-17 | End: 2023-02-17

## 2023-01-17 RX ORDER — AMOXICILLIN 250 MG
CAPSULE ORAL
Qty: 60 TABLET | Refills: 0 | Status: SHIPPED | OUTPATIENT
Start: 2023-01-17 | End: 2023-03-22

## 2023-01-17 RX ORDER — ATORVASTATIN CALCIUM 80 MG/1
80 TABLET, FILM COATED ORAL DAILY
Qty: 15 TABLET | Refills: 0 | Status: SHIPPED | OUTPATIENT
Start: 2023-01-17 | End: 2023-02-17

## 2023-01-17 RX ORDER — FUROSEMIDE 20 MG
20 TABLET ORAL 2 TIMES DAILY
Qty: 30 TABLET | Refills: 0 | Status: SHIPPED | OUTPATIENT
Start: 2023-01-17 | End: 2023-03-22

## 2023-02-10 ENCOUNTER — OFFICE VISIT (OUTPATIENT)
Dept: FAMILY MEDICINE | Facility: CLINIC | Age: 63
End: 2023-02-10
Payer: MEDICARE

## 2023-02-10 VITALS
HEIGHT: 71 IN | OXYGEN SATURATION: 96 % | BODY MASS INDEX: 42.28 KG/M2 | WEIGHT: 302 LBS | TEMPERATURE: 97 F | RESPIRATION RATE: 16 BRPM | HEART RATE: 84 BPM | SYSTOLIC BLOOD PRESSURE: 134 MMHG | DIASTOLIC BLOOD PRESSURE: 72 MMHG

## 2023-02-10 DIAGNOSIS — E11.69 TYPE 2 DIABETES MELLITUS WITH OTHER SPECIFIED COMPLICATION, WITH LONG-TERM CURRENT USE OF INSULIN (H): ICD-10-CM

## 2023-02-10 DIAGNOSIS — E66.01 MORBID OBESITY (H): ICD-10-CM

## 2023-02-10 DIAGNOSIS — N18.4 CKD (CHRONIC KIDNEY DISEASE) STAGE 4, GFR 15-29 ML/MIN (H): ICD-10-CM

## 2023-02-10 DIAGNOSIS — G63 POLYNEUROPATHY ASSOCIATED WITH UNDERLYING DISEASE (H): ICD-10-CM

## 2023-02-10 DIAGNOSIS — E78.00 HIGH BLOOD CHOLESTEROL: ICD-10-CM

## 2023-02-10 DIAGNOSIS — M62.81 GENERALIZED MUSCLE WEAKNESS: Primary | ICD-10-CM

## 2023-02-10 DIAGNOSIS — I73.9 PERIPHERAL VASCULAR DISEASE (H): ICD-10-CM

## 2023-02-10 DIAGNOSIS — Z79.4 TYPE 2 DIABETES MELLITUS WITH OTHER SPECIFIED COMPLICATION, WITH LONG-TERM CURRENT USE OF INSULIN (H): ICD-10-CM

## 2023-02-10 DIAGNOSIS — I10 HYPERTENSION GOAL BP (BLOOD PRESSURE) < 140/90: ICD-10-CM

## 2023-02-10 PROCEDURE — 99213 OFFICE O/P EST LOW 20 MIN: CPT | Performed by: PHYSICIAN ASSISTANT

## 2023-02-10 ASSESSMENT — ACTIVITIES OF DAILY LIVING (ADL)
CURRENT_FUNCTION: NEEDS ASSISTANCE
CURRENT_FUNCTION: NEEDS ASSISTANCE

## 2023-02-10 ASSESSMENT — PAIN SCALES - GENERAL: PAINLEVEL: MODERATE PAIN (5)

## 2023-02-10 NOTE — PROGRESS NOTES
"  Assessment & Plan       ICD-10-CM    1. Generalized muscle weakness  M62.81 Primary Care - Care Coordination Referral      2. Type 2 diabetes mellitus with other specified complication, with long-term current use of insulin (H)  E11.69 Primary Care - Care Coordination Referral    Z79.4 Adult Endocrinology  Referral      3. High blood cholesterol  E78.00 Primary Care - Care Coordination Referral      4. Peripheral vascular disease (H)  I73.9 Primary Care - Care Coordination Referral      5. Hypertension goal BP (blood pressure) < 140/90  I10 Primary Care - Care Coordination Referral      6. CKD (chronic kidney disease) stage 4, GFR 15-29 ml/min (H)  N18.4 Primary Care - Care Coordination Referral      7. Polyneuropathy associated with underlying disease (H)  G63 Primary Care - Care Coordination Referral      8. Morbid obesity (H)  E66.01 Primary Care - Care Coordination Referral      Had a lengthy discussion with patient and his daughter.  I encouraged him to follow with endocrinology due to uncontrolled diabetes a.  He will continue care with his nephrologist due to his kidney disease it.  I encouraged him to establish care with  due to his complex medical history.  I did put a referral for care coordination.    BMI:   Estimated body mass index is 42.12 kg/m  as calculated from the following:    Height as of this encounter: 1.803 m (5' 11\").    Weight as of this encounter: 137 kg (302 lb).   Weight management plan: Discussed healthy diet and exercise guidelines      Return in about 2 weeks (around 2/24/2023) for recheck.    ROMA[facility]   Roberto is a 62 year old accompanied by his daughter, who has a history of uncontrolled diabetes, high cholesterol, peripheral vascular disease, hypertension, CKD stage IV, neuropathy and obesity who presenting for the following health issues:  Hospital F/U      [facility]       Hospital Follow-up " "Visit:    Hospital/Nursing Home/IP Rehab Facility: [facility]  Date of Admission: 02/04/2023  Date of Discharge: 02/06/2023  Reason(s) for Admission: weakness    Was your hospitalization related to COVID-19? No   Problems taking medications regularly:  None  Medication changes since discharge: None  Problems adhering to non-medication therapy:  None    Summary of hospitalization:  CareEverywhere information obtained and reviewed  Diagnostic Tests/Treatments reviewed.  Follow up needed: none  Other Healthcare Providers Involved in Patient s Care:         None  Update since discharge: stable.     It is unclear if they have an endocrinologist.  He does see a nephrologist.  Overall he states he is feeling better since he was in the hospital.  He states he really does not have a primary provider.    Plan of care communicated with patient           Annual Wellness Visit    Patient has been advised of split billing requirements and indicates understanding: Yes     Are you in the first 12 months of your Medicare Part B coverage?  No    Physical Health:    In general, how would you rate your overall physical health? fair    Outside of work, how many days during the week do you exercise?none    Outside of work, approximately how many minutes a day do you exercise?not applicable    If you drink alcohol do you typically have >3 drinks per day or >7 drinks per week? No    Do you usually eat at least 4 servings of fruit and vegetables a day, include whole grains & fiber and avoid regularly eating high fat or \"junk\" foods? Yes    Do you have any problems taking medications regularly? No    Do you have any side effects from medications? none    Needs assistance for the following daily activities: transportation and housework    Which of the following safety concerns are present in your home?  none identified     Hearing impairment: No    In the past 6 months, have you been bothered by leaking of urine? no    Mental Health:    In " "general, how would you rate your overall mental or emotional health? fair  PHQ-2 Score:      Do you feel safe in your environment? Yes    Have you ever done Advance Care Planning? (For example, a Health Directive, POLST, or a discussion with a medical provider or your loved ones about your wishes)? No, advance care planning information given to patient to review.  Patient declined advance care planning discussion at this time.    Fall risk:  Fallen 2 or more times in the past year?: Yes  Any fall with injury in the past year?: No    Cognitive Screening: no impairment noted    Do you have sleep apnea, excessive snoring or daytime drowsiness?: yes    Current providers sharing in care for this patient include:   Patient Care Team:  Jaylyn Bellamy MD as PCP - General (Family Practice)  Jaylyn Bellamy MD as Assigned PCP  Oneida Abraham RN as Diabetes Educator (Diabetes Education)        Review of Systems   Constitutional, HEENT, cardiovascular, pulmonary, gi and gu systems are negative, except as otherwise noted.      Objective    /72   Pulse 84   Temp 97  F (36.1  C) (Tympanic)   Resp 16   Ht 1.803 m (5' 11\")   Wt 137 kg (302 lb)   SpO2 96%   BMI 42.12 kg/m    Body mass index is 42.12 kg/m .  Physical Exam   GENERAL: healthy, alert and no distress  NECK: no adenopathy, no asymmetry, masses, or scars and thyroid normal to palpation  RESP: lungs clear to auscultation - no rales, rhonchi or wheezes  CV: regular rate and rhythm, normal S1 S2, no S3 or S4, no murmur, click or rub, no peripheral edema and peripheral pulses strong  ABDOMEN: soft, nontender, no hepatosplenomegaly, no masses and bowel sounds normal    Labs reviewed                "

## 2023-02-13 ENCOUNTER — PATIENT OUTREACH (OUTPATIENT)
Dept: CARE COORDINATION | Facility: CLINIC | Age: 63
End: 2023-02-13
Payer: MEDICARE

## 2023-02-13 NOTE — PROGRESS NOTES
Clinic Care Coordination Contact  [facility]/Voicemail       Clinical Data: Care Coordinator Outreach  Outreach attempted x 1.  Left message on patient's voicemail with call back information and requested return call.  Plan: Care Coordinator will try to reach patient again in 1-2 business days.    CHANTEL Bridges  [facility] Care Coordination  [facility]  [phone]

## 2023-02-14 NOTE — PROGRESS NOTES
Clinic Care Coordination Contact  Community Health Worker Initial Outreach    Spoke to Patient (Roberto)     CHW Introduced intent of call regarding PCP referral    CHW introduce Clinic Care Coordination and Patient responded that support is needed in obtaining [employer] insurance due to Patient believes he was receiving more benefits and did not have to pay out of pocket on medications. Patient expressed needing support in understanding current coverage plan and benefits for current insurance coverage or if [employer] was a better option for Patient.     CHW acknowledged and scheduled Patient for an CCC Phone Visit with AN CCC SW for an [facility] Assessment on 02/16/2023 at 11:00AM. Patient acknowledged.     CHW acknowledged and provided Patient with CHW contact information for additional support needed.    CHW Initial Information Gathering:  Referral Source: PCP  Current living arrangement:: I live in a private home with spouse  Type of residence:: Private home - [facility]  Community Resources: None  Informal Support system:: Family  No PCP office visit in Past Year: No  CHW Additional Questions  If ED/Hospital discharge, follow-up appointment scheduled as recommended?: N/A  Medication changes made following ED/Hospital discharge?: N/A  MyChart active?: No  Patient agreeable to assistance with activating MyChart?: No    Patient accepts CC: Yes. Patient scheduled for assessment with AN [facility] SW on 02/16/2023 at 11:00AM. Patient noted desire to discuss support is needed in obtaining [employer] insurance due to Patient believes he was receiving more benefits and did not have to pay out of pocket on medications. Patient expressed needing support in understanding current coverage plan and benefits for current insurance coverage or if [employer] was a better option for Patient.     CHANTEL Dennis  Clinic Care Coordination   [facility]   Phone: [phone]  [email or web address]

## 2023-02-16 ENCOUNTER — PATIENT OUTREACH (OUTPATIENT)
Dept: CARE COORDINATION | Facility: CLINIC | Age: 63
End: 2023-02-16

## 2023-02-16 ENCOUNTER — PATIENT OUTREACH (OUTPATIENT)
Dept: NURSING | Facility: CLINIC | Age: 63
End: 2023-02-16
Payer: MEDICARE

## 2023-02-16 NOTE — PROGRESS NOTES
Clinic Care Coordination Contact    Clinic Care Coordination Contact  OUTREACH    Referral Information:  Referral Source: PCP    Primary Diagnosis: Psychosocial    Chief Complaint   Patient presents with     Clinic Care Coordination - Initial     Josemarcelino CHANCE        [place] Utilization: Nadine Anaya  Clinic Utilization  No PCP office visit in Past Year: No  Utilization    Hospital Admissions  0             ED Visits  0             No Show Count (past year)  1                Current as of: 2/15/2023 10:58 PM              Clinical Concerns:  Current Medical Concerns:    Patient Active Problem List   Diagnosis     Morbid obesity (H)     Type 2 diabetes mellitus with other specified complication, with long-term current use of insulin (H)     Hypertension goal BP (blood pressure) < 140/90     High blood cholesterol     Vitamin D deficiency     High blood triglycerides     Toe amputation status, right     KELBY (obstructive sleep apnea)     Peripheral neuropathy     Peripheral vascular disease (H)     Moderate nonproliferative diabetic retinopathy of both eyes with macular edema (H)     Encounter for long-term (current) insulin use (H)     Burn of skin     CKD (chronic kidney disease) stage 4, GFR 15-29 ml/min (H)     Polyneuropathy associated with underlying disease (H)         Current Behavioral Concerns: None    Education Provided to patient: Introduced self and role      Health Maintenance Reviewed: Due/Overdue   Health Maintenance Due   Topic Date Due     COVID-19 Vaccine (1) Never done     MEDICARE ANNUAL WELLNESS VISIT  Never done     ZOSTER IMMUNIZATION (1 of 2) Never done     BMP  05/09/2022     MICROALBUMIN  05/09/2022     HEMOGLOBIN  08/09/2022     LIPID  11/02/2022     DIABETIC FOOT EXAM  11/02/2022     EYE EXAM  12/13/2022       Clinical Pathway: None    Medication Management:  Did not review medications    Functional Status:       Living Situation:  Current living arrangement:: I live  in a private home with spouse  Type of residence:: Private home - stairs    Lifestyle & Psychosocial Needs: [facility] contacted patient with [place]  at scheduled assessment time. No answer. Left VM. [facility] called patient on his other listed phone number with . This time, patient answered. Introduced self and role. Discussed consult reason. Patient stated that he had [employer] in the past and wants to get back on it. He reports that he feels that this is why he is paying for medications out of pocket. He stated that the last time he went to the pharmacy, he paid $60 for a big bag of medications between him and his wife. [facility] explained that it appears that he has Medicare and Medicaid. Explained that what he paid out of pocket is likely the copay for the medications themselves. [facility] then explained that she does not believe that open enrollment is taking place for PMAP plans. Unsure about Medicare advantage plans, but did encourage patient to reach out to [employer] [facility]. Patient stated that [facility] was wasting his time. [facility] provided the number to [facility] [employer] [facility]. No further questions. Patient wanted no further involvement.     Social Determinants of Health     Tobacco Use: Low Risk      Smoking Tobacco Use: Never     Smokeless Tobacco Use: Never     Passive Exposure: Not on file   Alcohol Use: Not on file   Financial Resource Strain: Not on file   Food Insecurity: Not on file   Transportation Needs: Not on file   Physical Activity: Not on file   Stress: Not on file   Social Connections: Not on file   Intimate Partner Violence: Not on file   Depression: Not at risk     PHQ-2 Score: 1   Housing Stability: Not on file              Informal Support system:: Family      Resources and Interventions:  Current Resources:      Community Resources: None     Care Plan: No care plan was created, as patient declined enrollment.     Patient/Caregiver understanding: Yes       Future Appointments               Tomorrow Marianela Maldonado MD; VIDEO,  [facility] LATASHA Suarez    In 3 weeks Flavia Beltre PA-C [facility]          Plan: Patient was not enrolled with [facility]. He felt [facility] was wasting his time. He will contact [employer] [facility] to get more details on open enrollment process and see if there is any way he can get [facility] again this year. No further questions. Encouraged patient to reach out to care team if further needs arise    Jose Lauren, JOSE F  Primary Care Clinic- Social Work Care Coordinator  [facility]  Ph: [phone]  2/16/2023 11:59 AM

## 2023-02-17 ENCOUNTER — OFFICE VISIT (OUTPATIENT)
Dept: FAMILY MEDICINE | Facility: CLINIC | Age: 63
End: 2023-02-17
Payer: MEDICARE

## 2023-02-17 VITALS
WEIGHT: 304 LBS | DIASTOLIC BLOOD PRESSURE: 80 MMHG | TEMPERATURE: 97.6 F | RESPIRATION RATE: 16 BRPM | SYSTOLIC BLOOD PRESSURE: 130 MMHG | OXYGEN SATURATION: 95 % | HEIGHT: 71 IN | BODY MASS INDEX: 42.56 KG/M2 | HEART RATE: 80 BPM

## 2023-02-17 DIAGNOSIS — I73.9 PERIPHERAL VASCULAR DISEASE (H): ICD-10-CM

## 2023-02-17 DIAGNOSIS — Z79.4 TYPE 2 DIABETES MELLITUS WITH OTHER SPECIFIED COMPLICATION, WITH LONG-TERM CURRENT USE OF INSULIN (H): Primary | ICD-10-CM

## 2023-02-17 DIAGNOSIS — E78.1 HYPERTRIGLYCERIDEMIA: ICD-10-CM

## 2023-02-17 DIAGNOSIS — N18.4 CKD (CHRONIC KIDNEY DISEASE) STAGE 4, GFR 15-29 ML/MIN (H): ICD-10-CM

## 2023-02-17 DIAGNOSIS — E78.00 HIGH BLOOD CHOLESTEROL: ICD-10-CM

## 2023-02-17 DIAGNOSIS — I10 HYPERTENSION GOAL BP (BLOOD PRESSURE) < 140/90: ICD-10-CM

## 2023-02-17 DIAGNOSIS — E11.69 TYPE 2 DIABETES MELLITUS WITH OTHER SPECIFIED COMPLICATION, WITH LONG-TERM CURRENT USE OF INSULIN (H): Primary | ICD-10-CM

## 2023-02-17 PROCEDURE — 99215 OFFICE O/P EST HI 40 MIN: CPT | Performed by: FAMILY MEDICINE

## 2023-02-17 RX ORDER — ATORVASTATIN CALCIUM 80 MG/1
80 TABLET, FILM COATED ORAL DAILY
Qty: 15 TABLET | Refills: 0 | Status: SHIPPED | OUTPATIENT
Start: 2023-02-17 | End: 2023-03-20

## 2023-02-17 RX ORDER — INSULIN GLARGINE 300 U/ML
INJECTION, SOLUTION SUBCUTANEOUS
Qty: 48 ML | Refills: 0 | Status: SHIPPED | OUTPATIENT
Start: 2023-02-17 | End: 2023-05-15

## 2023-02-17 RX ORDER — INSULIN LISPRO 200 [IU]/ML
INJECTION, SOLUTION SUBCUTANEOUS
Qty: 180 ML | Refills: 1 | Status: SHIPPED | OUTPATIENT
Start: 2023-02-17 | End: 2023-05-18 | Stop reason: ALTCHOICE

## 2023-02-17 RX ORDER — ASPIRIN 81 MG/1
81 TABLET ORAL DAILY
Qty: 90 TABLET | Refills: 1 | Status: SHIPPED | OUTPATIENT
Start: 2023-02-17

## 2023-02-17 RX ORDER — FENOFIBRATE 160 MG/1
160 TABLET ORAL DAILY
Qty: 15 TABLET | Refills: 0 | Status: SHIPPED | OUTPATIENT
Start: 2023-02-17 | End: 2023-03-20

## 2023-02-17 RX ORDER — OMEGA-3-ACID ETHYL ESTERS 1 G/1
1 CAPSULE, LIQUID FILLED ORAL 2 TIMES DAILY
Qty: 90 CAPSULE | Refills: 1 | Status: SHIPPED | OUTPATIENT
Start: 2023-02-17 | End: 2024-09-19

## 2023-02-17 RX ORDER — CARVEDILOL 12.5 MG/1
12.5 TABLET ORAL 2 TIMES DAILY WITH MEALS
Qty: 180 TABLET | Refills: 0 | Status: SHIPPED | OUTPATIENT
Start: 2023-02-17 | End: 2023-05-05

## 2023-02-17 RX ORDER — OMEGA-3-ACID ETHYL ESTERS 1 G/1
1 CAPSULE, LIQUID FILLED ORAL 2 TIMES DAILY
COMMUNITY
Start: 2023-01-16 | End: 2023-02-17

## 2023-02-17 ASSESSMENT — PAIN SCALES - GENERAL: PAINLEVEL: NO PAIN (0)

## 2023-02-17 NOTE — PROGRESS NOTES
Assessment & Plan     Type 2 diabetes mellitus with other specified complication, with long-term current use of insulin (H)   I had a long discussion with the patient about his concerns. Diabetes is not well controlled despite large dose of insulin .He is compliant with his medications but makes poor dietary choices. Diabetes is poorly controlled and associated with multiple complications   - Insulin Toujeo 160 units at night   - Humalog 62 3 times daily    I recommend the following :  1. Patient education: In depth discussion and education was provided about the assessment and implications of each of the below recommendations for management. Patient indicated readiness to learn, all questions were answered and understanding of material presented was confirmed.  2. -Increase exercise as tolerated.   3. Avoid high glycemic index diet  4. ASA taking   5. -BP: controlled   6. -NAFL/CASAS: last LFT showed no evidence   7. -Lipids: on statin  Last LDL 88  8. Medications: continue the same medication for now refill provided   9. Referral / follow up with other providers: Endocrinology, Nephrology and Cardiology   10. Follow up:  1 months with PCP Dr Randolph     - Adult Endocrinology [facility] Referral; Future  - insulin glargine U-300 (TOUJEO MAX SOLOSTAR) 300 UNIT/ML (2 units dial) pen; Inject 160 Units Subcutaneous every evening. Needs to be seen for further refills Strength: 300 UNIT/ML  - Insulin Lispro (HUMALOG KWIKPEN) 200 UNIT/ML soln; INJECT 60 UNITS 3 TIMES DAILY WITH MEALS PLUS THE SCALE WITH EACH MEAL.  - dulaglutide (TRULICITY) 0.75 MG/0.5ML pen; Inject 0.75 mg Subcutaneous every 7 days    CKD (chronic kidney disease) stage 4, GFR 15-29 ml/min (H)  Follows with [facility] per daughter he was seen recently   CKD 4 secondary to diabetic nephropathy  Per nephrology visit last year He will undoubtedly need dialysis within his lifetime, GFR currently 29 mL/min, defer placement of AVF. Patient does not want  to be on dialysis     - Adult Nephrology  Referral; Future    Peripheral vascular disease (H)  secondary to uncontrolled diabetes   He gets intermittent chest pain , no current chest pain,   Advised to call 911 and go to the ED if he develops chest pain    - Adult Cardiology Eval  Referral; Future  - aspirin 81 MG EC tablet; Take 1 tablet (81 mg) by mouth daily    Hypertension goal BP (blood pressure) < 140/90  Refill Coreg   Follow up with cardiology   - Adult Cardiology Eval  Referral; Future  - carvedilol (COREG) 12.5 MG tablet; Take 1 tablet (12.5 mg) by mouth 2 times daily (with meals)    High blood cholesterol  - atorvastatin (LIPITOR) 80 MG tablet; Take 1 tablet (80 mg) by mouth daily Needs to be seen for further refills  - fenofibrate (TRIGLIDE/LOFIBRA) 160 MG tablet; Take 1 tablet (160 mg) by mouth daily Needs to be seen for further refills    Hypertriglyceridemia  - omega-3 acid ethyl esters (LOVAZA) 1 g capsule; Take 1 capsule by mouth 2 times daily  - fenofibrate (TRIGLIDE/LOFIBRA) 160 MG tablet; Take 1 tablet (160 mg) by mouth daily Needs to be seen for further refills    Review of external notes as documented elsewhere in note  Diagnosis or treatment significantly limited by social determinants of health - language barrier   Prescription drug management  41 minutes spent on the date of the encounter doing chart review, history and exam, documentation and further activities per the note     Blood sugar testing frequency justification:  Uncontrolled diabetes  Work on weight loss  Regular exercise    Return in about 4 weeks (around 3/17/2023) for Follow up, in person with PCP Dr Rosales .    Marianela Maldonado MD  [facility]   Roberto is a 62 year old accompanied by his daughter, presenting for the following health issues:  Diabetes and Hypertension      History of Present Illness       Back Pain:  He presents for follow up of back pain. Patient's  back pain is a recurring problem.  Location of back pain:  Right lower back and left lower back  Description of back pain: dull ache and stabbing  Back pain spreads: right knee, left knee, right foot, left foot, right shoulder, left shoulder, right side of neck and left side of neck    Since patient first noticed back pain, pain is: gradually worsening  Does back pain interfere with his job:  No      CKD: He is not using over the counter pain medicine.     Diabetes:   He presents for follow up of diabetes.  He is checking home blood glucose three times daily. He checks blood glucose before meals, after meals, before and after meals and at bedtime.  Blood glucose is sometimes over 200 and never under 70. When his blood glucose is low, the patient is asymptomatic for confusion, blurred vision, lethargy and reports not feeling dizzy, shaky, or weak.  He is concerned about other.  He is having numbness in feet, blurry vision and weight gain. The patient has had a diabetic eye exam in the last 12 months.         Heart Failure:  He presents for follow up of heart failure. He is experiencing shortness of breath with activity only, which is same as usual. He is experiencing lower extremity edema which is same as usual. He denies orthopenea and is not coughing at night. Patient is not checking weight daily. He has recently had a weight increase. He has side effects from medications including other. He has has a medical visit for heart failure 1 time since the last visit.    Hypertension: He presents for follow up of hypertension.  He does check blood pressure  regularly outside of the clinic. Outside blood pressures have been over 140/90. He follows a low salt diet.     Headaches:   Since the patient's last clinic visit, headaches are: no change  The patient is getting headaches:  1  He is not able to do normal daily activities when he has a migraine.  The patient is taking the following rescue/relief medications:  Tylenol  "  Patient states \"I get only a small amount of relief\" from the rescue/relief medications.   The patient is taking the following medications to prevent migraines:  Other  In the past 4 weeks, the patient has gone to an Urgent Care or Emergency Room 0 times times due to headaches.    Vascular Disease:  He presents for follow up of vascular disease.  He takes nitroglycerin occasionally. He takes daily aspirin.        Type 2 diabetes mellitus with diabetic neuropathy  - VERY insulin resistant   - diabetes is uncontrolled     Medication :  - Insulin Toujeo 160 units at night   - Humalog 62 3 times daily     Lab Results   Component Value Date    A1C 11.2 02/09/2022    A1C 11.0 11/02/2021    A1C 8.7 05/26/2021    A1C 9.2 10/27/2020    A1C 9.6 08/09/2020    A1C 9.3 04/03/2020    A1C 8.2 08/12/2019             Review of Systems   Constitutional, HEENT, cardiovascular, pulmonary, gi and gu systems are negative, except as otherwise noted.      Objective    /80   Pulse 80   Temp 97.6  F (36.4  C) (Tympanic)   Resp 16   Ht 1.803 m (5' 11\")   Wt 137.9 kg (304 lb)   SpO2 95%   BMI 42.40 kg/m    Body mass index is 42.4 kg/m .  Physical Exam  Vitals and nursing note reviewed.   Constitutional:       General: He is not in acute distress.     Appearance: Normal appearance. He is obese. He is not ill-appearing, toxic-appearing or diaphoretic.   HENT:      Head: Normocephalic and atraumatic.                            "

## 2023-02-22 ENCOUNTER — TELEPHONE (OUTPATIENT)
Dept: CARDIOLOGY | Facility: CLINIC | Age: 63
End: 2023-02-22

## 2023-02-22 NOTE — TELEPHONE ENCOUNTER
Called [facility] to have patients images US Carotid 09/12/17 and 10/06/20, NM Cardiac Stress 10/02/17, Echo 10/02/17 and US Arterial 09/11/17 to be pushed over to  PACS.      CLIF Rehman   Cardiology Team  [facility]

## 2023-02-24 ENCOUNTER — OFFICE VISIT (OUTPATIENT)
Dept: CARDIOLOGY | Facility: CLINIC | Age: 63
End: 2023-02-24
Attending: FAMILY MEDICINE
Payer: MEDICARE

## 2023-02-24 VITALS — SYSTOLIC BLOOD PRESSURE: 146 MMHG | DIASTOLIC BLOOD PRESSURE: 80 MMHG | HEART RATE: 80 BPM | OXYGEN SATURATION: 96 %

## 2023-02-24 DIAGNOSIS — I20.89 STABLE ANGINA (H): ICD-10-CM

## 2023-02-24 DIAGNOSIS — I10 HYPERTENSION GOAL BP (BLOOD PRESSURE) < 140/90: ICD-10-CM

## 2023-02-24 DIAGNOSIS — I73.9 PERIPHERAL VASCULAR DISEASE (H): ICD-10-CM

## 2023-02-24 DIAGNOSIS — N18.4 CHRONIC KIDNEY DISEASE, STAGE 4 (SEVERE) (H): ICD-10-CM

## 2023-02-24 DIAGNOSIS — R06.09 DOE (DYSPNEA ON EXERTION): Primary | ICD-10-CM

## 2023-02-24 LAB
CHOLEST SERPL-MCNC: 303 MG/DL
FASTING STATUS PATIENT QL REPORTED: NO
HDLC SERPL-MCNC: 38 MG/DL
LDLC SERPL CALC-MCNC: 75 MG/DL
LDLC SERPL CALC-MCNC: ABNORMAL MG/DL
NONHDLC SERPL-MCNC: 265 MG/DL
NT-PROBNP SERPL-MCNC: 94 PG/ML (ref 0–900)
TRIGL SERPL-MCNC: 1186 MG/DL

## 2023-02-24 PROCEDURE — 83721 ASSAY OF BLOOD LIPOPROTEIN: CPT | Mod: XU | Performed by: INTERNAL MEDICINE

## 2023-02-24 PROCEDURE — 83880 ASSAY OF NATRIURETIC PEPTIDE: CPT | Performed by: INTERNAL MEDICINE

## 2023-02-24 PROCEDURE — 80061 LIPID PANEL: CPT | Performed by: INTERNAL MEDICINE

## 2023-02-24 PROCEDURE — 36415 COLL VENOUS BLD VENIPUNCTURE: CPT | Mod: 59 | Performed by: INTERNAL MEDICINE

## 2023-02-24 PROCEDURE — 99205 OFFICE O/P NEW HI 60 MIN: CPT | Performed by: INTERNAL MEDICINE

## 2023-02-24 RX ORDER — ISOSORBIDE MONONITRATE 30 MG/1
30 TABLET, EXTENDED RELEASE ORAL DAILY
Qty: 90 TABLET | Refills: 3 | Status: SHIPPED | OUTPATIENT
Start: 2023-02-24 | End: 2023-11-10

## 2023-02-24 NOTE — RESULT ENCOUNTER NOTE
The test result shows very high triglycerides and cholesterol.  The main focus should be on better control of diabetes which will help.  Please ensure that patient is taking his statin, fenofibrate, and fish oil that was recently prescribed.

## 2023-02-24 NOTE — PROGRESS NOTES
[facility] Cardiology Consultation:    Assessment and Plan:     1.  Chest pain, multiple CAD risk factors  Nuclear stress test with CT attenuation at [facility]  Echo  Start Imdur to help with possible angina and lower BP  2. Hypertension, uncontrolled  On Coreg and Aldactone  Adding Imdur  3. Diabetes, uncontrolled: On insulin, GLP-1 agonist.  Encouraged to keep endocrinology consultation  4. CKD stage IV, presumably diabetic nephropathy.  Encouraged to keep nephrology consultation  On Lasix 20 twice daily  5. Mixed dyslipidemia, hypercholesterolemia and hypertriglyceridemia  Main contributor is uncontrolled diabetes.  On Lipitor 80 and fenofibrate 160  Lovaza 2 g started recently, unsure if taking  6. Severe obesity  7. KELBY on CPAP  8. Mild carotid stenosis  On aspirin and statin    A total of 60 minutes were spent on the day of the visit for chart review, care coordination, face-to-face consultation with the patient, and documentation.    Mitch Reynaga MD    Cardiac Imaging and Prevention  [facility]  [email or web address] Pager: [phone]    HPI: Referred by primary care for evaluation of intermittent chest pains.  History obtained via Japanese  on the phone.  He is accompanied by his wife.  History is significant for hypertension, uncontrolled diabetes, CKD stage IV, and dyslipidemia.  He was admitted earlier this month at [facility] with a diabetic foot infection.  He tells me that he usually uses a wheelchair and does not walk much.  He reports occasional chest pain.  Was difficult to get him to describe it further.  Labs checked last year showed normal electrolytes, creatinine of 2.48 with GFR of 28.  Lipid profile checked in 2021 showed a total cholesterol of 261, HDL cholesterol of 36, LDL could not be calculated, triglycerides of 750.  Labs checked recently at [facility] in [facility] everywhere shows sodium of 131, potassium of 4.1, creatinine of 3.02 with  GFR of 22, hemoglobin of 12.5, hemoglobin A1c of 11.4  Carotid Doppler in 2020 showed mild plaque, less than 50% stenosis on the right and 50% stenosis on the left.  Pharmacologic nuclear stress test in 2017 was normal, with no ischemia or infarction.  Echo done with María in 2017 was normal, with normal cardiac function, normal diastolic function, and no other structural abnormality.    EXAM:  BP (!) 146/80 (BP Location: Left arm, Patient Position: Sitting, Cuff Size: Adult Regular)   Pulse 80   SpO2 96%   GEN/CONSTITUIONAL: Appears comfortable, in no apparent distress   EYES: No icterus  ENT/MOUTH: Normal  JVP:  Not visible  RESPIRATORY: Clear to auscultation bilaterally   CARDIOVASCULAR: Regular S1 and S2, no murmurs, rubs, or gallops.   ABDOMEN: Soft, non-tender, positive bowel sounds   NEUROLOGIC: Grossly non-focal   PSYCHIATRIC: Normal affect  EXT: No cyanosis, clubbing, edema. Normal pedal pulses.  Skin: No petechiae, purpura or rash    PAST MEDICAL HISTORY:  Past Medical History:   Diagnosis Date     Diabetes (H)        CURRENT MEDICATIONS:  Current Outpatient Medications   Medication     ACCU-CHEK GUIDE test strip     ACE/ARB/ARNI NOT PRESCRIBED (INTENTIONAL)     alcohol swab prep pads     Alcohol Swabs (ALCOHOL PREP) 70 % PADS     aspirin 81 MG EC tablet     atorvastatin (LIPITOR) 80 MG tablet     blood glucose (ACCU-CHEK GUIDE) test strip     blood glucose (NO BRAND SPECIFIED) test strip     blood glucose calibration (NO BRAND SPECIFIED) solution     blood glucose calibration (NO BRAND SPECIFIED) solution     blood glucose monitoring (ACCU-CHEK FASTCLIX) lancets     blood glucose monitoring (NO BRAND SPECIFIED) meter device kit     blood glucose monitoring (NO BRAND SPECIFIED) meter device kit     carvedilol (COREG) 12.5 MG tablet     Cholecalciferol (VITAMIN D) 2000 units tablet     dulaglutide (TRULICITY) 0.75 MG/0.5ML pen     fenofibrate (TRIGLIDE/LOFIBRA) 160 MG tablet     furosemide (LASIX) 20 MG  tablet     GLOBAL EASE INJECT PEN NEEDLES 31G X 5 MM miscellaneous     insulin glargine U-300 (TOUJEO MAX SOLOSTAR) 300 UNIT/ML (2 units dial) pen     Insulin Lispro (HUMALOG KWIKPEN) 200 UNIT/ML soln     omega-3 acid ethyl esters (LOVAZA) 1 g capsule     Omega-3 Fatty Acids (FISH OIL) 1200 MG capsule     order for DME     senna-docusate (SENEXON-S) 8.6-50 MG tablet     silver sulfADIAZINE (SILVADENE) 1 % external cream     spironolactone (ALDACTONE) 25 MG tablet     thin (NO BRAND SPECIFIED) lancets     [employer]Care Alcohol Swabs 70 % PADS     vitamin D2 (ERGOCALCIFEROL) 70219 units (1250 mcg) capsule     Current Facility-Administered Medications   Medication     silver sulfADIAZINE (SILVADENE) 1 % cream     triamcinolone (KENALOG-40) injection 40 mg       PAST SURGICAL HISTORY:  Past Surgical History:   Procedure Laterality Date     APPENDECTOMY       ENT SURGERY      tonsils     ORTHOPEDIC SURGERY      thumb     ORTHOPEDIC SURGERY      2 toe amputations       ALLERGIES     Allergies   Allergen Reactions     Doxycycline Other (See Comments) and Swelling     Facial swelling  facial   swelling    Facial swelling  facial   swelling       Influenza Vac Split [Flu Virus Vaccine] Itching     And red /blue arms       FAMILY HISTORY:  Family History   Problem Relation Age of Onset     Diabetes Mother      Hypertension Mother      Cerebrovascular Disease Mother      Kidney Disease Mother      Osteoporosis Mother      Diabetes Father      Prostate Cancer Father      Dementia Father        SOCIAL HISTORY:  Social History     Socioeconomic History     Marital status:      Spouse name: Not on file     Number of children: Not on file     Years of education: Not on file     Highest education level: Not on file   Occupational History     Not on file   Tobacco Use     Smoking status: Never     Smokeless tobacco: Never   Vaping Use     Vaping Use: Never used   Substance and Sexual Activity     Alcohol use: No     Drug use: No      Sexual activity: Yes     Partners: Female     Birth control/protection: None   Other Topics Concern     Parent/sibling w/ CABG, MI or angioplasty before 65F 55M? Not Asked   Social History Narrative     Not on file     Social Determinants of Health     Financial Resource Strain: Not on file   Food Insecurity: Not on file   Transportation Needs: Not on file   Physical Activity: Not on file   Stress: Not on file   Social Connections: Not on file   Intimate Partner Violence: Not on file   Housing Stability: Not on file       ROS:   Constitutional: No fever, chills, or sweats. No weight gain/loss   ENT: No visual disturbance, ear ache, epistaxis, sore throat  Allergies/Immunologic: Negative.   Respiratory: No cough, hemoptysia  Cardiovascular: As per HPI  GI: No nausea, vomiting, hematemesis, melena, or hematochezia  : No urinary frequency, dysuria, or hematuria  Integument: Negative  Psychiatric: Negative  Neuro: Negative  Endocrinology: Negative   Musculoskeletal: Negative    ADDITIONAL COMMENTS:     I reviewed the patient's medications:     I reviewed the patient's pertinent clinical laboratory studies:     I reviewed the patient's pertinent imaging studies:   I reviewed the patient's ECG:

## 2023-02-24 NOTE — PATIENT INSTRUCTIONS
Take your medicines every day, as directed    Changes made today:  Lab today  Start imdur 30 mg every day       Cardiology Care Coordinators:      Arianna Mcclure, RN  Terri Marks, RN     Maritza Saleem, RN    Cardiology Rooming staff:  SHABBIR Peralta    Phone  [phone]      Fax [phone]    To Contact us    During Business Hours:  [phone]     If you are needing refills please contact your pharmacy.     For urgent after hour care please call the [facility] at [phone] or the [facility] at [phone] and ask to speak to the cardiologist on call.         HOW TO CHECK YOUR BLOOD PRESSURE AT HOME:    Avoid eating, smoking, and exercising for at least 30 minutes before taking a reading.    Be sure you have taken your BP medication at least 2-3 hours before you check it.     Sit quietly for 10 minutes before a reading.     Sit in a chair with your feet flat on the floor. Rest your  arm on a table so that the arm cuff is at the same level as your heart.    Remain still during the reading.  Record your blood pressure and pulse in a log and bring to your next appointment.     Use [employer] allows you to communicate directly with your heart team through secure messaging.  [employer] can be accessed any time on your phone, computer, or tablet.  If you need assistance, we'd be happy to help!         Keep your Heart Appointments:    Schedule echo and nuclear stress test  Lab today  Return to clinic in 3 months

## 2023-02-24 NOTE — PROGRESS NOTES
Roberto Thompson's goals for this visit include:     He requests these members of his care team be copied on today's visit information: PCP    PCP: Jaylyn Bellamy    Referring Provider:  Marianela Maldonado MD  [id number] [place] [address]    BP (!) 146/80 (BP Location: Left arm, Patient Position: Sitting, Cuff Size: Adult Regular)   Pulse 80   SpO2 96%     Do you need any medication refills at today's visit? No.    Clarence Aguayo, EMT  Clinic Support  [facility]    [phone]    Employed by [employer]

## 2023-03-02 ENCOUNTER — TELEPHONE (OUTPATIENT)
Dept: CARDIOLOGY | Facility: CLINIC | Age: 63
End: 2023-03-02
Payer: MEDICARE

## 2023-03-02 NOTE — TELEPHONE ENCOUNTER
----- Message from Mitch Reynaga MD sent at 2/24/2023  4:08 PM CST -----  The test result shows very high triglycerides and cholesterol.  The main focus should be on better control of diabetes which will help.  Please ensure that patient is taking his statin, fenofibrate, and fish oil that was recently prescribed.      Called and spoke to patient's  regarding Dr Reynaga's above comments for lab tests. He repeated message back to me.   He will call and inform patient.     rAianna Mcclure RN  Cardiology Care Coordinator  [facility]  Phone: [phone]

## 2023-03-07 NOTE — PROGRESS NOTES
Outcome for 03/07/23 2:56 PM: Left Voicemail using [employer]   Rodney Mccallum CMA  Adult Endocrinology  [facility]

## 2023-03-09 ENCOUNTER — OFFICE VISIT (OUTPATIENT)
Dept: ENDOCRINOLOGY | Facility: CLINIC | Age: 63
End: 2023-03-09
Attending: PHYSICIAN ASSISTANT
Payer: MEDICARE

## 2023-03-09 VITALS
DIASTOLIC BLOOD PRESSURE: 74 MMHG | SYSTOLIC BLOOD PRESSURE: 144 MMHG | OXYGEN SATURATION: 95 % | BODY MASS INDEX: 42.26 KG/M2 | HEART RATE: 72 BPM | WEIGHT: 303 LBS

## 2023-03-09 DIAGNOSIS — Z79.4 TYPE 2 DIABETES MELLITUS WITH OTHER SPECIFIED COMPLICATION, WITH LONG-TERM CURRENT USE OF INSULIN (H): ICD-10-CM

## 2023-03-09 DIAGNOSIS — G63 POLYNEUROPATHY ASSOCIATED WITH UNDERLYING DISEASE (H): ICD-10-CM

## 2023-03-09 DIAGNOSIS — I20.89 STABLE ANGINA (H): Primary | ICD-10-CM

## 2023-03-09 DIAGNOSIS — E11.69 TYPE 2 DIABETES MELLITUS WITH OTHER SPECIFIED COMPLICATION, WITH LONG-TERM CURRENT USE OF INSULIN (H): ICD-10-CM

## 2023-03-09 DIAGNOSIS — N18.4 CKD (CHRONIC KIDNEY DISEASE) STAGE 4, GFR 15-29 ML/MIN (H): ICD-10-CM

## 2023-03-09 PROCEDURE — 99204 OFFICE O/P NEW MOD 45 MIN: CPT | Performed by: PHYSICIAN ASSISTANT

## 2023-03-09 NOTE — PATIENT INSTRUCTIONS
[facility]-Department of Endocrinology  Diabetes Educators:   Gela York, RN and Tonya Sim RN  Clinic Nurse: GIOVANNI Martinez  CMA's: Abel MORELN: Donna  Scheduling/Clinic phone number : [phone]   Clinic Fax: [phone]  On-Call Endocrine at the [place] (after hours/weekends): [phone] option 4    Please call the number below to schedule your labs.    [facility] [phone]   [facility] [phone]   [place] [phone]   [facility]  [phone]   [facility] [phone]   [place] [phone]   [facility]) [phone]   [facility]   [place] [phone]   [place] [phone]   [place] [phone]   [place] [phone]     Please reach out to the following centers to schedule your imaging appointment:       Imaging (DEXA, CT, MRI, XRAY)    [facility] ([facility] [place]) [phone]   [facility] ([place], [place]) [phone]   [facility] ([facility]) [phone]   [facility] ([facility]) [phone]     Appointment Reminders:  * Please bring meter with for staff to download  * If you are due ONLY for an A1C, it is scheduled with the nurse and will be done in clinic. You do not need to schedule a lab appointment. Fasting is not required for an A1C.  * Refill request should be submitted to your pharmacy. They will contact clinic for approval.

## 2023-03-09 NOTE — NURSING NOTE
Roberto Thompson's goals for this visit include:   Chief Complaint   Patient presents with     Consult     Diabetes type 2     He requests these members of his care team be copied on today's visit information: yes    PCP: Jaylyn Bellamy    Referring Provider:  Lyndon Camarillo PA-C  [address] [place] [address]    BP (!) 144/74 (BP Location: Left arm, Patient Position: Sitting, Cuff Size: Adult Large)   Pulse 72   Wt 137.4 kg (303 lb)   SpO2 95%   BMI 42.26 kg/m      Do you need any medication refills at today's visit? no    Rodney Mccallum [facility]  Adult Endocrinology  [facility]

## 2023-03-09 NOTE — LETTER
3/9/2023         RE: Roberto Thompson  [address] [place]  [place] [address]        Dear Colleague,    Thank you for referring your patient, Roberto Thompson, to the [facility]. Please see a copy of my visit note below.    Outcome for 03/07/23 2:56 PM: Left Voicemail using Persian   Rodney Mccallum CMA  Adult Endocrinology  [facility]      Assessment/Plan :   1. Uncontrolled Type 2 DM, with long term use of insulin. Roberto seems to be un-phased by his current persistent hyperglycemia. He is insistent that he is taking insulin, as prescribed every day. He states that his blood sugars are just high, no matter what he does. We could consider switching to Humulin R U-500 given his large amount of insulin resistance. However, I would like to get a better idea of his day to day blood sugars before making the switch. He is currently taking 0.75 mg of Trulicity once weekly. He doesn't feel like it is doing anything. I explained that he is currently on the lowest dose. I would like to titrate that medication up to 1.5 mg today, with the plan of increasing to 3 mg, if he can tolerate it. I am hopeful that this will help with some of his ongoing insulin resistance. We will follow-up in 1 month. He needs to bring his glucometer to the visit.       I have independently reviewed and interpreted labs, imaging as indicated.      Chief complaint:  Rboerto is a 62 year old male seen in consultation at the request of Dr. Camarillo for uncontrolled Type 2 DM with long term use of insulin.     I have reviewed Care Everywhere including [facility], [facility], [facility], [facility], [place], [facility], [facility] , [facility], [place] lab reports, imaging reports and provider notes as indicated.      HISTORY OF PRESENT ILLNESS  A review of the chart indicates that Roberto was diagnosed with Type 2 DM in 2007. His A1C was 11% at the time of diagnosis. He was  started on metformin at the time of diagnosis, glipizide was added shortly after diagnosis. He has struggled with making lifestyle changes and it does not appear that he was ever able to get his A1C to goal of less than 7%. Pioglitazone was eventually added around 2008 but due to ongoing glucose elevations, this medication was discontinued when he was started on insulin in 2009. He was originally started on once daily NPH insulin but he continued to struggle with hyperglycemia. Over time this was switched to MDI insulin therapy consisting of Lantus and Novolog. Within the last few years the Lantus was switched to Toujeo due to increasing insulin needs. He is currently taking 160 units of Toujeo daily along with 62 units of Novolog before meals. All oral medications have been discontinued due to progressive kidney disease. He is taking once weekly Trulicity 0.75 mg along with insulin. He has a long history of non-compliance related to diet and exercise. However, he states that he has always taken his insulin as directed. He currently uses fingerstick testing to monitor his blood sugars 4-5x daily. [facility] but states that his blood sugars are often around 230 mg/dl in the morning and up to 400 mg/dl in the evening.    Roberto has severe peripheral neuropathy. He says that he has not been able to feel his feet for many years. He also has a history of diabetic foot ulcers and peripheral vascular disease. 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 He has stage 4 chronic kidney disease. He is concerned about his kidneys. He wanted to make sure that his medications are not affecting his kidneys. He also has long standing diabetic retinopathy. He has no complaints of worsening vision but states that his vision has been blurry for years.     He has uncontrolled hypertension. He states that he experiences little chest pains almost daily but he states that h takes an [place] medication  and the pain goes away. He also has uncontrolled hyperlipidemia.      Endocrine relevant labs are as follows:   Latest Reference Range & Units 02/09/22 09:00   Hemoglobin A1C 0.0 - 5.6 % 11.2 (H)   (H): Data is abnormally high   Latest Reference Range & Units 02/24/23 15:04   LDL Cholesterol Direct <100 mg/dL 75      Latest Reference Range & Units 02/24/23 15:04   Triglycerides <150 mg/dL 1,186 (H)   (H): Data is abnormally high     Latest Reference Range & Units 02/09/22 09:00   Albumin Urine mg/g Cr 0.00 - 17.00 mg/g Cr 229.76 (H)   (H): Data is abnormally high     Latest Reference Range & Units 02/09/22 09:00   Albumin Urine mg/L mg/L 193     REVIEW OF SYSTEMS    Endocrine: positive for diabetes  Skin: negative  Eyes: positive for visual blurring, diabetic retinopathy  Ears/Nose/Throat: negative  Respiratory: Dyspnea on exertion, No shortness of breath, No cough and No hemoptysis  Cardiovascular: positive for chest pain, dyspnea on exertion, lower extremity edema and exercise intolerance, negative for and irregular heart beat  Gastrointestinal: positive for constipation, negative for, nausea, vomiting, dyspepsia and diarrhea  Genitourinary: negative for, nocturia, dysuria, frequency and urgency  Musculoskeletal: positive for joint pain, joint stiffness and muscular weakness  Neurologic: positive for numbness or tingling of feet  Psychiatric: negative  Hematologic/Lymphatic/Immunologic: negative    Past Medical History  Past Medical History:   Diagnosis Date     Diabetes (H)        Medications  Current Outpatient Medications   Medication Sig Dispense Refill     ACCU-CHEK GUIDE test strip Use to test blood sugar 4 times daily or as directed. To accompany: Blood Glucose Monitor Brands: per insurance. 100 strip 10     ACE/ARB/ARNI NOT PRESCRIBED (INTENTIONAL) Please choose reason not prescribed from choices below.       alcohol swab prep pads Use to swab area of injection/mary kay as directed. 100 each 3     Alcohol  Swabs (ALCOHOL PREP) 70 % PADS USE TO SWAB AREA OF INJECTION/GOOD AS DIRECTED. 200 each 3     aspirin 81 MG EC tablet Take 1 tablet (81 mg) by mouth daily 90 tablet 1     atorvastatin (LIPITOR) 80 MG tablet Take 1 tablet (80 mg) by mouth daily Needs to be seen for further refills 15 tablet 0     blood glucose (ACCU-CHEK GUIDE) test strip 1 strip by In Vitro route 4 times daily Use to test blood sugar 4 times daily or as directed. 600 strip 3     blood glucose (NO BRAND SPECIFIED) test strip Use to test blood sugar 4 times daily or as directed. To accompany: Blood Glucose Monitor Brands: per insurance. 100 strip 6     blood glucose calibration (NO BRAND SPECIFIED) solution To accompany: Blood Glucose Monitor Brands: per insurance. 1 each 0     blood glucose calibration (NO BRAND SPECIFIED) solution To accompany: Blood Glucose Monitor Brands: per insurance. 100 each 11     blood glucose monitoring (ACCU-CHEK FASTCLIX) lancets USE 6 TIMES DAILY USE TO TEST BLOOD SUGAR 6 TIMES DAILY OR AS DIRECTED. 600 each 0     blood glucose monitoring (NO BRAND SPECIFIED) meter device kit Use to test blood sugar 4 times daily or as directed. Preferred blood glucose meter OR supplies to accompany: Blood Glucose Monitor Brands: per insurance. 1 kit 0     blood glucose monitoring (NO BRAND SPECIFIED) meter device kit Use to test blood sugar 4 times daily or as directed. Preferred blood glucose meter OR supplies to accompany: Blood Glucose Monitor Brands: per insurance. 1 kit 0     carvedilol (COREG) 12.5 MG tablet Take 1 tablet (12.5 mg) by mouth 2 times daily (with meals) 180 tablet 0     Cholecalciferol (VITAMIN D) 2000 units tablet Take 2,000 Units by mouth        dulaglutide (TRULICITY) 1.5 MG/0.5ML pen Inject 1.5 mg Subcutaneous every 7 days for 56 days 2 mL 1     fenofibrate (TRIGLIDE/LOFIBRA) 160 MG tablet Take 1 tablet (160 mg) by mouth daily Needs to be seen for further refills 15 tablet 0     furosemide (LASIX) 20 MG tablet  Take 1 tablet (20 mg) by mouth 2 times daily. Needs to be seen for further refills 30 tablet 0     GLOBAL EASE INJECT PEN NEEDLES 31G X 5 MM miscellaneous USE TO INJECT INSULIN 5 TIMES A  each 11     insulin glargine U-300 (TOUJEO MAX SOLOSTAR) 300 UNIT/ML (2 units dial) pen Inject 160 Units Subcutaneous every evening. Needs to be seen for further refills Strength: 300 UNIT/ML 48 mL 0     Insulin Lispro (HUMALOG KWIKPEN) 200 UNIT/ML soln INJECT 60 UNITS 3 TIMES DAILY WITH MEALS PLUS THE SCALE WITH EACH MEAL. 180 mL 1     isosorbide mononitrate (IMDUR) 30 MG 24 hr tablet Take 1 tablet (30 mg) by mouth daily 90 tablet 3     Omega-3 Fatty Acids (FISH OIL) 1200 MG capsule TAKE 1 CAPSULE BY MOUTH 2 (TWO) TIMES A  capsule 3     order for DME Equipment being ordered: Digital home blood pressure monitor kit 1 Device 1     senna-docusate (SENEXON-S) 8.6-50 MG tablet TAKE 1 TAB BY MOUTH TWO TIMES DAILY AS NEEDED FOR CONSTIPATION. 60 tablet 0     silver sulfADIAZINE (SILVADENE) 1 % external cream Apply topically daily 50 g 0     spironolactone (ALDACTONE) 25 MG tablet TAKE 1 TABLET (25 MG) BY MOUTH DAILY 90 tablet 0     thin (NO BRAND SPECIFIED) lancets Use with lanceting device. To accompany: Blood Glucose Monitor Brands: per insurance. 200 each 11     UltiCare Alcohol Swabs 70 % PADS Use to swab area of injection/mary kay as directed. 100 each 11     omega-3 acid ethyl esters (LOVAZA) 1 g capsule Take 1 capsule by mouth 2 times daily (Patient not taking: Reported on 3/9/2023) 90 capsule 1     vitamin D2 (ERGOCALCIFEROL) 14718 units (1250 mcg) capsule TAKE 1 CAPSULE (50,000 UNITS) BY MOUTH ONCE A WEEK. NEEDS TO BE SEEN FOR FURTHER REFILLS (Patient not taking: Reported on 3/9/2023) 1 capsule 10       Allergies  Allergies   Allergen Reactions     Doxycycline Other (See Comments) and Swelling     Facial swelling  facial   swelling    Facial swelling  facial   swelling       Influenza Vac Split [Flu Virus Vaccine]  Itching     And red /blue arms         Family History  family history includes Cerebrovascular Disease in his mother; Dementia in his father; Diabetes in his father and mother; Hypertension in his mother; Kidney Disease in his mother; Osteoporosis in his mother; Prostate Cancer in his father.    Social History  Social History     Tobacco Use     Smoking status: Never     Smokeless tobacco: Never   Vaping Use     Vaping Use: Never used   Substance Use Topics     Alcohol use: No     Drug use: No       Physical Exam  BP (!) 144/74 (BP Location: Left arm, Patient Position: Sitting, Cuff Size: Adult Large)   Pulse 72   Wt 137.4 kg (303 lb)   SpO2 95%   BMI 42.26 kg/m    Body mass index is 42.26 kg/m .  GENERAL :  In no apparent distress. He is in a wheelchair. We are using an [place]  through a phone service.  SKIN: Normal color, normal temperature, texture.  No hirsutism, alopecia or purple striae.     EYES: PERRL, EOMI, No scleral icterus,  No proptosis, conjunctival redness, stare, retraction  NECK: No visible masses. No palpable adenopathy, or masses. No carotid bruits.   RESP: Lungs clear to auscultation bilaterally  CARDIAC: Regular rate and rhythm, normal S1 S2, without murmurs, rubs or gallops    NEURO: awake, alert, responds appropriately to questions.  Cranial nerves intact.   He is unable to ambulate due to neuropathy.  No tremor of the outstretched hand.    EXTREMITIES: No clubbing, cyanosis. Edema present in bilateral lower extremities.  FOOT EXAM: Pt is unable to feel feet. No sensation to monofilament or touch. Decreased pedal pulses bilateral extremities. Feet are cold to touch. No evidence of current foot ulcer. Feet are dirty but without signs of skin breakdown. R foot scar related to previous toe amputation. He only has 3 toes on right foot.    DATA REVIEW  Pt did not bring glucometer to the appt.          Again, thank you for allowing me to participate in the care of your patient.         Sincerely,        Flavia Beltre PA-C

## 2023-03-09 NOTE — PROGRESS NOTES
Assessment/Plan :   1. Uncontrolled Type 2 DM, with long term use of insulin. Roberto seems to be un-phased by his current persistent hyperglycemia. He is insistent that he is taking insulin, as prescribed every day. He states that his blood sugars are just high, no matter what he does. We could consider switching to Humulin R U-500 given his large amount of insulin resistance. However, I would like to get a better idea of his day to day blood sugars before making the switch. He is currently taking 0.75 mg of Trulicity once weekly. He doesn't feel like it is doing anything. I explained that he is currently on the lowest dose. I would like to titrate that medication up to 1.5 mg today, with the plan of increasing to 3 mg, if he can tolerate it. I am hopeful that this will help with some of his ongoing insulin resistance. We will follow-up in 1 month. He needs to bring his glucometer to the visit.       I have independently reviewed and interpreted labs, imaging as indicated.      Chief complaint:  Roberto is a 62 year old male seen in consultation at the request of Dr. Camarillo for uncontrolled Type 2 DM with long term use of insulin.     I have reviewed Care Everywhere including [facility], [facility], [facility], [facility] , [facility] lab reports, imaging reports and provider notes as indicated.      HISTORY OF PRESENT ILLNESS  A review of the chart indicates that Roberto was diagnosed with Type 2 DM in 2007. His A1C was 11% at the time of diagnosis. He was started on metformin at the time of diagnosis, glipizide was added shortly after diagnosis. He has struggled with making lifestyle changes and it does not appear that he was ever able to get his A1C to goal of less than 7%. Pioglitazone was eventually added around 2008 but due to ongoing glucose elevations, this medication was discontinued when he was started on insulin in 2009. He was originally started on once daily  NPH insulin but he continued to struggle with hyperglycemia. Over time this was switched to MDI insulin therapy consisting of Lantus and Novolog. Within the last few years the Lantus was switched to Toujeo due to increasing insulin needs. He is currently taking 160 units of Toujeo daily along with 62 units of Novolog before meals. All oral medications have been discontinued due to progressive kidney disease. He is taking once weekly Trulicity 0.75 mg along with insulin. He has a long history of non-compliance related to diet and exercise. However, he states that he has always taken his insulin as directed. He currently uses fingerstick testing to monitor his blood sugars 4-5x daily. He did not bring his glucometer to the appt but states that his blood sugars are often around 230 mg/dl in the morning and up to 400 mg/dl in the evening.    Roberto has severe peripheral neuropathy. He says that he has not been able to feel his feet for many years. He also has a history of diabetic foot ulcers and peripheral vascular disease. 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 He has stage 4 chronic kidney disease. He is concerned about his kidneys. He wanted to make sure that his medications are not affecting his kidneys. He also has long standing diabetic retinopathy. He has no complaints of worsening vision but states that his vision has been blurry for years.     He has uncontrolled hypertension. He states that he experiences little chest pains almost daily but he states that h takes an Swedish medication and the pain goes away. He also has uncontrolled hyperlipidemia.      Endocrine relevant labs are as follows:   Latest Reference Range & Units 02/09/22 09:00   Hemoglobin A1C 0.0 - 5.6 % 11.2 (H)   (H): Data is abnormally high   Latest Reference Range & Units 02/24/23 15:04   LDL Cholesterol Direct <100 mg/dL 75      Latest Reference Range & Units 02/24/23 15:04   Triglycerides <150 mg/dL 1,186 (H)   (H): Data is  abnormally high     Latest Reference Range & Units 02/09/22 09:00   Albumin Urine mg/g Cr 0.00 - 17.00 mg/g Cr 229.76 (H)   (H): Data is abnormally high     Latest Reference Range & Units 02/09/22 09:00   Albumin Urine mg/L mg/L 193     REVIEW OF SYSTEMS    Endocrine: positive for diabetes  Skin: negative  Eyes: positive for visual blurring, diabetic retinopathy  Ears/Nose/Throat: negative  Respiratory: Dyspnea on exertion, No shortness of breath, No cough and No hemoptysis  Cardiovascular: positive for chest pain, dyspnea on exertion, lower extremity edema and exercise intolerance, negative for and irregular heart beat  Gastrointestinal: positive for constipation, negative for, nausea, vomiting, dyspepsia and diarrhea  Genitourinary: negative for, nocturia, dysuria, frequency and urgency  Musculoskeletal: positive for joint pain, joint stiffness and muscular weakness  Neurologic: positive for numbness or tingling of feet  Psychiatric: negative  Hematologic/Lymphatic/Immunologic: negative    Past Medical History  Past Medical History:   Diagnosis Date     Diabetes (H)        Medications  Current Outpatient Medications   Medication Sig Dispense Refill     ACCU-CHEK GUIDE test strip Use to test blood sugar 4 times daily or as directed. To accompany: Blood Glucose Monitor Brands: per insurance. 100 strip 10     ACE/ARB/ARNI NOT PRESCRIBED (INTENTIONAL) Please choose reason not prescribed from choices below.       alcohol swab prep pads Use to swab area of injection/good as directed. 100 each 3     Alcohol Swabs (ALCOHOL PREP) 70 % PADS USE TO SWAB AREA OF INJECTION/GOOD AS DIRECTED. 200 each 3     aspirin 81 MG EC tablet Take 1 tablet (81 mg) by mouth daily 90 tablet 1     atorvastatin (LIPITOR) 80 MG tablet Take 1 tablet (80 mg) by mouth daily Needs to be seen for further refills 15 tablet 0     blood glucose (ACCU-CHEK GUIDE) test strip 1 strip by In Vitro route 4 times daily Use to test blood sugar 4 times daily  or as directed. 600 strip 3     blood glucose (NO BRAND SPECIFIED) test strip Use to test blood sugar 4 times daily or as directed. To accompany: Blood Glucose Monitor Brands: per insurance. 100 strip 6     blood glucose calibration (NO BRAND SPECIFIED) solution To accompany: Blood Glucose Monitor Brands: per insurance. 1 each 0     blood glucose calibration (NO BRAND SPECIFIED) solution To accompany: Blood Glucose Monitor Brands: per insurance. 100 each 11     blood glucose monitoring (ACCU-CHEK FASTCLIX) lancets USE 6 TIMES DAILY USE TO TEST BLOOD SUGAR 6 TIMES DAILY OR AS DIRECTED. 600 each 0     blood glucose monitoring (NO BRAND SPECIFIED) meter device kit Use to test blood sugar 4 times daily or as directed. Preferred blood glucose meter OR supplies to accompany: Blood Glucose Monitor Brands: per insurance. 1 kit 0     blood glucose monitoring (NO BRAND SPECIFIED) meter device kit Use to test blood sugar 4 times daily or as directed. Preferred blood glucose meter OR supplies to accompany: Blood Glucose Monitor Brands: per insurance. 1 kit 0     carvedilol (COREG) 12.5 MG tablet Take 1 tablet (12.5 mg) by mouth 2 times daily (with meals) 180 tablet 0     Cholecalciferol (VITAMIN D) 2000 units tablet Take 2,000 Units by mouth        dulaglutide (TRULICITY) 1.5 MG/0.5ML pen Inject 1.5 mg Subcutaneous every 7 days for 56 days 2 mL 1     fenofibrate (TRIGLIDE/LOFIBRA) 160 MG tablet Take 1 tablet (160 mg) by mouth daily Needs to be seen for further refills 15 tablet 0     furosemide (LASIX) 20 MG tablet Take 1 tablet (20 mg) by mouth 2 times daily. Needs to be seen for further refills 30 tablet 0     GLOBAL EASE INJECT PEN NEEDLES 31G X 5 MM miscellaneous USE TO INJECT INSULIN 5 TIMES A  each 11     insulin glargine U-300 (TOUJEO MAX SOLOSTAR) 300 UNIT/ML (2 units dial) pen Inject 160 Units Subcutaneous every evening. Needs to be seen for further refills Strength: 300 UNIT/ML 48 mL 0     Insulin Lispro  (HUMALOG KWIKPEN) 200 UNIT/ML soln INJECT 60 UNITS 3 TIMES DAILY WITH MEALS PLUS THE SCALE WITH EACH MEAL. 180 mL 1     isosorbide mononitrate (IMDUR) 30 MG 24 hr tablet Take 1 tablet (30 mg) by mouth daily 90 tablet 3     Omega-3 Fatty Acids (FISH OIL) 1200 MG capsule TAKE 1 CAPSULE BY MOUTH 2 (TWO) TIMES A  capsule 3     order for DME Equipment being ordered: Digital home blood pressure monitor kit 1 Device 1     senna-docusate (SENEXON-S) 8.6-50 MG tablet TAKE 1 TAB BY MOUTH TWO TIMES DAILY AS NEEDED FOR CONSTIPATION. 60 tablet 0     silver sulfADIAZINE (SILVADENE) 1 % external cream Apply topically daily 50 g 0     spironolactone (ALDACTONE) 25 MG tablet TAKE 1 TABLET (25 MG) BY MOUTH DAILY 90 tablet 0     thin (NO BRAND SPECIFIED) lancets Use with lanceting device. To accompany: Blood Glucose Monitor Brands: per insurance. 200 each 11     UltiCare Alcohol Swabs 70 % PADS Use to swab area of injection/mary kay as directed. 100 each 11     omega-3 acid ethyl esters (LOVAZA) 1 g capsule Take 1 capsule by mouth 2 times daily (Patient not taking: Reported on 3/9/2023) 90 capsule 1     vitamin D2 (ERGOCALCIFEROL) 68141 units (1250 mcg) capsule TAKE 1 CAPSULE (50,000 UNITS) BY MOUTH ONCE A WEEK. NEEDS TO BE SEEN FOR FURTHER REFILLS (Patient not taking: Reported on 3/9/2023) 1 capsule 10       Allergies  Allergies   Allergen Reactions     Doxycycline Other (See Comments) and Swelling     Facial swelling  facial   swelling    Facial swelling  facial   swelling       Influenza Vac Split [Flu Virus Vaccine] Itching     And red /blue arms         Family History  family history includes Cerebrovascular Disease in his mother; Dementia in his father; Diabetes in his father and mother; Hypertension in his mother; Kidney Disease in his mother; Osteoporosis in his mother; Prostate Cancer in his father.    Social History  Social History     Tobacco Use     Smoking status: Never     Smokeless tobacco: Never   Vaping Use      Vaping Use: Never used   Substance Use Topics     Alcohol use: No     Drug use: No       Physical Exam  BP (!) 144/74 (BP Location: Left arm, Patient Position: Sitting, Cuff Size: Adult Large)   Pulse 72   Wt 137.4 kg (303 lb)   SpO2 95%   BMI 42.26 kg/m    Body mass index is 42.26 kg/m .  GENERAL :  In no apparent distress. He is in a wheelchair. We are using an [employer]  through a phone service.  SKIN: Normal color, normal temperature, texture.  No hirsutism, alopecia or purple striae.     EYES: PERRL, EOMI, No scleral icterus,  No proptosis, conjunctival redness, stare, retraction  NECK: No visible masses. No palpable adenopathy, or masses. No carotid bruits.   RESP: Lungs clear to auscultation bilaterally  CARDIAC: Regular rate and rhythm, normal S1 S2, without murmurs, rubs or gallops    NEURO: awake, alert, responds appropriately to questions.  Cranial nerves intact.   He is unable to ambulate due to neuropathy.  No tremor of the outstretched hand.    EXTREMITIES: No clubbing, cyanosis. Edema present in bilateral lower extremities.  FOOT EXAM: Pt is unable to feel feet. No sensation to monofilament or touch. Decreased pedal pulses bilateral extremities. Feet are cold to touch. No evidence of current foot ulcer. Feet are dirty but without signs of skin breakdown. R foot scar related to previous toe amputation. He only has 3 toes on right foot.    DATA REVIEW  Pt did not bring glucometer to the appt.

## 2023-03-17 DIAGNOSIS — Z79.4 TYPE 2 DIABETES MELLITUS WITH OTHER SPECIFIED COMPLICATION, WITH LONG-TERM CURRENT USE OF INSULIN (H): ICD-10-CM

## 2023-03-17 DIAGNOSIS — I10 HYPERTENSION GOAL BP (BLOOD PRESSURE) < 140/90: ICD-10-CM

## 2023-03-17 DIAGNOSIS — E11.69 TYPE 2 DIABETES MELLITUS WITH OTHER SPECIFIED COMPLICATION, WITH LONG-TERM CURRENT USE OF INSULIN (H): ICD-10-CM

## 2023-03-18 DIAGNOSIS — E78.1 HYPERTRIGLYCERIDEMIA: ICD-10-CM

## 2023-03-18 DIAGNOSIS — E78.00 HIGH BLOOD CHOLESTEROL: ICD-10-CM

## 2023-03-20 RX ORDER — LANCETS
EACH MISCELLANEOUS
Qty: 102 EACH | Refills: 1 | Status: SHIPPED | OUTPATIENT
Start: 2023-03-20 | End: 2023-05-15

## 2023-03-20 RX ORDER — FENOFIBRATE 160 MG/1
160 TABLET ORAL DAILY
Qty: 90 TABLET | Refills: 2 | Status: SHIPPED | OUTPATIENT
Start: 2023-03-20 | End: 2023-05-05

## 2023-03-20 RX ORDER — ATORVASTATIN CALCIUM 80 MG/1
80 TABLET, FILM COATED ORAL DAILY
Qty: 90 TABLET | Refills: 2 | Status: SHIPPED | OUTPATIENT
Start: 2023-03-20 | End: 2023-12-13

## 2023-03-21 DIAGNOSIS — I10 HYPERTENSION GOAL BP (BLOOD PRESSURE) < 140/90: ICD-10-CM

## 2023-03-21 DIAGNOSIS — K59.00 CONSTIPATION, UNSPECIFIED CONSTIPATION TYPE: ICD-10-CM

## 2023-03-22 RX ORDER — AMOXICILLIN 250 MG
CAPSULE ORAL
Qty: 60 TABLET | Refills: 1 | Status: SHIPPED | OUTPATIENT
Start: 2023-03-22 | End: 2023-05-15

## 2023-03-22 RX ORDER — SPIRONOLACTONE 25 MG/1
25 TABLET ORAL DAILY
Qty: 30 TABLET | Refills: 0 | Status: SHIPPED | OUTPATIENT
Start: 2023-03-22 | End: 2023-04-16

## 2023-03-22 RX ORDER — FUROSEMIDE 20 MG
20 TABLET ORAL 2 TIMES DAILY
Qty: 30 TABLET | Refills: 0 | Status: SHIPPED | OUTPATIENT
Start: 2023-03-22 | End: 2023-04-16

## 2023-03-24 ENCOUNTER — HOSPITAL ENCOUNTER (OUTPATIENT)
Dept: NUCLEAR MEDICINE | Facility: CLINIC | Age: 63
Setting detail: NUCLEAR MEDICINE
Discharge: HOME | End: 2023-03-24
Attending: INTERNAL MEDICINE
Payer: MEDICARE

## 2023-03-24 ENCOUNTER — HOSPITAL ENCOUNTER (OUTPATIENT)
Dept: CARDIOLOGY | Facility: CLINIC | Age: 63
Discharge: HOME | End: 2023-03-24
Attending: INTERNAL MEDICINE
Payer: MEDICARE

## 2023-03-24 DIAGNOSIS — I20.89 STABLE ANGINA (H): ICD-10-CM

## 2023-03-24 PROCEDURE — G1010 CDSM STANSON: HCPCS | Performed by: RADIOLOGY

## 2023-03-24 PROCEDURE — A9502 TC99M TETROFOSMIN: HCPCS | Performed by: INTERNAL MEDICINE

## 2023-03-24 PROCEDURE — 250N000011 HC RX IP 250 OP 636: Performed by: INTERNAL MEDICINE

## 2023-03-24 PROCEDURE — 93017 CV STRESS TEST TRACING ONLY: CPT

## 2023-03-24 PROCEDURE — 78452 HT MUSCLE IMAGE SPECT MULT: CPT | Mod: 26 | Performed by: RADIOLOGY

## 2023-03-24 PROCEDURE — 93018 CV STRESS TEST I&R ONLY: CPT | Performed by: INTERNAL MEDICINE

## 2023-03-24 PROCEDURE — G1010 CDSM STANSON: HCPCS

## 2023-03-24 PROCEDURE — 93016 CV STRESS TEST SUPVJ ONLY: CPT | Performed by: INTERNAL MEDICINE

## 2023-03-24 PROCEDURE — 78452 HT MUSCLE IMAGE SPECT MULT: CPT | Mod: ME

## 2023-03-24 PROCEDURE — 999N000128 HC STATISTIC PERIPHERAL IV START W/O US GUIDANCE

## 2023-03-24 PROCEDURE — 343N000001 HC RX 343: Performed by: INTERNAL MEDICINE

## 2023-03-24 RX ORDER — REGADENOSON 0.08 MG/ML
0.4 INJECTION, SOLUTION INTRAVENOUS ONCE
Status: COMPLETED | OUTPATIENT
Start: 2023-03-24 | End: 2023-03-24

## 2023-03-24 RX ORDER — ALBUTEROL SULFATE 90 UG/1
2 AEROSOL, METERED RESPIRATORY (INHALATION) EVERY 5 MIN PRN
Status: DISCONTINUED | OUTPATIENT
Start: 2023-03-24 | End: 2023-03-25 | Stop reason: HOSPADM

## 2023-03-24 RX ORDER — ACYCLOVIR 200 MG/1
0-1 CAPSULE ORAL
Status: DISCONTINUED | OUTPATIENT
Start: 2023-03-24 | End: 2023-03-25 | Stop reason: HOSPADM

## 2023-03-24 RX ORDER — CAFFEINE CITRATE 20 MG/ML
60 SOLUTION INTRAVENOUS
Status: DISCONTINUED | OUTPATIENT
Start: 2023-03-24 | End: 2023-03-25 | Stop reason: HOSPADM

## 2023-03-24 RX ORDER — AMINOPHYLLINE 25 MG/ML
50-100 INJECTION, SOLUTION INTRAVENOUS
Status: DISCONTINUED | OUTPATIENT
Start: 2023-03-24 | End: 2023-03-25 | Stop reason: HOSPADM

## 2023-03-24 RX ADMIN — REGADENOSON 0.4 MG: 0.08 INJECTION, SOLUTION INTRAVENOUS at 10:12

## 2023-03-24 RX ADMIN — TETROFOSMIN 40.2 MILLICURIE: 1.38 INJECTION, POWDER, LYOPHILIZED, FOR SOLUTION INTRAVENOUS at 10:13

## 2023-03-24 RX ADMIN — TETROFOSMIN 10.8 MILLICURIE: 1.38 INJECTION, POWDER, LYOPHILIZED, FOR SOLUTION INTRAVENOUS at 09:21

## 2023-03-24 NOTE — PROGRESS NOTES
Pt here for Lexiscan nuclear stress test.  Medication and side effects reviewed with patient. Lung sounds clear to auscultation bilaterally. Denied caffeine use.  Patient tolerated Lexiscan dose without any adverse reactions. VSS. Monitored post injection and then taken to nuclear medicine for follow up imaging.

## 2023-03-27 LAB
CV STRESS MAX HR HE: 83
RATE PRESSURE PRODUCT: NORMAL
STRESS ECHO BASELINE DIASTOLIC HE: 60
STRESS ECHO BASELINE HR: 68 BPM
STRESS ECHO BASELINE SYSTOLIC BP: 115
STRESS ECHO CALCULATED PERCENT HR: 53 %
STRESS ECHO LAST STRESS DIASTOLIC BP: 69
STRESS ECHO LAST STRESS SYSTOLIC BP: 129
STRESS ECHO TARGET HR: 156

## 2023-03-28 ENCOUNTER — TELEPHONE (OUTPATIENT)
Dept: CARDIOLOGY | Facility: CLINIC | Age: 63
End: 2023-03-28
Payer: MEDICARE

## 2023-03-28 ENCOUNTER — CARE COORDINATION (OUTPATIENT)
Dept: CARDIOLOGY | Facility: CLINIC | Age: 63
End: 2023-03-28
Payer: MEDICARE

## 2023-03-28 NOTE — PROGRESS NOTES
1923:  Assuemd care 1945:  Delivery  of baby boy 2001:  Placenta delivered 2216:  Pt taken to RR.  pericare provided. When walking back from RR pt c/o chest pain. Pt taken back to bed to rest.  Pt states it hurt to inhale and breath when she is standing. Pt states she does not hurt when she is sitting or resting 
 
2320:  Pt up to wheelchair. Pt transferred to [facility] with baby and family A user error has taken place: encounter opened in error, closed for administrative reasons.  Maritza Saleem RN

## 2023-03-28 NOTE — RESULT ENCOUNTER NOTE
Good news - no serious blockages. Severe coronary artery calcifications noted - he is on appropriate rx with aspiring and statin. Follow up as recommended.     Dr Reynaga

## 2023-03-28 NOTE — TELEPHONE ENCOUNTER
Mitch Reynaga MD   3/27/2023  8:33 PM CDT       Good news - no serious blockages. Severe coronary artery calcifications noted - he is on appropriate rx with aspiring and statin. Follow up as recommended.      Dr Reynaga       Situation  Patient contacted to review the above results via [facility]  ( code [id number]). Patient expressed understanding however continues to report chest discomfort.     Background  See last OV notes with cardiology from 2/24/2023.     Assessment  Patient complained of chest pain that he describes as an ache pain. Patient rated the pain between 5-7 on a scale 0-10. According to patient report the pain is felt usually when at rest/laying down in bed. Patient is not sure if the pain also occurs during activity.    Pain occurs 2-3 times a week. Patient uses his SL Nitroglycerin as needed and also reported taking 10 drops of  Valocordin which is medication that was apparently prescribed to him in [facility].   Patient usually felt better after that but not for long. Otherwise pt denied any other symptom along with the chest pain.       Recommendations:   RN advised to seek ER if chest pain worsen.     Routed to Dr Reynaga for further reviewed and recommendation.  Maritza Saleem RN

## 2023-03-28 NOTE — TELEPHONE ENCOUNTER
Copied from routing comment  Mitch Reynaga MD Clerge, Ingrid, RN; P [facility] Adult [place]  Caller: Unspecified (Today, 11:20 AM)  The description of the chest pain suggests it may be GI related, like GERD or gastroparesis.  I would suggest that he discuss this with his primary care physician.  Meanwhile I can discuss this further at his next appointment with me in May. >>      RN attempted to reach patient via [employer]    ([id number])  to review the above recommendations.   Per signed consent detail message left on answering machine and patient was asked to contact the clinic if any additional question. RN will follow-up next week to ensure pt is also following up with PCP that was also notified of the stress test results.    Maritza Saleem RN

## 2023-03-31 ENCOUNTER — ANCILLARY PROCEDURE (OUTPATIENT)
Dept: CARDIOLOGY | Facility: CLINIC | Age: 63
End: 2023-03-31
Attending: INTERNAL MEDICINE
Payer: MEDICARE

## 2023-03-31 DIAGNOSIS — R06.09 DOE (DYSPNEA ON EXERTION): ICD-10-CM

## 2023-03-31 LAB — LVEF ECHO: NORMAL

## 2023-03-31 PROCEDURE — 93306 TTE W/DOPPLER COMPLETE: CPT | Performed by: INTERNAL MEDICINE

## 2023-03-31 RX ADMIN — Medication 5 ML: at 10:06

## 2023-03-31 NOTE — LETTER
2023      Roberto Alfaro  [address]  [place] [address]        Dear ,    We are writing to inform you of your test results. Your test result is normal, there is no change in plan at this time. Please follow up as recommended. If you have any questions or concerns, please call the clinic at the number listed above.       Sincerely,      Mitch Richey MD/angy      Resulted Orders   Echocardiogram Complete   Result Value Ref Range    LVEF  55-60%     Narrative    [id number]  Critical access hospital  [id number]  [id number]^KAIDEN^MITCH^SHO     [facility]  Echocardiography Laboratory  [address] [place] [address]     Name: ROBERTO ALFARO  MRN: [id number]  : 1960  Study Date: 2023 09:32 AM  Age: 62 yrs  Gender: Male  Patient Location: [facility]  Reason For Study: MORRISON (dyspnea on exertion)  Ordering Physician: MITCH RICHEY  Referring Physician: MITCH RICHEY  Performed By: Rita Salazar RDCS     BSA: 2.5 m2  Height: 71 in  Weight: 300 lb  BP: 142/80 mmHg  ______________________________________________________________________________  Procedure  Echocardiogram with two-dimensional, color and spectral Doppler performed.  Contrast Optison. Technically difficult study. Optison (NDC #7356-6231-19)  given intravenously. Patient was given 5 ml mixture of 3 ml Optison and 6 ml  saline. 4 ml wasted. IV start location L Hand .  ______________________________________________________________________________  Interpretation Summary     Technically difficult study.     Global and regional left ventricular function is normal with an EF of 55-60%.  Grade II or moderate diastolic dysfunction.  Global right ventricular function is normal.  The inferior vena cava cannot be assessed.  No pericardial effusion is present.  No significant valvular abnormalities present.  Previous study not available for  comparison.  ______________________________________________________________________________  Left Ventricle  Left ventricular size is normal. Left ventricular wall thickness is normal.  Global and regional left ventricular function is normal with an EF of 55-60%.  Grade II or moderate diastolic dysfunction.     Right Ventricle  The right ventricle is normal size. Global right ventricular function is  normal.     Atria  The right atria appears normal. Mild left atrial enlargement is present.     Mitral Valve  Mild mitral annular calcification is present.     Aortic Valve  The valve leaflets are not well visualized. Aortic valve sclerosis is present.  On Doppler interrogation, there is no significant stenosis or regurgitation.     Tricuspid Valve  On Doppler interrogation, there is no significant stenosis or regurgitation.  The valve leaflets are not well visualized.     Pulmonic Valve  On Doppler interrogation, there is no significant stenosis or regurgitation.     Vessels  The aorta root is normal. The thoracic aorta is normal. The inferior vena cava  cannot be assessed.     Pericardium  No pericardial effusion is present.     Miscellaneous  No significant valvular abnormalities present.     Compared to Previous Study  Previous study not available for comparison.  ______________________________________________________________________________  MMode/2D Measurements & Calculations  IVSd: 1.3 cm  LVIDd: 4.3 cm  LVIDs: 2.0 cm  LVPWd: 1.3 cm  FS: 54.3 %  LV mass(C)d: 209.8 grams  LV mass(C)dI: 83.7 grams/m2  Ao root diam: 3.3 cm  asc Aorta Diam: 3.8 cm  LVOT diam: 2.2 cm  LVOT area: 3.8 cm2  LA Volume (BP): 88.0 ml     LA Volume Index (BP): 35.1 ml/m2  RWT: 0.63     Doppler Measurements & Calculations  MV E max haris: 112.0 cm/sec  MV A max haris: 101.7 cm/sec  MV E/A: 1.1  MV dec time: 0.25 sec  Ao V2 max: 119.5 cm/sec  Ao max P.0 mmHg  E/E' av.3  Lateral E/e': 14.0  Medial E/e': 20.5      ______________________________________________________________________________  Report approved by: Rowena BHATT 03/31/2023 10:20 AM

## 2023-04-07 ENCOUNTER — ANCILLARY PROCEDURE (OUTPATIENT)
Dept: GENERAL RADIOLOGY | Facility: CLINIC | Age: 63
End: 2023-04-07
Attending: EMERGENCY MEDICINE
Payer: MEDICARE

## 2023-04-07 ENCOUNTER — OFFICE VISIT (OUTPATIENT)
Dept: URGENT CARE | Facility: URGENT CARE | Age: 63
End: 2023-04-07
Payer: MEDICARE

## 2023-04-07 VITALS
DIASTOLIC BLOOD PRESSURE: 68 MMHG | SYSTOLIC BLOOD PRESSURE: 132 MMHG | TEMPERATURE: 97.1 F | WEIGHT: 303 LBS | HEART RATE: 74 BPM | OXYGEN SATURATION: 95 % | BODY MASS INDEX: 42.26 KG/M2 | RESPIRATION RATE: 15 BRPM

## 2023-04-07 DIAGNOSIS — S39.92XA TAILBONE INJURY, INITIAL ENCOUNTER: ICD-10-CM

## 2023-04-07 DIAGNOSIS — S32.2XXA CLOSED FRACTURE OF COCCYX, INITIAL ENCOUNTER (H): Primary | ICD-10-CM

## 2023-04-07 PROCEDURE — 72220 X-RAY EXAM SACRUM TAILBONE: CPT | Mod: TC | Performed by: RADIOLOGY

## 2023-04-07 PROCEDURE — 99214 OFFICE O/P EST MOD 30 MIN: CPT | Performed by: EMERGENCY MEDICINE

## 2023-04-07 RX ORDER — OXYCODONE HYDROCHLORIDE 5 MG/1
5 TABLET ORAL EVERY 6 HOURS PRN
Qty: 10 TABLET | Refills: 0 | Status: SHIPPED | OUTPATIENT
Start: 2023-04-07 | End: 2023-04-10

## 2023-04-07 NOTE — PROGRESS NOTES
Assessment & Plan     Diagnosis:  (S32.2XXA) Closed fracture of coccyx, initial encounter (H)  (primary encounter diagnosis)  Plan:  oxyCODONE         (ROXICODONE) 5 MG tablet    History and patient interactions provided by patient and daughter serving as Gu[place] .     Medical Decision Making:  Roberto Thompson is a 62 year old male who presents with sacral and low back pain after an accidental fall 4 weeks ago. Xrays reveal coccyx fracture and subluxation on my read; radiology notes as per below. The patient has not had a fever, saddle/perineal anesthesia, or bowel or bladder dysfunction. He does have chronic lower extremity neuropathy and weakness; is wheelchair bound. Notes no new changes in sensation in the upper legs/saddle region or bowel/bladder changes.  There are no red flags in history of cancer or IVDU. There is no clinical evidence of cauda equina syndrome, discitis, spinal/epidural space hematoma or epidural abscess. The neurological exam is at baseline for patient per daughter and patient as he does have significant neuropathy and diminished sensation in the legs chronically. The patient will be discharged with pain medications and stool softeners (has these) to use as directed.  I recommended using a donut pillow for comfort.  Go to the ER if increasing pain, numbness, weakness, or bowel or bladder dysfunction.  The patient was advised to schedule follow-up with his/her primary doctor next week for re-assessment. Patient and daughter verbalize understanding and agreement with the plan including reasons to go to the ER. All questions answered.       Carlos Alberto Louie PA-C  [facility]    Subjective     Roberto Thompson is a 62 year old male who presents to clinic today for the following health issues:  Chief Complaint   Patient presents with     Fall     Per - fell about 3 weeks ago , on the ice. Per daughter he has treid advil and tylenol with no relief. Per daughter  he is looking for some pain medication. Daughter also mentins he is whellchair bound most days only taking a few steps at a time with furniture.     Back Pain     Per the pt. Across the lower back and pain into the sacrum and buttocks on both sides.     sacrum     Pain ,  5 to 10/10 on pain, difficulty sleeping.        HPI  Patient present with back and tailbone pain that started after he fell about 3.5 weeks ago. This has been causing him a lot of pain; especially with defecation. He has been using Tylenol with no relief; tried Advil but limits this given history of chronic kidney problems.  Patient has numbness and weakness in the lower extremities at baseline, notes no new changes in numbness, weakness, bowel or bladder incontinence or other changes.  Days. Patient has been still able to walk 4-5 steps at a time which is at his baseline; is mostly wheelchair bound. No fevers, night sweats, abdominal pain, IVDU or other concerns.      Review of Systems    See HPI    Objective      Vitals: /68   Pulse 74   Temp 97.1  F (36.2  C) (Tympanic)   Resp 15   Wt 137.4 kg (303 lb)   SpO2 95%   BMI 42.26 kg/m        Patient Vitals for the past 24 hrs:   BP Temp Temp src Pulse Resp SpO2 Weight   04/07/23 1431 132/68 97.1  F (36.2  C) Tympanic 74 15 95 % 137.4 kg (303 lb)       Vital signs reviewed by: Carlos Alberto Louie PA-C    Physical Exam   Constitutional: Alert and active. Mild acute distress.  Head: Atraumatic. Normocephalic.  Neck: Normal ROM. No meningismus. No C-spine TTP.  Cardiovascular: Regular rate and rhythm  Pulmonary/Chest: Effort normal. No respiratory distress. Lungs are clear to auscultation throughout.  Musculoskeletal: Tenderness to palpation in the midline low sacral/coccyx region. No midline T or L spine tenderness to palpation.  No tenderness over the hips, SI joints.   Neurological: Alert and oriented x3. Strength is diminished in the bilateral lower extremities symmetrically. Normal knee  flexion/extension, hip extension/flexion greatly diminished (baseline). Sensation diminished in the bilateral extremities from the knees down, grossly normal and symmetric from above the knees proximally.    Skin: No rash noted on visualized skin on back.     Labs/Imaging:  Results for orders placed or performed in visit on 04/07/23   XR Sacrum and Coccyx 2 Views     Status: None (Preliminary result)    Narrative    SACRUM AND COCCYX TWO VIEWS 4/7/2023 3:45 PM     HISTORY: Tailbone injury, initial encounter.    COMPARISON: None.      Impression    IMPRESSION: Evaluation of the mid to lower aspect of the sacrum/coccyx  is somewhat limited on the frontal views due to radiographic overlap  of soft tissue structures. On the lateral view, there is mild  nonspecific cortical irregularity of the first coccygeal vertebra  along the posterior aspect of the inferior endplate. This could be  related to an age-indeterminate fracture; recommend correlation for  point tenderness. It also could conceivably be degenerative in nature.  No other findings concerning for fracture identified. There is slight  anterior subluxation of the first coccygeal vertebra with respect to  the second coccygeal vertebra. Alignment of the visualized bony pelvis  otherwise appears unremarkable. Small anterior endplate osteophytes in  the lower lumbar region are noted.    The findings were discussed with Carlos Alberto Louie, the ordering  provider, by myself at 4:37 PM on 4/7/2023.      Reading per radiology      Carlos Alberto Louie PA-C, April 7, 2023

## 2023-04-07 NOTE — PATIENT INSTRUCTIONS
Buy a seated Donut     Donut Pillow Seat Cushion Orthopedic Design Tailbone & Coccyx Memory Foam Pillow -- look up on [employer] or at [employer] and they should have a sitting donut to relive pressure on the tailbone while sitting in the wheelchair.

## 2023-04-12 ENCOUNTER — TELEPHONE (OUTPATIENT)
Dept: FAMILY MEDICINE | Facility: CLINIC | Age: 63
End: 2023-04-12
Payer: MEDICARE

## 2023-04-12 NOTE — LETTER
April 12, 2023    To  Roberto Thompson  [address]  [facility] [address]    Your team at [facility] cares about your health. We have reviewed your chart and based on our findings; we are making the following recommendations to better manage your health.     You are in particular need of attention regarding the following:     Please schedule a Nurse Only Appointment with your primary care clinic to update your immunizations that are due.  PREVENTATIVE VISIT: Annual Medicare Wellness:Schedule an Annual Medicare Wellness Exam. Please call your [facility] clinic to set up your appointment.    If you have already completed these items, please contact the clinic via phone or   MyChart so your care team can review and update your records. Thank you for   choosing [facility] Clinics for your healthcare needs. For any questions,   concerns, or to schedule an appointment please contact our clinic.    Healthy Regards,      Your [facility] Care Team

## 2023-04-12 NOTE — TELEPHONE ENCOUNTER
Patient Quality Outreach    Patient is due for the following:   Physical Annual Wellness Visit      Topic Date Due     COVID-19 Vaccine (1) Never done     Zoster (Shingles) Vaccine (1 of 2) Never done       Next Steps:   Schedule a Annual Wellness Visit    Type of outreach:    Sent letter.      Questions for provider review:         Shirley Thapa MA

## 2023-04-13 ENCOUNTER — OFFICE VISIT (OUTPATIENT)
Dept: ENDOCRINOLOGY | Facility: CLINIC | Age: 63
End: 2023-04-13
Payer: MEDICARE

## 2023-04-13 VITALS
HEART RATE: 73 BPM | SYSTOLIC BLOOD PRESSURE: 123 MMHG | DIASTOLIC BLOOD PRESSURE: 77 MMHG | RESPIRATION RATE: 18 BRPM | WEIGHT: 303 LBS | OXYGEN SATURATION: 95 % | BODY MASS INDEX: 42.26 KG/M2

## 2023-04-13 DIAGNOSIS — G63 POLYNEUROPATHY ASSOCIATED WITH UNDERLYING DISEASE (H): ICD-10-CM

## 2023-04-13 DIAGNOSIS — N18.4 CKD (CHRONIC KIDNEY DISEASE) STAGE 4, GFR 15-29 ML/MIN (H): ICD-10-CM

## 2023-04-13 DIAGNOSIS — I10 HYPERTENSION GOAL BP (BLOOD PRESSURE) < 140/90: ICD-10-CM

## 2023-04-13 DIAGNOSIS — Z79.4 TYPE 2 DIABETES MELLITUS WITH OTHER SPECIFIED COMPLICATION, WITH LONG-TERM CURRENT USE OF INSULIN (H): Primary | ICD-10-CM

## 2023-04-13 DIAGNOSIS — E11.69 TYPE 2 DIABETES MELLITUS WITH OTHER SPECIFIED COMPLICATION, WITH LONG-TERM CURRENT USE OF INSULIN (H): Primary | ICD-10-CM

## 2023-04-13 PROCEDURE — 99214 OFFICE O/P EST MOD 30 MIN: CPT | Performed by: PHYSICIAN ASSISTANT

## 2023-04-13 RX ORDER — INSULIN HUMAN 500 [IU]/ML
INJECTION, SOLUTION SUBCUTANEOUS
Qty: 15 ML | Refills: 1 | Status: SHIPPED | OUTPATIENT
Start: 2023-04-13 | End: 2023-06-01

## 2023-04-13 ASSESSMENT — PAIN SCALES - GENERAL: PAINLEVEL: NO PAIN (0)

## 2023-04-13 NOTE — NURSING NOTE
Roberto Thompson's goals for this visit include: NONE  He requests these members of his care team be copied on today's visit information: YES     PCP: Jaylyn Bellamy    Referring Provider:  No referring provider defined for this encounter.    /77   Pulse 73   Resp 18   Wt 137.4 kg (303 lb)   SpO2 95%   BMI 42.26 kg/m      Do you need any medication refills at today's visit? NONE    Xavier Smith, EMT

## 2023-04-13 NOTE — PROGRESS NOTES
Assessment/Plan :   1. Type 2 DM, uncontrolled on long term use of insulin. Roberto did not notice any difference on the increased dose of Trulicity. He denies any adverse effects but he does not feel like the medication is doing anything. He did not bring his glucometer to the visit but he states that his blood sugars are always over 200 mg/dl. We discussed his current medications and he is on the max dose of Toujeo Max. At this point, I think we should switch over too Humulin R U-500. We discussed the difference between his current insulin and the Humulin R U-500, at length. He seems to understand. He will  the new prescription on the 20th. A referral to our CDE team was place for follow-up on 5/18 for further adjustment. We will start with TID dosing, 125 units AM and 95 units at lunch and dinner.      I have independently reviewed and interpreted labs, imaging as indicated.      Chief complaint:  Roberto is a 62 year old male who returns for follow-up of uncontrolled Type 2 DM. We are using the  service today for the visit.    I have reviewed Care Everywhere including [facility], [facility], [facility], [facility] , [facility], [place] lab reports, imaging reports and provider notes as indicated.      HISTORY OF PRESENT ILLNESS  Roberto was diagnosed with diabetes in 2007. He has struggled to manage his blood sugars since diagnosis. He is currently taking a combination of MDI insulin therapy and once weekly Trulicity. He is taking his medication consistently but his blood sugars remain elevated. He is currently taking Toujeo Max U-300 160 units daily along with Humalog 60-62 units before meals, three times daily. At the last visit we increased the Trulicity from 0.75 mg weekly to 1.5 mg weekly. He states that he stated to new dose around March 20th. He has not noticed any difference. His blood sugar yesterday was 250 mg/dl in the morning and 300 mg/dl at night.  He was at 400 mg/dl this morning.     He continues to struggle with severe neuropathy. He is unable to ambulate and is very unsteady when trying to stand. He recently fell and broke his coccyx. He states that the pain is still there but improving. He has a history of unhealing diabetic foot ulcers with amputation of toes on the right foot. He also has stage 4 CKD.    Endocrine relevant labs are as follows:   Latest Reference Range & Units 02/09/22 09:00   Hemoglobin A1C 0.0 - 5.6 % 11.2 (H)   (H): Data is abnormally high   Latest Reference Range & Units 02/09/22 09:00   Albumin Urine mg/g Cr 0.00 - 17.00 mg/g Cr 229.76 (H)   (H): Data is abnormally high   Latest Reference Range & Units 02/09/22 09:00   Albumin Urine mg/L mg/L 193       REVIEW OF SYSTEMS    Endocrine: positive for diabetes  Skin: negative  Eyes: positive for visual blurring  Ears/Nose/Throat: negative  Respiratory: No shortness of breath, dyspnea on exertion, cough, or hemoptysis  Cardiovascular: positive for lower extremity edema and exercise intolerance, negative for and chest pain  Gastrointestinal: negative for, nausea, vomiting, constipation and diarrhea  Genitourinary: negative for, nocturia, dysuria, frequency and urgency  Musculoskeletal: positive for fracture, back pain, arthritis, joint pain and joint stiffness  Neurologic: positive for loss of feeling in his feet  Psychiatric: negative  Hematologic/Lymphatic/Immunologic: negative    Past Medical History  Past Medical History:   Diagnosis Date     Diabetes (H)        Medications  Current Outpatient Medications   Medication Sig Dispense Refill     ACCU-CHEK GUIDE test strip Use to test blood sugar 4 times daily or as directed. To accompany: Blood Glucose Monitor Brands: per insurance. 100 strip 10     ACE/ARB/ARNI NOT PRESCRIBED (INTENTIONAL) Please choose reason not prescribed from choices below.       alcohol swab prep pads Use to swab area of injection/mary kay as directed. 100 each 3      Alcohol Swabs (ALCOHOL PREP) 70 % PADS USE TO SWAB AREA OF INJECTION/GOOD AS DIRECTED. 200 each 3     aspirin 81 MG EC tablet Take 1 tablet (81 mg) by mouth daily 90 tablet 1     atorvastatin (LIPITOR) 80 MG tablet Take 1 tablet (80 mg) by mouth daily 90 tablet 2     blood glucose (ACCU-CHEK GUIDE) test strip 1 strip by In Vitro route 4 times daily Use to test blood sugar 4 times daily or as directed. 600 strip 3     blood glucose (NO BRAND SPECIFIED) test strip Use to test blood sugar 4 times daily or as directed. To accompany: Blood Glucose Monitor Brands: per insurance. 100 strip 6     blood glucose calibration (NO BRAND SPECIFIED) solution To accompany: Blood Glucose Monitor Brands: per insurance. 1 each 0     blood glucose calibration (NO BRAND SPECIFIED) solution To accompany: Blood Glucose Monitor Brands: per insurance. 100 each 11     blood glucose monitoring (ACCU-CHEK FASTCLIX) lancets Use with lanceting device. To accompany: Blood Glucose Monitor Brands: per insurance. 102 each 1     blood glucose monitoring (ACCU-CHEK FASTCLIX) lancets USE 6 TIMES DAILY USE TO TEST BLOOD SUGAR 6 TIMES DAILY OR AS DIRECTED. 600 each 0     blood glucose monitoring (NO BRAND SPECIFIED) meter device kit Use to test blood sugar 4 times daily or as directed. Preferred blood glucose meter OR supplies to accompany: Blood Glucose Monitor Brands: per insurance. 1 kit 0     blood glucose monitoring (NO BRAND SPECIFIED) meter device kit Use to test blood sugar 4 times daily or as directed. Preferred blood glucose meter OR supplies to accompany: Blood Glucose Monitor Brands: per insurance. 1 kit 0     carvedilol (COREG) 12.5 MG tablet Take 1 tablet (12.5 mg) by mouth 2 times daily (with meals) 180 tablet 0     Cholecalciferol (VITAMIN D) 2000 units tablet Take 2,000 Units by mouth        dulaglutide (TRULICITY) 1.5 MG/0.5ML pen Inject 1.5 mg Subcutaneous every 7 days for 56 days 2 mL 1     fenofibrate (TRIGLIDE/LOFIBRA) 160 MG  tablet Take 1 tablet (160 mg) by mouth daily 90 tablet 2     furosemide (LASIX) 20 MG tablet Take 1 tablet (20 mg) by mouth 2 times daily. Needs to be seen for further refills 30 tablet 0     GLOBAL EASE INJECT PEN NEEDLES 31G X 5 MM miscellaneous USE TO INJECT INSULIN 5 TIMES A  each 11     insulin glargine U-300 (TOUJEO MAX SOLOSTAR) 300 UNIT/ML (2 units dial) pen Inject 160 Units Subcutaneous every evening. Needs to be seen for further refills Strength: 300 UNIT/ML 48 mL 0     Insulin Lispro (HUMALOG KWIKPEN) 200 UNIT/ML soln INJECT 60 UNITS 3 TIMES DAILY WITH MEALS PLUS THE SCALE WITH EACH MEAL. 180 mL 1     isosorbide mononitrate (IMDUR) 30 MG 24 hr tablet Take 1 tablet (30 mg) by mouth daily 90 tablet 3     omega-3 acid ethyl esters (LOVAZA) 1 g capsule Take 1 capsule by mouth 2 times daily 90 capsule 1     Omega-3 Fatty Acids (FISH OIL) 1200 MG capsule TAKE 1 CAPSULE BY MOUTH 2 (TWO) TIMES A  capsule 3     order for DME Equipment being ordered: Digital home blood pressure monitor kit 1 Device 1     senna-docusate (SENOKOT-S/PERICOLACE) 8.6-50 MG tablet TAKE 1 TAB BY MOUTH TWO TIMES DAILY AS NEEDED FOR CONSTIPATION. 60 tablet 1     silver sulfADIAZINE (SILVADENE) 1 % external cream Apply topically daily 50 g 0     spironolactone (ALDACTONE) 25 MG tablet Take 1 tablet (25 mg) by mouth daily Needs to be seen for further refills 30 tablet 0     [employer]Care Alcohol Swabs 70 % PADS Use to swab area of injection/mary kay as directed. 100 each 11     vitamin D2 (ERGOCALCIFEROL) 52655 units (1250 mcg) capsule TAKE 1 CAPSULE (50,000 UNITS) BY MOUTH ONCE A WEEK. NEEDS TO BE SEEN FOR FURTHER REFILLS 1 capsule 10       Allergies  Allergies   Allergen Reactions     Doxycycline Other (See Comments) and Swelling     Facial swelling  facial   swelling    Facial swelling  facial   swelling       Influenza Vac Split [Flu Virus Vaccine] Itching     And red /blue arms         Family History  family history includes  Cerebrovascular Disease in his mother; Dementia in his father; Diabetes in his father and mother; Hypertension in his mother; Kidney Disease in his mother; Osteoporosis in his mother; Prostate Cancer in his father.    Social History  Social History     Tobacco Use     Smoking status: Never     Smokeless tobacco: Never   Vaping Use     Vaping status: Never Used   Substance Use Topics     Alcohol use: No     Drug use: No       Physical Exam  /77   Pulse 73   Resp 18   Wt 137.4 kg (303 lb)   SpO2 95%   BMI 42.26 kg/m    Body mass index is 42.26 kg/m .  GENERAL :  In no apparent distress.  SKIN: Normal color, normal temperature, texture.  No hirsutism, alopecia or purple striae.     EYES: PERRL, EOMI, No scleral icterus,  No proptosis, conjunctival redness, stare, retraction  RESP: Lungs clear to auscultation bilaterally  CARDIAC: Regular rate and rhythm, normal S1 S2, without murmurs, rubs or gallops    NEURO: awake, alert, responds appropriately to questions.  Cranial nerves intact.   Moves all extremities; Gait normal.  No tremor of the outstretched hand.    EXTREMITIES: No clubbing or cyanosis    DATA REVIEW    Pt did not bring meter to the appt.

## 2023-04-13 NOTE — LETTER
4/13/2023         RE: Roberto Thompson  [address] [place]  [facility] [address]        Dear Colleague,    Thank you for referring your patient, Roberto Thompson, to the [facility]. Please see a copy of my visit note below.    Assessment/Plan :   1. Type 2 DM, uncontrolled on long term use of insulin. Roberto did not notice any difference on the increased dose of Trulicity. He denies any adverse effects but he does not feel like the medication is doing anything. He did not bring his glucometer to the visit but he states that his blood sugars are always over 200 mg/dl. We discussed his current medications and he is on the max dose of Toujeo Max. At this point, I think we should switch over too Humulin R U-500. We discussed the difference between his current insulin and the Humulin R U-500, at length. He seems to understand. He will  the new prescription on the 20th. A referral to our CDE team was place for follow-up on 5/18 for further adjustment. We will start with TID dosing, 125 units AM and 95 units at lunch and dinner.      I have independently reviewed and interpreted labs, imaging as indicated.      Chief complaint:  Roberto is a 62 year old male who returns for follow-up of uncontrolled Type 2 DM. We are using the  service today for the visit.    I have reviewed Care Everywhere including [facility], [facility], [facility], [facility], [facility] , [facility], [place] lab reports, imaging reports and provider notes as indicated.      HISTORY OF PRESENT ILLNESS  Roberto was diagnosed with diabetes in 2007. He has struggled to manage his blood sugars since diagnosis. He is currently taking a combination of MDI insulin therapy and once weekly Trulicity. He is taking his medication consistently but his blood sugars remain elevated. He is currently taking Toujeo Max U-300 160 units daily along with Humalog 60-62 units before meals, three  times daily. At the last visit we increased the Trulicity from 0.75 mg weekly to 1.5 mg weekly. He states that he stated to new dose around March 20th. He has not noticed any difference. His blood sugar yesterday was 250 mg/dl in the morning and 300 mg/dl at night. He was at 400 mg/dl this morning.     He continues to struggle with severe neuropathy. He is unable to ambulate and is very unsteady when trying to stand. He recently fell and broke his coccyx. He states that the pain is still there but improving. He has a history of unhealing diabetic foot ulcers with amputation of toes on the right foot. He also has stage 4 CKD.    Endocrine relevant labs are as follows:   Latest Reference Range & Units 02/09/22 09:00   Hemoglobin A1C 0.0 - 5.6 % 11.2 (H)   (H): Data is abnormally high   Latest Reference Range & Units 02/09/22 09:00   Albumin Urine mg/g Cr 0.00 - 17.00 mg/g Cr 229.76 (H)   (H): Data is abnormally high   Latest Reference Range & Units 02/09/22 09:00   Albumin Urine mg/L mg/L 193       REVIEW OF SYSTEMS    Endocrine: positive for diabetes  Skin: negative  Eyes: positive for visual blurring  Ears/Nose/Throat: negative  Respiratory: No shortness of breath, dyspnea on exertion, cough, or hemoptysis  Cardiovascular: positive for lower extremity edema and exercise intolerance, negative for and chest pain  Gastrointestinal: negative for, nausea, vomiting, constipation and diarrhea  Genitourinary: negative for, nocturia, dysuria, frequency and urgency  Musculoskeletal: positive for fracture, back pain, arthritis, joint pain and joint stiffness  Neurologic: positive for loss of feeling in his feet  Psychiatric: negative  Hematologic/Lymphatic/Immunologic: negative    Past Medical History  Past Medical History:   Diagnosis Date     Diabetes (H)        Medications  Current Outpatient Medications   Medication Sig Dispense Refill     ACCU-CHEK GUIDE test strip Use to test blood sugar 4 times daily or as directed. To  accompany: Blood Glucose Monitor Brands: per insurance. 100 strip 10     ACE/ARB/ARNI NOT PRESCRIBED (INTENTIONAL) Please choose reason not prescribed from choices below.       alcohol swab prep pads Use to swab area of injection/good as directed. 100 each 3     Alcohol Swabs (ALCOHOL PREP) 70 % PADS USE TO SWAB AREA OF INJECTION/GOOD AS DIRECTED. 200 each 3     aspirin 81 MG EC tablet Take 1 tablet (81 mg) by mouth daily 90 tablet 1     atorvastatin (LIPITOR) 80 MG tablet Take 1 tablet (80 mg) by mouth daily 90 tablet 2     blood glucose (ACCU-CHEK GUIDE) test strip 1 strip by In Vitro route 4 times daily Use to test blood sugar 4 times daily or as directed. 600 strip 3     blood glucose (NO BRAND SPECIFIED) test strip Use to test blood sugar 4 times daily or as directed. To accompany: Blood Glucose Monitor Brands: per insurance. 100 strip 6     blood glucose calibration (NO BRAND SPECIFIED) solution To accompany: Blood Glucose Monitor Brands: per insurance. 1 each 0     blood glucose calibration (NO BRAND SPECIFIED) solution To accompany: Blood Glucose Monitor Brands: per insurance. 100 each 11     blood glucose monitoring (ACCU-CHEK FASTCLIX) lancets Use with lanceting device. To accompany: Blood Glucose Monitor Brands: per insurance. 102 each 1     blood glucose monitoring (ACCU-CHEK FASTCLIX) lancets USE 6 TIMES DAILY USE TO TEST BLOOD SUGAR 6 TIMES DAILY OR AS DIRECTED. 600 each 0     blood glucose monitoring (NO BRAND SPECIFIED) meter device kit Use to test blood sugar 4 times daily or as directed. Preferred blood glucose meter OR supplies to accompany: Blood Glucose Monitor Brands: per insurance. 1 kit 0     blood glucose monitoring (NO BRAND SPECIFIED) meter device kit Use to test blood sugar 4 times daily or as directed. Preferred blood glucose meter OR supplies to accompany: Blood Glucose Monitor Brands: per insurance. 1 kit 0     carvedilol (COREG) 12.5 MG tablet Take 1 tablet (12.5 mg) by mouth 2  times daily (with meals) 180 tablet 0     Cholecalciferol (VITAMIN D) 2000 units tablet Take 2,000 Units by mouth        dulaglutide (TRULICITY) 1.5 MG/0.5ML pen Inject 1.5 mg Subcutaneous every 7 days for 56 days 2 mL 1     fenofibrate (TRIGLIDE/LOFIBRA) 160 MG tablet Take 1 tablet (160 mg) by mouth daily 90 tablet 2     furosemide (LASIX) 20 MG tablet Take 1 tablet (20 mg) by mouth 2 times daily. Needs to be seen for further refills 30 tablet 0     GLOBAL EASE INJECT PEN NEEDLES 31G X 5 MM miscellaneous USE TO INJECT INSULIN 5 TIMES A  each 11     insulin glargine U-300 (TOUJEO MAX SOLOSTAR) 300 UNIT/ML (2 units dial) pen Inject 160 Units Subcutaneous every evening. Needs to be seen for further refills Strength: 300 UNIT/ML 48 mL 0     Insulin Lispro (HUMALOG KWIKPEN) 200 UNIT/ML soln INJECT 60 UNITS 3 TIMES DAILY WITH MEALS PLUS THE SCALE WITH EACH MEAL. 180 mL 1     isosorbide mononitrate (IMDUR) 30 MG 24 hr tablet Take 1 tablet (30 mg) by mouth daily 90 tablet 3     omega-3 acid ethyl esters (LOVAZA) 1 g capsule Take 1 capsule by mouth 2 times daily 90 capsule 1     Omega-3 Fatty Acids (FISH OIL) 1200 MG capsule TAKE 1 CAPSULE BY MOUTH 2 (TWO) TIMES A  capsule 3     order for DME Equipment being ordered: Digital home blood pressure monitor kit 1 Device 1     senna-docusate (SENOKOT-S/PERICOLACE) 8.6-50 MG tablet TAKE 1 TAB BY MOUTH TWO TIMES DAILY AS NEEDED FOR CONSTIPATION. 60 tablet 1     silver sulfADIAZINE (SILVADENE) 1 % external cream Apply topically daily 50 g 0     spironolactone (ALDACTONE) 25 MG tablet Take 1 tablet (25 mg) by mouth daily Needs to be seen for further refills 30 tablet 0     UltiCare Alcohol Swabs 70 % PADS Use to swab area of injection/mary kay as directed. 100 each 11     vitamin D2 (ERGOCALCIFEROL) 03251 units (1250 mcg) capsule TAKE 1 CAPSULE (50,000 UNITS) BY MOUTH ONCE A WEEK. NEEDS TO BE SEEN FOR FURTHER REFILLS 1 capsule 10       Allergies  Allergies   Allergen  Reactions     Doxycycline Other (See Comments) and Swelling     Facial swelling  facial   swelling    Facial swelling  facial   swelling       Influenza Vac Split [Flu Virus Vaccine] Itching     And red /blue arms         Family History  family history includes Cerebrovascular Disease in his mother; Dementia in his father; Diabetes in his father and mother; Hypertension in his mother; Kidney Disease in his mother; Osteoporosis in his mother; Prostate Cancer in his father.    Social History  Social History     Tobacco Use     Smoking status: Never     Smokeless tobacco: Never   Vaping Use     Vaping status: Never Used   Substance Use Topics     Alcohol use: No     Drug use: No       Physical Exam  /77   Pulse 73   Resp 18   Wt 137.4 kg (303 lb)   SpO2 95%   BMI 42.26 kg/m    Body mass index is 42.26 kg/m .  GENERAL :  In no apparent distress.  SKIN: Normal color, normal temperature, texture.  No hirsutism, alopecia or purple striae.     EYES: PERRL, EOMI, No scleral icterus,  No proptosis, conjunctival redness, stare, retraction  RESP: Lungs clear to auscultation bilaterally  CARDIAC: Regular rate and rhythm, normal S1 S2, without murmurs, rubs or gallops    NEURO: awake, alert, responds appropriately to questions.  Cranial nerves intact.   Moves all extremities; Gait normal.  No tremor of the outstretched hand.    EXTREMITIES: No clubbing or cyanosis    DATA REVIEW    Pt did not bring meter to the appt.      Again, thank you for allowing me to participate in the care of your patient.        Sincerely,        Flavia Beltre PA-C

## 2023-04-14 DIAGNOSIS — I10 HYPERTENSION GOAL BP (BLOOD PRESSURE) < 140/90: ICD-10-CM

## 2023-04-16 RX ORDER — FUROSEMIDE 20 MG
20 TABLET ORAL 2 TIMES DAILY
Qty: 30 TABLET | Refills: 0 | Status: SHIPPED | OUTPATIENT
Start: 2023-04-16 | End: 2023-04-17

## 2023-04-16 RX ORDER — SPIRONOLACTONE 25 MG/1
25 TABLET ORAL DAILY
Qty: 30 TABLET | Refills: 0 | Status: SHIPPED | OUTPATIENT
Start: 2023-04-16 | End: 2023-04-17

## 2023-04-17 ENCOUNTER — TELEPHONE (OUTPATIENT)
Dept: FAMILY MEDICINE | Facility: CLINIC | Age: 63
End: 2023-04-17
Payer: MEDICARE

## 2023-04-17 DIAGNOSIS — I10 HYPERTENSION GOAL BP (BLOOD PRESSURE) < 140/90: ICD-10-CM

## 2023-04-17 RX ORDER — FUROSEMIDE 20 MG
20 TABLET ORAL 2 TIMES DAILY
Qty: 90 TABLET | Refills: 0 | Status: SHIPPED | OUTPATIENT
Start: 2023-04-17 | End: 2023-05-05

## 2023-04-17 RX ORDER — SPIRONOLACTONE 25 MG/1
25 TABLET ORAL DAILY
Qty: 90 TABLET | Refills: 0 | Status: SHIPPED | OUTPATIENT
Start: 2023-04-17 | End: 2023-05-05

## 2023-04-17 NOTE — TELEPHONE ENCOUNTER
Pharmacy calling to clarify medication furosemide (LASIX) 20 MG tablet taken twice daily and spironolactone (ALDACTONE) 25 MG tablet. Patient was given 15 day supply of lasix and 30 day supply of spironolactone.     Provider: does patient need office visit for further refills? Last office visit 02/17/23.     Pharmacy requesting more refills for patient.    Amy Reyes

## 2023-04-17 NOTE — TELEPHONE ENCOUNTER
Will refill for 90 days since Dr Salter not in clinic for 2 weeks.   Further refills per him    Jaylyn Bellamy MD

## 2023-04-25 NOTE — LETTER
New patient is here today presenting with Right foot painful plantar lump at the arch of foot.  Right foot painful for about 10 years, lump noticeable for the last 4 months.    Patient denies any other issues at this time.    Referred by nobody  Allergies to latex denies  Smoking status nonsmoker  LAST OV WITH PCP approx. 2000, patient unsure    Medical History  *Diabetic~ no  *CA~ no   *Heart~ no  *Resp~ no  *Kidney~ no  *Bone~ no  *Aspirin~ no               February 11, 2022      Roberto [place]ST[place] Thompson  [address]  [place] [address]        Roberto,     Your vitamin D level is still low. I have sent a prescription for vitamin d supplements to your pharmacy. Please follow-up at lab in about 8 weeks when you have finished the prescription     There is protein in your urine. This is due to your kidney damage   Your electrolytes are normal, kidneys stable   Please follow-up with the kidney doctor as we discussed     Your diabetes is not well controlled. Please check your blood sugars and see where it is running high. Consider seeing an endocrinologist to help you manage your diabetes. Let me know if you would like a referral     Your hemoglobin is stable.     Jaylyn Bellamy MD       Resulted Orders   Albumin Random Urine Quantitative with Creat Ratio   Result Value Ref Range    Creatinine Urine mg/dL 84 mg/dL    Albumin Urine mg/L 193 mg/L    Albumin Urine mg/g Cr 229.76 (H) 0.00 - 17.00 mg/g Cr   **CBC with platelets FUTURE 2mo   Result Value Ref Range    WBC Count 6.4 4.0 - 11.0 10e3/uL    RBC Count 4.30 (L) 4.40 - 5.90 10e6/uL    Hemoglobin 13.1 (L) 13.3 - 17.7 g/dL    Hematocrit 40.3 40.0 - 53.0 %    MCV 94 78 - 100 fL    MCH 30.5 26.5 - 33.0 pg    MCHC 32.5 31.5 - 36.5 g/dL    RDW 13.7 10.0 - 15.0 %    Platelet Count 142 (L) 150 - 450 10e3/uL   **Basic metabolic panel FUTURE 2mo   Result Value Ref Range    Sodium 133 133 - 144 mmol/L    Potassium 4.9 3.4 - 5.3 mmol/L    Chloride 100 94 - 109 mmol/L    Carbon Dioxide (CO2) 26 20 - 32 mmol/L    Anion Gap 7 3 - 14 mmol/L    Urea Nitrogen 45 (H) 7 - 30 mg/dL    Creatinine 2.48 (H) 0.66 - 1.25 mg/dL    Calcium 9.4 8.5 - 10.1 mg/dL    Glucose 291 (H) 70 - 99 mg/dL    GFR Estimate 28 (L) >60 mL/min/1.73m2      Comment:      Effective December 21, 2021 eGFRcr in adults is calculated using the 2021 CKD-EPI creatinine equation which includes age and gender (Robert et al., NEJM, DOI: 10.1056/ZSCIrl7193328)    **A1C FUTURE 3mo   Result Value Ref Range    Hemoglobin A1C 11.2 (H) 0.0 - 5.6 %      Comment:      Normal <5.7%   Prediabetes 5.7-6.4%    Diabetes 6.5% or higher     Note: Adopted from ADA consensus guidelines.   **Vitamin D Deficiency FUTURE 2mo   Result Value Ref Range    Vitamin D, Total (25-Hydroxy) 17 (L) 20 - 75 ug/L    Narrative    Season, race, dietary intake, and treatment affect the concentration of 25-hydroxy-Vitamin D. Values may decrease during winter months and increase during summer months. Values 20-29 ug/L may indicate Vitamin D insufficiency and values <20 ug/L may indicate Vitamin D deficiency.    Vitamin D determination is routinely performed by an immunoassay specific for 25 hydroxyvitamin D3.  If an individual is on vitamin D2(ergocalciferol) supplementation, please specify 25 OH vitamin D2 and D3 level determination by LCMSMS test [id number].

## 2023-05-05 ENCOUNTER — OFFICE VISIT (OUTPATIENT)
Dept: CARDIOLOGY | Facility: CLINIC | Age: 63
End: 2023-05-05
Payer: MEDICARE

## 2023-05-05 VITALS — SYSTOLIC BLOOD PRESSURE: 122 MMHG | DIASTOLIC BLOOD PRESSURE: 63 MMHG | HEART RATE: 80 BPM | OXYGEN SATURATION: 97 %

## 2023-05-05 DIAGNOSIS — E78.1 HYPERTRIGLYCERIDEMIA: ICD-10-CM

## 2023-05-05 DIAGNOSIS — E78.00 HIGH BLOOD CHOLESTEROL: ICD-10-CM

## 2023-05-05 DIAGNOSIS — I10 HYPERTENSION GOAL BP (BLOOD PRESSURE) < 140/90: ICD-10-CM

## 2023-05-05 PROCEDURE — 99215 OFFICE O/P EST HI 40 MIN: CPT | Performed by: INTERNAL MEDICINE

## 2023-05-05 RX ORDER — CARVEDILOL 12.5 MG/1
12.5 TABLET ORAL 2 TIMES DAILY WITH MEALS
Qty: 180 TABLET | Refills: 3 | Status: SHIPPED | OUTPATIENT
Start: 2023-05-05 | End: 2024-05-07

## 2023-05-05 RX ORDER — FENOFIBRATE 160 MG/1
160 TABLET ORAL DAILY
Qty: 90 TABLET | Refills: 3 | Status: SHIPPED | OUTPATIENT
Start: 2023-05-05 | End: 2024-05-28

## 2023-05-05 RX ORDER — FUROSEMIDE 20 MG
20 TABLET ORAL 2 TIMES DAILY
Qty: 180 TABLET | Refills: 3 | Status: SHIPPED | OUTPATIENT
Start: 2023-05-05 | End: 2024-05-28

## 2023-05-05 RX ORDER — SPIRONOLACTONE 25 MG/1
25 TABLET ORAL DAILY
Qty: 90 TABLET | Refills: 3 | Status: SHIPPED | OUTPATIENT
Start: 2023-05-05 | End: 2024-05-28

## 2023-05-05 NOTE — PROGRESS NOTES
Roberto Thompson's goals for this visit include:     He requests these members of his care team be copied on today's visit information: PCP    PCP: Jaylyn Bellamy    Referring Provider:  No referring provider defined for this encounter.    /63 (BP Location: Left arm, Patient Position: Sitting, Cuff Size: Adult Large)   Pulse 80   SpO2 97%         Do you need any medication refills at today's visit? No.    Clarence Aguayo, EMT  Clinic Support  [facility]    [phone]    Employed by [employer]

## 2023-05-05 NOTE — PROGRESS NOTES
[facility] Cardiology Consultation:    Assessment and Plan:     1.  Subclinical CAD with severe CAC, normal nuclear stress test, normal LV function  On appropriate preventive therapy with aspirin and statin.  2. Hypertension, controlled on current regimen  3. Diabetes, uncontrolled: On insulin, GLP-1 agonist.  Follows with endocrinology   4. CKD stage IV, presumably diabetic nephropathy.  Encouraged to keep nephrology consultation  On Lasix 20 twice daily  5. Mixed dyslipidemia, hypercholesterolemia and hypertriglyceridemia, goal LDL less than 70  Main contributor is uncontrolled diabetes.  On Lipitor 80 and fenofibrate 160  Lovaza 2 g started recently, unsure if taking  6. Severe obesity  7. KELBY on CPAP  8. Mild carotid stenosis  On aspirin and statin    A total of 40 minutes were spent on the day of the visit for chart review, care coordination, face-to-face consultation with the patient, and documentation.    Mitch Reynaga MD    Cardiac Imaging and Prevention  [facility]  [email or web address] I Pager: [phone]    HPI: Follow-up for chest pain and to review recent cardiac testing.  History obtained via Thai  on the phone.    I reviewed his nuclear stress test that was done recently.  It was normal, with no ischemia or infarction, and normal cardiac function.  It did show severe coronary artery calcifications.  I reviewed his echocardiogram that was done recently.  It showed normal LV function, concentric LV remodeling, grade 2 diastolic dysfunction, normal RV function, normal valvular function, and IVC could not be assessed.   He is working with endocrinology for diabetes control.  Office blood pressures have been better.  He tells me that his chest pains have improved since his last visit.  Again a Thai herbal supplement thinks that it helps.    EXAM:  /63 (BP Location: Left arm, Patient Position: Sitting, Cuff Size: Adult Large)   Pulse  80   SpO2 97%   GEN/CONSTITUIONAL: Appears comfortable, in no apparent distress   EYES: No icterus  ENT/MOUTH: Normal  JVP:  Not visible  RESPIRATORY: Clear to auscultation bilaterally   CARDIOVASCULAR: Regular S1 and S2, no murmurs, rubs, or gallops.   ABDOMEN: Soft, non-tender, positive bowel sounds   NEUROLOGIC: Grossly non-focal   PSYCHIATRIC: Normal affect  EXT: 1+LE edema.   Skin: No petechiae, purpura or rash    PAST MEDICAL HISTORY:  Past Medical History:   Diagnosis Date     Diabetes (H)        CURRENT MEDICATIONS:  Current Outpatient Medications   Medication     ACCU-CHEK GUIDE test strip     ACE/ARB/ARNI NOT PRESCRIBED (INTENTIONAL)     alcohol swab prep pads     Alcohol Swabs (ALCOHOL PREP) 70 % PADS     aspirin 81 MG EC tablet     atorvastatin (LIPITOR) 80 MG tablet     blood glucose (ACCU-CHEK GUIDE) test strip     blood glucose (NO BRAND SPECIFIED) test strip     blood glucose calibration (NO BRAND SPECIFIED) solution     blood glucose calibration (NO BRAND SPECIFIED) solution     blood glucose monitoring (ACCU-CHEK FASTCLIX) lancets     blood glucose monitoring (ACCU-CHEK FASTCLIX) lancets     blood glucose monitoring (NO BRAND SPECIFIED) meter device kit     blood glucose monitoring (NO BRAND SPECIFIED) meter device kit     carvedilol (COREG) 12.5 MG tablet     Cholecalciferol (VITAMIN D) 2000 units tablet     fenofibrate (TRIGLIDE/LOFIBRA) 160 MG tablet     furosemide (LASIX) 20 MG tablet     GLOBAL EASE INJECT PEN NEEDLES 31G X 5 MM miscellaneous     insulin glargine U-300 (TOUJEO MAX SOLOSTAR) 300 UNIT/ML (2 units dial) pen     Insulin Lispro (HUMALOG KWIKPEN) 200 UNIT/ML soln     insulin reg HIGH CONC (HUMULIN R U-500 KWIKPEN) 500 UNIT/ML PEN soln     isosorbide mononitrate (IMDUR) 30 MG 24 hr tablet     omega-3 acid ethyl esters (LOVAZA) 1 g capsule     Omega-3 Fatty Acids (FISH OIL) 1200 MG capsule     order for DME     senna-docusate (SENOKOT-S/PERICOLACE) 8.6-50 MG tablet     silver  sulfADIAZINE (SILVADENE) 1 % external cream     spironolactone (ALDACTONE) 25 MG tablet     UltiCare Alcohol Swabs 70 % PADS     vitamin D2 (ERGOCALCIFEROL) 64333 units (1250 mcg) capsule     Current Facility-Administered Medications   Medication     silver sulfADIAZINE (SILVADENE) 1 % cream     triamcinolone (KENALOG-40) injection 40 mg       PAST SURGICAL HISTORY:  Past Surgical History:   Procedure Laterality Date     APPENDECTOMY       ENT SURGERY      tonsils     ORTHOPEDIC SURGERY      thumb     ORTHOPEDIC SURGERY      2 toe amputations       ALLERGIES     Allergies   Allergen Reactions     Doxycycline Other (See Comments) and Swelling     Facial swelling  facial   swelling    Facial swelling  facial   swelling       Influenza Vac Split [Influenza Virus Vaccine] Itching     And red /blue arms       FAMILY HISTORY:  Family History   Problem Relation Age of Onset     Diabetes Mother      Hypertension Mother      Cerebrovascular Disease Mother      Kidney Disease Mother      Osteoporosis Mother      Diabetes Father      Prostate Cancer Father      Dementia Father        SOCIAL HISTORY:  Social History     Socioeconomic History     Marital status:      Spouse name: Not on file     Number of children: Not on file     Years of education: Not on file     Highest education level: Not on file   Occupational History     Not on file   Tobacco Use     Smoking status: Never     Smokeless tobacco: Never   Vaping Use     Vaping status: Never Used   Substance and Sexual Activity     Alcohol use: No     Drug use: No     Sexual activity: Yes     Partners: Female     Birth control/protection: None   Other Topics Concern     Parent/sibling w/ CABG, MI or angioplasty before 65F 55M? Not Asked   Social History Narrative     Not on file     Social Determinants of Health     Financial Resource Strain: Not on file   Food Insecurity: Not on file   Transportation Needs: Not on file   Physical Activity: Not on file   Stress: Not  on file   Social Connections: Not on file   Intimate Partner Violence: Not on file   Housing Stability: Not on file       ROS:   Constitutional: No fever, chills, or sweats. No weight gain/loss   ENT: No visual disturbance, ear ache, epistaxis, sore throat  Allergies/Immunologic: Negative.   Respiratory: No cough, hemoptysia  Cardiovascular: As per HPI  GI: No nausea, vomiting, hematemesis, melena, or hematochezia  : No urinary frequency, dysuria, or hematuria  Integument: Negative  Psychiatric: Negative  Neuro: Negative  Endocrinology: Negative   Musculoskeletal: Negative    ADDITIONAL COMMENTS:     I reviewed the patient's medications:     I reviewed the patient's pertinent clinical laboratory studies:     I reviewed the patient's pertinent imaging studies:   I reviewed the patient's ECG:

## 2023-05-15 DIAGNOSIS — Z79.4 TYPE 2 DIABETES MELLITUS WITH OTHER SPECIFIED COMPLICATION, WITH LONG-TERM CURRENT USE OF INSULIN (H): ICD-10-CM

## 2023-05-15 DIAGNOSIS — K59.00 CONSTIPATION, UNSPECIFIED CONSTIPATION TYPE: ICD-10-CM

## 2023-05-15 DIAGNOSIS — E11.69 TYPE 2 DIABETES MELLITUS WITH OTHER SPECIFIED COMPLICATION, WITH LONG-TERM CURRENT USE OF INSULIN (H): ICD-10-CM

## 2023-05-15 RX ORDER — AMOXICILLIN 250 MG
CAPSULE ORAL
Qty: 60 TABLET | Refills: 10 | Status: SHIPPED | OUTPATIENT
Start: 2023-05-15 | End: 2024-04-19

## 2023-05-15 RX ORDER — LANCETS
EACH MISCELLANEOUS
Qty: 102 EACH | Refills: 10 | Status: SHIPPED | OUTPATIENT
Start: 2023-05-15 | End: 2024-05-20

## 2023-05-16 RX ORDER — INSULIN GLARGINE 300 U/ML
INJECTION, SOLUTION SUBCUTANEOUS
Qty: 48 ML | Refills: 1 | Status: SHIPPED | OUTPATIENT
Start: 2023-05-16 | End: 2023-05-18 | Stop reason: ALTCHOICE

## 2023-05-18 ENCOUNTER — TELEPHONE (OUTPATIENT)
Dept: ENDOCRINOLOGY | Facility: CLINIC | Age: 63
End: 2023-05-18

## 2023-05-18 ENCOUNTER — ALLIED HEALTH/NURSE VISIT (OUTPATIENT)
Dept: EDUCATION SERVICES | Facility: CLINIC | Age: 63
End: 2023-05-18
Payer: MEDICARE

## 2023-05-18 DIAGNOSIS — Z79.4 TYPE 2 DIABETES MELLITUS WITH OTHER SPECIFIED COMPLICATION, WITH LONG-TERM CURRENT USE OF INSULIN (H): Primary | ICD-10-CM

## 2023-05-18 DIAGNOSIS — E11.69 TYPE 2 DIABETES MELLITUS WITH OTHER SPECIFIED COMPLICATION, WITH LONG-TERM CURRENT USE OF INSULIN (H): Primary | ICD-10-CM

## 2023-05-18 DIAGNOSIS — E11.69 TYPE 2 DIABETES MELLITUS WITH OTHER SPECIFIED COMPLICATION, WITH LONG-TERM CURRENT USE OF INSULIN (H): ICD-10-CM

## 2023-05-18 DIAGNOSIS — Z79.4 TYPE 2 DIABETES MELLITUS WITH OTHER SPECIFIED COMPLICATION, WITH LONG-TERM CURRENT USE OF INSULIN (H): ICD-10-CM

## 2023-05-18 PROCEDURE — G0108 DIAB MANAGE TRN  PER INDIV: HCPCS

## 2023-05-18 NOTE — PROGRESS NOTES
DIABETES SELF MANAGEMENT EDUCATION: Previous Diagnosis/Individual Diabetes Review    Roberto Thompson presents today for evaluation of glucose control and modification of medication(s) related to Type 2 diabetes.  He is accompanied by daughter, Nakita    Year of diagnosis: 2007  Referring provider:  Flavia Beltre PA-C     Past Diabetes Education: No  Support system: family  Living Situation: Lives with his wife and daughter  Employment:     DIABETES RELATED CONCERNS/COMMENTS: here for blood sugar review and medication adjustment.      Patient's emotional response to diabetes: expresses readiness to learn    ASSESSMENT:  Patient Problem List and Medication List reviewed for relevant medical history, current medical status, and diabetes risk factors.    MEDICATION:    Current Diabetes Management per Patient:  Taking diabetes medications?   He was still taking the Toujeo as of last evening. He was not taking the Humalog.  He said he was taking 120 units of U-500 every morning.  He confirmed taking Trulicity 1.5 mg once weekly.      MONITORING:  Patient glucose self monitoring as follows: two times daily  BG meter: Accu Check Earnestine meter  Blood sugar data:    Date Breakfast Lunch Dinner Bedtime    Before Before Before    5/14 371  419 414   5/15 305  296    5/16 236 94 244    5/17 307      5/18 219 343             Patient's most recent   Lab Results   Component Value Date    A1C 11.2 02/09/2022    A1C 8.7 05/26/2021      Patient's A1C goal: <8.0    PHYSICAL ACTIVITY:  not assessed    NUTRITION:  Patient states he does not always eat 3 meals a day. He said he sometimes skips lunch. He gets up around 7 am and eats, maybe eats around 12-1 pm and then around 10 pm.  Beverages:   Cultural/Uatsdin diet restrictions: No   Biggest Challenge to Healthy Eating: knowing what to eat    Diabetes knowledge and skills assessment:   Barriers to Learning Assessment: English as a second language (ESL)    Problem Solving:    Patient is  at risk of hypoglycemia?: YES  Hospitalizations for hyper or hypoglycemia: No    Healthy Coping and Stress Management:   Sources of stress identified by patient My health    EDUCATION and INSTRUCTION PROVIDED AT THIS VISIT:    -Target blood glucose for pre-meal and 2 hour post-prandial glucose   -Action, timing and potential side-effects of U-500 and Trulicity diabetes medications:     Pt verbalized understanding of concepts discussed and recommendations provided today.       PLAN:  Confirmed with Flavia Beltre PA-C while Roberto was still here with his daughter that we will use only U-500 and Trulicity.   Toujeo and Humalog is discontinued.  Instructed Roberto and his daughter to take U-500 125 units around 7 am when he gets up, take 95 units with lunch between 12-1 pm and 95 units with supper around 10 pm.   He is to continue Trulicity 1.5 mg once weekly.  I called [facility] and spoke with the pharmacist.  We cancelled the Toujeo and Humalog at the pharmacy. We refilled the U-500 and Trulicity for 10 months.      See patient instructions/AVS    FOLLOW-UP:  Seeing Flavia Beltre PA-C June 1st.  He should see me 1-2 months after that.    Time Spent: 30 minutes  Encounter Type: Individual    Any diabetes medication dose changes were made via the CDE Protocol and Collaborative Practice Agreement with [facility] and  [facility].  A copy of this encounter was provided to patient's referring provider.    Tonya Sim RN, [facility]

## 2023-05-18 NOTE — TELEPHONE ENCOUNTER
Please sign pended A1C order to be completed at the time of upcoming appointment.    Harmony Juarez, [facility]  Adult Endocrinology  [facility] [place]

## 2023-06-01 ENCOUNTER — OFFICE VISIT (OUTPATIENT)
Dept: ENDOCRINOLOGY | Facility: CLINIC | Age: 63
End: 2023-06-01
Payer: MEDICARE

## 2023-06-01 VITALS
OXYGEN SATURATION: 97 % | DIASTOLIC BLOOD PRESSURE: 77 MMHG | WEIGHT: 303 LBS | HEART RATE: 73 BPM | BODY MASS INDEX: 42.26 KG/M2 | SYSTOLIC BLOOD PRESSURE: 131 MMHG

## 2023-06-01 DIAGNOSIS — E66.01 MORBID OBESITY (H): Primary | ICD-10-CM

## 2023-06-01 DIAGNOSIS — Z79.4 TYPE 2 DIABETES MELLITUS WITH OTHER SPECIFIED COMPLICATION, WITH LONG-TERM CURRENT USE OF INSULIN (H): ICD-10-CM

## 2023-06-01 DIAGNOSIS — E11.69 TYPE 2 DIABETES MELLITUS WITH OTHER SPECIFIED COMPLICATION, WITH LONG-TERM CURRENT USE OF INSULIN (H): ICD-10-CM

## 2023-06-01 DIAGNOSIS — G63 POLYNEUROPATHY ASSOCIATED WITH UNDERLYING DISEASE (H): ICD-10-CM

## 2023-06-01 LAB — HBA1C MFR BLD: 10.4 % (ref 4.3–?)

## 2023-06-01 PROCEDURE — 99213 OFFICE O/P EST LOW 20 MIN: CPT | Performed by: PHYSICIAN ASSISTANT

## 2023-06-01 PROCEDURE — 83036 HEMOGLOBIN GLYCOSYLATED A1C: CPT | Performed by: PHYSICIAN ASSISTANT

## 2023-06-01 RX ORDER — INSULIN HUMAN 500 [IU]/ML
INJECTION, SOLUTION SUBCUTANEOUS
Qty: 30 ML | Refills: 1 | Status: SHIPPED | OUTPATIENT
Start: 2023-06-01 | End: 2023-07-17

## 2023-06-01 RX ORDER — BLOOD SUGAR DIAGNOSTIC
1 STRIP MISCELLANEOUS 4 TIMES DAILY
Qty: 400 STRIP | Refills: 1 | Status: SHIPPED | OUTPATIENT
Start: 2023-06-01 | End: 2024-03-26

## 2023-06-01 ASSESSMENT — PAIN SCALES - GENERAL: PAINLEVEL: NO PAIN (0)

## 2023-06-01 NOTE — NURSING NOTE
Roberto Thompson's goals for this visit include: none  He requests these members of his care team be copied on today's visit information: yes    PCP: Jaylyn Bellamy    Referring Provider:  No referring provider defined for this encounter.    /77 (BP Location: Right arm, Patient Position: Sitting, Cuff Size: Adult Large)   Pulse 73   Wt 137.4 kg (303 lb)   SpO2 97%   BMI 42.26 kg/m      Do you need any medication refills at today's visit? NONE    Xavier Smith, EMT

## 2023-06-01 NOTE — LETTER
"    6/1/2023         RE: Roberto Thompson  [address] [place]  [place] [address]        Dear Colleague,    Thank you for referring your patient, Roberto Thompson, to the [facility]. Please see a copy of my visit note below.    Assessment/Plan :   1. Type 2 DM, uncontrolled. Roberto seems to be responding to the Humulin R nicely. His blood sugars are still elevated but he has not had any \"HI\" readings since starting the Humulin R. He has had a few blood sugars under 150 mg/dl. He has not had any problems with low blood sugar. He is motivated to get his blood sugars under control. His blood sugars are still a little elevated at dinner and when he wakes up. We will increase his lunch and dinner time dose to 100 unit(s). This brings his total daily dose to 325 unit(s). I will also send in a new prescription.     He is also requesting refills of test strips and alcohol wipes.    I encouraged him to keep up the good work. We will follow-up in 3 mos and I would like him to follow-up with Tonya Sim RN in the next 6 wks.    If he has any problems, he is to contact our office.      I have independently reviewed and interpreted labs, imaging as indicated.      Chief complaint:  Roberto is a 62 year old male who returns for follow-up of uncontrolled Type 2 DM. We have an  on the Ipad.    I have reviewed Care Everywhere including [facility], [facility], [facility], [facility] , , [place] lab reports, imaging reports and provider notes as indicated.      HISTORY OF PRESENT ILLNESS  Roberto was diagnosed with diabetes in 2007. He has struggle to get his blood sugars under stable control. He has a large amount of insulin resistance and requires a significant amount of insulin. He was previously taking Toujeo Max U-300 160 unit(s) daily along with Humalog 62 unit(s) before meals. He also takes once weekly Trulicity 1.5 mg. His " hemoglobin A1C was 11.2%.     Due to his large insulin requirements, we decided to switch him to Humulin R U-500. He was started on U-500 on May 18th. He is currently taking 125 unit(s) in the morning and 95 unit(s) with lunch and dinner. He remains on Trulicity 1.5 mg weekly. He feels like he is doing okay. He is still experiencing some elevated reading post-dinner and in the morning. He has not had any severe hyperglycemia and/or hypoglycemia. His highest reading since starting U-500 is 343 mg/dl and his lowest is 134 mg/dl. He has had no injection site reactions.    Endocrine relevant labs are as follows:     Latest Reference Range & Units 06/01/23 09:23   Hemoglobin A1C POCT 4.3 - <5.7 % 10.4 !   !: Data is abnormal    REVIEW OF SYSTEMS    Endocrine: positive for diabetes  Skin: negative  Eyes: positive for visual blurring, stable  Ears/Nose/Throat: negative  Respiratory: No shortness of breath, dyspnea on exertion, cough, or hemoptysis  Cardiovascular: positive for lower extremity edema and exercise intolerance, negative for and chest pain  Gastrointestinal: negative for, nausea, vomiting, constipation and diarrhea  Genitourinary: negative for, nocturia, dysuria, frequency and urgency  Musculoskeletal: positive for joint swelling and joint stiffness, negative for, muscular weakness and nocturnal cramping  Neurologic: positive for numbness or tingling of feet which is immobilizing  Psychiatric: negative  Hematologic/Lymphatic/Immunologic: negative    Past Medical History  Past Medical History:   Diagnosis Date     Diabetes (H)        Medications  Current Outpatient Medications   Medication Sig Dispense Refill     ACCU-CHEK GUIDE test strip Use to test blood sugar 4 times daily or as directed. 100 strip 1     ACE/ARB/ARNI NOT PRESCRIBED (INTENTIONAL) Please choose reason not prescribed from choices below.       aspirin 81 MG EC tablet Take 1 tablet (81 mg) by mouth daily 90 tablet 1     atorvastatin (LIPITOR) 80  MG tablet Take 1 tablet (80 mg) by mouth daily 90 tablet 2     blood glucose monitoring (ACCU-CHEK FASTCLIX) lancets Use with lanceting device. To accompany: Blood Glucose Monitor Brands: per insurance. 102 each 10     blood glucose monitoring (NO BRAND SPECIFIED) meter device kit Use to test blood sugar 4 times daily or as directed. Preferred blood glucose meter OR supplies to accompany: Blood Glucose Monitor Brands: per insurance. 1 kit 0     blood glucose monitoring (NO BRAND SPECIFIED) meter device kit Use to test blood sugar 4 times daily or as directed. Preferred blood glucose meter OR supplies to accompany: Blood Glucose Monitor Brands: per insurance. 1 kit 0     carvedilol (COREG) 12.5 MG tablet Take 1 tablet (12.5 mg) by mouth 2 times daily (with meals) 180 tablet 3     Cholecalciferol (VITAMIN D) 2000 units tablet Take 2,000 Units by mouth       fenofibrate (TRIGLIDE/LOFIBRA) 160 MG tablet Take 1 tablet (160 mg) by mouth daily 90 tablet 3     furosemide (LASIX) 20 MG tablet Take 1 tablet (20 mg) by mouth 2 times daily 180 tablet 3     GLOBAL EASE INJECT PEN NEEDLES 31G X 5 MM miscellaneous USE TO INJECT INSULIN 5 TIMES A  each 11     insulin reg HIGH CONC (HUMULIN R U-500 KWIKPEN) 500 UNIT/ML PEN soln Inj 125 units subcutaneous with breakfast and 95 units subcutaneous with lunch and dinner. (total daily dose of 315 units) 15 mL 1     isosorbide mononitrate (IMDUR) 30 MG 24 hr tablet Take 1 tablet (30 mg) by mouth daily 90 tablet 3     omega-3 acid ethyl esters (LOVAZA) 1 g capsule Take 1 capsule by mouth 2 times daily 90 capsule 1     Omega-3 Fatty Acids (FISH OIL) 1200 MG capsule TAKE 1 CAPSULE BY MOUTH 2 (TWO) TIMES A  capsule 3     order for DME Equipment being ordered: Digital home blood pressure monitor kit 1 Device 1     senna-docusate (SENOKOT-S/PERICOLACE) 8.6-50 MG tablet TAKE 1 TAB BY MOUTH TWO TIMES DAILY AS NEEDED FOR CONSTIPATION. 60 tablet 10     silver sulfADIAZINE (SILVADENE)  1 % external cream Apply topically daily 50 g 0     spironolactone (ALDACTONE) 25 MG tablet Take 1 tablet (25 mg) by mouth daily 90 tablet 3     vitamin D2 (ERGOCALCIFEROL) 84856 units (1250 mcg) capsule TAKE 1 CAPSULE (50,000 UNITS) BY MOUTH ONCE A WEEK. NEEDS TO BE SEEN FOR FURTHER REFILLS 1 capsule 10     blood glucose monitoring (ACCU-CHEK FASTCLIX) lancets USE 6 TIMES DAILY USE TO TEST BLOOD SUGAR 6 TIMES DAILY OR AS DIRECTED. (Patient not taking: Reported on 5/5/2023) 600 each 0       Allergies  Allergies   Allergen Reactions     Doxycycline Other (See Comments) and Swelling     Facial swelling  facial   swelling    Facial swelling  facial   swelling       Influenza Vac Split [Influenza Virus Vaccine] Itching     And red /blue arms         Family History  family history includes Cerebrovascular Disease in his mother; Dementia in his father; Diabetes in his father and mother; Hypertension in his mother; Kidney Disease in his mother; Osteoporosis in his mother; Prostate Cancer in his father.    Social History  Social History     Tobacco Use     Smoking status: Never     Smokeless tobacco: Never   Vaping Use     Vaping status: Never Used   Substance Use Topics     Alcohol use: No     Drug use: No       Physical Exam  /77 (BP Location: Right arm, Patient Position: Sitting, Cuff Size: Adult Large)   Pulse 73   Wt 137.4 kg (303 lb)   SpO2 97%   BMI 42.26 kg/m    Body mass index is 42.26 kg/m .  GENERAL :  In no apparent distress. Pt is in a wheelchair  SKIN: Normal color, normal temperature, texture.  No hirsutism, alopecia or purple striae.     EYES: PERRL, EOMI, No scleral icterus,  No proptosis, conjunctival redness, stare, retraction  NECK: No visible masses. No palpable adenopathy, or masses. No carotid bruits.   RESP: Lungs clear to auscultation bilaterally  CARDIAC: Regular rate and rhythm, normal S1 S2, without murmurs, rubs or gallops    NEURO: awake, alert, responds appropriately to questions.   Cranial nerves intact.   Moves all extremities; Gait normal.  No tremor of the outstretched hand.    EXTREMITIES: No clubbing, cyanosis. Edema bilateral    DATA REVIEW  Glooko Report  Current Ave BG is 223 mg/dl      Again, thank you for allowing me to participate in the care of your patient.        Sincerely,        Flavia Beltre PA-C

## 2023-06-01 NOTE — PROGRESS NOTES
"Assessment/Plan :   1. Type 2 DM, uncontrolled. Roberto seems to be responding to the Humulin R nicely. His blood sugars are still elevated but he has not had any \"HI\" readings since starting the Humulin R. He has had a few blood sugars under 150 mg/dl. He has not had any problems with low blood sugar. He is motivated to get his blood sugars under control. His blood sugars are still a little elevated at dinner and when he wakes up. We will increase his lunch and dinner time dose to 100 unit(s). This brings his total daily dose to 325 unit(s). I will also send in a new prescription.     He is also requesting refills of test strips and alcohol wipes.    I encouraged him to keep up the good work. We will follow-up in 3 mos and I would like him to follow-up with Tonya Sim RN in the next 6 wks.    If he has any problems, he is to contact our office.      I have independently reviewed and interpreted labs, imaging as indicated.      Chief complaint:  Roberto is a 62 year old male who returns for follow-up of uncontrolled Type 2 DM. We have an  on the Ipad.    I have reviewed Care Everywhere including [facility], [facility], [facility] , [facility], [place] lab reports, imaging reports and provider notes as indicated.      HISTORY OF PRESENT ILLNESS  Roberto was diagnosed with diabetes in 2007. He has struggle to get his blood sugars under stable control. He has a large amount of insulin resistance and requires a significant amount of insulin. He was previously taking Toujeo Max U-300 160 unit(s) daily along with Humalog 62 unit(s) before meals. He also takes once weekly Trulicity 1.5 mg. His hemoglobin A1C was 11.2%.     Due to his large insulin requirements, we decided to switch him to Humulin R U-500. He was started on U-500 on May 18th. He is currently taking 125 unit(s) in the morning and 95 unit(s) with lunch and dinner. He remains on Trulicity 1.5 mg " weekly. He feels like he is doing okay. He is still experiencing some elevated reading post-dinner and in the morning. He has not had any severe hyperglycemia and/or hypoglycemia. His highest reading since starting U-500 is 343 mg/dl and his lowest is 134 mg/dl. He has had no injection site reactions.    Endocrine relevant labs are as follows:     Latest Reference Range & Units 06/01/23 09:23   Hemoglobin A1C POCT 4.3 - <5.7 % 10.4 !   !: Data is abnormal    REVIEW OF SYSTEMS    Endocrine: positive for diabetes  Skin: negative  Eyes: positive for visual blurring, stable  Ears/Nose/Throat: negative  Respiratory: No shortness of breath, dyspnea on exertion, cough, or hemoptysis  Cardiovascular: positive for lower extremity edema and exercise intolerance, negative for and chest pain  Gastrointestinal: negative for, nausea, vomiting, constipation and diarrhea  Genitourinary: negative for, nocturia, dysuria, frequency and urgency  Musculoskeletal: positive for joint swelling and joint stiffness, negative for, muscular weakness and nocturnal cramping  Neurologic: positive for numbness or tingling of feet which is immobilizing  Psychiatric: negative  Hematologic/Lymphatic/Immunologic: negative    Past Medical History  Past Medical History:   Diagnosis Date     Diabetes (H)        Medications  Current Outpatient Medications   Medication Sig Dispense Refill     ACCU-CHEK GUIDE test strip Use to test blood sugar 4 times daily or as directed. 100 strip 1     ACE/ARB/ARNI NOT PRESCRIBED (INTENTIONAL) Please choose reason not prescribed from choices below.       aspirin 81 MG EC tablet Take 1 tablet (81 mg) by mouth daily 90 tablet 1     atorvastatin (LIPITOR) 80 MG tablet Take 1 tablet (80 mg) by mouth daily 90 tablet 2     blood glucose monitoring (ACCU-CHEK FASTCLIX) lancets Use with lanceting device. To accompany: Blood Glucose Monitor Brands: per insurance. 102 each 10     blood glucose monitoring (NO BRAND SPECIFIED) meter  device kit Use to test blood sugar 4 times daily or as directed. Preferred blood glucose meter OR supplies to accompany: Blood Glucose Monitor Brands: per insurance. 1 kit 0     blood glucose monitoring (NO BRAND SPECIFIED) meter device kit Use to test blood sugar 4 times daily or as directed. Preferred blood glucose meter OR supplies to accompany: Blood Glucose Monitor Brands: per insurance. 1 kit 0     carvedilol (COREG) 12.5 MG tablet Take 1 tablet (12.5 mg) by mouth 2 times daily (with meals) 180 tablet 3     Cholecalciferol (VITAMIN D) 2000 units tablet Take 2,000 Units by mouth       fenofibrate (TRIGLIDE/LOFIBRA) 160 MG tablet Take 1 tablet (160 mg) by mouth daily 90 tablet 3     furosemide (LASIX) 20 MG tablet Take 1 tablet (20 mg) by mouth 2 times daily 180 tablet 3     GLOBAL EASE INJECT PEN NEEDLES 31G X 5 MM miscellaneous USE TO INJECT INSULIN 5 TIMES A  each 11     insulin reg HIGH CONC (HUMULIN R U-500 KWIKPEN) 500 UNIT/ML PEN soln Inj 125 units subcutaneous with breakfast and 95 units subcutaneous with lunch and dinner. (total daily dose of 315 units) 15 mL 1     isosorbide mononitrate (IMDUR) 30 MG 24 hr tablet Take 1 tablet (30 mg) by mouth daily 90 tablet 3     omega-3 acid ethyl esters (LOVAZA) 1 g capsule Take 1 capsule by mouth 2 times daily 90 capsule 1     Omega-3 Fatty Acids (FISH OIL) 1200 MG capsule TAKE 1 CAPSULE BY MOUTH 2 (TWO) TIMES A  capsule 3     order for DME Equipment being ordered: Digital home blood pressure monitor kit 1 Device 1     senna-docusate (SENOKOT-S/PERICOLACE) 8.6-50 MG tablet TAKE 1 TAB BY MOUTH TWO TIMES DAILY AS NEEDED FOR CONSTIPATION. 60 tablet 10     silver sulfADIAZINE (SILVADENE) 1 % external cream Apply topically daily 50 g 0     spironolactone (ALDACTONE) 25 MG tablet Take 1 tablet (25 mg) by mouth daily 90 tablet 3     vitamin D2 (ERGOCALCIFEROL) 56470 units (1250 mcg) capsule TAKE 1 CAPSULE (50,000 UNITS) BY MOUTH ONCE A WEEK. NEEDS TO BE  SEEN FOR FURTHER REFILLS 1 capsule 10     blood glucose monitoring (ACCU-CHEK FASTCLIX) lancets USE 6 TIMES DAILY USE TO TEST BLOOD SUGAR 6 TIMES DAILY OR AS DIRECTED. (Patient not taking: Reported on 5/5/2023) 600 each 0       Allergies  Allergies   Allergen Reactions     Doxycycline Other (See Comments) and Swelling     Facial swelling  facial   swelling    Facial swelling  facial   swelling       Influenza Vac Split [Influenza Virus Vaccine] Itching     And red /blue arms         Family History  family history includes Cerebrovascular Disease in his mother; Dementia in his father; Diabetes in his father and mother; Hypertension in his mother; Kidney Disease in his mother; Osteoporosis in his mother; Prostate Cancer in his father.    Social History  Social History     Tobacco Use     Smoking status: Never     Smokeless tobacco: Never   Vaping Use     Vaping status: Never Used   Substance Use Topics     Alcohol use: No     Drug use: No       Physical Exam  /77 (BP Location: Right arm, Patient Position: Sitting, Cuff Size: Adult Large)   Pulse 73   Wt 137.4 kg (303 lb)   SpO2 97%   BMI 42.26 kg/m    Body mass index is 42.26 kg/m .  GENERAL :  In no apparent distress. Pt is in a wheelchair  SKIN: Normal color, normal temperature, texture.  No hirsutism, alopecia or purple striae.     EYES: PERRL, EOMI, No scleral icterus,  No proptosis, conjunctival redness, stare, retraction  NECK: No visible masses. No palpable adenopathy, or masses. No carotid bruits.   RESP: Lungs clear to auscultation bilaterally  CARDIAC: Regular rate and rhythm, normal S1 S2, without murmurs, rubs or gallops    NEURO: awake, alert, responds appropriately to questions.  Cranial nerves intact.   Moves all extremities; Gait normal.  No tremor of the outstretched hand.    EXTREMITIES: No clubbing, cyanosis. Edema bilateral    DATA REVIEW  Glooko Report  Current Ave BG is 223 mg/dl

## 2023-06-06 ENCOUNTER — TELEPHONE (OUTPATIENT)
Dept: EDUCATION SERVICES | Facility: CLINIC | Age: 63
End: 2023-06-06
Payer: MEDICARE

## 2023-06-06 DIAGNOSIS — Z79.4 TYPE 2 DIABETES MELLITUS WITH OTHER SPECIFIED COMPLICATION, WITH LONG-TERM CURRENT USE OF INSULIN (H): ICD-10-CM

## 2023-06-06 DIAGNOSIS — E11.69 TYPE 2 DIABETES MELLITUS WITH OTHER SPECIFIED COMPLICATION, WITH LONG-TERM CURRENT USE OF INSULIN (H): ICD-10-CM

## 2023-06-06 RX ORDER — ALCOHOL ANTISEPTIC PADS
PADS, MEDICATED (EA) TOPICAL
Qty: 100 EACH | Refills: 11 | Status: SHIPPED | OUTPATIENT
Start: 2023-06-06 | End: 2024-06-17

## 2023-06-06 NOTE — TELEPHONE ENCOUNTER
Left Voicemail with Family Member (1st Attempt) for the patient to call back and schedule the following:    Appointment type: Diabetic Ed  Provider: Velma Sim  Return date: July 2023  Specialty phone number: [phone]  Additional appointment(s) needed:   Additonal Notes:       [10:55 AM] Velma Sim    MRN:  [id number] Can you call Emily's daughter and schedule him to see me in July per Sarah Lanes request. In person and best if daughter can come too    Ananya R/Procedure    [facility]   Neurology, NeuroSurgery, NeuroPsychology and Pain Management Specialties  Medical/Surgical Adult Specialties

## 2023-07-05 DIAGNOSIS — N18.4 CKD (CHRONIC KIDNEY DISEASE) STAGE 4, GFR 15-29 ML/MIN (H): Primary | ICD-10-CM

## 2023-07-17 ENCOUNTER — ALLIED HEALTH/NURSE VISIT (OUTPATIENT)
Dept: EDUCATION SERVICES | Facility: CLINIC | Age: 63
End: 2023-07-17
Payer: MEDICARE

## 2023-07-17 DIAGNOSIS — Z79.4 TYPE 2 DIABETES MELLITUS WITH OTHER SPECIFIED COMPLICATION, WITH LONG-TERM CURRENT USE OF INSULIN (H): ICD-10-CM

## 2023-07-17 DIAGNOSIS — E11.69 TYPE 2 DIABETES MELLITUS WITH OTHER SPECIFIED COMPLICATION, WITH LONG-TERM CURRENT USE OF INSULIN (H): ICD-10-CM

## 2023-07-17 PROCEDURE — G0108 DIAB MANAGE TRN  PER INDIV: HCPCS

## 2023-07-17 RX ORDER — INSULIN HUMAN 500 [IU]/ML
INJECTION, SOLUTION SUBCUTANEOUS
Qty: 45 ML | Refills: 3 | Status: SHIPPED | OUTPATIENT
Start: 2023-07-17 | End: 2023-11-02

## 2023-07-17 NOTE — PROGRESS NOTES
DIABETES SELF MANAGEMENT EDUCATION: Previous Diagnosis/Individual Diabetes Review    Roberto Thompson presents today for evaluation of glucose control and modification of medication(s) related to Type 2 diabetes.  He is accompanied by his wife.    Year of diagnosis: 2007  Referring provider:  Flavia Beltre PA-C     Past Diabetes Education: No  Support system: family  Living Situation: Lives with his wife and daughter    DIABETES RELATED CONCERNS/COMMENTS: here for blood sugar review and medication adjustment.    Patient's emotional response to diabetes: expresses readiness to learn    ASSESSMENT:  Patient Problem List and Medication List reviewed for relevant medical history, current medical status, and diabetes risk factors.    MEDICATION:    Current Diabetes Management per Patient:  Taking diabetes medications?   U-500 Regular insulin taking 113 units for breakfast, 100 units at lunch and up to 130 units for supper.  He confirmed taking Trulicity 1.5 mg once weekly.      MONITORING:  Patient glucose self monitoring as follows: two times daily before eating   BG meter: Accu Check Earnestine meter  Blood sugar data:    Date Breakfast Lunch Dinner    Before Before Before   7-9 128 267 198   7-10 229 - 300   7-11 206 - 243   7-12 189 -    7-13 249 - 293   7-14 - - -   7-15 223 - 327   7-16 187 - 369   7-17 171       Patient's most recent   Lab Results   Component Value Date    A1C 11.2 02/09/2022    A1C 8.7 05/26/2021      Patient's A1C goal: <8.0    PHYSICAL ACTIVITY:  not assessed    NUTRITION:  Patient states he does not always eat 3 meals a day. He said he sometimes skips either breakfast or lunch. He gets up around 7 am and eats, maybe eats around 12-1 pm and then around 10 pm.  Beverages:   Cultural/Evangelical diet restrictions: No   Biggest Challenge to Healthy Eating: knowing what to eat    Diabetes knowledge and skills assessment:   Barriers to Learning Assessment: English as a second language (ESL)    Problem  Solving:    Patient is at risk of hypoglycemia?: YES  Hospitalizations for hyper or hypoglycemia: No    Healthy Coping and Stress Management:   Sources of stress identified by patient My health    EDUCATION and INSTRUCTION PROVIDED AT THIS VISIT:    -Target blood glucose for pre-meal and 2 hour post-prandial glucose   -Action, timing and potential side-effects of U-500 and Trulicity diabetes medications:     Pt verbalized understanding of concepts discussed and recommendations provided today.       PLAN:  Instructed Roberto and his wife to take U-500 113 units around 7 am when he gets up. We will increase the lunch from 100 to now taking 110 units with lunch between 12-1 pm and continue taking up to 130 units with supper around 10 pm.   He is to continue Trulicity 1.5 mg once weekly.    See patient instructions/AVS    FOLLOW-UP:  Seeing Flavia Beltre PA-C in August   He should see me 1-2 months after that.    Time Spent: 30 minutes  Encounter Type: Individual    Any diabetes medication dose changes were made via the CDE Protocol and Collaborative Practice Agreement [facility].  A copy of this encounter was provided to patient's referring provider.    Tonya Sim RN, [facility]

## 2023-07-25 ENCOUNTER — TRANSFERRED RECORDS (OUTPATIENT)
Dept: HEALTH INFORMATION MANAGEMENT | Facility: CLINIC | Age: 63
End: 2023-07-25

## 2023-08-08 ENCOUNTER — LAB (OUTPATIENT)
Dept: LAB | Facility: CLINIC | Age: 63
End: 2023-08-08
Payer: MEDICARE

## 2023-08-08 ENCOUNTER — OFFICE VISIT (OUTPATIENT)
Dept: NEPHROLOGY | Facility: CLINIC | Age: 63
End: 2023-08-08
Attending: FAMILY MEDICINE
Payer: MEDICARE

## 2023-08-08 VITALS
DIASTOLIC BLOOD PRESSURE: 77 MMHG | SYSTOLIC BLOOD PRESSURE: 159 MMHG | OXYGEN SATURATION: 95 % | HEART RATE: 96 BPM | BODY MASS INDEX: 41.56 KG/M2 | WEIGHT: 298 LBS

## 2023-08-08 DIAGNOSIS — N18.4 CKD (CHRONIC KIDNEY DISEASE) STAGE 4, GFR 15-29 ML/MIN (H): ICD-10-CM

## 2023-08-08 DIAGNOSIS — E66.01 MORBID OBESITY (H): ICD-10-CM

## 2023-08-08 DIAGNOSIS — E11.22 TYPE 2 DIABETES MELLITUS WITH STAGE 3 CHRONIC KIDNEY DISEASE, WITHOUT LONG-TERM CURRENT USE OF INSULIN, UNSPECIFIED WHETHER STAGE 3A OR 3B CKD (H): Primary | ICD-10-CM

## 2023-08-08 DIAGNOSIS — E55.9 VITAMIN D DEFICIENCY: ICD-10-CM

## 2023-08-08 DIAGNOSIS — N18.30 TYPE 2 DIABETES MELLITUS WITH STAGE 3 CHRONIC KIDNEY DISEASE, WITHOUT LONG-TERM CURRENT USE OF INSULIN, UNSPECIFIED WHETHER STAGE 3A OR 3B CKD (H): Primary | ICD-10-CM

## 2023-08-08 LAB
ALBUMIN MFR UR ELPH: 106 MG/DL
ALBUMIN SERPL BCG-MCNC: 4.3 G/DL (ref 3.5–5.2)
ALBUMIN UR-MCNC: 100 MG/DL
ANION GAP SERPL CALCULATED.3IONS-SCNC: 16 MMOL/L (ref 7–15)
APPEARANCE UR: CLEAR
BACTERIA #/AREA URNS HPF: ABNORMAL /HPF
BILIRUB UR QL STRIP: NEGATIVE
BUN SERPL-MCNC: 42.1 MG/DL (ref 8–23)
CALCIUM SERPL-MCNC: 10.2 MG/DL (ref 8.8–10.2)
CHLORIDE SERPL-SCNC: 97 MMOL/L (ref 98–107)
COLOR UR AUTO: ABNORMAL
CREAT SERPL-MCNC: 2.35 MG/DL (ref 0.67–1.17)
CREAT UR-MCNC: 111 MG/DL
CREAT UR-MCNC: 113 MG/DL
DEPRECATED HCO3 PLAS-SCNC: 23 MMOL/L (ref 22–29)
ERYTHROCYTE [DISTWIDTH] IN BLOOD BY AUTOMATED COUNT: 13.2 % (ref 10–15)
GFR SERPL CREATININE-BSD FRML MDRD: 30 ML/MIN/1.73M2
GLUCOSE SERPL-MCNC: 186 MG/DL (ref 70–99)
GLUCOSE UR STRIP-MCNC: >1000 MG/DL
HCT VFR BLD AUTO: 36.2 % (ref 40–53)
HGB BLD-MCNC: 12.2 G/DL (ref 13.3–17.7)
HGB UR QL STRIP: ABNORMAL
KETONES UR STRIP-MCNC: NEGATIVE MG/DL
LEUKOCYTE ESTERASE UR QL STRIP: NEGATIVE
MCH RBC QN AUTO: 30.3 PG (ref 26.5–33)
MCHC RBC AUTO-ENTMCNC: 33.7 G/DL (ref 31.5–36.5)
MCV RBC AUTO: 90 FL (ref 78–100)
MICROALBUMIN UR-MCNC: 473 MG/L
MICROALBUMIN/CREAT UR: 426.13 MG/G CR (ref 0–17)
NITRATE UR QL: NEGATIVE
PH UR STRIP: 5.5 [PH] (ref 5–7)
PHOSPHATE SERPL-MCNC: 4 MG/DL (ref 2.5–4.5)
PLATELET # BLD AUTO: 238 10E3/UL (ref 150–450)
POTASSIUM SERPL-SCNC: 4.4 MMOL/L (ref 3.4–5.3)
PROT/CREAT 24H UR: 0.94 MG/MG CR (ref 0–0.2)
PTH-INTACT SERPL-MCNC: 31 PG/ML (ref 15–65)
RBC # BLD AUTO: 4.02 10E6/UL (ref 4.4–5.9)
RBC #/AREA URNS AUTO: ABNORMAL /HPF
SKIP: ABNORMAL
SODIUM SERPL-SCNC: 136 MMOL/L (ref 136–145)
SP GR UR STRIP: 1.02 (ref 1–1.03)
SQUAMOUS #/AREA URNS AUTO: ABNORMAL /LPF
UROBILINOGEN UR STRIP-MCNC: NORMAL MG/DL
WBC # BLD AUTO: 8.7 10E3/UL (ref 4–11)
WBC #/AREA URNS AUTO: ABNORMAL /HPF

## 2023-08-08 PROCEDURE — 82306 VITAMIN D 25 HYDROXY: CPT

## 2023-08-08 PROCEDURE — 80069 RENAL FUNCTION PANEL: CPT

## 2023-08-08 PROCEDURE — 99205 OFFICE O/P NEW HI 60 MIN: CPT | Performed by: INTERNAL MEDICINE

## 2023-08-08 PROCEDURE — 85027 COMPLETE CBC AUTOMATED: CPT

## 2023-08-08 PROCEDURE — 82043 UR ALBUMIN QUANTITATIVE: CPT

## 2023-08-08 PROCEDURE — 81001 URINALYSIS AUTO W/SCOPE: CPT

## 2023-08-08 PROCEDURE — 84156 ASSAY OF PROTEIN URINE: CPT

## 2023-08-08 PROCEDURE — 83970 ASSAY OF PARATHORMONE: CPT

## 2023-08-08 PROCEDURE — 82570 ASSAY OF URINE CREATININE: CPT

## 2023-08-08 PROCEDURE — 36415 COLL VENOUS BLD VENIPUNCTURE: CPT

## 2023-08-08 ASSESSMENT — PAIN SCALES - GENERAL: PAINLEVEL: NO PAIN (0)

## 2023-08-08 NOTE — NURSING NOTE
Roberto Thompson's goals for this visit include: NONE   He requests these members of his care team be copied on today's visit information: YES    PCP: Jaylyn Bellamy    Referring Provider:  Marianela Maldonado MD  [address] [place] [address]    BP (!) 159/77 (BP Location: Left arm, Patient Position: Sitting, Cuff Size: Adult Large)   Pulse 96   Wt 135.2 kg (298 lb)   SpO2 95%   BMI 41.56 kg/m      Do you need any medication refills at today's visit? NONE    Xavier Smith, EMT

## 2023-08-08 NOTE — PATIENT INSTRUCTIONS
It was a pleasure taking care of you today.  I've included a brief summary of our discussion and care plan from today's visit below.  Please review this information with your primary care provider.     My recommendations are summarized as follows:  -will start a new medication called jardiance 25 mg once daily. It is a very good medication. I hope that the insurance will cover it.  -It is important to lose weight and consume a low carbohydrate diet to have your diabetes under control.  -You kidney function is now at 30%; this kidney disease is because of diabetes and it is very important to have your sugars under control.    Who do I call with any questions after my visit?  Please be in touch if there are any further questions that arise following today's visit.  There are multiple ways to contact your nephrology care team.       During business hours, you may reach your Nephrology Care Team or schedule or reschedule an appointment or lab at [phone].       If you need to schedule imaging, please call [phone].   To schedule a COVID test, please call [phone].     You can always send a secure message through [employer]. [employer] messages are answered by your nurse or doctor typically within 24-48 hours. Please allow extra time on weekends and holidays.       For urgent/emergent questions after business hours, you may reach the on-call Nephrology Fellow by contacting the [facility]  at [phone].     How will I get the results of any tests ordered?    You will receive all of your results.  If you have signed up for [employer], any tests ordered at your visit will be available to you once resulted on Teleust. Typically the physician reviews them and may or may not make further recommendations. If there are urgent results that require a change in your care plan, your physician or nurse will call you to discuss the next steps. If you are not on [employer], a letter may be generated and mailed  to you with your results.

## 2023-08-08 NOTE — PROGRESS NOTES
I was asked to see this patient by Dr. Marianela Maldonado    CC: CKD 3b-4    HPI: Thank you for the referral. I had the pleasure today of seeing Roberto Thompson. He is a 62 year old male  who presents for evaluation of NKC9v-0.He is originally from [facility] and has moved to the [place] back in [place]. All his family is here. He has no family in [facility].  This visit was facilitated by the English  Chiara.    His medical history is significant for:  1.  Subclinical CAD with severe CAC, normal nuclear stress test, normal LV function. On preventive therapy with aspirin and statin.  2. Hypertension, controlled on current regimen  3. Diabetes type II, uncontrolled: On insulin, GLP-1 agonist.  Follows with endocrinology   4. CKD stage IIIb-IV, presumably diabetic nephropathy.  On Lasix 20 twice daily  5. Mixed dyslipidemia, hypercholesterolemia and hypertriglyceridemia, goal LDL less than 70. Main contributor is uncontrolled diabetes.  On Lipitor 80 and fenofibrate 160; TG still >1000; unsure if taking Lovaza 2 g which was started recently,   6. Severe obesity  7. KELBY on CPAP  8. Mild carotid stenosis: On aspirin and statin  9. Bilateral Knee [pain/OA with limited mobility/instability].     His diabetes is longstanding since 2007 and poorly controlled.  His hemoglobin A1c over the past 5 years has been ranging between 9 and 11%.  His most recent hemoglobin A1c is 10.4%.  He has documented nonproliferative diabetic retinopathy.  He is overweight and I am not sure he pays attention to his diet.  His records indicate a history of diabetic foot ulcer and severe diabetic neuropathy.    He also have longstanding hypertension.  He notes that his blood pressure at home has been around 140/80 though it is elevated in clinic today.  He is not compliant with a low-salt diet    He is also obese and he notes that he has been obese for a long time and does not seem to have any plans to lose weight.    He is here today for chronic  kidney disease.  His creatinine has been around 2-2.5 mg/dL and his GFR has been stable around 30 mL/min for the past 5 years.  He does have some mild lower extremity edema for which he takes Lasix.    He does not have any acute complaints during this visit.  He does have some difficulty walking.  He denies any shortness of breath.  His weight has been stable.  No heat or cold intolerance and no acute urinary symptoms.    Social: Originally from [facility].  He is  with 4 children.  He has 1 grandchild.  He is currently retired because he has difficulty walking.  He used to work in construction.  He does not smoke or drink.      BP Readings from Last 6 Encounters:   08/08/23 (!) 159/77   06/01/23 131/77   05/05/23 122/63   04/13/23 123/77   04/07/23 132/68   03/09/23 (!) 144/74     Wt Readings from Last 4 Encounters:   08/08/23 135.2 kg (298 lb)   06/01/23 137.4 kg (303 lb)   04/13/23 137.4 kg (303 lb)   04/07/23 137.4 kg (303 lb)     Allergies   Allergen Reactions    Doxycycline Other (See Comments) and Swelling     Facial swelling  facial   swelling    Facial swelling  facial   swelling      Influenza Vac Split [Influenza Virus Vaccine] Itching     And red /blue arms       ACE/ARB/ARNI NOT PRESCRIBED (INTENTIONAL), Please choose reason not prescribed from choices below.  aspirin 81 MG EC tablet, Take 1 tablet (81 mg) by mouth daily  atorvastatin (LIPITOR) 80 MG tablet, Take 1 tablet (80 mg) by mouth daily  blood glucose (ACCU-CHEK GUIDE) test strip, 1 strip by In Vitro route 4 times daily Use to test blood sugar 4 times daily or as directed.  blood glucose monitoring (ACCU-CHEK FASTCLIX) lancets, Use with lanceting device. To accompany: Blood Glucose Monitor Brands: per insurance.  blood glucose monitoring (NO BRAND SPECIFIED) meter device kit, Use to test blood sugar 4 times daily or as directed. Preferred blood glucose meter OR supplies to accompany: Blood Glucose Monitor Brands: per insurance.  blood  glucose monitoring (NO BRAND SPECIFIED) meter device kit, Use to test blood sugar 4 times daily or as directed. Preferred blood glucose meter OR supplies to accompany: Blood Glucose Monitor Brands: per insurance.  carvedilol (COREG) 12.5 MG tablet, Take 1 tablet (12.5 mg) by mouth 2 times daily (with meals)  Cholecalciferol (VITAMIN D) 2000 units tablet, Take 2,000 Units by mouth  fenofibrate (TRIGLIDE/LOFIBRA) 160 MG tablet, Take 1 tablet (160 mg) by mouth daily  furosemide (LASIX) 20 MG tablet, Take 1 tablet (20 mg) by mouth 2 times daily  GLOBAL EASE INJECT PEN NEEDLES 31G X 5 MM miscellaneous, USE TO INJECT INSULIN 5 TIMES A DAY  insulin reg HIGH CONC (HUMULIN R U-500 KWIKPEN) 500 UNIT/ML PEN soln, Inj 113 units subcutaneous with breakfast and 110 units subcutaneous with lunch and up to 130 units for dinner. (Max daily dose of 370 units)  isosorbide mononitrate (IMDUR) 30 MG 24 hr tablet, Take 1 tablet (30 mg) by mouth daily  omega-3 acid ethyl esters (LOVAZA) 1 g capsule, Take 1 capsule by mouth 2 times daily  Omega-3 Fatty Acids (FISH OIL) 1200 MG capsule, TAKE 1 CAPSULE BY MOUTH 2 (TWO) TIMES A DAY  order for DME, Equipment being ordered: Digital home blood pressure monitor kit  senna-docusate (SENOKOT-S/PERICOLACE) 8.6-50 MG tablet, TAKE 1 TAB BY MOUTH TWO TIMES DAILY AS NEEDED FOR CONSTIPATION.  silver sulfADIAZINE (SILVADENE) 1 % external cream, Apply topically daily  spironolactone (ALDACTONE) 25 MG tablet, Take 1 tablet (25 mg) by mouth daily  UltiCare Alcohol Swabs 70 % PADS, Use to swab area of injection/mary kay as directed.  vitamin D2 (ERGOCALCIFEROL) 04470 units (1250 mcg) capsule, TAKE 1 CAPSULE (50,000 UNITS) BY MOUTH ONCE A WEEK. NEEDS TO BE SEEN FOR FURTHER REFILLS  blood glucose monitoring (ACCU-CHEK FASTCLIX) lancets, USE 6 TIMES DAILY USE TO TEST BLOOD SUGAR 6 TIMES DAILY OR AS DIRECTED. (Patient not taking: Reported on 5/5/2023)  [DISCONTINUED] NOVOLOG FLEXPEN 100 UNIT/ML soln, INJECT 60 UNITS  3 TIMES DAILY WITH MEALS PLUS THE SCALE WITH EACH MEAL. PLUS THE FOLLOWING COVERAGE SCALE    silver sulfADIAZINE (SILVADENE) 1 % cream  triamcinolone (KENALOG-40) injection 40 mg      Past Medical History:   Diagnosis Date    Diabetes (H)     Hypertension        Past Surgical History:   Procedure Laterality Date    APPENDECTOMY      ENT SURGERY      tonsils    ORTHOPEDIC SURGERY      thumb    ORTHOPEDIC SURGERY      2 toe amputations       Social History     Tobacco Use    Smoking status: Never    Smokeless tobacco: Never   Vaping Use    Vaping Use: Never used   Substance Use Topics    Alcohol use: No    Drug use: No       Family History   Problem Relation Age of Onset    Diabetes Mother     Hypertension Mother     Cerebrovascular Disease Mother     Kidney Disease Mother     Osteoporosis Mother     Diabetes Father     Prostate Cancer Father     Dementia Father        ROS: A 12 system review of systems was negative other than noted here or above.     Exam:  BP (!) 159/77 (BP Location: Left arm, Patient Position: Sitting, Cuff Size: Adult Large)   Pulse 96   Wt 135.2 kg (298 lb)   SpO2 95%   BMI 41.56 kg/m    GENERAL APPEARANCE: alert and no distress, obese with thick neck, Mallampati 5  EYES: PERRL  HENT: mouth without ulcers or lesions  NECK: supple, no adenopathy  RESP: lungs clear to auscultation - no rales, rhonchi or wheezes  CV: regular rhythm, normal rate, no rub   ABDOMEN:  soft, nontender, no HSM or masses and bowel sounds normal  MS: extremities normal- no gross deformities noted, no evidence of inflammation in joints, no muscle tenderness  SKIN: no rash  NEURO: Normal strength and tone, sensory exam grossly normal, mentation intact and speech normal  PSYCH: mentation appears normal. and affect normal/bright  Trace lower extremity edema    Results: Reviewed in details with the patient    Assessment/Plan:   Problem #1 proteinuric chronic kidney disease stage IIIb-4: This is most likely secondary to  diabetic nephropathy and hypertensive nephrosclerosis.  He has a longstanding history of uncontrolled diabetes mellitus complicated by nonproliferative retinopathy.  His creatinine has been around 2 to 2.5 mg/dL with an estimated GFR around 30 mL/min.  We had a long discussion today about the importance of blood sugar control and halting the progression of kidney disease as well as maintaining a good blood pressure and an ideal body weight.  He mentioned that he already know that but does not seem that he is making any effort in that regards.  - I will start Jardiance hoping that it would help with his diabetes, hypertension, weight loss as well as any protective and cardioprotective effects.  - It is unclear to me why he is not on any RAAS blockade. It is mentioned that it is intentional on his medication list.  It seems that he has been on lisinopril hydrochlorothiazide in the past and I am not sure whether this was stopped with his worsening creatinine.    Problem #2 hypertension: Partially essential and partially secondary to his chronic kidney disease.  His blood pressure in clinic today is elevated though he mentions better blood pressure readings at home around 130/80.  He is on carvedilol, spironolactone and Lasix.  He is not on any RAAS blockade  -His potassium is within normal limits    Problem #3 diabetes mellitus type 2 uncontrolled.  He follows with endocrinology.  His last hemoglobin A1c is at 10.4%.  - We will start Jardiance 25 mg daily.    Problem #4 obesity: I instructed him to try to lose some weight.    Problem # 5 dyslipidemia: He is on fenofibrate as well as atorvastatin.  His triglycerides are above 1000.  Will defer to his PCP and cardiologist.    Problem #6 CKD BMD: calcium and phos are WNL. PTH and vitamin D are still pending    Problem #7 obstructive sleep apnea on CPAP    Problem #8 atherosclerotic disease with subclinical CAD as well as mild carotid stenosis: On aspirin and  statin.    The total time of this encounter amounted to 63 minutes on the day of the encounter 8/8/2023. This time included face to face time spent with the patient, preparatory work and chart review, ordering tests, and performing post visit documentation.    *This dictation was prepared in part using Dragon recognition software.  As a result errors may occur. When identified these transcription errors have been corrected.  While every attempt is made to correct errors during dictation, errors may still exist.     Beverley Bucio MD   [facility]   Department of Medicine   Division of Renal Disease and Hypertension

## 2023-08-09 LAB — DEPRECATED CALCIDIOL+CALCIFEROL SERPL-MC: 25 UG/L (ref 20–75)

## 2023-08-09 NOTE — PROGRESS NOTES
Outcome for 08/09/23 2:05 PM: Patient contacted using [employer] [place] . He is not home right now and will bring meter to appointment.  8/9/2023 120    TJ Stevens  Adult Endocrinology  [facility]

## 2023-08-11 ENCOUNTER — OFFICE VISIT (OUTPATIENT)
Dept: ENDOCRINOLOGY | Facility: CLINIC | Age: 63
End: 2023-08-11
Payer: MEDICARE

## 2023-08-11 VITALS
BODY MASS INDEX: 41.46 KG/M2 | WEIGHT: 297.3 LBS | RESPIRATION RATE: 16 BRPM | OXYGEN SATURATION: 96 % | SYSTOLIC BLOOD PRESSURE: 135 MMHG | HEART RATE: 79 BPM | DIASTOLIC BLOOD PRESSURE: 81 MMHG

## 2023-08-11 DIAGNOSIS — E11.649 HYPOGLYCEMIA UNAWARENESS ASSOCIATED WITH TYPE 2 DIABETES MELLITUS (H): ICD-10-CM

## 2023-08-11 DIAGNOSIS — Z79.4 TYPE 2 DIABETES MELLITUS WITH OTHER SPECIFIED COMPLICATION, WITH LONG-TERM CURRENT USE OF INSULIN (H): Primary | ICD-10-CM

## 2023-08-11 DIAGNOSIS — E11.69 TYPE 2 DIABETES MELLITUS WITH OTHER SPECIFIED COMPLICATION, WITH LONG-TERM CURRENT USE OF INSULIN (H): Primary | ICD-10-CM

## 2023-08-11 PROCEDURE — 99214 OFFICE O/P EST MOD 30 MIN: CPT | Performed by: PHYSICIAN ASSISTANT

## 2023-08-11 ASSESSMENT — PAIN SCALES - GENERAL: PAINLEVEL: NO PAIN (0)

## 2023-08-11 NOTE — LETTER
8/11/2023         RE: Roberto Thompson  [address] [place] [address]        Dear Colleague,    Thank you for referring your patient, Roberto Thompson, to the [facility]. Please see a copy of my visit note below.    Outcome for 08/09/23 2:05 PM: Patient contacted using Kyrgyz . He is not home right now and will bring meter to appointment.  8/9/2023 Bridget Stevens CMA  Adult Endocrinology  [facility]      Assessment/Plan :   Type 2 DM. Roberto is doing better. He feels like his blood sugars are under better control and he has been feeling better, overall. He does have a few low blood sugars in the morning before lunch. I think he may be getting a bit too much insulin with breakfast and I would like him to decrease the morning dose down to 100 unit(s) from 113 unit(s). We also discussed his evening spikes and I encouraged him to continue to work on making healthy dietary decisions. Lastly, he needs to continue to work on scanning every 4 hrs. He has a follow-up appt with our CDE team on October 2nd. He will be due for repeat laboratory testing, at that time and I will place the order today. I would like to see him back at the clinic in 3 mos.        I have independently reviewed and interpreted labs, imaging as indicated.      Chief complaint:  Roberto is a 62 year old male who returns for follow-up of Type 2 DM. We are using  services for the visit.    I have reviewed Care Everywhere including [facility], Critical access hospital, [facility], [facility], [place], [facility], [facility] , [facility], [place] lab reports, imaging reports and provider notes as indicated.      HISTORY OF PRESENT ILLNESS  Roberto has a long history of poorly controlled Type 2 DM. He was originally diagnosed with diabetes in 2007 and he has been on insulin therapy since the time of diagnosis, along with various oral medications. Previously, he was  taking a combination of Trulcity 1.5 mg weekly along with Toujeo Max U-300 160 unit(s) daily and 60 unit(s) of Humalog with meals. He was missing a lot of the Humalog dosing and his blood sugars were consistently over 250 mg/dl with many readings into the 400s. In April of this year, we switched him to Humulin R U500 TID dosing. This has made a significant improvement. He is currently taking 113 unit(s) with breakfast, 110 unit(s) with lunch, and 130 unit(s) with dinner. He also remains stable on Trulicity 1.5 mg weekly.    He is currently using the FreeStyle Dominic to monitor his blood sugars. His most recent blood sugar average is 182 mg/dl. He has no complaints of severe hyperglycemia and/or hypoglycemia. However, he does have a few episodes of prolonged low blood sugars on his CGMS reading. He states that he cannot tell when his blood sugar is low but he does hear the alerts. Rhett also has severe neuropathy and he struggles with ambulation. He also has a history of unhealing diabetic foot ulcers with previous amputations. He also has stage 4 CKD    Endocrine relevant labs are as follows:   Latest Reference Range & Units 08/08/23 07:35   Albumin Urine mg/g Cr 0.00 - 17.00 mg/g Cr 426.13 (H)   (H): Data is abnormally high   Latest Reference Range & Units 08/08/23 07:35   Albumin Urine mg/L mg/L 473.0      Latest Reference Range & Units 06/01/23 09:23   Hemoglobin A1C POCT 4.3 - <5.7 % 10.4 !   !: Data is abnormal    REVIEW OF SYSTEMS    Endocrine: positive for diabetes  Skin: negative for, hyperpigmentation, facial redness  Eyes: negative for, visual blurring, redness, tearing  Ears/Nose/Throat: negative  Respiratory: No shortness of breath, dyspnea on exertion, cough, or hemoptysis  Cardiovascular: positive for lower extremity edema and exercise intolerance, negative for, and chest pain  Gastrointestinal: negative for, nausea, vomiting, constipation, and diarrhea  Genitourinary: negative for, dysuria,  frequency, urgency, and hesitancy  Musculoskeletal: positive for joint pain, negative for, and muscular weakness  Neurologic: positive for numbness or tingling of feet  Psychiatric: negative  Hematologic/Lymphatic/Immunologic: negative    Past Medical History  Past Medical History:   Diagnosis Date     Diabetes (H)      Hypertension        Medications  Current Outpatient Medications   Medication Sig Dispense Refill     ACE/ARB/ARNI NOT PRESCRIBED (INTENTIONAL) Please choose reason not prescribed from choices below.       aspirin 81 MG EC tablet Take 1 tablet (81 mg) by mouth daily 90 tablet 1     atorvastatin (LIPITOR) 80 MG tablet Take 1 tablet (80 mg) by mouth daily 90 tablet 2     blood glucose (ACCU-CHEK GUIDE) test strip 1 strip by In Vitro route 4 times daily Use to test blood sugar 4 times daily or as directed. 400 strip 1     blood glucose monitoring (ACCU-CHEK FASTCLIX) lancets Use with lanceting device. To accompany: Blood Glucose Monitor Brands: per insurance. 102 each 10     blood glucose monitoring (ACCU-CHEK FASTCLIX) lancets USE 6 TIMES DAILY USE TO TEST BLOOD SUGAR 6 TIMES DAILY OR AS DIRECTED. 600 each 0     blood glucose monitoring (NO BRAND SPECIFIED) meter device kit Use to test blood sugar 4 times daily or as directed. Preferred blood glucose meter OR supplies to accompany: Blood Glucose Monitor Brands: per insurance. 1 kit 0     blood glucose monitoring (NO BRAND SPECIFIED) meter device kit Use to test blood sugar 4 times daily or as directed. Preferred blood glucose meter OR supplies to accompany: Blood Glucose Monitor Brands: per insurance. 1 kit 0     carvedilol (COREG) 12.5 MG tablet Take 1 tablet (12.5 mg) by mouth 2 times daily (with meals) 180 tablet 3     Cholecalciferol (VITAMIN D) 2000 units tablet Take 2,000 Units by mouth       empagliflozin (JARDIANCE) 25 MG TABS tablet Take 1 tablet (25 mg) by mouth daily 90 tablet 1     fenofibrate (TRIGLIDE/LOFIBRA) 160 MG tablet Take 1 tablet  (160 mg) by mouth daily 90 tablet 3     furosemide (LASIX) 20 MG tablet Take 1 tablet (20 mg) by mouth 2 times daily 180 tablet 3     GLOBAL EASE INJECT PEN NEEDLES 31G X 5 MM miscellaneous USE TO INJECT INSULIN 5 TIMES A  each 11     insulin reg HIGH CONC (HUMULIN R U-500 KWIKPEN) 500 UNIT/ML PEN soln Inj 113 units subcutaneous with breakfast and 110 units subcutaneous with lunch and up to 130 units for dinner. (Max daily dose of 370 units) 45 mL 3     isosorbide mononitrate (IMDUR) 30 MG 24 hr tablet Take 1 tablet (30 mg) by mouth daily 90 tablet 3     omega-3 acid ethyl esters (LOVAZA) 1 g capsule Take 1 capsule by mouth 2 times daily 90 capsule 1     Omega-3 Fatty Acids (FISH OIL) 1200 MG capsule TAKE 1 CAPSULE BY MOUTH 2 (TWO) TIMES A  capsule 3     order for DME Equipment being ordered: Digital home blood pressure monitor kit 1 Device 1     senna-docusate (SENOKOT-S/PERICOLACE) 8.6-50 MG tablet TAKE 1 TAB BY MOUTH TWO TIMES DAILY AS NEEDED FOR CONSTIPATION. 60 tablet 10     silver sulfADIAZINE (SILVADENE) 1 % external cream Apply topically daily 50 g 0     spironolactone (ALDACTONE) 25 MG tablet Take 1 tablet (25 mg) by mouth daily 90 tablet 3     UltiCare Alcohol Swabs 70 % PADS Use to swab area of injection/mary kay as directed. 100 each 11     vitamin D2 (ERGOCALCIFEROL) 45055 units (1250 mcg) capsule TAKE 1 CAPSULE (50,000 UNITS) BY MOUTH ONCE A WEEK. NEEDS TO BE SEEN FOR FURTHER REFILLS 1 capsule 10       Allergies  Allergies   Allergen Reactions     Doxycycline Other (See Comments) and Swelling     Facial swelling  facial   swelling    Facial swelling  facial   swelling       Influenza Vac Split [Influenza Virus Vaccine] Itching     And red /blue arms         Family History  family history includes Cerebrovascular Disease in his mother; Dementia in his father; Diabetes in his father and mother; Hypertension in his mother; Kidney Disease in his mother; Osteoporosis in his mother; Prostate  Cancer in his father.    Social History  Social History     Tobacco Use     Smoking status: Never     Smokeless tobacco: Never   Vaping Use     Vaping Use: Never used   Substance Use Topics     Alcohol use: No     Drug use: No       Physical Exam  /81 (BP Location: Left arm, Patient Position: Sitting, Cuff Size: Adult Large)   Pulse 79   Resp 16   Wt 134.9 kg (297 lb 4.8 oz)   SpO2 96%   BMI 41.46 kg/m    Body mass index is 41.46 kg/m .  GENERAL :  In no apparent distress. He is accompanied by his wife  SKIN: Normal color, normal temperature, texture.  No hirsutism, alopecia or purple striae.     EYES: PERRL, EOMI, No scleral icterus,  No proptosis, conjunctival redness, stare, retraction  RESP: Lungs clear to auscultation bilaterally  CARDIAC: Regular rate and rhythm, normal S1 S2, without murmurs, rubs or gallops    NEURO: awake, alert, responds appropriately to questions.  Cranial nerves intact.   Moves all extremities; Uses wheelchair for ambulation.  No tremor of the outstretched hand.    EXTREMITIES: No clubbing, cyanosis. Significant bilateral edema    DATA REVIEW  FreeStyle LibreView  Time in target range 46%  Very Low 1%, Low 4%  High 29% and Very High 20%  Current Ave  mg/dl        Again, thank you for allowing me to participate in the care of your patient.        Sincerely,        Flavia Beltre PA-C

## 2023-08-11 NOTE — NURSING NOTE
Roberto Thompson's goals for this visit include: NONE  He requests these members of his care team be copied on today's visit information: YES    PCP: Jaylyn Bellamy    Referring Provider:  No referring provider defined for this encounter.    /81 (BP Location: Left arm, Patient Position: Sitting, Cuff Size: Adult Large)   Pulse 79   Resp 16   Wt 134.9 kg (297 lb 4.8 oz)   SpO2 96%   BMI 41.46 kg/m      Do you need any medication refills at today's visit? NONE    [facility]-Department of Endocrinology  Diabetes Educators:   Gela York, RN and Tonya Sim RN  Clinic Nurse: GIOVANNI Martinez  CMA's: Domonique Antunez and Roland   EMT: Xavier  Scheduling/Clinic phone number : [phone]   Clinic Fax: [phone]  On-Call Endocrine at the [facility] (after hours/weekends): [phone] option 4    Please call the number below to schedule your labs.  [facility]    General [phone]   [facility] [phone]   [place] [phone]   [facility]  [phone]   [facility] [phone]   [place] [phone]   [facility] [phone]   [facility] [phone]   [place] [phone]   [place] [phone]   [place] [phone]     Please reach out to the following centers to schedule your imaging appointment:  Imaging (DEXA, CT, MRI, XRAY)    [facility] ([facility], [facility] [place]) [phone]   [facility] ([place], [place]) [phone]   [facility] ([facility]) [phone]   [facility] ([facility]) [phone]     Appointment Reminders:  * Please bring meter with for staff to download  * If you are due ONLY for an A1C, it is scheduled with the nurse and will be done in clinic. You do not need to schedule a lab appointment. Fasting is not required for an A1C.  * Refill request should be submitted to your pharmacy. They will contact clinic for approval.    Xavier Smith, EMT

## 2023-08-11 NOTE — PROGRESS NOTES
Assessment/Plan :   Type 2 DM. Roberto is doing better. He feels like his blood sugars are under better control and he has been feeling better, overall. He does have a few low blood sugars in the morning before lunch. I think he may be getting a bit too much insulin with breakfast and I would like him to decrease the morning dose down to 100 unit(s) from 113 unit(s). We also discussed his evening spikes and I encouraged him to continue to work on making healthy dietary decisions. Lastly, he needs to continue to work on scanning every 4 hrs. He has a follow-up appt with our CDE team on October 2nd. He will be due for repeat laboratory testing, at that time and I will place the order today. I would like to see him back at the clinic in 3 mos.        I have independently reviewed and interpreted labs, imaging as indicated.      Chief complaint:  Roberto is a 62 year old male who returns for follow-up of Type 2 DM. We are using  services for the visit.    I have reviewed Care Everywhere including [facility], [facility], [facility], [facility], [facility] , [facility], [place] lab reports, imaging reports and provider notes as indicated.      HISTORY OF PRESENT ILLNESS  Roberto has a long history of poorly controlled Type 2 DM. He was originally diagnosed with diabetes in 2007 and he has been on insulin therapy since the time of diagnosis, along with various oral medications. Previously, he was taking a combination of Trulcity 1.5 mg weekly along with Toujeo Max U-300 160 unit(s) daily and 60 unit(s) of Humalog with meals. He was missing a lot of the Humalog dosing and his blood sugars were consistently over 250 mg/dl with many readings into the 400s. In April of this year, we switched him to Humulin R U500 TID dosing. This has made a significant improvement. He is currently taking 113 unit(s) with breakfast, 110 unit(s) with lunch, and 130 unit(s) with dinner. He also remains stable on  Trulicity 1.5 mg weekly.    He is currently using the FreeStyle Dominic to monitor his blood sugars. His most recent blood sugar average is 182 mg/dl. He has no complaints of severe hyperglycemia and/or hypoglycemia. However, he does have a few episodes of prolonged low blood sugars on his CGMS reading. He states that he cannot tell when his blood sugar is low but he does hear the alerts. Rhett also has severe neuropathy and he struggles with ambulation. He also has a history of unhealing diabetic foot ulcers with previous amputations. He also has stage 4 CKD    Endocrine relevant labs are as follows:   Latest Reference Range & Units 08/08/23 07:35   Albumin Urine mg/g Cr 0.00 - 17.00 mg/g Cr 426.13 (H)   (H): Data is abnormally high   Latest Reference Range & Units 08/08/23 07:35   Albumin Urine mg/L mg/L 473.0      Latest Reference Range & Units 06/01/23 09:23   Hemoglobin A1C POCT 4.3 - <5.7 % 10.4 !   !: Data is abnormal    REVIEW OF SYSTEMS    Endocrine: positive for diabetes  Skin: negative for, hyperpigmentation, facial redness  Eyes: negative for, visual blurring, redness, tearing  Ears/Nose/Throat: negative  Respiratory: No shortness of breath, dyspnea on exertion, cough, or hemoptysis  Cardiovascular: positive for lower extremity edema and exercise intolerance, negative for, and chest pain  Gastrointestinal: negative for, nausea, vomiting, constipation, and diarrhea  Genitourinary: negative for, dysuria, frequency, urgency, and hesitancy  Musculoskeletal: positive for joint pain, negative for, and muscular weakness  Neurologic: positive for numbness or tingling of feet  Psychiatric: negative  Hematologic/Lymphatic/Immunologic: negative    Past Medical History  Past Medical History:   Diagnosis Date    Diabetes (H)     Hypertension        Medications  Current Outpatient Medications   Medication Sig Dispense Refill    ACE/ARB/ARNI NOT PRESCRIBED (INTENTIONAL) Please choose reason not prescribed from choices  below.      aspirin 81 MG EC tablet Take 1 tablet (81 mg) by mouth daily 90 tablet 1    atorvastatin (LIPITOR) 80 MG tablet Take 1 tablet (80 mg) by mouth daily 90 tablet 2    blood glucose (ACCU-CHEK GUIDE) test strip 1 strip by In Vitro route 4 times daily Use to test blood sugar 4 times daily or as directed. 400 strip 1    blood glucose monitoring (ACCU-CHEK FASTCLIX) lancets Use with lanceting device. To accompany: Blood Glucose Monitor Brands: per insurance. 102 each 10    blood glucose monitoring (ACCU-CHEK FASTCLIX) lancets USE 6 TIMES DAILY USE TO TEST BLOOD SUGAR 6 TIMES DAILY OR AS DIRECTED. 600 each 0    blood glucose monitoring (NO BRAND SPECIFIED) meter device kit Use to test blood sugar 4 times daily or as directed. Preferred blood glucose meter OR supplies to accompany: Blood Glucose Monitor Brands: per insurance. 1 kit 0    blood glucose monitoring (NO BRAND SPECIFIED) meter device kit Use to test blood sugar 4 times daily or as directed. Preferred blood glucose meter OR supplies to accompany: Blood Glucose Monitor Brands: per insurance. 1 kit 0    carvedilol (COREG) 12.5 MG tablet Take 1 tablet (12.5 mg) by mouth 2 times daily (with meals) 180 tablet 3    Cholecalciferol (VITAMIN D) 2000 units tablet Take 2,000 Units by mouth      empagliflozin (JARDIANCE) 25 MG TABS tablet Take 1 tablet (25 mg) by mouth daily 90 tablet 1    fenofibrate (TRIGLIDE/LOFIBRA) 160 MG tablet Take 1 tablet (160 mg) by mouth daily 90 tablet 3    furosemide (LASIX) 20 MG tablet Take 1 tablet (20 mg) by mouth 2 times daily 180 tablet 3    GLOBAL EASE INJECT PEN NEEDLES 31G X 5 MM miscellaneous USE TO INJECT INSULIN 5 TIMES A  each 11    insulin reg HIGH CONC (HUMULIN R U-500 KWIKPEN) 500 UNIT/ML PEN soln Inj 113 units subcutaneous with breakfast and 110 units subcutaneous with lunch and up to 130 units for dinner. (Max daily dose of 370 units) 45 mL 3    isosorbide mononitrate (IMDUR) 30 MG 24 hr tablet Take 1 tablet  (30 mg) by mouth daily 90 tablet 3    omega-3 acid ethyl esters (LOVAZA) 1 g capsule Take 1 capsule by mouth 2 times daily 90 capsule 1    Omega-3 Fatty Acids (FISH OIL) 1200 MG capsule TAKE 1 CAPSULE BY MOUTH 2 (TWO) TIMES A  capsule 3    order for DME Equipment being ordered: Digital home blood pressure monitor kit 1 Device 1    senna-docusate (SENOKOT-S/PERICOLACE) 8.6-50 MG tablet TAKE 1 TAB BY MOUTH TWO TIMES DAILY AS NEEDED FOR CONSTIPATION. 60 tablet 10    silver sulfADIAZINE (SILVADENE) 1 % external cream Apply topically daily 50 g 0    spironolactone (ALDACTONE) 25 MG tablet Take 1 tablet (25 mg) by mouth daily 90 tablet 3    UltiCare Alcohol Swabs 70 % PADS Use to swab area of injection/mary kay as directed. 100 each 11    vitamin D2 (ERGOCALCIFEROL) 99633 units (1250 mcg) capsule TAKE 1 CAPSULE (50,000 UNITS) BY MOUTH ONCE A WEEK. NEEDS TO BE SEEN FOR FURTHER REFILLS 1 capsule 10       Allergies  Allergies   Allergen Reactions    Doxycycline Other (See Comments) and Swelling     Facial swelling  facial   swelling    Facial swelling  facial   swelling      Influenza Vac Split [Influenza Virus Vaccine] Itching     And red /blue arms         Family History  family history includes Cerebrovascular Disease in his mother; Dementia in his father; Diabetes in his father and mother; Hypertension in his mother; Kidney Disease in his mother; Osteoporosis in his mother; Prostate Cancer in his father.    Social History  Social History     Tobacco Use    Smoking status: Never    Smokeless tobacco: Never   Vaping Use    Vaping Use: Never used   Substance Use Topics    Alcohol use: No    Drug use: No       Physical Exam  /81 (BP Location: Left arm, Patient Position: Sitting, Cuff Size: Adult Large)   Pulse 79   Resp 16   Wt 134.9 kg (297 lb 4.8 oz)   SpO2 96%   BMI 41.46 kg/m    Body mass index is 41.46 kg/m .  GENERAL :  In no apparent distress. He is accompanied by his wife  SKIN: Normal color, normal  temperature, texture.  No hirsutism, alopecia or purple striae.     EYES: PERRL, EOMI, No scleral icterus,  No proptosis, conjunctival redness, stare, retraction  RESP: Lungs clear to auscultation bilaterally  CARDIAC: Regular rate and rhythm, normal S1 S2, without murmurs, rubs or gallops    NEURO: awake, alert, responds appropriately to questions.  Cranial nerves intact.   Moves all extremities; Uses wheelchair for ambulation.  No tremor of the outstretched hand.    EXTREMITIES: No clubbing, cyanosis. Significant bilateral edema    DATA REVIEW  FreeStyle LibreView  Time in target range 46%  Very Low 1%, Low 4%  High 29% and Very High 20%  Current Ave  mg/dl

## 2023-08-13 RX ORDER — INSULIN HUMAN 500 [IU]/ML
INJECTION, SOLUTION SUBCUTANEOUS
Qty: 15 ML | Refills: 10 | OUTPATIENT
Start: 2023-08-13

## 2023-08-13 RX ORDER — DULAGLUTIDE 1.5 MG/.5ML
INJECTION, SOLUTION SUBCUTANEOUS
Qty: 2 ML | Refills: 10 | OUTPATIENT
Start: 2023-08-13

## 2023-10-02 ENCOUNTER — ALLIED HEALTH/NURSE VISIT (OUTPATIENT)
Dept: EDUCATION SERVICES | Facility: CLINIC | Age: 63
End: 2023-10-02
Payer: MEDICARE

## 2023-10-02 DIAGNOSIS — E11.69 TYPE 2 DIABETES MELLITUS WITH OTHER SPECIFIED COMPLICATION, WITH LONG-TERM CURRENT USE OF INSULIN (H): Primary | ICD-10-CM

## 2023-10-02 DIAGNOSIS — Z79.4 TYPE 2 DIABETES MELLITUS WITH OTHER SPECIFIED COMPLICATION, WITH LONG-TERM CURRENT USE OF INSULIN (H): Primary | ICD-10-CM

## 2023-10-02 PROCEDURE — G0108 DIAB MANAGE TRN  PER INDIV: HCPCS

## 2023-10-02 NOTE — PROGRESS NOTES
DIABETES SELF MANAGEMENT EDUCATION: Previous Diagnosis/Individual Diabetes Review    Roberto Thompson presents today for evaluation of glucose control and modification of medication(s) related to Type 2 diabetes.  He is here by himself today.    Year of diagnosis: 2007  Referring provider:  Flavia Beltre PA-C     Past Diabetes Education: No  Support system: family  Living Situation: Lives with his wife and daughter    DIABETES RELATED CONCERNS/COMMENTS: here for blood sugar review and medication adjustment.    Patient's emotional response to diabetes: expresses readiness to learn    ASSESSMENT:  Patient Problem List and Medication List reviewed for relevant medical history, current medical status, and diabetes risk factors.    MEDICATION:    Current Diabetes Management per Patient:  Taking diabetes medications?   U-500 Regular insulin taking  units for breakfast, 130 units at lunch and up to 130-140 units for supper.  He confirmed taking Trulicity 1.5 mg once weekly.      MONITORING:  Patient glucose self monitoring as follows: two times daily before eating   BG meter: Accu Check Earnestine meter  Blood sugar data:    Date Breakfast Lunch Dinner    Before Before Before   9/22 124 222 416   9/23 177 289 398   9/24 176 306 192   9/25 179     9/26 217     9/27 178 244 226   9/28 116     9/29 156 171 173   9/30   336   10/1 222 219 248   10/2 135       Patient's most recent   Lab Results   Component Value Date    A1C 11.2 02/09/2022    A1C 8.7 05/26/2021      Patient's A1C goal: <8.0    PHYSICAL ACTIVITY:  not assessed    NUTRITION:  Patient states he does not always eat 3 meals a day. He said he sometimes skips either breakfast or lunch. He gets up around 7 am and eats, maybe eats around 12-1 pm and then around 10 pm.  Beverages:   Cultural/Gnosticism diet restrictions: No   Biggest Challenge to Healthy Eating: knowing what to eat    Diabetes knowledge and skills assessment:   Barriers to Learning Assessment: English  as a second language (ESL)    Problem Solving:    Patient is at risk of hypoglycemia?: YES  Hospitalizations for hyper or hypoglycemia: No    Healthy Coping and Stress Management:   Sources of stress identified by patient My health    EDUCATION and INSTRUCTION PROVIDED AT THIS VISIT:    -Target blood glucose for pre-meal and 2 hour post-prandial glucose   -Action, timing and potential side-effects of U-500 and Trulicity diabetes medications:     Pt verbalized understanding of concepts discussed and recommendations provided today.       PLAN:  Overall blood sugars are good. We did not make any changes to his current insulin. He varies the insulin doses based on how high or low his blood sugars are before eating.  We talked about avoiding cookies, cakes and desserts as much as possible. We discussed getting plenty of protein at meals. He eats vegetables at lunch and supper. He drinks milk with his meals. Encouraged to make healthy food choices.    He is to continue Trulicity 1.5 mg once weekly.  Roberto came in today ambulatory. He typically comes in a wheelchair due to his neuropathy.   He said he is trying to walk more. He is not interested in using a walker or cane.    See patient instructions/AVS    FOLLOW-UP:  Seeing Flavia Beltre PA-C Nov. 2nd.  He should see me 1-2 months after that.    Time Spent: 30 minutes  Encounter Type: Individual    Any diabetes medication dose changes were made via the CDE Protocol and Collaborative Practice Agreement with [place] and  [place].  A copy of this encounter was provided to patient's referring provider.    Tonya Sim RN, [facility]

## 2023-10-30 NOTE — PROGRESS NOTES
Outcome for 10/30/23 2:49 PM:  Meter-upload in clinc   Domonique Ornelas CMA  Adult Endocrinology   [facility]

## 2023-11-02 ENCOUNTER — OFFICE VISIT (OUTPATIENT)
Dept: ENDOCRINOLOGY | Facility: CLINIC | Age: 63
End: 2023-11-02
Payer: MEDICARE

## 2023-11-02 VITALS
WEIGHT: 297 LBS | RESPIRATION RATE: 16 BRPM | OXYGEN SATURATION: 97 % | DIASTOLIC BLOOD PRESSURE: 75 MMHG | HEART RATE: 78 BPM | SYSTOLIC BLOOD PRESSURE: 135 MMHG | BODY MASS INDEX: 41.42 KG/M2

## 2023-11-02 DIAGNOSIS — E11.69 TYPE 2 DIABETES MELLITUS WITH OTHER SPECIFIED COMPLICATION, WITH LONG-TERM CURRENT USE OF INSULIN (H): ICD-10-CM

## 2023-11-02 DIAGNOSIS — Z79.4 TYPE 2 DIABETES MELLITUS WITH OTHER SPECIFIED COMPLICATION, WITH LONG-TERM CURRENT USE OF INSULIN (H): ICD-10-CM

## 2023-11-02 LAB — HBA1C MFR BLD: 8.9 % (ref 4.3–?)

## 2023-11-02 PROCEDURE — 83036 HEMOGLOBIN GLYCOSYLATED A1C: CPT | Performed by: PHYSICIAN ASSISTANT

## 2023-11-02 PROCEDURE — 99213 OFFICE O/P EST LOW 20 MIN: CPT | Performed by: PHYSICIAN ASSISTANT

## 2023-11-02 RX ORDER — INSULIN HUMAN 500 [IU]/ML
INJECTION, SOLUTION SUBCUTANEOUS
Qty: 45 ML | Refills: 3 | Status: SHIPPED | OUTPATIENT
Start: 2023-11-02 | End: 2024-06-17

## 2023-11-02 NOTE — PROGRESS NOTES
Assessment/Plan :   Type 2 DM. Roberto is doing a better job at managing his blood sugars. He really feels like the U500 insulin has made a difference. We reviewed his current blood sugars and his evening readings are still a little elevated. He has been working on eating healthy and I encouraged him to continue with the good work. We will follow-up in 3 mos.      I have independently reviewed and interpreted labs, imaging as indicated.      Chief complaint:  Roberto is a 63 year old male who returns for follow-up of Type 2 DM with long term use of insulin.    I have reviewed Care Everywhere including [facility], [facility], [facility], [facility], [place], [facility], [facility] , [facility], [place] lab reports, imaging reports and provider notes as indicated.      HISTORY OF PRESENT ILLNESS  Roberto continues to work on improving his blood sugars. He feels like the new U500 insulin is working much better. He has been taking 100 unit(s) with breakfast, 110 unit(s) with lunch, and 115 unit(s) with supper. He also remains stable on Trulicity 1.5 mg weekly. He uses fingerstick testing to monitor his blood sugars twice daily.    Roberto has not had any problems with severe hyperglycemia and/or hypoglycemia since our last visit. His blood sugars are improving but he would like them to be a bit lower. He denies any problems with worsening vision. He does have severe peripheral neuropathy with a history of amputations. He also has significant arthritis in his knees. Despite this, he has been making an effort to walk more. His diabetes is complicated by a history of nonpoliferative diabetic retinopathy with macular edema, hyperlipidemia, HTN, obesity, CKD stage 4 and KELBY. He has had diabetes since 2007.     Endocrine relevant labs are as follows:   Latest Reference Range & Units 11/02/23 11:22   Hemoglobin A1C POCT 4.3 - <5.7 % 8.9 !   !: Data is abnormal   Latest Reference Range & Units 08/08/23 07:35   Albumin Urine  mg/g Cr 0.00 - 17.00 mg/g Cr 426.13 (H)   (H): Data is abnormally high   Latest Reference Range & Units 08/08/23 07:35   Albumin Urine mg/L mg/L 473.0     REVIEW OF SYSTEMS    Endocrine: positive for diabetes  Skin: negative  Eyes: negative for, visual blurring, redness, tearing  Ears/Nose/Throat: negative  Respiratory: No shortness of breath, dyspnea on exertion, cough, or hemoptysis  Cardiovascular: negative for, chest pain, lower extremity edema, and exercise intolerance  Gastrointestinal: negative for, nausea, vomiting, constipation, and diarrhea  Genitourinary: negative for, nocturia, dysuria, frequency, and urgency  Musculoskeletal: positive for arthritis and joint stiffness, negative for, muscular weakness, and nocturnal cramping  Neurologic: positive for numbness or tingling of hands and numbness or tingling of feet  Psychiatric: negative  Hematologic/Lymphatic/Immunologic: negative    Past Medical History  Past Medical History:   Diagnosis Date    Diabetes (H)     Hypertension        Medications  Current Outpatient Medications   Medication Sig Dispense Refill    ACE/ARB/ARNI NOT PRESCRIBED (INTENTIONAL) Please choose reason not prescribed from choices below.      aspirin 81 MG EC tablet Take 1 tablet (81 mg) by mouth daily 90 tablet 1    atorvastatin (LIPITOR) 80 MG tablet Take 1 tablet (80 mg) by mouth daily 90 tablet 2    blood glucose (ACCU-CHEK GUIDE) test strip 1 strip by In Vitro route 4 times daily Use to test blood sugar 4 times daily or as directed. 400 strip 1    blood glucose monitoring (ACCU-CHEK FASTCLIX) lancets Use with lanceting device. To accompany: Blood Glucose Monitor Brands: per insurance. 102 each 10    carvedilol (COREG) 12.5 MG tablet Take 1 tablet (12.5 mg) by mouth 2 times daily (with meals) 180 tablet 3    Cholecalciferol (VITAMIN D) 2000 units tablet Take 2,000 Units by mouth      empagliflozin (JARDIANCE) 25 MG TABS tablet Take 1 tablet (25 mg) by mouth daily 90 tablet 1     fenofibrate (TRIGLIDE/LOFIBRA) 160 MG tablet Take 1 tablet (160 mg) by mouth daily 90 tablet 3    furosemide (LASIX) 20 MG tablet Take 1 tablet (20 mg) by mouth 2 times daily 180 tablet 3    GLOBAL EASE INJECT PEN NEEDLES 31G X 5 MM miscellaneous USE TO INJECT INSULIN 5 TIMES A  each 11    insulin reg HIGH CONC (HUMULIN R U-500 KWIKPEN) 500 UNIT/ML PEN soln Inj 113 units subcutaneous with breakfast and 110 units subcutaneous with lunch and up to 130 units for dinner. (Max daily dose of 370 units) 45 mL 3    isosorbide mononitrate (IMDUR) 30 MG 24 hr tablet Take 1 tablet (30 mg) by mouth daily 90 tablet 3    omega-3 acid ethyl esters (LOVAZA) 1 g capsule Take 1 capsule by mouth 2 times daily 90 capsule 1    Omega-3 Fatty Acids (FISH OIL) 1200 MG capsule TAKE 1 CAPSULE BY MOUTH 2 (TWO) TIMES A  capsule 3    order for DME Equipment being ordered: Digital home blood pressure monitor kit 1 Device 1    senna-docusate (SENOKOT-S/PERICOLACE) 8.6-50 MG tablet TAKE 1 TAB BY MOUTH TWO TIMES DAILY AS NEEDED FOR CONSTIPATION. 60 tablet 10    silver sulfADIAZINE (SILVADENE) 1 % external cream Apply topically daily 50 g 0    spironolactone (ALDACTONE) 25 MG tablet Take 1 tablet (25 mg) by mouth daily 90 tablet 3    UltiCare Alcohol Swabs 70 % PADS Use to swab area of injection/mary kay as directed. 100 each 11    vitamin D2 (ERGOCALCIFEROL) 61897 units (1250 mcg) capsule TAKE 1 CAPSULE (50,000 UNITS) BY MOUTH ONCE A WEEK. NEEDS TO BE SEEN FOR FURTHER REFILLS 1 capsule 10       Allergies  Allergies   Allergen Reactions    Doxycycline Other (See Comments) and Swelling     Facial swelling  facial   swelling    Facial swelling  facial   swelling      Influenza Vac Split [Influenza Virus Vaccine] Itching     And red /blue arms         Family History  family history includes Cerebrovascular Disease in his mother; Dementia in his father; Diabetes in his father and mother; Hypertension in his mother; Kidney Disease in his  mother; Osteoporosis in his mother; Prostate Cancer in his father.    Social History  Social History     Tobacco Use    Smoking status: Never    Smokeless tobacco: Never   Vaping Use    Vaping Use: Never used   Substance Use Topics    Alcohol use: No    Drug use: No       Physical Exam  /75 (BP Location: Left arm, Patient Position: Sitting, Cuff Size: Adult Large)   Pulse 78   Resp 16   Wt 134.7 kg (297 lb)   SpO2 97%   BMI 41.42 kg/m    Body mass index is 41.42 kg/m .  GENERAL :  In no apparent distress  SKIN: Normal color, normal temperature, texture.  No hirsutism, alopecia or purple striae.     EYES: PERRL, EOMI, No scleral icterus,  No proptosis, conjunctival redness, stare, retraction  RESP: Lungs clear to auscultation bilaterally  CARDIAC: Regular rate and rhythm, normal S1 S2, without murmurs, rubs or gallops    NEURO: awake, alert, responds appropriately to questions.  Cranial nerves intact.   Moves all extremities; Gait normal.  No tremor of the outstretched hand.   EXTREMITIES: No clubbing, cyanosis or edema.    DATA REVIEW  Date Morning Night   10/15 269    10/16 225 299   10/17 185 211, 184   10/18 113    10/19 369 249   10/21 279, 173 294   10/22 204 250   10/23 157 201   10/24 140 145   10/25 221    10/26 292, 231 183   10/27 162 377   10/28 399 327   10/29 229 247   10/30  302   10/31 107 155, 235   11/1 166 219   11/2 195

## 2023-11-02 NOTE — NURSING NOTE
Roberto Thompson's goals for this visit include:   Chief Complaint   Patient presents with    Follow Up     DMII     He requests these members of his care team be copied on today's visit information: yes    PCP: Jaylyn Bellamy    Referring Provider:  No referring provider defined for this encounter.    /75 (BP Location: Left arm, Patient Position: Sitting, Cuff Size: Adult Large)   Pulse 78   Resp 16   Wt 134.7 kg (297 lb)   SpO2 97%   BMI 41.42 kg/m      Do you need any medication refills at today's visit? None    Xavier Smith, EMT

## 2023-11-02 NOTE — LETTER
11/2/2023         RE: Roberto Thompson  [address] [place]  [place] [address]        Dear Colleague,    Thank you for referring your patient, Roebrto Thompson, to the [facility]. Please see a copy of my visit note below.    Outcome for 10/30/23 2:49 PM:  Meter-upload in clin   Domonique Ornelas CMA  Adult Endocrinology   [facility]        Assessment/Plan :   Type 2 DM. Roberto is doing a better job at managing his blood sugars. He really feels like the U500 insulin has made a difference. We reviewed his current blood sugars and his evening readings are still a little elevated. He has been working on eating healthy and I encouraged him to continue with the good work. We will follow-up in 3 mos.      I have independently reviewed and interpreted labs, imaging as indicated.      Chief complaint:  Roberto is a 63 year old male who returns for follow-up of Type 2 DM with long term use of insulin.    I have reviewed Care Everywhere including [facility], [facility], [facility], [facility], [facility] , [facility], [place] lab reports, imaging reports and provider notes as indicated.      HISTORY OF PRESENT ILLNESS  Roberto continues to work on improving his blood sugars. He feels like the new U500 insulin is working much better. He has been taking 100 unit(s) with breakfast, 110 unit(s) with lunch, and 115 unit(s) with supper. He also remains stable on Trulicity 1.5 mg weekly. He uses fingerstick testing to monitor his blood sugars twice daily.    Roberto has not had any problems with severe hyperglycemia and/or hypoglycemia since our last visit. His blood sugars are improving but he would like them to be a bit lower. He denies any problems with worsening vision. He does have severe peripheral neuropathy with a history of amputations. He also has significant arthritis in his knees. Despite this, he has been making an effort to walk more. His  diabetes is complicated by a history of nonpoliferative diabetic retinopathy with macular edema, hyperlipidemia, HTN, obesity, CKD stage 4 and KELBY. He has had diabetes since 2007.     Endocrine relevant labs are as follows:   Latest Reference Range & Units 11/02/23 11:22   Hemoglobin A1C POCT 4.3 - <5.7 % 8.9 !   !: Data is abnormal   Latest Reference Range & Units 08/08/23 07:35   Albumin Urine mg/g Cr 0.00 - 17.00 mg/g Cr 426.13 (H)   (H): Data is abnormally high   Latest Reference Range & Units 08/08/23 07:35   Albumin Urine mg/L mg/L 473.0     REVIEW OF SYSTEMS    Endocrine: positive for diabetes  Skin: negative  Eyes: negative for, visual blurring, redness, tearing  Ears/Nose/Throat: negative  Respiratory: No shortness of breath, dyspnea on exertion, cough, or hemoptysis  Cardiovascular: negative for, chest pain, lower extremity edema, and exercise intolerance  Gastrointestinal: negative for, nausea, vomiting, constipation, and diarrhea  Genitourinary: negative for, nocturia, dysuria, frequency, and urgency  Musculoskeletal: positive for arthritis and joint stiffness, negative for, muscular weakness, and nocturnal cramping  Neurologic: positive for numbness or tingling of hands and numbness or tingling of feet  Psychiatric: negative  Hematologic/Lymphatic/Immunologic: negative    Past Medical History  Past Medical History:   Diagnosis Date     Diabetes (H)      Hypertension        Medications  Current Outpatient Medications   Medication Sig Dispense Refill     ACE/ARB/ARNI NOT PRESCRIBED (INTENTIONAL) Please choose reason not prescribed from choices below.       aspirin 81 MG EC tablet Take 1 tablet (81 mg) by mouth daily 90 tablet 1     atorvastatin (LIPITOR) 80 MG tablet Take 1 tablet (80 mg) by mouth daily 90 tablet 2     blood glucose (ACCU-CHEK GUIDE) test strip 1 strip by In Vitro route 4 times daily Use to test blood sugar 4 times daily or as directed. 400 strip 1     blood glucose monitoring (ACCU-CHEK  FASTCLIX) lancets Use with lanceting device. To accompany: Blood Glucose Monitor Brands: per insurance. 102 each 10     carvedilol (COREG) 12.5 MG tablet Take 1 tablet (12.5 mg) by mouth 2 times daily (with meals) 180 tablet 3     Cholecalciferol (VITAMIN D) 2000 units tablet Take 2,000 Units by mouth       empagliflozin (JARDIANCE) 25 MG TABS tablet Take 1 tablet (25 mg) by mouth daily 90 tablet 1     fenofibrate (TRIGLIDE/LOFIBRA) 160 MG tablet Take 1 tablet (160 mg) by mouth daily 90 tablet 3     furosemide (LASIX) 20 MG tablet Take 1 tablet (20 mg) by mouth 2 times daily 180 tablet 3     GLOBAL EASE INJECT PEN NEEDLES 31G X 5 MM miscellaneous USE TO INJECT INSULIN 5 TIMES A  each 11     insulin reg HIGH CONC (HUMULIN R U-500 KWIKPEN) 500 UNIT/ML PEN soln Inj 113 units subcutaneous with breakfast and 110 units subcutaneous with lunch and up to 130 units for dinner. (Max daily dose of 370 units) 45 mL 3     isosorbide mononitrate (IMDUR) 30 MG 24 hr tablet Take 1 tablet (30 mg) by mouth daily 90 tablet 3     omega-3 acid ethyl esters (LOVAZA) 1 g capsule Take 1 capsule by mouth 2 times daily 90 capsule 1     Omega-3 Fatty Acids (FISH OIL) 1200 MG capsule TAKE 1 CAPSULE BY MOUTH 2 (TWO) TIMES A  capsule 3     order for DME Equipment being ordered: Digital home blood pressure monitor kit 1 Device 1     senna-docusate (SENOKOT-S/PERICOLACE) 8.6-50 MG tablet TAKE 1 TAB BY MOUTH TWO TIMES DAILY AS NEEDED FOR CONSTIPATION. 60 tablet 10     silver sulfADIAZINE (SILVADENE) 1 % external cream Apply topically daily 50 g 0     spironolactone (ALDACTONE) 25 MG tablet Take 1 tablet (25 mg) by mouth daily 90 tablet 3     UltiCare Alcohol Swabs 70 % PADS Use to swab area of injection/mary kay as directed. 100 each 11     vitamin D2 (ERGOCALCIFEROL) 96368 units (1250 mcg) capsule TAKE 1 CAPSULE (50,000 UNITS) BY MOUTH ONCE A WEEK. NEEDS TO BE SEEN FOR FURTHER REFILLS 1 capsule 10       Allergies  Allergies   Allergen  Reactions     Doxycycline Other (See Comments) and Swelling     Facial swelling  facial   swelling    Facial swelling  facial   swelling       Influenza Vac Split [Influenza Virus Vaccine] Itching     And red /blue arms         Family History  family history includes Cerebrovascular Disease in his mother; Dementia in his father; Diabetes in his father and mother; Hypertension in his mother; Kidney Disease in his mother; Osteoporosis in his mother; Prostate Cancer in his father.    Social History  Social History     Tobacco Use     Smoking status: Never     Smokeless tobacco: Never   Vaping Use     Vaping Use: Never used   Substance Use Topics     Alcohol use: No     Drug use: No       Physical Exam  /75 (BP Location: Left arm, Patient Position: Sitting, Cuff Size: Adult Large)   Pulse 78   Resp 16   Wt 134.7 kg (297 lb)   SpO2 97%   BMI 41.42 kg/m    Body mass index is 41.42 kg/m .  GENERAL :  In no apparent distress  SKIN: Normal color, normal temperature, texture.  No hirsutism, alopecia or purple striae.     EYES: PERRL, EOMI, No scleral icterus,  No proptosis, conjunctival redness, stare, retraction  RESP: Lungs clear to auscultation bilaterally  CARDIAC: Regular rate and rhythm, normal S1 S2, without murmurs, rubs or gallops    NEURO: awake, alert, responds appropriately to questions.  Cranial nerves intact.   Moves all extremities; Gait normal.  No tremor of the outstretched hand.   EXTREMITIES: No clubbing, cyanosis or edema.    DATA REVIEW  Date Morning Night   10/15 269    10/16 225 299   10/17 185 211, 184   10/18 113    10/19 369 249   10/21 279, 173 294   10/22 204 250   10/23 157 201   10/24 140 145   10/25 221    10/26 292, 231 183   10/27 162 377   10/28 399 327   10/29 229 247   10/30  302   10/31 107 155, 235   11/1 166 219   11/2 195          Again, thank you for allowing me to participate in the care of your patient.        Sincerely,        Flavia Beltre PA-C

## 2023-11-09 NOTE — PROGRESS NOTES
[facility] Card[facility] Consultation:    Assessment and Plan:     1.  Subclinical CAD with severe CAC, normal nuclear stress test, normal LV function  On appropriate preventive therapy with aspirin and statin.  2. Hypertension, un controlled on current regimen: Inc imdur to 60 mg  3. Diabetes, uncontrolled: On insulin, GLP-1 agonist.  Follows with endocrinology   4. CKD stage IV, presumably diabetic nephropathy.  On Lasix 20 twice daily  5. Mixed dyslipidemia, hypercholesterolemia and hypertriglyceridemia, goal LDL less than 70  Main contributor is uncontrolled diabetes.  On Lipitor 80 and fenofibrate 160  Lovaza 2 g started recently, unsure if taking. Check FLP today  6. Severe obesity  7. KELBY on CPAP  8. Mild carotid stenosis  On aspirin and statin    A total of 40 minutes were spent on the day of the visit for chart review, care coordination, face-to-face consultation with the patient, and documentation.    Mitch Reynaga MD    Cardiac Imaging and Prevention  [facility]  [email or web address] Pager: [phone]    HPI: Routine follow-up.  History obtained via [place]  on the phone.    He has been doing well without any intercurrent cardiac illness or hospitalization.  He underwent finger amputation few months back due to osteomyelitis.  He continues to follow with endocrinology, and has also established care with neurology.  There is no recent lipid profile for me to review.  Renal function is stable.  He reports decreasing vision.  He tells me that he spoke with an eye doctor over the phone and was told that Coreg may be responsible for his decreasing vision.  I did my best to reassure him that Coreg is not associated with vision problems, and asked him to make an ophthalmology appointment, and if needed have the eye doctor contact me.  He occasionally checks his BP at home, it is usually elevated in the 140s.    EXAM:  BP (!) 149/80 (BP Location: Right  arm, Patient Position: Chair, Cuff Size: Adult Large)   Pulse 80   Wt 133.2 kg (293 lb 11.2 oz)   SpO2 96%   BMI 40.96 kg/m    GEN/CONSTITUIONAL: Appears comfortable, in no apparent distress   EYES: No icterus  ENT/MOUTH: Normal  JVP:  Not visible  RESPIRATORY: Clear to auscultation bilaterally   CARDIOVASCULAR: Regular S1 and S2, no murmurs, rubs, or gallops.   ABDOMEN: Soft, non-tender, positive bowel sounds   NEUROLOGIC: Grossly non-focal   PSYCHIATRIC: Normal affect  EXT: trace edema.   Skin: No petechiae, purpura or rash    PAST MEDICAL HISTORY:  Past Medical History:   Diagnosis Date    Diabetes (H)     Hypertension        CURRENT MEDICATIONS:  Current Outpatient Medications   Medication    ACE/ARB/ARNI NOT PRESCRIBED (INTENTIONAL)    aspirin 81 MG EC tablet    atorvastatin (LIPITOR) 80 MG tablet    blood glucose (ACCU-CHEK GUIDE) test strip    blood glucose monitoring (ACCU-CHEK FASTCLIX) lancets    carvedilol (COREG) 12.5 MG tablet    Cholecalciferol (VITAMIN D) 2000 units tablet    empagliflozin (JARDIANCE) 25 MG TABS tablet    fenofibrate (TRIGLIDE/LOFIBRA) 160 MG tablet    furosemide (LASIX) 20 MG tablet    GLOBAL EASE INJECT PEN NEEDLES 31G X 5 MM miscellaneous    insulin reg HIGH CONC (HUMULIN R U-500 KWIKPEN) 500 UNIT/ML PEN soln    isosorbide mononitrate (IMDUR) 30 MG 24 hr tablet    omega-3 acid ethyl esters (LOVAZA) 1 g capsule    Omega-3 Fatty Acids (FISH OIL) 1200 MG capsule    order for DME    senna-docusate (SENOKOT-S/PERICOLACE) 8.6-50 MG tablet    silver sulfADIAZINE (SILVADENE) 1 % external cream    spironolactone (ALDACTONE) 25 MG tablet    UltiCare Alcohol Swabs 70 % PADS    vitamin D2 (ERGOCALCIFEROL) 15192 units (1250 mcg) capsule     Current Facility-Administered Medications   Medication    silver sulfADIAZINE (SILVADENE) 1 % cream    triamcinolone (KENALOG-40) injection 40 mg       PAST SURGICAL HISTORY:  Past Surgical History:   Procedure Laterality Date    APPENDECTOMY      ENT  SURGERY      tonsils    ORTHOPEDIC SURGERY      thumb    ORTHOPEDIC SURGERY      2 toe amputations       ALLERGIES     Allergies   Allergen Reactions    Doxycycline Other (See Comments) and Swelling     Facial swelling  facial   swelling    Facial swelling  facial   swelling      Influenza Vac Split [Influenza Virus Vaccine] Itching     And red /blue arms       FAMILY HISTORY:  Family History   Problem Relation Age of Onset    Diabetes Mother     Hypertension Mother     Cerebrovascular Disease Mother     Kidney Disease Mother     Osteoporosis Mother     Diabetes Father     Prostate Cancer Father     Dementia Father        SOCIAL HISTORY:  Social History     Socioeconomic History    Marital status:      Spouse name: Not on file    Number of children: Not on file    Years of education: Not on file    Highest education level: Not on file   Occupational History    Not on file   Tobacco Use    Smoking status: Never    Smokeless tobacco: Never   Vaping Use    Vaping Use: Never used   Substance and Sexual Activity    Alcohol use: No    Drug use: No    Sexual activity: Yes     Partners: Female     Birth control/protection: None   Other Topics Concern    Parent/sibling w/ CABG, MI or angioplasty before 65F 55M? Not Asked   Social History Narrative    Not on file     Social Determinants of Health     Financial Resource Strain: Not on file   Food Insecurity: Not on file   Transportation Needs: Not on file   Physical Activity: Not on file   Stress: Not on file   Social Connections: Not on file   Interpersonal Safety: Not on file   Housing Stability: Not on file       ROS:   Constitutional: No fever, chills, or sweats. No weight gain/loss   ENT: No visual disturbance, ear ache, epistaxis, sore throat  Allergies/Immunologic: Negative.   Respiratory: No cough, hemoptysia  Cardiovascular: As per HPI  GI: No nausea, vomiting, hematemesis, melena, or hematochezia  : No urinary frequency, dysuria, or hematuria  Integument:  Negative  Psychiatric: Negative  Neuro: Negative  Endocrinology: Negative   Musculoskeletal: Negative    ADDITIONAL COMMENTS:     I reviewed the patient's medications:     I reviewed the patient's pertinent clinical laboratory studies:     I reviewed the patient's pertinent imaging studies:   I reviewed the patient's ECG:

## 2023-11-10 ENCOUNTER — OFFICE VISIT (OUTPATIENT)
Dept: CARDIOLOGY | Facility: CLINIC | Age: 63
End: 2023-11-10
Payer: MEDICARE

## 2023-11-10 VITALS
OXYGEN SATURATION: 96 % | HEART RATE: 80 BPM | WEIGHT: 293.7 LBS | BODY MASS INDEX: 40.96 KG/M2 | SYSTOLIC BLOOD PRESSURE: 149 MMHG | DIASTOLIC BLOOD PRESSURE: 80 MMHG

## 2023-11-10 DIAGNOSIS — N18.4 CKD (CHRONIC KIDNEY DISEASE) STAGE 4, GFR 15-29 ML/MIN (H): Primary | ICD-10-CM

## 2023-11-10 DIAGNOSIS — E78.1 HYPERTRIGLYCERIDEMIA: ICD-10-CM

## 2023-11-10 DIAGNOSIS — I20.89 STABLE ANGINA (H): ICD-10-CM

## 2023-11-10 DIAGNOSIS — E11.69 TYPE 2 DIABETES MELLITUS WITH OTHER SPECIFIED COMPLICATION, WITH LONG-TERM CURRENT USE OF INSULIN (H): ICD-10-CM

## 2023-11-10 DIAGNOSIS — Z79.4 TYPE 2 DIABETES MELLITUS WITH OTHER SPECIFIED COMPLICATION, WITH LONG-TERM CURRENT USE OF INSULIN (H): ICD-10-CM

## 2023-11-10 LAB
ANION GAP SERPL CALCULATED.3IONS-SCNC: 14 MMOL/L (ref 7–15)
BUN SERPL-MCNC: 40.3 MG/DL (ref 8–23)
CALCIUM SERPL-MCNC: 9.6 MG/DL (ref 8.8–10.2)
CHLORIDE SERPL-SCNC: 100 MMOL/L (ref 98–107)
CHOLEST SERPL-MCNC: 274 MG/DL
CREAT SERPL-MCNC: 2.39 MG/DL (ref 0.67–1.17)
DEPRECATED HCO3 PLAS-SCNC: 22 MMOL/L (ref 22–29)
EGFRCR SERPLBLD CKD-EPI 2021: 29 ML/MIN/1.73M2
GLUCOSE SERPL-MCNC: 177 MG/DL (ref 70–99)
HBA1C MFR BLD: 8.9 % (ref 0–5.6)
HDLC SERPL-MCNC: 28 MG/DL
LDLC SERPL CALC-MCNC: ABNORMAL MG/DL
NONHDLC SERPL-MCNC: 246 MG/DL
POTASSIUM SERPL-SCNC: 5.2 MMOL/L (ref 3.4–5.3)
SODIUM SERPL-SCNC: 136 MMOL/L (ref 135–145)
TRIGL SERPL-MCNC: 1043 MG/DL

## 2023-11-10 PROCEDURE — 83721 ASSAY OF BLOOD LIPOPROTEIN: CPT | Mod: XU | Performed by: INTERNAL MEDICINE

## 2023-11-10 PROCEDURE — 80061 LIPID PANEL: CPT | Performed by: INTERNAL MEDICINE

## 2023-11-10 PROCEDURE — 36415 COLL VENOUS BLD VENIPUNCTURE: CPT | Performed by: INTERNAL MEDICINE

## 2023-11-10 PROCEDURE — 80048 BASIC METABOLIC PNL TOTAL CA: CPT | Performed by: INTERNAL MEDICINE

## 2023-11-10 PROCEDURE — 99215 OFFICE O/P EST HI 40 MIN: CPT | Performed by: INTERNAL MEDICINE

## 2023-11-10 PROCEDURE — 83036 HEMOGLOBIN GLYCOSYLATED A1C: CPT | Performed by: INTERNAL MEDICINE

## 2023-11-10 RX ORDER — ISOSORBIDE MONONITRATE 60 MG/1
60 TABLET, EXTENDED RELEASE ORAL DAILY
Qty: 90 TABLET | Refills: 3 | Status: SHIPPED | OUTPATIENT
Start: 2023-11-10 | End: 2024-05-07

## 2023-11-10 NOTE — NURSING NOTE
Roberto Thompson's goals for this visit include:   Chief Complaint   Patient presents with    Follow Up     6 month follow up        He requests these members of his care team be copied on today's visit information: yes     PCP: Jaylyn Bellamy    Referring Provider:  No referring provider defined for this encounter.    BP (!) 149/80 (BP Location: Right arm, Patient Position: Chair, Cuff Size: Adult Large)   Pulse 80   Wt 133.2 kg (293 lb 11.2 oz)   SpO2 96%   BMI 40.96 kg/m      Do you need any medication refills at today's visit? No     CLIF Rehman   Neph/Pulm [facility]

## 2023-11-10 NOTE — PATIENT INSTRUCTIONS
Take your medicines every day, as directed     Changes made today:  Inc imdur to 60 mg every day   Lab today  RTC 6 months with Molly Castrejon       Cardiology Care Coordinators:      Terri MAYES RN         Phone  [phone]      Fax [phone]    To Contact us     During Business Hours:  [phone]     If you are needing refills please contact your pharmacy.     For urgent after hour care please call the [facility] at [phone] or the [facility] at [phone] and ask to speak to the cardiologist on call.            HOW TO CHECK YOUR BLOOD PRESSURE AT HOME:     Avoid eating, smoking, and exercising for at least 30 minutes before taking a reading.     Be sure you have taken your BP medication at least 2-3 hours before you check it.      Sit quietly for 10 minutes before a reading.      Sit in a chair with your feet flat on the floor. Rest your  arm on a table so that the arm cuff is at the same level as your heart.     Remain still during the reading.  Record your blood pressure and pulse in a log and bring to your next appointment.       Use [employer] allows you to communicate directly with your heart team through secure messaging.  [employer] can be accessed any time on your phone, computer, or tablet.  If you need assistance, we'd be happy to help!             Keep your Heart Appointments:

## 2023-11-10 NOTE — RESULT ENCOUNTER NOTE
Cholesterol continues to be very elevated.  Please confirm that he is taking Lipitor, fenofibrate and Lovaza

## 2023-11-11 LAB — LDLC SERPL DIRECT ASSAY-MCNC: 84 MG/DL

## 2023-11-21 ENCOUNTER — TELEPHONE (OUTPATIENT)
Dept: ENDOCRINOLOGY | Facility: CLINIC | Age: 63
End: 2023-11-21
Payer: MEDICARE

## 2023-11-21 NOTE — TELEPHONE ENCOUNTER
----- Message from Flavia Beltre PA-C sent at 11/16/2023  7:36 AM CST -----  Please call Roberto, with help of Vietnamese , and let him know that his blood sugars are looking much better. Keep up the good work.    Flavia

## 2023-11-21 NOTE — TELEPHONE ENCOUNTER
Writer called patient on 3 way call with . Advised patient of result recommendations from Flavia Beltre PA-C. Patient verbalizes understanding and agrees to plan.         Michelle Dorsey RN  Endocrine Care Coordinator  [facility]

## 2023-12-13 DIAGNOSIS — E78.00 HIGH BLOOD CHOLESTEROL: ICD-10-CM

## 2023-12-13 RX ORDER — ATORVASTATIN CALCIUM 80 MG/1
80 TABLET, FILM COATED ORAL DAILY
Qty: 90 TABLET | Refills: 0 | Status: SHIPPED | OUTPATIENT
Start: 2023-12-13 | End: 2024-02-28

## 2023-12-14 ENCOUNTER — TRANSFERRED RECORDS (OUTPATIENT)
Dept: HEALTH INFORMATION MANAGEMENT | Facility: CLINIC | Age: 63
End: 2023-12-14

## 2023-12-15 ENCOUNTER — TRANSFERRED RECORDS (OUTPATIENT)
Dept: HEALTH INFORMATION MANAGEMENT | Facility: CLINIC | Age: 63
End: 2023-12-15
Payer: MEDICARE

## 2023-12-15 LAB — RETINOPATHY: POSITIVE

## 2024-01-01 NOTE — LETTER
December 19, 2018    Roberto Thompson  [address]  [place] [address]            Dear Roberto,    NO evidence for heart failure as a cause of swelling in your feet  Your electrolytes, liver and kidney function are stable  Your diabetes is not well controlled on your medication  Please make sure you keep your appointment with the diabetic educator    If you have any questions or concerns, please call myself or my nurse at [phone].    Sincerely,    Jaylyn Conte MD/jeniffer    Results for orders placed or performed in visit on 12/18/18   BNP-N terminal pro   Result Value Ref Range    N-Terminal Pro Bnp 162 (H) 0 - 125 pg/mL   Hemoglobin A1c   Result Value Ref Range    Hemoglobin A1C 10.6 (H) 0 - 5.6 %   Comprehensive metabolic panel   Result Value Ref Range    Sodium 136 133 - 144 mmol/L    Potassium 4.6 3.4 - 5.3 mmol/L    Chloride 101 94 - 109 mmol/L    Carbon Dioxide 29 20 - 32 mmol/L    Anion Gap 6 3 - 14 mmol/L    Glucose 238 (H) 70 - 99 mg/dL    Urea Nitrogen 35 (H) 7 - 30 mg/dL    Creatinine 2.03 (H) 0.66 - 1.25 mg/dL    GFR Estimate 34 (L) >60 mL/min/1.7m2    GFR Estimate If Black 41 (L) >60 mL/min/1.7m2    Calcium 8.7 8.5 - 10.1 mg/dL    Bilirubin Total 0.4 0.2 - 1.3 mg/dL    Albumin 3.7 3.4 - 5.0 g/dL    Protein Total 8.3 6.8 - 8.8 g/dL    Alkaline Phosphatase 76 40 - 150 U/L    ALT 31 0 - 70 U/L    AST 21 0 - 45 U/L                        Progress Note reviewed and summarized for off-service hand off on 4/5 by YANCY.     RSV PROPHYLAXIS:   Maternal RSV vaccine [Abrysvo]: [ _ ] Yes  [ _ ] No  SYNAGIS [palivizumab] candidate [ _ ] Yes  [ _ ] No;   Received SYNAGIS [palivizumab]? : [ _ ] Yes  [ _ ] No,  IF yes, date _________        or   [ _ ] ELIGIBLE AT A LATER DATE   - [ _ ]<29 weeks      [ _ ]<32 weeks and O2 use indira 28 days    [ _ ]  other criteria.   Received BEYFORTUS [Nirsevimab] [ _ ] Yes  [ _ ] No  IF yes, date _________         or    [ _ ] Declined RSV Prophylaxis     CIrcumcision:   Hip US rec:    Neurodevelop eval?	  CPR class done?  	  PVS at DC?  Vit D at DC?	  FE at DC?    G6PD screen sent on  ____ . Result ______ . 	    PMD:          Name:  ______________ _             Contact information:  ______________ _  Pharmacy: Name:  ______________ _              Contact information:  ______________ _    Follow-up appointments (list):      [ _ ] Discharge time spent >30 min    [ _ ] Car Seat Challenge lasting 90 min was performed. Today I have reviewed and interpreted the nurses’ records of pulse oximetry, heart rate and respiratory rate and observations during testing period. Car Seat Challenge  passed. The patient is cleared to begin using rear-facing car seat upon discharge. Parents were counseled on rear-facing car seat use.
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
## 2024-01-09 ENCOUNTER — TELEPHONE (OUTPATIENT)
Dept: FAMILY MEDICINE | Facility: CLINIC | Age: 64
End: 2024-01-09

## 2024-01-09 ENCOUNTER — OFFICE VISIT (OUTPATIENT)
Dept: FAMILY MEDICINE | Facility: CLINIC | Age: 64
End: 2024-01-09
Payer: MEDICARE

## 2024-01-09 VITALS
BODY MASS INDEX: 41.56 KG/M2 | DIASTOLIC BLOOD PRESSURE: 72 MMHG | TEMPERATURE: 98.8 F | OXYGEN SATURATION: 94 % | RESPIRATION RATE: 18 BRPM | HEART RATE: 86 BPM | WEIGHT: 298 LBS | SYSTOLIC BLOOD PRESSURE: 150 MMHG

## 2024-01-09 DIAGNOSIS — N18.4 CKD (CHRONIC KIDNEY DISEASE) STAGE 4, GFR 15-29 ML/MIN (H): ICD-10-CM

## 2024-01-09 DIAGNOSIS — I20.89 STABLE ANGINA (H): ICD-10-CM

## 2024-01-09 DIAGNOSIS — Z79.4 ENCOUNTER FOR LONG-TERM (CURRENT) INSULIN USE (H): ICD-10-CM

## 2024-01-09 DIAGNOSIS — Z79.4 TYPE 2 DIABETES MELLITUS WITH OTHER SPECIFIED COMPLICATION, WITH LONG-TERM CURRENT USE OF INSULIN (H): ICD-10-CM

## 2024-01-09 DIAGNOSIS — E11.69 TYPE 2 DIABETES MELLITUS WITH OTHER SPECIFIED COMPLICATION, WITH LONG-TERM CURRENT USE OF INSULIN (H): ICD-10-CM

## 2024-01-09 DIAGNOSIS — G47.33 OSA (OBSTRUCTIVE SLEEP APNEA): ICD-10-CM

## 2024-01-09 DIAGNOSIS — Z01.818 PREOP GENERAL PHYSICAL EXAM: Primary | ICD-10-CM

## 2024-01-09 DIAGNOSIS — I10 HYPERTENSION GOAL BP (BLOOD PRESSURE) < 140/90: ICD-10-CM

## 2024-01-09 DIAGNOSIS — E66.01 MORBID OBESITY (H): ICD-10-CM

## 2024-01-09 DIAGNOSIS — G63 POLYNEUROPATHY ASSOCIATED WITH UNDERLYING DISEASE (H): ICD-10-CM

## 2024-01-09 DIAGNOSIS — E11.3313 MODERATE NONPROLIFERATIVE DIABETIC RETINOPATHY OF BOTH EYES WITH MACULAR EDEMA ASSOCIATED WITH TYPE 2 DIABETES MELLITUS (H): ICD-10-CM

## 2024-01-09 LAB
CREAT UR-MCNC: 95.8 MG/DL
ERYTHROCYTE [DISTWIDTH] IN BLOOD BY AUTOMATED COUNT: 13.9 % (ref 10–15)
HCT VFR BLD AUTO: 39.3 % (ref 40–53)
HGB BLD-MCNC: 13 G/DL (ref 13.3–17.7)
MCH RBC QN AUTO: 30.4 PG (ref 26.5–33)
MCHC RBC AUTO-ENTMCNC: 33.1 G/DL (ref 31.5–36.5)
MCV RBC AUTO: 92 FL (ref 78–100)
MICROALBUMIN UR-MCNC: 366 MG/L
MICROALBUMIN/CREAT UR: 382.05 MG/G CR (ref 0–17)
PLATELET # BLD AUTO: 193 10E3/UL (ref 150–450)
RBC # BLD AUTO: 4.27 10E6/UL (ref 4.4–5.9)
WBC # BLD AUTO: 11.4 10E3/UL (ref 4–11)

## 2024-01-09 PROCEDURE — 36415 COLL VENOUS BLD VENIPUNCTURE: CPT | Performed by: FAMILY MEDICINE

## 2024-01-09 PROCEDURE — 82570 ASSAY OF URINE CREATININE: CPT | Performed by: FAMILY MEDICINE

## 2024-01-09 PROCEDURE — 85027 COMPLETE CBC AUTOMATED: CPT | Performed by: FAMILY MEDICINE

## 2024-01-09 PROCEDURE — 99214 OFFICE O/P EST MOD 30 MIN: CPT | Performed by: FAMILY MEDICINE

## 2024-01-09 PROCEDURE — 82043 UR ALBUMIN QUANTITATIVE: CPT | Performed by: FAMILY MEDICINE

## 2024-01-09 ASSESSMENT — PAIN SCALES - GENERAL: PAINLEVEL: SEVERE PAIN (7)

## 2024-01-09 NOTE — LETTER
January 10, 2024      Robertomagdaleno Thompson  [address]  [place] [address]        Dear ,    We are writing to inform you of your test results.    Your hemoglobin has improved   There is protein in your urine. We will continue to monitor this     Jaylyn Bellamy MD     Resulted Orders   **CBC with platelets FUTURE 2mo   Result Value Ref Range    WBC Count 11.4 (H) 4.0 - 11.0 10e3/uL    RBC Count 4.27 (L) 4.40 - 5.90 10e6/uL    Hemoglobin 13.0 (L) 13.3 - 17.7 g/dL    Hematocrit 39.3 (L) 40.0 - 53.0 %    MCV 92 78 - 100 fL    MCH 30.4 26.5 - 33.0 pg    MCHC 33.1 31.5 - 36.5 g/dL    RDW 13.9 10.0 - 15.0 %    Platelet Count 193 150 - 450 10e3/uL   Albumin Random Urine Quantitative with Creat Ratio   Result Value Ref Range    Creatinine Urine mg/dL 95.8 mg/dL      Comment:      The reference ranges have not been established in urine creatinine. The results should be integrated into the clinical context for interpretation.    Albumin Urine mg/L 366.0 mg/L      Comment:      The reference ranges have not been established in urine albumin. The results should be integrated into the clinical context for interpretation.    Albumin Urine mg/g Cr 382.05 (H) 0.00 - 17.00 mg/g Cr      Comment:      Microalbuminuria is defined as an albumin:creatinine ratio of 17 to 299 for males and 25 to 299 for females. A ratio of albumin:creatinine of 300 or higher is indicative of overt proteinuria.  Due to biologic variability, positive results should be confirmed by a second, first-morning random or 24-hour timed urine specimen. If there is discrepancy, a third specimen is recommended. When 2 out of 3 results are in the microalbuminuria range, this is evidence for incipient nephropathy and warrants increased efforts at glucose control, blood pressure control, and institution of therapy with an angiotensin-converting-enzyme (ACE) inhibitor (if the patient can tolerate it).         If you have any questions or  concerns, please call the clinic at the number listed above.

## 2024-01-12 ENCOUNTER — OFFICE VISIT (OUTPATIENT)
Dept: URGENT CARE | Facility: URGENT CARE | Age: 64
End: 2024-01-12
Payer: MEDICARE

## 2024-01-12 VITALS
OXYGEN SATURATION: 95 % | WEIGHT: 292 LBS | SYSTOLIC BLOOD PRESSURE: 155 MMHG | DIASTOLIC BLOOD PRESSURE: 80 MMHG | BODY MASS INDEX: 40.73 KG/M2 | HEART RATE: 82 BPM | TEMPERATURE: 97.6 F | RESPIRATION RATE: 18 BRPM

## 2024-01-12 DIAGNOSIS — J06.9 VIRAL URI WITH COUGH: ICD-10-CM

## 2024-01-12 DIAGNOSIS — H60.393 INFECTIVE OTITIS EXTERNA, BILATERAL: Primary | ICD-10-CM

## 2024-01-12 PROCEDURE — 99213 OFFICE O/P EST LOW 20 MIN: CPT | Performed by: PHYSICIAN ASSISTANT

## 2024-01-12 RX ORDER — BENZONATATE 200 MG/1
200 CAPSULE ORAL 3 TIMES DAILY PRN
Qty: 30 CAPSULE | Refills: 0 | Status: SHIPPED | OUTPATIENT
Start: 2024-01-12 | End: 2024-01-22

## 2024-01-12 RX ORDER — NEOMYCIN SULFATE, POLYMYXIN B SULFATE, HYDROCORTISONE 3.5; 10000; 1 MG/ML; [USP'U]/ML; MG/ML
4 SOLUTION/ DROPS AURICULAR (OTIC) 4 TIMES DAILY
Qty: 10 ML | Refills: 0 | Status: SHIPPED | OUTPATIENT
Start: 2024-01-12 | End: 2024-01-19

## 2024-01-12 ASSESSMENT — ENCOUNTER SYMPTOMS
COUGH: 1
CHILLS: 0
SORE THROAT: 0
RHINORRHEA: 1
WHEEZING: 0
SINUS PAIN: 0
FEVER: 0
CARDIOVASCULAR NEGATIVE: 1
PALPITATIONS: 0
CHEST TIGHTNESS: 0
GASTROINTESTINAL NEGATIVE: 1
FATIGUE: 0
SINUS PRESSURE: 0
SHORTNESS OF BREATH: 0
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
## 2024-01-31 DIAGNOSIS — E11.22 TYPE 2 DIABETES MELLITUS WITH STAGE 3 CHRONIC KIDNEY DISEASE, WITHOUT LONG-TERM CURRENT USE OF INSULIN, UNSPECIFIED WHETHER STAGE 3A OR 3B CKD (H): Primary | ICD-10-CM

## 2024-01-31 DIAGNOSIS — N18.30 TYPE 2 DIABETES MELLITUS WITH STAGE 3 CHRONIC KIDNEY DISEASE, WITHOUT LONG-TERM CURRENT USE OF INSULIN, UNSPECIFIED WHETHER STAGE 3A OR 3B CKD (H): Primary | ICD-10-CM

## 2024-02-09 ENCOUNTER — TELEPHONE (OUTPATIENT)
Dept: NEPHROLOGY | Facility: CLINIC | Age: 64
End: 2024-02-09
Payer: COMMERCIAL

## 2024-02-09 NOTE — TELEPHONE ENCOUNTER
Called and spoke with pt using [employer] [phone]  ID# [id number].  Discussed Pt is scheduled for a Inclinic appointment with Dr. Bucio 2/20/24 at 10:00am.  A non fasting lab appt is needed prior.  Will be both blood and urine collection.   Pt verbalized understanding and agreed to be scheduled on 2/20/24 at 9:20am.  Confirmed clinic location.  Pt had no further questions.    Edith Rubi LPN

## 2024-02-13 ENCOUNTER — TELEPHONE (OUTPATIENT)
Dept: FAMILY MEDICINE | Facility: CLINIC | Age: 64
End: 2024-02-13
Payer: COMMERCIAL

## 2024-02-13 DIAGNOSIS — N18.30 TYPE 2 DIABETES MELLITUS WITH STAGE 3 CHRONIC KIDNEY DISEASE, WITHOUT LONG-TERM CURRENT USE OF INSULIN, UNSPECIFIED WHETHER STAGE 3A OR 3B CKD (H): ICD-10-CM

## 2024-02-13 DIAGNOSIS — E11.22 TYPE 2 DIABETES MELLITUS WITH STAGE 3 CHRONIC KIDNEY DISEASE, WITHOUT LONG-TERM CURRENT USE OF INSULIN, UNSPECIFIED WHETHER STAGE 3A OR 3B CKD (H): ICD-10-CM

## 2024-02-13 NOTE — TELEPHONE ENCOUNTER
Prior Authorization Retail Medication Request    Medication/Dose: Jardiance 25 mg tablet  Diagnosis and ICD code (if different than what is on RX):    Type 2 diabetes mellitus with stage 3 chronic kidney disease, without long-term current use of insulin, unspecified whether stage 3a or 3b CKD (H) [E11.22, N18.30]     New/renewal/insurance change PA/secondary ins. PA:  Previously Tried and Failed:    Rationale:      Insurance   Primary: [employer] [place]  Insurance ID:  [id number]     Secondary (if applicable):  Medicare  Insurance ID:  [id number]     Pharmacy Information (if different than what is on RX)  Name: [facility]  Phone:  [phone]  Fax:  [phone]    OLEARY [id number]

## 2024-02-14 ENCOUNTER — TELEPHONE (OUTPATIENT)
Dept: FAMILY MEDICINE | Facility: CLINIC | Age: 64
End: 2024-02-14
Payer: COMMERCIAL

## 2024-02-14 NOTE — LETTER
February 14, 2024    To  Roberto Thompson  [address]  [place] [address]    Your team at [facility] cares about your health. We have reviewed your chart and based on our findings; we are making the following recommendations to better manage your health.     You are in particular need of attention regarding the following:     Schedule a DIABETIC FOLLOW UP appointment for Office Visit. Patients with diabetes should see their provider regularly.  HYPERTENSION FOLLOW UP: Office Visit  PREVENTATIVE VISIT: Annual Medicare Wellness:Schedule an Annual Medicare Wellness Exam. Please call [facility] to set up your appointment.    If you have already completed these items, please contact the clinic via phone or   MyChart so your care team can review and update your records. Thank you for   choosing [facility] for your healthcare needs. For any questions,   concerns, or to schedule an appointment please contact our clinic.    Healthy Regards,      Your [facility] Care Team

## 2024-02-14 NOTE — TELEPHONE ENCOUNTER
Patient Quality Outreach    Patient is due for the following:   Diabetes -  A1C  Hypertension -  BP check  Physical Preventive Adult Physical    Next Steps:   Schedule a Annual Wellness Visit, diabetic check and bp check       Type of outreach:    Sent letter.      Questions for provider review:    None           Solange Harris MA

## 2024-02-20 ENCOUNTER — OFFICE VISIT (OUTPATIENT)
Dept: NEPHROLOGY | Facility: CLINIC | Age: 64
End: 2024-02-20
Payer: MEDICARE

## 2024-02-20 ENCOUNTER — LAB (OUTPATIENT)
Dept: LAB | Facility: CLINIC | Age: 64
End: 2024-02-20
Payer: MEDICARE

## 2024-02-20 VITALS — SYSTOLIC BLOOD PRESSURE: 125 MMHG | HEART RATE: 77 BPM | OXYGEN SATURATION: 94 % | DIASTOLIC BLOOD PRESSURE: 81 MMHG

## 2024-02-20 DIAGNOSIS — E08.22 DIABETES MELLITUS DUE TO UNDERLYING CONDITION WITH STAGE 4 CHRONIC KIDNEY DISEASE, UNSPECIFIED WHETHER LONG TERM INSULIN USE (H): ICD-10-CM

## 2024-02-20 DIAGNOSIS — N18.4 DIABETES MELLITUS DUE TO UNDERLYING CONDITION WITH STAGE 4 CHRONIC KIDNEY DISEASE, UNSPECIFIED WHETHER LONG TERM INSULIN USE (H): ICD-10-CM

## 2024-02-20 DIAGNOSIS — N18.4 CKD (CHRONIC KIDNEY DISEASE) STAGE 4, GFR 15-29 ML/MIN (H): ICD-10-CM

## 2024-02-20 DIAGNOSIS — E08.22 DIABETES MELLITUS DUE TO UNDERLYING CONDITION WITH STAGE 4 CHRONIC KIDNEY DISEASE, UNSPECIFIED WHETHER LONG TERM INSULIN USE (H): Primary | ICD-10-CM

## 2024-02-20 DIAGNOSIS — E11.22 TYPE 2 DIABETES MELLITUS WITH STAGE 3 CHRONIC KIDNEY DISEASE, WITHOUT LONG-TERM CURRENT USE OF INSULIN, UNSPECIFIED WHETHER STAGE 3A OR 3B CKD (H): ICD-10-CM

## 2024-02-20 DIAGNOSIS — N18.4 DIABETES MELLITUS DUE TO UNDERLYING CONDITION WITH STAGE 4 CHRONIC KIDNEY DISEASE, UNSPECIFIED WHETHER LONG TERM INSULIN USE (H): Primary | ICD-10-CM

## 2024-02-20 DIAGNOSIS — N18.30 TYPE 2 DIABETES MELLITUS WITH STAGE 3 CHRONIC KIDNEY DISEASE, WITHOUT LONG-TERM CURRENT USE OF INSULIN, UNSPECIFIED WHETHER STAGE 3A OR 3B CKD (H): ICD-10-CM

## 2024-02-20 LAB
ALBUMIN MFR UR ELPH: 69.3 MG/DL
ALBUMIN SERPL BCG-MCNC: 4.7 G/DL (ref 3.5–5.2)
ANION GAP SERPL CALCULATED.3IONS-SCNC: 13 MMOL/L (ref 7–15)
BUN SERPL-MCNC: 34.3 MG/DL (ref 8–23)
CALCIUM SERPL-MCNC: 10 MG/DL (ref 8.8–10.2)
CHLORIDE SERPL-SCNC: 98 MMOL/L (ref 98–107)
CREAT SERPL-MCNC: 2.45 MG/DL (ref 0.67–1.17)
CREAT UR-MCNC: 50.5 MG/DL
CREAT UR-MCNC: 53.3 MG/DL
DEPRECATED HCO3 PLAS-SCNC: 27 MMOL/L (ref 22–29)
EGFRCR SERPLBLD CKD-EPI 2021: 28 ML/MIN/1.73M2
ERYTHROCYTE [DISTWIDTH] IN BLOOD BY AUTOMATED COUNT: 14.4 % (ref 10–15)
GLUCOSE SERPL-MCNC: 149 MG/DL (ref 70–99)
HBA1C MFR BLD: 9.7 % (ref 0–5.6)
HCT VFR BLD AUTO: 38 % (ref 40–53)
HGB BLD-MCNC: 12.3 G/DL (ref 13.3–17.7)
MCH RBC QN AUTO: 30 PG (ref 26.5–33)
MCHC RBC AUTO-ENTMCNC: 32.4 G/DL (ref 31.5–36.5)
MCV RBC AUTO: 93 FL (ref 78–100)
MICROALBUMIN UR-MCNC: 284 MG/L
MICROALBUMIN/CREAT UR: 562.38 MG/G CR (ref 0–17)
PHOSPHATE SERPL-MCNC: 3.5 MG/DL (ref 2.5–4.5)
PLATELET # BLD AUTO: 179 10E3/UL (ref 150–450)
POTASSIUM SERPL-SCNC: 4.5 MMOL/L (ref 3.4–5.3)
PROT/CREAT 24H UR: 1.3 MG/MG CR (ref 0–0.2)
RBC # BLD AUTO: 4.1 10E6/UL (ref 4.4–5.9)
SODIUM SERPL-SCNC: 138 MMOL/L (ref 135–145)
TOTAL PROTEIN SERUM FOR ELP: 8.4 G/DL (ref 6.4–8.3)
WBC # BLD AUTO: 6.1 10E3/UL (ref 4–11)

## 2024-02-20 PROCEDURE — 36415 COLL VENOUS BLD VENIPUNCTURE: CPT

## 2024-02-20 PROCEDURE — 80069 RENAL FUNCTION PANEL: CPT

## 2024-02-20 PROCEDURE — 84155 ASSAY OF PROTEIN SERUM: CPT | Performed by: INTERNAL MEDICINE

## 2024-02-20 PROCEDURE — 83521 IG LIGHT CHAINS FREE EACH: CPT | Performed by: INTERNAL MEDICINE

## 2024-02-20 PROCEDURE — 99215 OFFICE O/P EST HI 40 MIN: CPT | Performed by: INTERNAL MEDICINE

## 2024-02-20 PROCEDURE — 82570 ASSAY OF URINE CREATININE: CPT

## 2024-02-20 PROCEDURE — 85027 COMPLETE CBC AUTOMATED: CPT

## 2024-02-20 PROCEDURE — 83036 HEMOGLOBIN GLYCOSYLATED A1C: CPT

## 2024-02-20 PROCEDURE — 84156 ASSAY OF PROTEIN URINE: CPT

## 2024-02-20 PROCEDURE — 86334 IMMUNOFIX E-PHORESIS SERUM: CPT | Performed by: PATHOLOGY

## 2024-02-20 PROCEDURE — 84165 PROTEIN E-PHORESIS SERUM: CPT | Performed by: PATHOLOGY

## 2024-02-20 PROCEDURE — 2894A ALBUMIN RANDOM URINE QUANTITATIVE: CPT

## 2024-02-20 PROCEDURE — G2211 COMPLEX E/M VISIT ADD ON: HCPCS | Performed by: INTERNAL MEDICINE

## 2024-02-20 PROCEDURE — 82043 UR ALBUMIN QUANTITATIVE: CPT

## 2024-02-20 NOTE — NURSING NOTE
Roberto Thompson's goals for this visit include:   Chief Complaint   Patient presents with    RECHECK     6 month follow up CKD  declined , will have family member interpret        He requests these members of his care team be copied on today's visit information: yes     PCP: Jaylyn Bellamy    Referring Provider:  No referring provider defined for this encounter.    /81 (BP Location: Left arm, Patient Position: Chair, Cuff Size: Adult Large)   Pulse 77   SpO2 94%     Do you need any medication refills at today's visit? No     CLIF Rehman   Neph/Pulm [facility]

## 2024-02-20 NOTE — PATIENT INSTRUCTIONS
It was a pleasure taking care of you today.  I've included a brief summary of our discussion and care plan from today's visit below.  Please review this information with your primary care provider.     My recommendations are summarized as follows:  -It is important to lose some weight so your sugars get better and for your kidney health.  -If you changed your mind about the kidney education classes or the weight management program, Please let me know   -Please do the blood work at your convenience    Who do I call with any questions after my visit?  Please be in touch if there are any further questions that arise following today's visit.  There are multiple ways to contact your nephrology care team.       During business hours, you may reach your Nephrology Care Team or schedule or reschedule an appointment or lab at [phone].       If you need to schedule imaging, please call [phone].   To schedule a COVID test, please call [phone].     You can always send a secure message through [employer]. [employer] messages are answered by your nurse or doctor typically within 24-48 hours. Please allow extra time on weekends and holidays.       For urgent/emergent questions after business hours, you may reach the on-call Nephrology Fellow by contacting the [facility]  at [phone].     How will I get the results of any tests ordered?    You will receive all of your results.  If you have signed up for [employer], any tests ordered at your visit will be available to you once resulted on [employer]. Typically the physician reviews them and may or may not make further recommendations. If there are urgent results that require a change in your care plan, your physician or nurse will call you to discuss the next steps. If you are not on [employer], a letter may be generated and mailed to you with your results.

## 2024-02-20 NOTE — PROGRESS NOTES
I was asked to see this patient by Dr. Marianela Maldonado    CC: CKD 3b-4    HPI:   I had the pleasure today of seeing Roberto Thompson. He is a 62 year old male  who presents for Follow-up  of NBK6t-4.He is originally from [place] and has moved to the [place] back in [place]. All his family is here. He has no family in [place].  This visit was facilitated by  his daughter.    His medical history is significant for:  1.  Subclinical CAD with severe CAC, normal nuclear stress test, normal LV function. On preventive therapy with aspirin and statin.  2. Hypertension, controlled on current regimen  3. Diabetes type II, uncontrolled: On insulin, GLP-1 agonist.  Follows with endocrinology   4. CKD stage IIIb-IV, presumably diabetic nephropathy.  5. Mixed dyslipidemia, hypercholesterolemia and hypertriglyceridemia, goal LDL less than 70. Main contributor is uncontrolled diabetes.  On Lipitor 80 and fenofibrate 160; TG still >1000; on Lovaza 2 g which was started recently,   6. Severe obesity  7. KELBY on CPAP  8. Mild carotid stenosis: On aspirin and statin  9. Bilateral Knee [pain/OA with limited mobility/instability].  10. Wheelchair dependent because of the pain.     His diabetes is longstanding since 2007 and poorly controlled.  His hemoglobin A1c over the past 5 years has been ranging between 9 and 11%.  His most recent hemoglobin A1c is 9.7%.  He has documented nonproliferative diabetic retinopathy.  He is overweight and he doesnoy pay attention to his diet.  His records indicate a history of diabetic foot ulcer and severe diabetic neuropathy.    He also have longstanding hypertension.  He notes that his blood pressure at home has been around 140/80 though it is elevated in clinic today.  He is not compliant with a low-salt diet    He is also obese and he notes that he has been obese for a long time and does not seem to have any plans to lose weight.    He is here today for chronic kidney disease.  His creatinine has been around  2-2.5 mg/dL and his GFR has been stable around 30 mL/min for the past 5 years.  He does have some mild lower extremity edema for which he takes Lasix. He is asking for an electric scooter.    He does not have any acute complaints during this visit.  He does have some difficulty walking.  He denies any shortness of breath.  His weight has been stable.  No heat or cold intolerance and no acute urinary symptoms.    Social: Originally from [place].  He is  with 4 children.  He has 1 grandchild.  He is currently retired because he has difficulty walking.  He used to work in construction.  He does not smoke or drink.      BP Readings from Last 6 Encounters:   02/20/24 125/81   01/12/24 (!) 155/80   01/09/24 (!) 150/72   11/10/23 (!) 149/80   11/02/23 135/75   08/11/23 135/81     Wt Readings from Last 4 Encounters:   01/12/24 132.5 kg (292 lb)   01/09/24 135.2 kg (298 lb)   11/10/23 133.2 kg (293 lb 11.2 oz)   11/02/23 134.7 kg (297 lb)     Allergies   Allergen Reactions    Doxycycline Other (See Comments) and Swelling     Facial swelling  facial   swelling    Facial swelling  facial   swelling      Influenza Vac Split [Influenza Virus Vaccine] Itching     And red /blue arms       aspirin 81 MG EC tablet, Take 1 tablet (81 mg) by mouth daily  atorvastatin (LIPITOR) 80 MG tablet, Take 1 tablet (80 mg) by mouth daily  blood glucose (ACCU-CHEK GUIDE) test strip, 1 strip by In Vitro route 4 times daily Use to test blood sugar 4 times daily or as directed.  blood glucose monitoring (ACCU-CHEK FASTCLIX) lancets, Use with lanceting device. To accompany: Blood Glucose Monitor Brands: per insurance.  carvedilol (COREG) 12.5 MG tablet, Take 1 tablet (12.5 mg) by mouth 2 times daily (with meals)  Cholecalciferol (VITAMIN D) 2000 units tablet, Take 2,000 Units by mouth  empagliflozin (JARDIANCE) 25 MG TABS tablet, Take 1 tablet (25 mg) by mouth daily Further refills per endo  fenofibrate (TRIGLIDE/LOFIBRA) 160 MG tablet, Take 1  tablet (160 mg) by mouth daily  furosemide (LASIX) 20 MG tablet, Take 1 tablet (20 mg) by mouth 2 times daily  GLOBAL EASE INJECT PEN NEEDLES 31G X 5 MM miscellaneous, USE TO INJECT INSULIN 5 TIMES A DAY  insulin reg HIGH CONC (HUMULIN R U-500 KWIKPEN) 500 UNIT/ML PEN soln, Inj 113 units subcutaneous with breakfast and 110 units subcutaneous with lunch and up to 130 units for dinner. (Max daily dose of 370 units)  isosorbide mononitrate (IMDUR) 60 MG 24 hr tablet, Take 1 tablet (60 mg) by mouth daily  omega-3 acid ethyl esters (LOVAZA) 1 g capsule, Take 1 capsule by mouth 2 times daily  Omega-3 Fatty Acids (FISH OIL) 1200 MG capsule, TAKE 1 CAPSULE BY MOUTH 2 (TWO) TIMES A DAY  order for DME, Equipment being ordered: Digital home blood pressure monitor kit  senna-docusate (SENOKOT-S/PERICOLACE) 8.6-50 MG tablet, TAKE 1 TAB BY MOUTH TWO TIMES DAILY AS NEEDED FOR CONSTIPATION.  silver sulfADIAZINE (SILVADENE) 1 % external cream, Apply topically daily  spironolactone (ALDACTONE) 25 MG tablet, Take 1 tablet (25 mg) by mouth daily  UltiCare Alcohol Swabs 70 % PADS, Use to swab area of injection/mary kay as directed.  vitamin D2 (ERGOCALCIFEROL) 63126 units (1250 mcg) capsule, TAKE 1 CAPSULE (50,000 UNITS) BY MOUTH ONCE A WEEK. NEEDS TO BE SEEN FOR FURTHER REFILLS  ACE/ARB/ARNI NOT PRESCRIBED (INTENTIONAL), Please choose reason not prescribed from choices below. (Patient not taking: Reported on 1/12/2024)  [DISCONTINUED] NOVOLOG FLEXPEN 100 UNIT/ML soln, INJECT 60 UNITS 3 TIMES DAILY WITH MEALS PLUS THE SCALE WITH EACH MEAL. PLUS THE FOLLOWING COVERAGE SCALE    silver sulfADIAZINE (SILVADENE) 1 % cream  triamcinolone (KENALOG-40) injection 40 mg      Past Medical History:   Diagnosis Date    Diabetes (H)     Hypertension        Past Surgical History:   Procedure Laterality Date    APPENDECTOMY      ENT SURGERY      tonsils    ORTHOPEDIC SURGERY      thumb    ORTHOPEDIC SURGERY      2 toe amputations       Social History      Tobacco Use    Smoking status: Never    Smokeless tobacco: Never   Vaping Use    Vaping Use: Never used   Substance Use Topics    Alcohol use: No    Drug use: No       Family History   Problem Relation Age of Onset    Diabetes Mother     Hypertension Mother     Cerebrovascular Disease Mother     Kidney Disease Mother     Osteoporosis Mother     Diabetes Father     Prostate Cancer Father     Dementia Father        ROS: A 12 system review of systems was negative other than noted here or above.     Exam:  /81 (BP Location: Left arm, Patient Position: Chair, Cuff Size: Adult Large)   Pulse 77   SpO2 94%   GENERAL APPEARANCE: alert and no distress, obese with thick neck, Mallampati 5  EYES: PERRL  HENT: mouth without ulcers or lesions  NECK: supple, no adenopathy  RESP: lungs clear to auscultation - no rales, rhonchi or wheezes  CV: regular rhythm, normal rate, no rub   ABDOMEN:  soft, nontender, no HSM or masses and bowel sounds normal  MS: extremities normal- no gross deformities noted, no evidence of inflammation in joints, no muscle tenderness  SKIN: no rash  NEURO: Normal strength and tone, sensory exam grossly normal, mentation intact and speech normal  PSYCH: mentation appears normal. and affect normal/bright  Trace lower extremity edema    Results: Reviewed in details with the patient    Assessment/Plan:   Problem #1 proteinuric chronic kidney disease stage IIIb-4: This is most likely secondary to diabetic nephropathy and hypertensive nephrosclerosis.  He has a longstanding history of uncontrolled diabetes mellitus complicated by nonproliferative retinopathy.  His creatinine has been around 2 to 2.5 mg/dL with an estimated GFR around 30 mL/min.  We had a long discussion today about the importance of blood sugar control and halting the progression of kidney disease as well as maintaining a good blood pressure and an ideal body weight.  He mentioned that he already know that but does not seem that he is  making any effort in that regards.  - He is on  Jardiance   - It is unclear to me why he is not on any RAAS blockade. It is mentioned that it is intentional on his medication list.  It seems that he has been on lisinopril hydrochlorothiazide in the past and I am not sure whether this was stopped with his worsening creatinine.  --he is not interested in joining CKD journey  --Will check SPEP, SIFE and free light chains given some minor discrepancy between UCR and PCR    Problem #2 hypertension: Partially essential and partially secondary to his chronic kidney disease.  His blood pressure in clinic today is well controlled. He is on carvedilol, spironolactone and Lasix.  He is not on any RAAS blockade  -His potassium is within normal limits    Problem #3 diabetes mellitus type 2 uncontrolled.  He follows with endocrinology.  His last hemoglobin A1c is at 9.7%.  - On Jardiance 25 mg daily.    Problem #4 obesity: I instructed him to try to lose some weight. He was offered to be referred to the weight management but he is not interested    Problem # 5 dyslipidemia: He is on fenofibrate as well as atorvastatin.  His triglycerides are above 1000.  Will defer to his PCP and cardiologist.    Problem #6 CKD BMD: PTH, vitamin D, calcium and phos are WNL.     Problem #7 obstructive sleep apnea on CPAP    Problem #8 atherosclerotic disease with subclinical CAD as well as mild carotid stenosis: On aspirin and statin.    The total time of this encounter amounted to 40  minutes on the day of the encounter 2/20/2024. This time included face to face time spent with the patient, preparatory work and chart review, ordering tests, and performing post visit documentation.    The longitudinal plan of care for this patient was addressed during this visit. Due to the added complexity in care, I will continue to support the patient with subsequent management of this condition(s) and with the ongoing continuity of care of this condition(s).      *This dictation was prepared in part using Dragon recognition software.  As a result errors may occur. When identified these transcription errors have been corrected.  While every attempt is made to correct errors during dictation, errors may still exist.     Beverley Bucio MD   [facility]   Department of Medicine   Division of Renal Disease and Hypertension

## 2024-02-21 LAB
CREAT UR-MCNC: 50.3 MG/DL
KAPPA LC FREE SER-MCNC: 9.79 MG/DL (ref 0.33–1.94)
KAPPA LC FREE/LAMBDA FREE SER NEPH: 1.95 {RATIO} (ref 0.26–1.65)
LAMBDA LC FREE SERPL-MCNC: 5.02 MG/DL (ref 0.57–2.63)
MICROALBUMIN UR-MCNC: 277 MG/L
MICROALBUMIN/CREAT UR: 550.7 MG/G CR (ref 0–17)

## 2024-02-22 LAB
ALBUMIN SERPL ELPH-MCNC: 4.9 G/DL (ref 3.7–5.1)
ALPHA1 GLOB SERPL ELPH-MCNC: 0.3 G/DL (ref 0.2–0.4)
ALPHA2 GLOB SERPL ELPH-MCNC: 0.7 G/DL (ref 0.5–0.9)
B-GLOBULIN SERPL ELPH-MCNC: 1 G/DL (ref 0.6–1)
GAMMA GLOB SERPL ELPH-MCNC: 1.6 G/DL (ref 0.7–1.6)
M PROTEIN SERPL ELPH-MCNC: 0 G/DL
PROT PATTERN SERPL ELPH-IMP: NORMAL
PROT PATTERN SERPL IFE-IMP: NORMAL

## 2024-02-27 NOTE — TELEPHONE ENCOUNTER
Retail Pharmacy Prior Authorization Team   Phone: [phone]    PA Initiation    Medication: JARDIANCE 25 MG PO TABS  Insurance Company: [employer] Part D - Phone [phone] Fax [phone]  Pharmacy Filling the Rx: [place], [place] - [address] [address]  Filling Pharmacy Phone: [phone]  Filling Pharmacy Fax:    Start Date: 2/27/2024

## 2024-02-27 NOTE — TELEPHONE ENCOUNTER
I am not managing his diabetes  He sees endo  He was supposed to be seen in November  Have his schedule an appt with endo    aJylyn Bellamy MD

## 2024-02-27 NOTE — TELEPHONE ENCOUNTER
Note: Due to record-high volumes, our turn-around time is taking longer than usual . We are currently 10 business days behind in the pools.   We are working diligently to submit all requests in a timely manner and in the order they are received. Please only flag TRUE URGENT requests as high priority to the pool at this time.   If you have questions - please send a note/message in the active PA encounter and send back to the RPPA (Retail Pharmacy Prior Authorization) team [[id number]].    If you have more specific questions about our process please reach out to our supervisor Ruby Acosta.   Thank you!     We are currently submitting requests we received on 02/14/2024    PRIOR AUTHORIZATION DENIED    Medication: JARDIANCE 25 MG PO TABS  Insurance Company: [employer] Part D - Phone [phone] Fax [phone]  Denial Date: 2/27/2024  Denial Rational:         Appeal Information:   If provider would like to appeal please review the plan's reasons for denial listed above. Please utilize that information to complete letter and provide specific, detailed clinical information/rationale of your patient's health status to address their denial reasons.        Patient Notified: No

## 2024-02-27 NOTE — TELEPHONE ENCOUNTER
Forwarded PA decision/information to patient's endocrinologist, PIEDAD Moran    [facility]

## 2024-03-05 ENCOUNTER — OFFICE VISIT (OUTPATIENT)
Dept: FAMILY MEDICINE | Facility: CLINIC | Age: 64
End: 2024-03-05
Payer: MEDICARE

## 2024-03-05 VITALS
DIASTOLIC BLOOD PRESSURE: 69 MMHG | OXYGEN SATURATION: 95 % | RESPIRATION RATE: 19 BRPM | HEART RATE: 83 BPM | BODY MASS INDEX: 43.38 KG/M2 | HEIGHT: 70 IN | SYSTOLIC BLOOD PRESSURE: 143 MMHG | TEMPERATURE: 98.2 F | WEIGHT: 303 LBS

## 2024-03-05 DIAGNOSIS — Z00.00 ROUTINE HISTORY AND PHYSICAL EXAMINATION OF ADULT: Primary | ICD-10-CM

## 2024-03-05 DIAGNOSIS — N25.81 SECONDARY RENAL HYPERPARATHYROIDISM (H): ICD-10-CM

## 2024-03-05 DIAGNOSIS — G63 POLYNEUROPATHY ASSOCIATED WITH UNDERLYING DISEASE (H): ICD-10-CM

## 2024-03-05 DIAGNOSIS — E78.00 HIGH BLOOD CHOLESTEROL: ICD-10-CM

## 2024-03-05 DIAGNOSIS — I73.9 PERIPHERAL VASCULAR DISEASE (H): ICD-10-CM

## 2024-03-05 DIAGNOSIS — Z89.431 PARTIAL NONTRAUMATIC AMPUTATION OF RIGHT FOOT (H): ICD-10-CM

## 2024-03-05 DIAGNOSIS — Z79.4 ENCOUNTER FOR LONG-TERM (CURRENT) INSULIN USE (H): ICD-10-CM

## 2024-03-05 DIAGNOSIS — E78.1 HYPERTRIGLYCERIDEMIA: ICD-10-CM

## 2024-03-05 DIAGNOSIS — Z12.5 SCREENING FOR PROSTATE CANCER: ICD-10-CM

## 2024-03-05 DIAGNOSIS — Z79.4 TYPE 2 DIABETES MELLITUS WITH OTHER SPECIFIED COMPLICATION, WITH LONG-TERM CURRENT USE OF INSULIN (H): ICD-10-CM

## 2024-03-05 DIAGNOSIS — G47.33 OSA (OBSTRUCTIVE SLEEP APNEA): ICD-10-CM

## 2024-03-05 DIAGNOSIS — I10 HYPERTENSION GOAL BP (BLOOD PRESSURE) < 140/90: ICD-10-CM

## 2024-03-05 DIAGNOSIS — E11.69 TYPE 2 DIABETES MELLITUS WITH OTHER SPECIFIED COMPLICATION, WITH LONG-TERM CURRENT USE OF INSULIN (H): ICD-10-CM

## 2024-03-05 DIAGNOSIS — E66.01 MORBID OBESITY (H): ICD-10-CM

## 2024-03-05 DIAGNOSIS — E78.5 HYPERLIPIDEMIA LDL GOAL <70: ICD-10-CM

## 2024-03-05 DIAGNOSIS — M86.9 OSTEOMYELITIS OF FINGER OF RIGHT HAND (H): ICD-10-CM

## 2024-03-05 DIAGNOSIS — N18.4 CKD (CHRONIC KIDNEY DISEASE) STAGE 4, GFR 15-29 ML/MIN (H): ICD-10-CM

## 2024-03-05 PROBLEM — G62.9 PERIPHERAL NEUROPATHY: Status: RESOLVED | Noted: 2018-09-05 | Resolved: 2024-03-05

## 2024-03-05 LAB — PSA SERPL DL<=0.01 NG/ML-MCNC: 0.34 NG/ML (ref 0–4.5)

## 2024-03-05 PROCEDURE — 36415 COLL VENOUS BLD VENIPUNCTURE: CPT | Performed by: FAMILY MEDICINE

## 2024-03-05 PROCEDURE — 99214 OFFICE O/P EST MOD 30 MIN: CPT | Mod: 25 | Performed by: FAMILY MEDICINE

## 2024-03-05 PROCEDURE — G0103 PSA SCREENING: HCPCS | Performed by: FAMILY MEDICINE

## 2024-03-05 PROCEDURE — 99397 PER PM REEVAL EST PAT 65+ YR: CPT | Performed by: FAMILY MEDICINE

## 2024-03-05 RX ORDER — RESPIRATORY SYNCYTIAL VIRUS VACCINE 120MCG/0.5
0.5 KIT INTRAMUSCULAR ONCE
Qty: 1 EACH | Refills: 0 | Status: CANCELLED | OUTPATIENT
Start: 2024-03-05 | End: 2024-03-05

## 2024-03-05 RX ORDER — ATORVASTATIN CALCIUM 80 MG/1
80 TABLET, FILM COATED ORAL DAILY
Qty: 90 TABLET | Refills: 3 | Status: SHIPPED | OUTPATIENT
Start: 2024-03-05

## 2024-03-05 ASSESSMENT — PAIN SCALES - GENERAL: PAINLEVEL: SEVERE PAIN (7)

## 2024-03-05 NOTE — LETTER
March 6, 2024      Roberto HOUSTON Donna  [address]  [facility] [address]        Dear ,    We are writing to inform you of your test results.    Your PSA (prostate cancer screening) results are normal.    Resulted Orders   PSA, screen   Result Value Ref Range    Prostate Specific Antigen Screen 0.34 0.00 - 4.50 ng/mL    Narrative    This result is obtained using the Roche Elecsys total PSA method on the wolf [id number] immunoassay analyzer. Results obtained with different assay methods or kits cannot be used interchangeably.     If you have any questions or concerns, please call the clinic at the number listed above.     Sincerely,        Jaylyn Bellamy MD/bmc

## 2024-03-05 NOTE — PROGRESS NOTES
"  Assessment & Plan     Routine history and physical examination of adult      Type 2 diabetes mellitus with other specified complication, with long-term current use of insulin (H)  Poorly controlled, sees endo  Lots of complications due to poor control  - Wheelchair Scooter Order for DME with OT or PT Referral; Future    Encounter for long-term (current) insulin use (H)  Per endo    CKD (chronic kidney disease) stage 4, GFR 15-29 ml/min (H)  Sees nephrology    Peripheral vascular disease (H24)  Limits ability to walk due to pain  - Wheelchair Scooter Order for DME with OT or PT Referral; Future    Morbid obesity (H)  Contributes to diabetes, htn  - Wheelchair Scooter Order for DME with OT or PT Referral; Future    Hypertension goal BP (blood pressure) < 140/90  Near goal today    Polyneuropathy associated with underlying disease (H24)  Affects balance and ability to ambulate   - Wheelchair Scooter Order for DME with OT or PT Referral; Future    Secondary renal hyperparathyroidism (H24)  Per nephrology    Osteomyelitis of finger of right hand (H)  Has lost part of 3rd finger on right hand    Partial nontraumatic amputation of right foot (H)  Has lost right toe  - Wheelchair Scooter Order for DME with OT or PT Referral; Future    Hyperlipidemia LDL goal <70  Not at goal, has seen cards  - atorvastatin (LIPITOR) 80 MG tablet; Take 1 tablet (80 mg) by mouth daily    High blood cholesterol      - atorvastatin (LIPITOR) 80 MG tablet; Take 1 tablet (80 mg) by mouth daily    Hypertriglyceridemia  Due to uncontrolled diabetes    KELBY (obstructive sleep apnea)      Screening for prostate cancer    - PSA, screen; Future  - PSA, screen            BMI  Estimated body mass index is 44.1 kg/m  as calculated from the following:    Height as of this encounter: 1.765 m (5' 9.5\").    Weight as of this encounter: 137.4 kg (303 lb).   Weight management plan: Discussed healthy diet and exercise guidelines          Subjective   Roberto is " a 63 year old, presenting for the following health issues:  Physical and Form Request (Request for scooter/)      3/5/2024     8:27 AM   Additional Questions   Roomed by gideon   Accompanied by wife michele   Failed to redirect to the [facility] of the [facility].  Healthy Habits:     Taking medications regularly:  0  History of Present Illness       Reason for visit:  Would like a scooter    He eats 0-1 servings of fruits and vegetables daily.He consumes 0 sweetened beverage(s) daily.He exercises with enough effort to increase his heart rate 9 or less minutes per day.  He exercises with enough effort to increase his heart rate 3 or less days per week.   He is taking medications regularly.         Annual Wellness Visit     Can walk up to 50 meters  Has PVD and has lost right big toe due to diabetes  Has had abscess of left foot and was in hospital for 2 months  Limited by legs hurting due to PVD and is at risk for falling due to neuropathy  Seeing endocrinology for diabetes  Seeing nephrologist for CKD4  Currently in wheelchair    Patient has been advised of split billing requirements and indicates understanding: Yes     Health Care Directive  Patient does not have a Health Care Directive or Living Will: Discussed advance care planning with patient; however, patient declined at this time.  In general, how would you rate your overall physical health? (!) FAIR   Discussed with patient their rating of physical health; information has been provided.   Do you have a special diet?  Diabetic      Do you see a dentist two times every year?  (!) NO  The patient was instructed to see the dentist every 6 months.   Have you been more tired than usual lately?  (!) YES   Discussed possible causes of fatigue.   If you drink alcohol do you typically have >3 drinks per day or >7 drinks per week? No  Do you have a current opioid prescription? No  Do you use any other controlled substances or medications that are not prescribed  "by a provider? None  Social History     Tobacco Use    Smoking status: Never    Smokeless tobacco: Never   Vaping Use    Vaping Use: Never used   Substance Use Topics    Alcohol use: No    Drug use: No     Needs assistance for the following daily activities:  has a hard time walking   Which of the following safety concerns are present in your home?  none identified   Do you (or your family members) have any concerns about your safety while driving?  No  Do you have any of the following hearing concerns?: No hearing concerns  In the past 6 months, have you been bothered by leaking of urine? (!) YES   Information on urinary incontinence and treatment options given to patient.        1/9/2024   Social Factors   Worry food won't last until get money to buy more No   Food not last or not have enough money for food? No   Do you have housing?  Yes   Are you worried about losing your housing? No   Lack of transportation? No   Unable to get utilities (heat,electricity)? No          3/5/2024   Fall Risk   Fallen 2 or more times in the past year? Yes   Trouble with walking or balance? Yes     Pt   Pt in wheelchair        Today's PHQ-2 Score:       1/9/2024     9:52 AM   PHQ-2 ( 1999 [employer])   Q1: Little interest or pleasure in doing things 0   Q2: Feeling down, depressed or hopeless 0   PHQ-2 Score 0   Q1: Little interest or pleasure in doing things Not at all   Q2: Feeling down, depressed or hopeless Not at all   PHQ-2 Score 0     Last PSA: No results found for: \"PSA\"  ASCVD Risk   The 10-year ASCVD risk score (Omar MARTINEZ, et al., 2019) is: 49%    Values used to calculate the score:      Age: 63 years      Sex: Male      Is Non- : No      Diabetic: Yes      Tobacco smoker: No      Systolic Blood Pressure: 143 mmHg      Is BP treated: Yes      HDL Cholesterol: 28 mg/dL      Total Cholesterol: 274 mg/dL            Reviewed and updated as needed this visit by Provider                "         Current providers sharing in care for this patient include:  Patient Care Team:  Jaylyn Bellamy MD as PCP - General (Family Practice)  Oneida Abraham RN as Diabetes Educator (Diabetes Education)  Flavia Beltre PA-C as Physician Assistant (Endocrinology, Diabetes, and Metabolism)  Mitch Reynaga MD as MD (Cardiovascular Disease)  Mitch Reynaga MD as Assigned Heart and Vascular Provider  Beverley Bucio MD as Assigned Nephrology Provider  Jyalyn Bellamy MD as Assigned PCP  Flavia Beltre PA-C as Assigned Endocrinology Provider    The following health maintenance items are reviewed in Epic and correct as of today:  Health Maintenance   Topic Date Due    ANNUAL REVIEW OF  ORDERS  Never done    Pneumococcal Vaccine: 65+ Years (1 of 2 - PCV) Never done    ZOSTER IMMUNIZATION (1 of 2) Never done    RSV VACCINE (Pregnancy & 60+) (1 - 1-dose 60+ series) Never done    COVID-19 Vaccine (1 - 2023-24 season) Never done    MEDICARE ANNUAL WELLNESS VISIT  Never done    DIABETIC FOOT EXAM  03/09/2024    BMP  05/20/2024    MICROALBUMIN  05/20/2024    A1C  08/20/2024    HEMOGLOBIN  08/20/2024    LIPID  11/10/2024    EYE EXAM  12/15/2024    FALL RISK ASSESSMENT  03/05/2025    DTAP/TDAP/TD IMMUNIZATION (2 - Td or Tdap) 05/20/2026    ADVANCE CARE PLANNING  02/10/2028    COLORECTAL CANCER SCREENING  08/31/2028    PARATHYROID  Completed    PHOSPHORUS  Completed    HEPATITIS C SCREENING  Completed    PHQ-2 (once per calendar year)  Completed    URINALYSIS  Completed    ALK PHOS  Completed    HIV SCREENING  Addressed    IPV IMMUNIZATION  Aged Out    HPV IMMUNIZATION  Aged Out    MENINGITIS IMMUNIZATION  Aged Out    RSV MONOCLONAL ANTIBODY  Aged Out    INFLUENZA VACCINE  Discontinued       Appropriate preventive services were discussed with this patient, including applicable screening as appropriate for fall prevention, nutrition, physical activity, Tobacco-use cessation, weight loss and cognition.  " Checklist reviewing preventive services available has been given to the patient.           No data to display                           Review of Systems  Constitutional, HEENT, cardiovascular, pulmonary, gi and gu systems are negative, except as otherwise noted.      Objective    BP (!) 143/69   Pulse 83   Temp 98.2  F (36.8  C) (Oral)   Resp 19   Ht 1.765 m (5' 9.5\")   Wt 137.4 kg (303 lb)   SpO2 95%   BMI 44.10 kg/m    Body mass index is 44.1 kg/m .  Physical Exam   GENERAL: alert and no distress  EYES: Eyes grossly normal to inspection, PERRL and conjunctivae and sclerae normal  HENT: ear canals and TM's normal, nose and mouth without ulcers or lesions  NECK: no adenopathy, no asymmetry, masses, or scars  RESP: lungs clear to auscultation - no rales, rhonchi or wheezes  CV: regular rate and rhythm, normal S1 S2, no S3 or S4, no murmur, click or rub, no peripheral edema  ABDOMEN: soft, nontender, no hepatosplenomegaly, no masses and bowel sounds normal  MS: bilateral pedal edema, loss of right toe and one partial right finger  SKIN: no suspicious lesions or rashes  NEURO: Normal strength and tone, mentation intact and speech normal  PSYCH: mentation appears normal, affect normal/bright    No results found for this or any previous visit (from the past 24 hour(s)).        Signed Electronically by: Jaylyn Bellamy MD    "

## 2024-03-06 ENCOUNTER — TELEPHONE (OUTPATIENT)
Dept: ENDOCRINOLOGY | Facility: CLINIC | Age: 64
End: 2024-03-06
Payer: COMMERCIAL

## 2024-03-06 DIAGNOSIS — Z79.4 TYPE 2 DIABETES MELLITUS WITH OTHER SPECIFIED COMPLICATION, WITH LONG-TERM CURRENT USE OF INSULIN (H): Primary | ICD-10-CM

## 2024-03-06 DIAGNOSIS — E11.69 TYPE 2 DIABETES MELLITUS WITH OTHER SPECIFIED COMPLICATION, WITH LONG-TERM CURRENT USE OF INSULIN (H): Primary | ICD-10-CM

## 2024-03-06 RX ORDER — DULAGLUTIDE 3 MG/.5ML
3 INJECTION, SOLUTION SUBCUTANEOUS
Qty: 2 ML | Refills: 2 | Status: SHIPPED | OUTPATIENT
Start: 2024-03-06 | End: 2024-05-28

## 2024-03-06 NOTE — TELEPHONE ENCOUNTER
Health Call Center    Phone Message    May a detailed message be left on voicemail: yes     Reason for Call: Medication Question or concern regarding medication   Prescription Clarification  Name of Medication: Trulicity   Prescribing Provider: Flavia Beltre   [facility] [address] [facility]      What on the order needs clarification? Jc calls stating that medication and dose of 1.5 are on back order and they don't know when they're getting it back in. Wondering if you can higher or lower the dose of Trulicity as they have that in stock. Please call and advise.

## 2024-03-07 NOTE — TELEPHONE ENCOUNTER
Per Flavia Beltre PA-C:    Let's increase to 3 mg weekly. I just sent in a new prescription.    Thanks,  Flavia      Disp Refills Start End ANUP   Dulaglutide (TRULICITY) 3 MG/0.5ML SOPN 2 mL 2 3/6/2024 -- No   Sig - Route: Inject 3 mg Subcutaneous every 7 days - Subcutaneous   Sent to pharmacy as: Trulicity 3 MG/0.5ML Subcutaneous Solution Pen-injector (Dulaglutide)   Class: E-Prescribe   Order: [id number]   E-Prescribing Status: Receipt confirmed by pharmacy (3/6/2024  3:34 PM CST)       Writer called patient on 3 way call with . Updated patient on Trulicity increase to dose of 3 mg weekly from current 1.5 mg weekly. Patient verbalizes understanding and agrees to plan. Patient also noted the pharmacy updated him he would need dose change.     Patient states that he missed his 2/22/24 office visit with Flavia but wanted to reschedule. Patient states that he will be in clinic today with his wife and will get rescheduled at that time.       Michelle Dorsey, RN  Endocrine Care Coordinator  [facility]

## 2024-03-19 DIAGNOSIS — Z79.4 TYPE 2 DIABETES MELLITUS WITH OTHER SPECIFIED COMPLICATION, WITH LONG-TERM CURRENT USE OF INSULIN (H): ICD-10-CM

## 2024-03-19 DIAGNOSIS — E11.69 TYPE 2 DIABETES MELLITUS WITH OTHER SPECIFIED COMPLICATION, WITH LONG-TERM CURRENT USE OF INSULIN (H): ICD-10-CM

## 2024-03-26 ENCOUNTER — THERAPY VISIT (OUTPATIENT)
Dept: OCCUPATIONAL THERAPY | Facility: CLINIC | Age: 64
End: 2024-03-26
Attending: FAMILY MEDICINE
Payer: MEDICARE

## 2024-03-26 DIAGNOSIS — I73.9 PERIPHERAL VASCULAR DISEASE (H): ICD-10-CM

## 2024-03-26 DIAGNOSIS — Z79.4 TYPE 2 DIABETES MELLITUS WITH OTHER SPECIFIED COMPLICATION, WITH LONG-TERM CURRENT USE OF INSULIN (H): ICD-10-CM

## 2024-03-26 DIAGNOSIS — E11.69 TYPE 2 DIABETES MELLITUS WITH OTHER SPECIFIED COMPLICATION, WITH LONG-TERM CURRENT USE OF INSULIN (H): ICD-10-CM

## 2024-03-26 DIAGNOSIS — Z78.9 IMPAIRED MOBILITY AND ADLS: Primary | ICD-10-CM

## 2024-03-26 DIAGNOSIS — Z74.09 IMPAIRED MOBILITY AND ADLS: Primary | ICD-10-CM

## 2024-03-26 DIAGNOSIS — E66.01 MORBID OBESITY (H): ICD-10-CM

## 2024-03-26 DIAGNOSIS — G63 POLYNEUROPATHY ASSOCIATED WITH UNDERLYING DISEASE (H): ICD-10-CM

## 2024-03-26 DIAGNOSIS — Z89.431 PARTIAL NONTRAUMATIC AMPUTATION OF RIGHT FOOT (H): ICD-10-CM

## 2024-03-26 PROCEDURE — 97542 WHEELCHAIR MNGMENT TRAINING: CPT | Mod: GO | Performed by: OCCUPATIONAL THERAPIST

## 2024-03-26 RX ORDER — BLOOD SUGAR DIAGNOSTIC
STRIP MISCELLANEOUS
Qty: 400 STRIP | Refills: 2 | Status: SHIPPED | OUTPATIENT
Start: 2024-03-26

## 2024-03-26 NOTE — PROGRESS NOTES
"                                                                             SEATING AND WHEELED MOBILITY ASSESSMENT    [facility]  Occupational Therapy   Date of service: March 26, 2024    Referring provider:   Jaylyn Bellamy MD     Order Date 3/5/24  Onset Date: 3/5/24    Order Details: ashleigh vazquez    Funding:Medicare/blue plus [place]     present: Yes  Language: Bulgarian    Height/Weight: 5'9\" / 303    Medical diagnosis:   Nervous and Auditory  Polyneuropathy associated with underlying disease (H24)  Stable angina     Respiratory  KELBY (obstructive sleep apnea)     Digestive  Morbid obesity (H)  Vitamin D deficiency     Endocrine  Type 2 diabetes mellitus with other specified complication, with long-term current use of insulin (H)  High blood cholesterol  High blood triglycerides  Moderate nonproliferative diabetic retinopathy of both eyes with macular edema (H)  Secondary renal hyperparathyroidism (H24)     Circulatory  Hypertension goal BP (blood pressure) < 140/90  Peripheral vascular disease (H24)     Musculoskeletal and Integumentary  Toe amputation status, right  Burn of skin  Osteomyelitis of finger of right hand (H)     Urinary  CKD (chronic kidney disease) stage 4, GFR 15-29 ml/min (H)     Other  Encounter for long-term (current) insulin use (H)    Patient concerns/goals: power mobility    Living environment:  House    Living environment barriers:  None  Stairs to get up but not needed      Current assistance/living environment:  Lives with family    Community Mobility:  Transportation: Van  Community Mobility Requirements: Medical Appointments, Shopping, Confucianist    Current mobility equipment:  Currently borrowing standard manual chair that is too small    Fall Risk Screen:   Has the patient fallen 2 or more times in the last year? Yes     Has the patient fallen and had an injury in the past year? Yes- last year fracture of tailbone    Is the patient a fall " risk?     ADL:   Feeding self:  ind  Grooming: ind  UE Dressing:  ind  LE Dressing:  ind  Showering/bathing: sits on edge of tub, wife helps as needed  Toileting/transfer:  ind  Functional Mobility: standard manual, wall walking    IADL: ind with support from family and kids    Evaluation     Pain assessment:  Pain present  Knees and pelvis from fracture    Cognition:  wnl    Vision: recent surgery    Hearing: wnl    Fatigue:  Reported Problems: limited ability to ambulate greater that 50 ft    Balance:  Unsupported Sitting Balance: Uses UE for Balance  Sitting Balance in Chair: Uses UE for Balance  Standing Balance: Uses UE for Balance    Ambulation:  Level of [place]: Independent, short distances only while holding wall  Assistive Device(s): Wheelchair (manual), here today in [facility] standard manual    Transfers:   Occasional assist    Neuromuscular:  History of Pressure Sores: on feet, current on L heel non healing    Coordination:  Slowed throughout, numb and edematous    Tone:   Hypotonic    Sensation:  Sensory Deficits Reported: hands and feet are numb    Head and Neck:  Head and Neck Position: WFL  Head Control: Adequate    Upper Extremities  UE ROM: WFL but tight rotation  UE Strength:  4/5  Dominance: Right    Pelvis  Anterior/Posterior Pelvis Position: Posterior Tilt    Trunk:  Anterior/Posterior Trunk Position: Increased Thoracic Kyphosis  Left-Right Trunk Position:   Lower Extremities:  LE ROM: Limited hips to about 90 degrees, knees full, ankles limited   LE Strength:  hips 3/5, knees 4/5 ankles   Foot Positioning: Plantarflexed    Edema: throughout extremities     Impairments:  Fatigue  Coordination  Balance  Pain     Treatment diagnosis:  Impaired mobility  Impaired activities of daily living     Recommendations/Plan of care:  Patient would benefit from interventions to enhance safety and independence.  Occupational therapy intervention for  wheelchair management.    Goals:   Target date:    Patient, family and/or caregiver will verbalize/demonstrate understanding of compensatory methods /equipment to enhance functional independence and safety.    Educational assessment/barriers to learning:  Language    Treatment provided this date:   Wheelchair management, 40 minutes   Determined need for power scooter in order to aid in healing of chronic Le wounds and aiding in safe mobility secondary to diabetic neuropathies causing falls and immobility.  HD scooter needed to support weight.      Response to treatment/recommendations: understanding and receptive    Goal attainment:  All goals met    Risks and benefits of evaluation/treatment have been explained.  Patient, family and/or caregiver are in agreement with Plan of Care.     Timed Code Treatment Minutes: 40  Total Treatment Time (sum of timed and untimed services): 40    Electronically signed by:  Donna MORAN/SURAJ, ATP      Occupational Therapist, Assistive   [phone]      fax: [phone]      [email or web address]  Seating Clinic- [facility], [address]  [address]  [place] [address]  March 26, 2024    Dino victory 10 Power Operated Vehicle / Scooter -  This device is being requested for this patient with mobility impairments to allow him to be able to complete all of his mobility related activities of daily living in a safe fashion, without risk of injury from falling, and in a reasonable time frame. He demonstrated during the home evaluation that he was able to transfer to/from the requested scooter, operate the tiller steering system, able to maintain postural stability and position while operating the POV, and operate the on/off mechanism and the speed dial appropriately and safely. They are very willing and physically / cognitively able to use the recommended equipment to assist with mobility related activities of daily living and general mobility. There is a mobility  limitation that cannot be sufficiently and safely resolved by the use of an appropriately fitted cane or walker and they do not have sufficient upper extremity function to self-propel an optimally-configured manual wheelchair in their home to perform MRADLs during a typical day due to limitations in strength, endurance, range of motion, and coordination. This equipment is reasonable and necessary with reference to accepted standards of medical practice and treatment of this patient's condition and is not being recommended as a convenience item. Without this recommended equipment, he is highly likely to sustain injuries from falls, develop pressure sores or postural compensation, and/or be bed confined, which those costs far exceed the cost of the requested equipment. This version being requested in ordered due to weight of greater than 300#.    This therapist has no financial connection to the equipment being requested or vendor being used.  I have read and concur with the above recommendations.  Physician Printed Name __________________________________________  Physician Signature  _____________________________________________  Date of Signature ______________________________  Physician Phone  ______________________________

## 2024-04-15 ENCOUNTER — MEDICAL CORRESPONDENCE (OUTPATIENT)
Dept: HEALTH INFORMATION MANAGEMENT | Facility: CLINIC | Age: 64
End: 2024-04-15
Payer: COMMERCIAL

## 2024-04-18 DIAGNOSIS — K59.00 CONSTIPATION, UNSPECIFIED CONSTIPATION TYPE: ICD-10-CM

## 2024-04-19 RX ORDER — AMOXICILLIN 250 MG
CAPSULE ORAL
Qty: 60 TABLET | Refills: 10 | Status: SHIPPED | OUTPATIENT
Start: 2024-04-19

## 2024-05-02 NOTE — PROGRESS NOTES
[facility] Cardiology   Cardiology Clinic Note      HPI:   Mr. Roberto Thompson is a pleasant 63 year old male with medical history pertinent for subclinical CAD with severe CAC, HTN, Uncontrolled DM2, CKD4, HLD, Obesity, KELBY on CPAP, and mild carotid stenosis. He presents to cardiology clinic for 6 month follow up.    Patient last seen by Dr. Reynaga 11/10/23. At that time, patient reported doing well without any intercurrent cardiac illness or hospitalization. He reported a decrease in his vision and was told his Coreg could be responsible. Dr. Reynaga reassured patient that Coreg is not associated with vision problems. He had reported that his blood pressures were running in the 140s at home. His Imdur was increased to 60mg daily at last visit, but patient denies ever having started this medication.    Since his last visit, patient reports feeling well and denies any symptoms or complaints. He reports that his blood pressures tend to moses 120s-140s. He rarely has SBP near 180, but reports he hasn't had that in a long time.   His primary concern today is an itchiness around his beard and in his ears that he feels is due to allergies; has not seen his PCP for this.   He reports occasional dizziness/unsteadiness on his feet, but he attributes this to his neuropathy and declines further workup at this time.     Today in clinic, he denies chest pain, palpitations, syncope, or lower extremity edema.      History obtained via family member acting as [place]  .      PAST MEDICAL HISTORY:  Past Medical History:   Diagnosis Date    Diabetes (H)     Hypertension        FAMILY HISTORY:  Family History   Problem Relation Age of Onset    Diabetes Mother     Hypertension Mother     Cerebrovascular Disease Mother     Kidney Disease Mother     Osteoporosis Mother     Diabetes Father     Prostate Cancer Father     Dementia Father        SOCIAL HISTORY:  Social History     Socioeconomic History    Marital status:     Tobacco Use    Smoking status: Never    Smokeless tobacco: Never   Vaping Use    Vaping status: Never Used   Substance and Sexual Activity    Alcohol use: No    Drug use: No    Sexual activity: Yes     Partners: Female     Birth control/protection: None     Social Determinants of Health     Financial Resource Strain: Low Risk  (1/9/2024)    Financial Resource Strain     Within the past 12 months, have you or your family members you live with been unable to get utilities (heat, electricity) when it was really needed?: No   Food Insecurity: Low Risk  (1/9/2024)    Food Insecurity     Within the past 12 months, did you worry that your food would run out before you got money to buy more?: No     Within the past 12 months, did the food you bought just not last and you didn t have money to get more?: No   Transportation Needs: Low Risk  (1/9/2024)    Transportation Needs     Within the past 12 months, has lack of transportation kept you from medical appointments, getting your medicines, non-medical meetings or appointments, work, or from getting things that you need?: No    Received from [facility], [facility]    Social Connections   Housing Stability: Low Risk  (1/9/2024)    Housing Stability     Do you have housing? : Yes     Are you worried about losing your housing?: No       CURRENT MEDICATIONS:  Current Outpatient Medications   Medication Sig Dispense Refill    ACE/ARB/ARNI NOT PRESCRIBED (INTENTIONAL) Please choose reason not prescribed from choices below.      aspirin 81 MG EC tablet Take 1 tablet (81 mg) by mouth daily 90 tablet 1    atorvastatin (LIPITOR) 80 MG tablet Take 1 tablet (80 mg) by mouth daily 90 tablet 3    blood glucose (ACCU-CHEK GUIDE) test strip Use to test blood sugar 4 times daily or as directed. *Dispense ACCU-CHEK GUIDE VI or use brand compatible with patients insurance and/or device. 400 strip 2    blood glucose monitoring  (ACCU-CHEK FASTCLIX) lancets Use with lanceting device. To accompany: Blood Glucose Monitor Brands: per insurance. 102 each 10    carvedilol (COREG) 12.5 MG tablet Take 1 tablet (12.5 mg) by mouth 2 times daily (with meals) 180 tablet 3    Dulaglutide (TRULICITY) 3 MG/0.5ML SOPN Inject 3 mg Subcutaneous every 7 days 2 mL 2    empagliflozin (JARDIANCE) 25 MG TABS tablet Take 1 tablet (25 mg) by mouth daily Further refills per endo 30 tablet 0    fenofibrate (TRIGLIDE/LOFIBRA) 160 MG tablet Take 1 tablet (160 mg) by mouth daily 90 tablet 3    furosemide (LASIX) 20 MG tablet Take 1 tablet (20 mg) by mouth 2 times daily 180 tablet 3    GLOBAL EASE INJECT PEN NEEDLES 31G X 5 MM miscellaneous USE TO INJECT INSULIN 5 TIMES A  each 11    insulin reg HIGH CONC (HUMULIN R U-500 KWIKPEN) 500 UNIT/ML PEN soln Inj 113 units subcutaneous with breakfast and 110 units subcutaneous with lunch and up to 130 units for dinner. (Max daily dose of 370 units) 45 mL 3    isosorbide mononitrate (IMDUR) 60 MG 24 hr tablet Take 1 tablet (60 mg) by mouth daily 90 tablet 3    omega-3 acid ethyl esters (LOVAZA) 1 g capsule Take 1 capsule by mouth 2 times daily 90 capsule 1    Omega-3 Fatty Acids (FISH OIL) 1200 MG capsule TAKE 1 CAPSULE BY MOUTH 2 (TWO) TIMES A  capsule 3    order for DME Equipment being ordered: Digital home blood pressure monitor kit 1 Device 1    senna-docusate (SENNA-PLUS) 8.6-50 MG tablet TAKE 1 TAB BY MOUTH TWO TIMES DAILY AS NEEDED FOR CONSTIPATION. 60 tablet 10    spironolactone (ALDACTONE) 25 MG tablet Take 1 tablet (25 mg) by mouth daily 90 tablet 3    UltiCare Alcohol Swabs 70 % PADS Use to swab area of injection/mary kay as directed. 100 each 11    Cholecalciferol (VITAMIN D) 2000 units tablet Take 2,000 Units by mouth (Patient not taking: Reported on 5/7/2024)      silver sulfADIAZINE (SILVADENE) 1 % external cream Apply topically daily (Patient not taking: Reported on 5/7/2024) 50 g 0    vitamin D2  (ERGOCALCIFEROL) 68918 units (1250 mcg) capsule TAKE 1 CAPSULE (50,000 UNITS) BY MOUTH ONCE A WEEK. NEEDS TO BE SEEN FOR FURTHER REFILLS (Patient not taking: Reported on 5/7/2024) 1 capsule 10     Current Facility-Administered Medications   Medication Dose Route Frequency Provider Last Rate Last Admin    silver sulfADIAZINE (SILVADENE) 1 % cream   Topical Once Marianela Maldonado MD        triamcinolone (KENALOG-40) injection 40 mg  40 mg   Julienne Queen MD   40 mg at 06/01/21 1811       ROS:   Refer to HPI    EXAM:  BP (!) 145/75 (BP Location: Right arm, Patient Position: Chair, Cuff Size: Adult Large)   Pulse 75   Wt 136.1 kg (300 lb)   SpO2 95%   BMI 43.67 kg/m      GENERAL: Appears comfortable, in no acute distress.   HEENT: Eye symmetrical, no discharge or icterus bilaterally.   CV: RRR, +S1S2, no murmur, rub, or gallop.   RESPIRATORY: Respirations regular, even, and unlabored. Lungs CTA throughout.   GI: Soft and non distended.   EXTREMITIES: no peripheral edema.    NEUROLOGIC: Alert and oriented x 3.   MUSCULOSKELETAL: No joint swelling or tenderness.   SKIN: No jaundice. No rashes or lesions. Dry skin.    Labs, reviewed with patient in clinic today:  CBC RESULTS:  Lab Results   Component Value Date    WBC 6.1 02/20/2024    WBC 6.5 12/29/2020    RBC 4.10 (L) 02/20/2024    RBC 4.26 (L) 12/29/2020    HGB 12.3 (L) 02/20/2024    HGB 13.2 (L) 12/29/2020    HCT 38.0 (L) 02/20/2024    HCT 38.7 (L) 12/29/2020    MCV 93 02/20/2024    MCV 91 12/29/2020    MCH 30.0 02/20/2024    MCH 31.0 12/29/2020    MCHC 32.4 02/20/2024    MCHC 34.1 12/29/2020    RDW 14.4 02/20/2024    RDW 13.4 12/29/2020     02/20/2024     12/29/2020       CMP RESULTS:  Lab Results   Component Value Date     02/20/2024     05/26/2021    POTASSIUM 4.5 02/20/2024    POTASSIUM 4.9 02/09/2022    POTASSIUM 4.4 05/26/2021    CHLORIDE 98 02/20/2024    CHLORIDE 100 02/09/2022    CHLORIDE 101 05/26/2021    CO2 27  "02/20/2024    CO2 26 02/09/2022    CO2 30 05/26/2021    ANIONGAP 13 02/20/2024    ANIONGAP 7 02/09/2022    ANIONGAP 5 05/26/2021     (H) 02/20/2024     (H) 02/09/2022     (H) 05/26/2021    BUN 34.3 (H) 02/20/2024    BUN 45 (H) 02/09/2022    BUN 39 (H) 05/26/2021    CR 2.45 (H) 02/20/2024    CR 2.34 (H) 05/26/2021    GFRESTIMATED 28 (L) 02/20/2024    GFRESTIMATED 29 (L) 05/26/2021    GFRESTBLACK 33 (L) 05/26/2021    CYN 10.0 02/20/2024    CYN 9.3 05/26/2021    BILITOTAL 0.4 12/18/2018    ALBUMIN 4.7 02/20/2024    ALBUMIN 3.9 12/29/2020    ALKPHOS 76 12/18/2018    ALT 31 12/18/2018    AST 29 08/14/2020        INR RESULTS:  No results found for: \"INR\"    No results found for: \"MAG\"  No results found for: \"NTBNPI\"  Lab Results   Component Value Date    NTBNP 94 02/24/2023    NTBNP 162 (H) 12/18/2018       LIPIDS:  Lab Results   Component Value Date    CHOL 274 11/10/2023    CHOL 212 10/27/2020     Lab Results   Component Value Date    HDL 28 11/10/2023    HDL 35 10/27/2020     Lab Results   Component Value Date    LDL  11/10/2023      Comment:      Cannot estimate LDL when triglyceride exceeds 400 mg/dL    LDL 84 11/10/2023    LDL 75 02/24/2023    LDL  10/27/2020     Cannot estimate LDL when triglyceride exceeds 400 mg/dL    LDL 95 10/27/2020     Lab Results   Component Value Date    TRIG 1,043 11/10/2023    TRIG 652 10/27/2020       Assessment and Plan:   Mr. Thompson is a 63 year old male with a PMH of subclinical CAD with severe CAC, HTN, DM2, CKD4, HLD, Obesity, KELBY on CPAP, and mild carotid stenosis    # Subclinical CAD with severe CAC  # HLD w/ LDL goal <70  # Mild carotid stenosis  Patient denies any symptoms at this time and reports he is able to complete the activity he would like to without limitations.   - Continue ASA 81mg daily + Lipitor 80 and fenofibrate 160; Lovaza 1g BID    # HTN   Patient reports that his blood pressures have been running 120s-140s. He reports he doesn't think he " ever started the Imdur that was increased at last visit.   - Start Imdur 60mg daily   - Continue Coreg 12.5mg BID    # Seasonal Allergies  Patient reports pruritus of beard and ears 2/2 allergies. Discussed notifying his PCP. Will route this progress note per patient's request.     Follow up:  6 months   Chart review time today: 15 minutes  Visit time today: 15 minutes  Total time spent today: 30 minutes        Molly Castrejon PA-C  General Cardiology   05/07/24

## 2024-05-07 ENCOUNTER — OFFICE VISIT (OUTPATIENT)
Dept: CARDIOLOGY | Facility: CLINIC | Age: 64
End: 2024-05-07
Payer: MEDICARE

## 2024-05-07 VITALS
WEIGHT: 300 LBS | BODY MASS INDEX: 43.67 KG/M2 | OXYGEN SATURATION: 96 % | HEART RATE: 75 BPM | SYSTOLIC BLOOD PRESSURE: 153 MMHG | DIASTOLIC BLOOD PRESSURE: 84 MMHG

## 2024-05-07 DIAGNOSIS — I10 HYPERTENSION GOAL BP (BLOOD PRESSURE) < 140/90: ICD-10-CM

## 2024-05-07 DIAGNOSIS — I20.89 STABLE ANGINA (H): ICD-10-CM

## 2024-05-07 PROCEDURE — 99214 OFFICE O/P EST MOD 30 MIN: CPT

## 2024-05-07 RX ORDER — ISOSORBIDE MONONITRATE 60 MG/1
60 TABLET, EXTENDED RELEASE ORAL DAILY
Qty: 90 TABLET | Refills: 3 | Status: SHIPPED | OUTPATIENT
Start: 2024-05-07

## 2024-05-07 RX ORDER — CARVEDILOL 12.5 MG/1
12.5 TABLET ORAL 2 TIMES DAILY WITH MEALS
Qty: 180 TABLET | Refills: 3 | Status: SHIPPED | OUTPATIENT
Start: 2024-05-07 | End: 2024-06-26

## 2024-05-07 NOTE — PATIENT INSTRUCTIONS
Take your medicines every day, as directed     Changes made today:  START dur        Cardiology Care Coordinators:      Maritza MAYES RN     Cardiology Rooming staff:  Gina SHEA    Phone  [phone]      Fax [phone]    To Contact us     During Business Hours:  [phone]     If you are needing refills please contact your pharmacy.     For urgent after hour care please call the [facility] at [phone] or the [facility] at [phone] and ask to speak to the cardiologist on call.            HOW TO CHECK YOUR BLOOD PRESSURE AT HOME:     Avoid eating, smoking, and exercising for at least 30 minutes before taking a reading.     Be sure you have taken your BP medication at least 2-3 hours before you check it.      Sit quietly for 10 minutes before a reading.      Sit in a chair with your feet flat on the floor. Rest your  arm on a table so that the arm cuff is at the same level as your heart.     Remain still during the reading.  Record your blood pressure and pulse in a log and bring to your next appointment.       Use [employer] allows you to communicate directly with your heart team through secure messaging.  Measy can be accessed any time on your phone, computer, or tablet.  If you need assistance, we'd be happy to help!             Keep your Heart Appointments:     6 months with Molly Castrejon

## 2024-05-07 NOTE — NURSING NOTE
"Chief Complaint   Patient presents with    Follow Up      6 month follow up, previous [facility] pt       Initial BP (!) 145/75 (BP Location: Right arm, Patient Position: Chair, Cuff Size: Adult Large)   Pulse 75   Wt 136.1 kg (300 lb)   SpO2 95%   BMI 43.67 kg/m   Estimated body mass index is 43.67 kg/m  as calculated from the following:    Height as of 3/5/24: 1.765 m (5' 9.5\").    Weight as of this encounter: 136.1 kg (300 lb)..  BP completed using cuff size: roberto carlos GRAJEDA, VF     "

## 2024-05-20 DIAGNOSIS — E78.00 HIGH BLOOD CHOLESTEROL: ICD-10-CM

## 2024-05-20 DIAGNOSIS — E11.69 TYPE 2 DIABETES MELLITUS WITH OTHER SPECIFIED COMPLICATION, WITH LONG-TERM CURRENT USE OF INSULIN (H): ICD-10-CM

## 2024-05-20 DIAGNOSIS — Z79.4 TYPE 2 DIABETES MELLITUS WITH OTHER SPECIFIED COMPLICATION, WITH LONG-TERM CURRENT USE OF INSULIN (H): Primary | ICD-10-CM

## 2024-05-20 DIAGNOSIS — E78.1 HYPERTRIGLYCERIDEMIA: ICD-10-CM

## 2024-05-20 DIAGNOSIS — Z79.4 TYPE 2 DIABETES MELLITUS WITH OTHER SPECIFIED COMPLICATION, WITH LONG-TERM CURRENT USE OF INSULIN (H): ICD-10-CM

## 2024-05-20 DIAGNOSIS — I10 HYPERTENSION GOAL BP (BLOOD PRESSURE) < 140/90: ICD-10-CM

## 2024-05-20 DIAGNOSIS — E11.69 TYPE 2 DIABETES MELLITUS WITH OTHER SPECIFIED COMPLICATION, WITH LONG-TERM CURRENT USE OF INSULIN (H): Primary | ICD-10-CM

## 2024-05-20 RX ORDER — LANCETS
EACH MISCELLANEOUS
Qty: 102 EACH | Refills: 0 | Status: SHIPPED | OUTPATIENT
Start: 2024-05-20 | End: 2024-06-17

## 2024-05-24 ENCOUNTER — TELEPHONE (OUTPATIENT)
Dept: CARDIOLOGY | Facility: CLINIC | Age: 64
End: 2024-05-24
Payer: COMMERCIAL

## 2024-05-24 NOTE — TELEPHONE ENCOUNTER
Left Voicemail (1st Attempt) for the patient to call back and schedule the following:    Appointment type: Return Card  Provider: Molly Castrejon  Return date: Oct/Nov 2024  Specialty phone number: [phone]  Additional appointment(s) needed:   Additonal Notes:     Attempted to reschedule the appointment with Molly Castrejon on 11/12/2024, provider out of clinic.    Also sent an appointment reschedule letter, patient does not have MyChart.    Ananya UMANZOR/Complex Procedure    [facility]   Neurology, NeuroSurgery, NeuroPsychology, Pain Management and Cardiology Specialties  Medical/Surgical Adult Specialties

## 2024-05-28 RX ORDER — FUROSEMIDE 20 MG
20 TABLET ORAL 2 TIMES DAILY
Qty: 180 TABLET | Refills: 3 | Status: SHIPPED | OUTPATIENT
Start: 2024-05-28

## 2024-05-28 RX ORDER — DULAGLUTIDE 3 MG/.5ML
3 INJECTION, SOLUTION SUBCUTANEOUS
Qty: 2 ML | Refills: 2 | Status: SHIPPED | OUTPATIENT
Start: 2024-05-28 | End: 2024-06-17

## 2024-05-28 RX ORDER — SPIRONOLACTONE 25 MG/1
25 TABLET ORAL DAILY
Qty: 90 TABLET | Refills: 3 | Status: SHIPPED | OUTPATIENT
Start: 2024-05-28

## 2024-05-28 RX ORDER — CARVEDILOL 12.5 MG/1
12.5 TABLET ORAL 2 TIMES DAILY WITH MEALS
Qty: 180 TABLET | Refills: 3 | OUTPATIENT
Start: 2024-05-28

## 2024-05-28 RX ORDER — FENOFIBRATE 160 MG/1
160 TABLET ORAL DAILY
Qty: 90 TABLET | Refills: 3 | Status: SHIPPED | OUTPATIENT
Start: 2024-05-28

## 2024-05-28 NOTE — TELEPHONE ENCOUNTER
Left Voicemail with Family Member (2nd Attempt) for the patient to call back and schedule the following:    Appointment type: Return Cardiology  Provider: Molly Castrejon  Return date: Nov 2024  Specialty phone number: [phone]  Additional appointment(s) needed:   Additonal Notes:     2nd attempt to reschedule the appointment with Molly Beltre, on 11/12/2024, lvm for Daughter Nakita to reschedule. Also sent an appointment reschedule letter.    Ananya UMANZOR/Melanie Procedure    [facility]   Neurology, NeuroSurgery, NeuroPsychology, Pain Management and Cardiology Specialties  Medical/Surgical Adult Specialties

## 2024-06-17 DIAGNOSIS — E11.69 TYPE 2 DIABETES MELLITUS WITH OTHER SPECIFIED COMPLICATION, WITH LONG-TERM CURRENT USE OF INSULIN (H): ICD-10-CM

## 2024-06-17 DIAGNOSIS — I10 HYPERTENSION GOAL BP (BLOOD PRESSURE) < 140/90: ICD-10-CM

## 2024-06-17 DIAGNOSIS — Z79.4 TYPE 2 DIABETES MELLITUS WITH OTHER SPECIFIED COMPLICATION, WITH LONG-TERM CURRENT USE OF INSULIN (H): ICD-10-CM

## 2024-06-17 RX ORDER — ALCOHOL ANTISEPTIC PADS
PADS, MEDICATED (EA) TOPICAL
Qty: 200 EACH | Refills: 11 | Status: SHIPPED | OUTPATIENT
Start: 2024-06-17

## 2024-06-17 RX ORDER — INSULIN HUMAN 500 [IU]/ML
INJECTION, SOLUTION SUBCUTANEOUS
Qty: 45 ML | Refills: 3 | Status: SHIPPED | OUTPATIENT
Start: 2024-06-17

## 2024-06-17 RX ORDER — LANCETS
EACH MISCELLANEOUS
Qty: 102 EACH | Refills: 11 | Status: SHIPPED | OUTPATIENT
Start: 2024-06-17

## 2024-06-17 RX ORDER — DULAGLUTIDE 3 MG/.5ML
INJECTION, SOLUTION SUBCUTANEOUS
Qty: 2 ML | Refills: 0 | Status: SHIPPED | OUTPATIENT
Start: 2024-06-17 | End: 2024-07-22

## 2024-06-26 RX ORDER — CARVEDILOL 12.5 MG/1
12.5 TABLET ORAL 2 TIMES DAILY WITH MEALS
Qty: 180 TABLET | Refills: 1 | Status: SHIPPED | OUTPATIENT
Start: 2024-06-26

## 2024-06-26 NOTE — TELEPHONE ENCOUNTER
"    carvedilol 12.5 mg tablet     Last Written Prescription Date:  3/28/24  Last Fill Quantity: 90,   # refills: 0  Last Office Visit :5/7/24 [facility]    \"He reports that his blood pressures tend to moses 120s-140s....  Plan: # HTN   Patient reports that his blood pressures have been running 120s-140s. He reports he doesn't think he ever started the Imdur that was increased at last visit.   - Start Imdur 60mg daily   - Continue Coreg 12.5mg BID\"  Future Office visit:  6 months       05/07/24 (!) 153/84- see Cards note, excerpted above 5/7/24 03/05/24 (!) 143/69   02/20/24 125/81           "

## 2024-07-17 DIAGNOSIS — E11.69 TYPE 2 DIABETES MELLITUS WITH OTHER SPECIFIED COMPLICATION, WITH LONG-TERM CURRENT USE OF INSULIN (H): ICD-10-CM

## 2024-07-17 DIAGNOSIS — Z79.4 TYPE 2 DIABETES MELLITUS WITH OTHER SPECIFIED COMPLICATION, WITH LONG-TERM CURRENT USE OF INSULIN (H): ICD-10-CM

## 2024-07-19 NOTE — TELEPHONE ENCOUNTER
TRULICITY 3 MG/0.5ML SOPN 2 mL 0 6/17/2024 -- No   Sig: inject 3mg SUBCUTANEOUSLY every SEVEN DAYS     ----------------------  Last Office Visit : 11/2/2023  [facility]      Future Office visit:    7/25/2024 9:40 AM (50 min)  [facility]    RETURN DIABETES   [facility] ([place])   Flavia Beltre PA-C   [facility] NURSE     ----------------------      Routing refill request to provider for review/approval because:  Overdue A1C recheck, last Cr/GFR abnormal        Pass/Fail Protocol Criteria:  GLP-1 Agonists Protocol [id number]/19/2024 01:33 PM   Protocol Details HgbA1C in past 3 or 6 months    Has GFR on file in past 12 months and most recent value is normal     Hemoglobin A1C   Date Value Ref Range Status   02/20/2024 9.7 (H) 0.0 - 5.6 % Final     Comment:     Normal <5.7%   Prediabetes 5.7-6.4%    Diabetes 6.5% or higher     Note: Adopted from ADA consensus guidelines.   05/26/2021 8.7 (H) 0 - 5.6 % Final     Comment:     Normal <5.7% Prediabetes 5.7-6.4%  Diabetes 6.5% or higher - adopted from ADA   consensus guidelines.     Last Comprehensive Metabolic Panel:  Lab Results   Component Value Date     02/20/2024    POTASSIUM 4.5 02/20/2024    CHLORIDE 98 02/20/2024    CO2 27 02/20/2024    ANIONGAP 13 02/20/2024     (H) 02/20/2024    BUN 34.3 (H) 02/20/2024    CR 2.45 (H) 02/20/2024    GFRESTIMATED 28 (L) 02/20/2024    CYN 10.0 02/20/2024

## 2024-07-22 RX ORDER — DULAGLUTIDE 3 MG/.5ML
3 INJECTION, SOLUTION SUBCUTANEOUS
Qty: 2 ML | Refills: 3 | Status: SHIPPED | OUTPATIENT
Start: 2024-07-22

## 2024-07-25 ENCOUNTER — OFFICE VISIT (OUTPATIENT)
Dept: ENDOCRINOLOGY | Facility: CLINIC | Age: 64
End: 2024-07-25
Payer: MEDICARE

## 2024-07-25 VITALS
SYSTOLIC BLOOD PRESSURE: 123 MMHG | HEART RATE: 83 BPM | OXYGEN SATURATION: 98 % | DIASTOLIC BLOOD PRESSURE: 74 MMHG | RESPIRATION RATE: 16 BRPM

## 2024-07-25 DIAGNOSIS — E11.69 TYPE 2 DIABETES MELLITUS WITH OTHER SPECIFIED COMPLICATION, WITH LONG-TERM CURRENT USE OF INSULIN (H): Primary | ICD-10-CM

## 2024-07-25 DIAGNOSIS — N18.4 CKD (CHRONIC KIDNEY DISEASE) STAGE 4, GFR 15-29 ML/MIN (H): ICD-10-CM

## 2024-07-25 DIAGNOSIS — Z79.4 TYPE 2 DIABETES MELLITUS WITH OTHER SPECIFIED COMPLICATION, WITH LONG-TERM CURRENT USE OF INSULIN (H): Primary | ICD-10-CM

## 2024-07-25 DIAGNOSIS — E66.01 MORBID OBESITY (H): ICD-10-CM

## 2024-07-25 LAB — HBA1C MFR BLD: 10.4 %

## 2024-07-25 PROCEDURE — 99214 OFFICE O/P EST MOD 30 MIN: CPT | Performed by: PHYSICIAN ASSISTANT

## 2024-07-25 PROCEDURE — G2211 COMPLEX E/M VISIT ADD ON: HCPCS | Performed by: PHYSICIAN ASSISTANT

## 2024-07-25 PROCEDURE — 83036 HEMOGLOBIN GLYCOSYLATED A1C: CPT | Performed by: PHYSICIAN ASSISTANT

## 2024-07-25 NOTE — PATIENT INSTRUCTIONS
Welcome to the [facility]ology [facility] Diabetes Clinics     Our Endocrinology Clinics are here to provide you with a team-based, collaborative approach in the diagnosis and treatment of patients with diabetes and endocrine disorders. The team is made up of Physicians, Physician Assistants, Certified Diabetes Educators, Registered Nurses, Medical Assistants, Emergency Medical Technicians, and many others, all of whom have the unified goal of providing our patients with high quality care.     Please see below for some helpful tips to best navigate and use the [facility] Endocrinology clinic:     [place] Respect: At [facility], we are committed to a respectful and safe space for all patients, visitors, and staff.  We believe that mutual respect between patients and their care team is the foundation of quality care.  It is our expectation that you will be treated with respect by your care team.  In turn, we ask that all communication with the care team (written and verbal) be respectful and free from profanity, threatening, or abusive language.  Disrespectful communication undermines our therapeutic relationship with you and may result in us being unable to continue to provide your care.    Refills: A provider must see you at least annually to prescribe and refill medications. This is to ensure your safety as well as meet insurance and compliance regulations.    Scheduling: Many of our Providers offer both in-person or video visits. Please call to schedule any needed follow ups as soon as possible because our provider schedules fill up very quickly. Our care team has the right to require an in-person visit when they believe that it is medically necessary. Please remember that for any virtual visits, you must be in the [facility] at the time of the visit, otherwise we are unable to see you and you will need to be rescheduled.    Missed Appointments: If you need to cancel or miss your  scheduled appointment, please call the clinic at [phone] to reschedule.  Please note if you repeatedly miss appointments or repeatedly miss appointments without calling to inform us ahead of time (no-show), the clinic may elect to not allow you to reschedule without speaking to a manager, may require a Partnership In Care Agreement prior to rescheduling, or could result in you no longer being able to receive care from the clinic. Providing the clinic with timely notification if you have to miss an appointment, allows us to better serve the needs of all of our patients.    Primary Care Provider: Our Endocrinologists are Specialists in their field. We expect you to have a Primary Care Provider established to handle any needs outside of your diabetes and endocrine care.  We would be happy to assist you find a Primary Care Provider, if you do not have one.    shopatplaces: [employer]atplaces is a wonderful resource that allows you access to your Care Team via online or the fish. Please ask a member of the team if you would like help creating an account. Please note that it may take up to 2 business days for a response. [employer] messages are not reviewed on weekends or after business hours.  Emergent or urgent care needs should never be communicated via shopatplaces.  If you experience a medical emergency call 911 or go to the nearest emergency room.    Labs: It is recommended that you stay within the [facility] for labs but you are welcome to obtain ordered labs (with some exceptions) from any location of your choice as long as they are able to complete and process the needed labs. If you need us to fax orders to your preferred lab, please provide us the name and fax number of the lab you would like to go to so we can fax the orders. If your labs are drawn outside of the [facility], please have them fax the results to [phone] ([place]) or [phone] ([place]) or via [facility]. It is your  responsibility to ensure that outside lab results are sent to us.    We look forward to working with you. Please do not hesitate to reach out with any questions.    Thank you,    The Endocrine Team    [facility] Address:   [place] Address:     [address] [place] [address]    Phone: [phone]  Fax: [phone] [address] [place] [address]    Phone: [phone]  Fax: [phone]     [facility] Cost Estimate Phone Number: [phone]    General Lab and Imaging Scheduling Phone Number: [phone]

## 2024-07-25 NOTE — LETTER
7/25/2024      Roberto Thompson  [address]  [place] [address]      Dear Colleague,    Thank you for referring your patient, Roberto Thompson, to the [facility]. Please see a copy of my visit note below.    Assessment/Plan :   Type 2 DM. We had a long discussion regarding Dara's insulin. He states that his blood sugars are elevated due to a recent hospitalization. He feels like when he is home and managing his blood sugars, on his own, his numbers are better. We discussed the MOA of Humulin R U500 and the importance of taking the insulin, consistently. He does not want to make adjustments today because he is worried that his blood sugars will get too low. He will continue to adjust his insulin as needed. We will follow-up in 3-4 mos. I am expecting his hemoglobin A1C to be lower.      I have independently reviewed and interpreted labs, imaging as indicated.      Chief complaint:  Roberto is a 63 year old male who returns for follow-up of Type 2 DM with long term use of insulin.    I have reviewed Care Everywhere including [facility], [facility], [facility], [facility], [facility], [facility], [facility] , [facility], [place] lab reports, imaging reports and provider notes as indicated.      HISTORY OF PRESENT ILLNESS  Roberto continues to work on managing his blood sugars. He is frustrated with his insulin and his blood sugars. He states that he does not take the Humulin R U500 in the morning when his blood sugars are low. He has tried taking smaller doses but his blood sugars will still drop. He finds that it is easier to just skip the dosing and then take his lunch and dinner dose. He states that his blood sugars are usually in the 130s in the morning but will occasionally increase to 300 mg/dl before dinner. He also takes Jardiance 25 mg daily and Trulicity 3 mg weekly.     Roberto has returned to monitoring his blood sugars with fingerstick testing daily. He usually  checks in the morning and before dinner. He has not had any problems with severe hyperglycemia and/or hypoglycemia. He has had a few blood sugars drop into the 60s after he takes his morning Humulin dose. He states that he feels horrible when his numbers drop. He has not had any problems with blurred vision. He does have a significant history of severe peripheral neuropathy and CKD stage 4.     He has had diabetes since 2007. He has struggled to get his hemoglobin A1C under 8%. He has used a combination of insulin and oral medications for many years. He struggles with medication compliance and was unable to consistently administer meal time insulin, so we transitioned him from MDI therapy to Humulin R U500 in April of 2023. This initially led to an improvement in his blood sugar control. Recently, he has again struggled with compliance. He also admits to eating whatever he wants to eat.  His diabetes is complicated by a history of nonpoliferative diabetic retinopathy with macular edema, hyperlipidemia, HTN, obesity, CKD stage 4 and KELBY.     Endocrine relevant labs are as follows:   Latest Reference Range & Units 07/25/24 10:04   Afinion Hemoglobin A1c POCT <=5.7 % 10.4 (H)   (H): Data is abnormally high   Latest Reference Range & Units 02/20/24 09:05   Hemoglobin A1C 0.0 - 5.6 % 9.7 (H)   (H): Data is abnormally high   Latest Reference Range & Units 02/20/24 09:05   Albumin Urine mg/g Cr 0.00 - 17.00 mg/g Cr  0.00 - 17.00 mg/g Cr 550.70 (H)  562.38 (H)   (H): Data is abnormally high   Latest Reference Range & Units 02/20/24 09:05   Albumin Urine mg/L mg/L  mg/L 277.0  284.0     REVIEW OF SYSTEMS    Endocrine: positive for diabetes and obesity  Skin: negative  Eyes: negative for, visual blurring, redness, tearing  Ears/Nose/Throat: negative  Respiratory: No shortness of breath, dyspnea on exertion, cough, or hemoptysis  Cardiovascular: negative for, chest pain, dyspnea on exertion, syncope or near-syncope, and  exercise intolerance  Gastrointestinal: negative for, nausea, vomiting, constipation, and diarrhea  Genitourinary: negative for, nocturia, dysuria, frequency, and urgency  Musculoskeletal: negative for, muscular weakness, nocturnal cramping, and foot pain  Neurologic: positive for numbness or tingling of feet, negative for, and numbness or tingling of hands  Psychiatric: negative  Hematologic/Lymphatic/Immunologic: negative    Past Medical History  Past Medical History:   Diagnosis Date     Diabetes (H)      Hypertension        Medications  Current Outpatient Medications   Medication Sig Dispense Refill     ACE/ARB/ARNI NOT PRESCRIBED (INTENTIONAL) Please choose reason not prescribed from choices below.       aspirin 81 MG EC tablet Take 1 tablet (81 mg) by mouth daily 90 tablet 1     atorvastatin (LIPITOR) 80 MG tablet Take 1 tablet (80 mg) by mouth daily 90 tablet 3     blood glucose (ACCU-CHEK GUIDE) test strip Use to test blood sugar 4 times daily or as directed. *Dispense ACCU-CHEK GUIDE VI or use brand compatible with patients insurance and/or device. 400 strip 2     blood glucose monitoring (ACCU-CHEK FASTCLIX) lancets Use with lanceting device. To accompany: Blood Glucose Monitor Brands: per insurance. 102 each 11     carvedilol (COREG) 12.5 MG tablet Take 1 tablet (12.5 mg) by mouth 2 times daily (with meals) 180 tablet 1     Cholecalciferol (VITAMIN D) 2000 units tablet Take 2,000 Units by mouth (Patient not taking: Reported on 5/7/2024)       Dulaglutide (TRULICITY) 3 MG/0.5ML SOPN Inject 3 mg subcutaneously every 7 days 2 mL 3     empagliflozin (JARDIANCE) 25 MG TABS tablet Take 1 tablet (25 mg) by mouth daily Further refills per endo 30 tablet 0     fenofibrate (TRIGLIDE/LOFIBRA) 160 MG tablet Take 1 tablet (160 mg) by mouth daily 90 tablet 3     furosemide (LASIX) 20 MG tablet Take 1 tablet (20 mg) by mouth 2 times daily 180 tablet 3     GLOBAL EASE INJECT PEN NEEDLES 31G X 5 MM miscellaneous USE TO  INJECT INSULIN 5 TIMES A  each 11     HUMULIN R U-500 KWIKPEN 500 UNIT/ML PEN soln Inj 113 units subcutaneous with breakfast and 110 units subcutaneous with lunch and up to 130 units for dinner. (Max daily dose of 370 units) 45 mL 3     isosorbide mononitrate (IMDUR) 60 MG 24 hr tablet Take 1 tablet (60 mg) by mouth daily 90 tablet 3     omega-3 acid ethyl esters (LOVAZA) 1 g capsule Take 1 capsule by mouth 2 times daily 90 capsule 1     Omega-3 Fatty Acids (FISH OIL) 1200 MG capsule TAKE 1 CAPSULE BY MOUTH 2 (TWO) TIMES A  capsule 3     order for DME Equipment being ordered: Digital home blood pressure monitor kit 1 Device 1     senna-docusate (SENNA-PLUS) 8.6-50 MG tablet TAKE 1 TAB BY MOUTH TWO TIMES DAILY AS NEEDED FOR CONSTIPATION. 60 tablet 10     silver sulfADIAZINE (SILVADENE) 1 % external cream Apply topically daily (Patient not taking: Reported on 5/7/2024) 50 g 0     spironolactone (ALDACTONE) 25 MG tablet Take 1 tablet (25 mg) by mouth daily 90 tablet 3     UltiCare Alcohol Swabs 70 % PADS Use to swab area of injection/mary kay as directed. 200 each 11     vitamin D2 (ERGOCALCIFEROL) 37790 units (1250 mcg) capsule TAKE 1 CAPSULE (50,000 UNITS) BY MOUTH ONCE A WEEK. NEEDS TO BE SEEN FOR FURTHER REFILLS (Patient not taking: Reported on 5/7/2024) 1 capsule 10       Allergies  Allergies   Allergen Reactions     Doxycycline Other (See Comments) and Swelling     Facial swelling  facial   swelling    Facial swelling  facial   swelling       Influenza Vac Split [Influenza Virus Vaccine] Itching     And red /blue arms     Penicillins Rash         Family History  family history includes Cerebrovascular Disease in his mother; Dementia in his father; Diabetes in his father and mother; Hypertension in his mother; Kidney Disease in his mother; Osteoporosis in his mother; Prostate Cancer in his father.    Social History  Social History     Tobacco Use     Smoking status: Never     Smokeless tobacco: Never    Vaping Use     Vaping status: Never Used   Substance Use Topics     Alcohol use: No     Drug use: No       Physical Exam  /74 (BP Location: Left arm, Patient Position: Sitting, Cuff Size: Adult Large)   Pulse 83   Resp 16   SpO2 98%   There is no height or weight on file to calculate BMI.  GENERAL :  In no apparent distress. He is accompanied by his daughter who is acting as   SKIN: Normal color, normal temperature, texture.  No hirsutism, alopecia or purple striae.     EYES: PERRL, EOMI, No scleral icterus,  No proptosis, conjunctival redness, stare, retraction  RESP: Lungs clear to auscultation bilaterally  CARDIAC: Regular rate and rhythm, normal S1 S2, without murmurs, rubs or gallops    NEURO: awake, alert, responds appropriately to questions.  Cranial nerves intact.   Moves all extremities; he is using a wheelchair for ambulation.  No tremor of the outstretched hand.    EXTREMITIES: No clubbing, cyanosis. Significant lower extremity edema.    DATA REVIEW  He did not bring his glucometer to the visit.        Again, thank you for allowing me to participate in the care of your patient.        Sincerely,        Flavia Beltre PA-C

## 2024-07-25 NOTE — NURSING NOTE
Roberto Thompson's goals for this visit include:   Chief Complaint   Patient presents with    Follow Up     DM       He requests these members of his care team be copied on today's visit information: yes    PCP: Jaylyn Bellamy    Referring Provider:  Jaylyn Bellamy [place]  [address] [place] [address]    /74 (BP Location: Left arm, Patient Position: Sitting, Cuff Size: Adult Large)   Pulse 83   Resp 16   SpO2 98%     Do you need any medication refills at today's visit? None    Xavier Smith, EMT

## 2024-07-25 NOTE — PROGRESS NOTES
Assessment/Plan :   Type 2 DM. We had a long discussion regarding Dara's insulin. He states that his blood sugars are elevated due to a recent hospitalization. He feels like when he is home and managing his blood sugars, on his own, his numbers are better. We discussed the MOA of Humulin R U500 and the importance of taking the insulin, consistently. He does not want to make adjustments today because he is worried that his blood sugars will get too low. He will continue to adjust his insulin as needed. We will follow-up in 3-4 mos. I am expecting his hemoglobin A1C to be lower.      I have independently reviewed and interpreted labs, imaging as indicated.      Chief complaint:  Roberto is a 63 year old male who returns for follow-up of Type 2 DM with long term use of insulin.    I have reviewed Care Everywhere including [facility], [facility], [facility], [facility], [facility], [facility] , [facility], [place] lab reports, imaging reports and provider notes as indicated.      HISTORY OF PRESENT ILLNESS  Roberto continues to work on managing his blood sugars. He is frustrated with his insulin and his blood sugars. He states that he does not take the Humulin R U500 in the morning when his blood sugars are low. He has tried taking smaller doses but his blood sugars will still drop. He finds that it is easier to just skip the dosing and then take his lunch and dinner dose. He states that his blood sugars are usually in the 130s in the morning but will occasionally increase to 300 mg/dl before dinner. He also takes Jardiance 25 mg daily and Trulicity 3 mg weekly.     Roberto has returned to monitoring his blood sugars with fingerstick testing daily. He usually checks in the morning and before dinner. He has not had any problems with severe hyperglycemia and/or hypoglycemia. He has had a few blood sugars drop into the 60s after he takes his morning Humulin dose. He states that he feels horrible when his  numbers drop. He has not had any problems with blurred vision. He does have a significant history of severe peripheral neuropathy and CKD stage 4.     He has had diabetes since 2007. He has struggled to get his hemoglobin A1C under 8%. He has used a combination of insulin and oral medications for many years. He struggles with medication compliance and was unable to consistently administer meal time insulin, so we transitioned him from MDI therapy to Humulin R U500 in April of 2023. This initially led to an improvement in his blood sugar control. Recently, he has again struggled with compliance. He also admits to eating whatever he wants to eat.  His diabetes is complicated by a history of nonpoliferative diabetic retinopathy with macular edema, hyperlipidemia, HTN, obesity, CKD stage 4 and KELBY.     Endocrine relevant labs are as follows:   Latest Reference Range & Units 07/25/24 10:04   Afinion Hemoglobin A1c POCT <=5.7 % 10.4 (H)   (H): Data is abnormally high   Latest Reference Range & Units 02/20/24 09:05   Hemoglobin A1C 0.0 - 5.6 % 9.7 (H)   (H): Data is abnormally high   Latest Reference Range & Units 02/20/24 09:05   Albumin Urine mg/g Cr 0.00 - 17.00 mg/g Cr  0.00 - 17.00 mg/g Cr 550.70 (H)  562.38 (H)   (H): Data is abnormally high   Latest Reference Range & Units 02/20/24 09:05   Albumin Urine mg/L mg/L  mg/L 277.0  284.0     REVIEW OF SYSTEMS    Endocrine: positive for diabetes and obesity  Skin: negative  Eyes: negative for, visual blurring, redness, tearing  Ears/Nose/Throat: negative  Respiratory: No shortness of breath, dyspnea on exertion, cough, or hemoptysis  Cardiovascular: negative for, chest pain, dyspnea on exertion, syncope or near-syncope, and exercise intolerance  Gastrointestinal: negative for, nausea, vomiting, constipation, and diarrhea  Genitourinary: negative for, nocturia, dysuria, frequency, and urgency  Musculoskeletal: negative for, muscular weakness, nocturnal cramping, and foot  pain  Neurologic: positive for numbness or tingling of feet, negative for, and numbness or tingling of hands  Psychiatric: negative  Hematologic/Lymphatic/Immunologic: negative    Past Medical History  Past Medical History:   Diagnosis Date    Diabetes (H)     Hypertension        Medications  Current Outpatient Medications   Medication Sig Dispense Refill    ACE/ARB/ARNI NOT PRESCRIBED (INTENTIONAL) Please choose reason not prescribed from choices below.      aspirin 81 MG EC tablet Take 1 tablet (81 mg) by mouth daily 90 tablet 1    atorvastatin (LIPITOR) 80 MG tablet Take 1 tablet (80 mg) by mouth daily 90 tablet 3    blood glucose (ACCU-CHEK GUIDE) test strip Use to test blood sugar 4 times daily or as directed. *Dispense ACCU-CHEK GUIDE VI or use brand compatible with patients insurance and/or device. 400 strip 2    blood glucose monitoring (ACCU-CHEK FASTCLIX) lancets Use with lanceting device. To accompany: Blood Glucose Monitor Brands: per insurance. 102 each 11    carvedilol (COREG) 12.5 MG tablet Take 1 tablet (12.5 mg) by mouth 2 times daily (with meals) 180 tablet 1    Cholecalciferol (VITAMIN D) 2000 units tablet Take 2,000 Units by mouth (Patient not taking: Reported on 5/7/2024)      Dulaglutide (TRULICITY) 3 MG/0.5ML SOPN Inject 3 mg subcutaneously every 7 days 2 mL 3    empagliflozin (JARDIANCE) 25 MG TABS tablet Take 1 tablet (25 mg) by mouth daily Further refills per endo 30 tablet 0    fenofibrate (TRIGLIDE/LOFIBRA) 160 MG tablet Take 1 tablet (160 mg) by mouth daily 90 tablet 3    furosemide (LASIX) 20 MG tablet Take 1 tablet (20 mg) by mouth 2 times daily 180 tablet 3    GLOBAL EASE INJECT PEN NEEDLES 31G X 5 MM miscellaneous USE TO INJECT INSULIN 5 TIMES A  each 11    HUMULIN R U-500 KWIKPEN 500 UNIT/ML PEN soln Inj 113 units subcutaneous with breakfast and 110 units subcutaneous with lunch and up to 130 units for dinner. (Max daily dose of 370 units) 45 mL 3    isosorbide mononitrate  (IMDUR) 60 MG 24 hr tablet Take 1 tablet (60 mg) by mouth daily 90 tablet 3    omega-3 acid ethyl esters (LOVAZA) 1 g capsule Take 1 capsule by mouth 2 times daily 90 capsule 1    Omega-3 Fatty Acids (FISH OIL) 1200 MG capsule TAKE 1 CAPSULE BY MOUTH 2 (TWO) TIMES A  capsule 3    order for DME Equipment being ordered: Digital home blood pressure monitor kit 1 Device 1    senna-docusate (SENNA-PLUS) 8.6-50 MG tablet TAKE 1 TAB BY MOUTH TWO TIMES DAILY AS NEEDED FOR CONSTIPATION. 60 tablet 10    silver sulfADIAZINE (SILVADENE) 1 % external cream Apply topically daily (Patient not taking: Reported on 5/7/2024) 50 g 0    spironolactone (ALDACTONE) 25 MG tablet Take 1 tablet (25 mg) by mouth daily 90 tablet 3    UltiCare Alcohol Swabs 70 % PADS Use to swab area of injection/mary kay as directed. 200 each 11    vitamin D2 (ERGOCALCIFEROL) 28644 units (1250 mcg) capsule TAKE 1 CAPSULE (50,000 UNITS) BY MOUTH ONCE A WEEK. NEEDS TO BE SEEN FOR FURTHER REFILLS (Patient not taking: Reported on 5/7/2024) 1 capsule 10       Allergies  Allergies   Allergen Reactions    Doxycycline Other (See Comments) and Swelling     Facial swelling  facial   swelling    Facial swelling  facial   swelling      Influenza Vac Split [Influenza Virus Vaccine] Itching     And red /blue arms    Penicillins Rash         Family History  family history includes Cerebrovascular Disease in his mother; Dementia in his father; Diabetes in his father and mother; Hypertension in his mother; Kidney Disease in his mother; Osteoporosis in his mother; Prostate Cancer in his father.    Social History  Social History     Tobacco Use    Smoking status: Never    Smokeless tobacco: Never   Vaping Use    Vaping status: Never Used   Substance Use Topics    Alcohol use: No    Drug use: No       Physical Exam  /74 (BP Location: Left arm, Patient Position: Sitting, Cuff Size: Adult Large)   Pulse 83   Resp 16   SpO2 98%   There is no height or weight on file to  calculate BMI.  GENERAL :  In no apparent distress. He is accompanied by his daughter who is acting as   SKIN: Normal color, normal temperature, texture.  No hirsutism, alopecia or purple striae.     EYES: PERRL, EOMI, No scleral icterus,  No proptosis, conjunctival redness, stare, retraction  RESP: Lungs clear to auscultation bilaterally  CARDIAC: Regular rate and rhythm, normal S1 S2, without murmurs, rubs or gallops    NEURO: awake, alert, responds appropriately to questions.  Cranial nerves intact.   Moves all extremities; he is using a wheelchair for ambulation.  No tremor of the outstretched hand.    EXTREMITIES: No clubbing, cyanosis. Significant lower extremity edema.    DATA REVIEW  He did not bring his glucometer to the visit.

## 2024-08-16 NOTE — PROGRESS NOTES
Assessment & Plan     Type 2 diabetes mellitus with other specified complication, with long-term current use of insulin (H)  Poor control due to pt noncompliance. Is seeing endo  - [facility] Order for DME - ONLY FOR DME    Encounter for long-term (current) insulin use (H)  Seeing endo    CKD (chronic kidney disease) stage 4, GFR 15-29 ml/min (H)  Stage 4, sees nephrology  - BASIC METABOLIC PANEL; Future  - Albumin Random Urine Quantitative with Creat Ratio; Future  - **CBC with platelets FUTURE 14d; Future  - BASIC METABOLIC PANEL  - Albumin Random Urine Quantitative with Creat Ratio  - **CBC with platelets FUTURE 14d    Moderate nonproliferative diabetic retinopathy of both eyes with macular edema associated with type 2 diabetes mellitus (H)  Sees optho  - [facility] Order for DME - ONLY FOR DME    Polyneuropathy associated with underlying disease (H24)  At risk for fall due to this  - [facility] Order for DME - ONLY FOR DME    Morbid obesity (H)  Contributes to diabetes/htn/cholesterol  - [facility] Order for DME - ONLY FOR DME    Peripheral vascular disease (H24)    - [facility] Order for DME - ONLY FOR DME    Itching  Trial of otc anthistamine as needed  May need to add pepcid  Follow-up with allergist if unable to determine source of allergy  - Adult Allergy/Asthma  Referral; Future    Hypertension goal BP (blood pressure) < 140/90  At goal on meds    Diabetic ulcer of left midfoot associated with type 2 diabetes mellitus, with necrosis of bone (H)  Seeing podiatrist    Generalized muscle weakness  Due to deconditioning, poor compliance with management of chronic disease      The longitudinal plan of care for the diagnosis(es)/condition(s) as documented were addressed during this visit. Due to the added complexity in care, I will continue to support Roberto in the subsequent management and with ongoing continuity of care.    Blood sugar testing frequency justification:  Uncontrolled diabetes        Subjective    Roberto is a 63 year old, presenting for the following health issues:  Surgical Followup        8/19/2024     6:47 AM   Additional Questions   Roomed by Xena Mccallum MA   Accompanied by Daughter     - discuss allergies.  Pt itchy in face and neck/arms when eating certain foods, daughter thinks it is some type of spice but not certain. 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   He lives in apartment with wife. Home has been unlivable til repaired  Son pays for apartment. Sometimes he is home alone and has fallen and can't get up by himself    History of Present Illness       CKD: He is not using over the counter pain medicine.     Diabetes:   He presents for follow up of diabetes.  He is checking home blood glucose four or more times daily.   He checks blood glucose before meals.  Blood glucose is sometimes over 200 and never under 70. He is aware of hypoglycemia symptoms including none and other. When his blood glucose is low, the patient is asymptomatic for confusion, blurred vision, lethargy and reports not feeling dizzy, shaky, or weak.   He has no concerns regarding his diabetes at this time.  He is having numbness in feet, redness, sores, or blisters on feet, excessive thirst and blurry vision.            Hypertension: He presents for follow up of hypertension.  He does check blood pressure  regularly outside of the clinic. Outside blood pressures have been over 140/90. He follows a low salt diet.     Headaches:   Since the patient's last clinic visit, headaches are: no change  The patient is getting headaches:  Almost daily  it can verry have some days pain free  He  "is able to do normal daily activities when he has a migraine.  The patient is taking the following rescue/relief medications:  Other   Patient states \"I get no relief\" from the rescue/relief medications.   The patient is taking the following medications to prevent migraines:  No medications to prevent migraines  In the past 4 weeks, the patient has gone to an Urgent Care or Emergency Room 0 times times due to headaches.    He eats 2-3 servings of fruits and vegetables daily.He consumes 2 sweetened beverage(s) daily.He exercises with enough effort to increase his heart rate 9 or less minutes per day.  He exercises with enough effort to increase his heart rate 3 or less days per week.   He is taking medications regularly.                 Review of Systems  Constitutional, HEENT, cardiovascular, pulmonary, gi and gu systems are negative, except as otherwise noted.      Objective    /71   Pulse 82   Temp 97.5  F (36.4  C) (Tympanic)   Resp 16   Ht 1.765 m (5' 9.5\")   Wt 134.3 kg (296 lb)   SpO2 98%   BMI 43.08 kg/m    Body mass index is 43.08 kg/m .  Physical Exam   GENERAL: alert and no distress  RESP: lungs clear to auscultation - no rales, rhonchi or wheezes  CV: regular rate and rhythm, normal S1 S2, no S3 or S4, no murmur, click or rub, no peripheral edema   ABDOMEN: soft, nontender, no hepatosplenomegaly, no masses and bowel sounds normal    Results for orders placed or performed in visit on 08/19/24 (from the past 24 hour(s))   **CBC with platelets FUTURE 14d   Result Value Ref Range    WBC Count 6.0 4.0 - 11.0 10e3/uL    RBC Count 3.68 (L) 4.40 - 5.90 10e6/uL    Hemoglobin 11.1 (L) 13.3 - 17.7 g/dL    Hematocrit 34.2 (L) 40.0 - 53.0 %    MCV 93 78 - 100 fL    MCH 30.2 26.5 - 33.0 pg    MCHC 32.5 31.5 - 36.5 g/dL    RDW 16.4 (H) 10.0 - 15.0 %    Platelet Count 185 150 - 450 10e3/uL           Signed Electronically by: Jaylyn Bellamy MD      "

## 2024-08-19 ENCOUNTER — OFFICE VISIT (OUTPATIENT)
Dept: FAMILY MEDICINE | Facility: CLINIC | Age: 64
End: 2024-08-19
Payer: MEDICARE

## 2024-08-19 VITALS
OXYGEN SATURATION: 98 % | TEMPERATURE: 97.5 F | WEIGHT: 296 LBS | BODY MASS INDEX: 42.37 KG/M2 | SYSTOLIC BLOOD PRESSURE: 126 MMHG | HEIGHT: 70 IN | RESPIRATION RATE: 16 BRPM | DIASTOLIC BLOOD PRESSURE: 71 MMHG | HEART RATE: 82 BPM

## 2024-08-19 DIAGNOSIS — L29.9 ITCHING: ICD-10-CM

## 2024-08-19 DIAGNOSIS — N18.4 CKD (CHRONIC KIDNEY DISEASE) STAGE 4, GFR 15-29 ML/MIN (H): ICD-10-CM

## 2024-08-19 DIAGNOSIS — M62.81 GENERALIZED MUSCLE WEAKNESS: ICD-10-CM

## 2024-08-19 DIAGNOSIS — I10 HYPERTENSION GOAL BP (BLOOD PRESSURE) < 140/90: ICD-10-CM

## 2024-08-19 DIAGNOSIS — E11.3313 MODERATE NONPROLIFERATIVE DIABETIC RETINOPATHY OF BOTH EYES WITH MACULAR EDEMA ASSOCIATED WITH TYPE 2 DIABETES MELLITUS (H): ICD-10-CM

## 2024-08-19 DIAGNOSIS — G63 POLYNEUROPATHY ASSOCIATED WITH UNDERLYING DISEASE (H): ICD-10-CM

## 2024-08-19 DIAGNOSIS — I73.9 PERIPHERAL VASCULAR DISEASE (H): ICD-10-CM

## 2024-08-19 DIAGNOSIS — L97.424 DIABETIC ULCER OF LEFT MIDFOOT ASSOCIATED WITH TYPE 2 DIABETES MELLITUS, WITH NECROSIS OF BONE (H): ICD-10-CM

## 2024-08-19 DIAGNOSIS — E11.621 DIABETIC ULCER OF LEFT MIDFOOT ASSOCIATED WITH TYPE 2 DIABETES MELLITUS, WITH NECROSIS OF BONE (H): ICD-10-CM

## 2024-08-19 DIAGNOSIS — E66.01 MORBID OBESITY (H): ICD-10-CM

## 2024-08-19 DIAGNOSIS — E11.69 TYPE 2 DIABETES MELLITUS WITH OTHER SPECIFIED COMPLICATION, WITH LONG-TERM CURRENT USE OF INSULIN (H): Primary | ICD-10-CM

## 2024-08-19 DIAGNOSIS — Z79.4 ENCOUNTER FOR LONG-TERM (CURRENT) INSULIN USE (H): ICD-10-CM

## 2024-08-19 DIAGNOSIS — Z79.4 TYPE 2 DIABETES MELLITUS WITH OTHER SPECIFIED COMPLICATION, WITH LONG-TERM CURRENT USE OF INSULIN (H): Primary | ICD-10-CM

## 2024-08-19 LAB
ANION GAP SERPL CALCULATED.3IONS-SCNC: 11 MMOL/L (ref 7–15)
BUN SERPL-MCNC: 35 MG/DL (ref 8–23)
CALCIUM SERPL-MCNC: 9.4 MG/DL (ref 8.8–10.4)
CHLORIDE SERPL-SCNC: 100 MMOL/L (ref 98–107)
CREAT SERPL-MCNC: 2.44 MG/DL (ref 0.67–1.17)
CREAT UR-MCNC: 69.8 MG/DL
EGFRCR SERPLBLD CKD-EPI 2021: 29 ML/MIN/1.73M2
ERYTHROCYTE [DISTWIDTH] IN BLOOD BY AUTOMATED COUNT: 16.4 % (ref 10–15)
GLUCOSE SERPL-MCNC: 163 MG/DL (ref 70–99)
HCO3 SERPL-SCNC: 25 MMOL/L (ref 22–29)
HCT VFR BLD AUTO: 34.2 % (ref 40–53)
HGB BLD-MCNC: 11.1 G/DL (ref 13.3–17.7)
MCH RBC QN AUTO: 30.2 PG (ref 26.5–33)
MCHC RBC AUTO-ENTMCNC: 32.5 G/DL (ref 31.5–36.5)
MCV RBC AUTO: 93 FL (ref 78–100)
MICROALBUMIN UR-MCNC: 123 MG/L
MICROALBUMIN/CREAT UR: 176.22 MG/G CR (ref 0–17)
PLATELET # BLD AUTO: 185 10E3/UL (ref 150–450)
POTASSIUM SERPL-SCNC: 4.1 MMOL/L (ref 3.4–5.3)
RBC # BLD AUTO: 3.68 10E6/UL (ref 4.4–5.9)
SODIUM SERPL-SCNC: 136 MMOL/L (ref 135–145)
WBC # BLD AUTO: 6 10E3/UL (ref 4–11)

## 2024-08-19 PROCEDURE — 85027 COMPLETE CBC AUTOMATED: CPT | Performed by: FAMILY MEDICINE

## 2024-08-19 PROCEDURE — 80048 BASIC METABOLIC PNL TOTAL CA: CPT | Performed by: FAMILY MEDICINE

## 2024-08-19 PROCEDURE — G2211 COMPLEX E/M VISIT ADD ON: HCPCS | Performed by: FAMILY MEDICINE

## 2024-08-19 PROCEDURE — 36415 COLL VENOUS BLD VENIPUNCTURE: CPT | Performed by: FAMILY MEDICINE

## 2024-08-19 PROCEDURE — 82570 ASSAY OF URINE CREATININE: CPT | Performed by: FAMILY MEDICINE

## 2024-08-19 PROCEDURE — 99214 OFFICE O/P EST MOD 30 MIN: CPT | Performed by: FAMILY MEDICINE

## 2024-08-19 PROCEDURE — 82043 UR ALBUMIN QUANTITATIVE: CPT | Performed by: FAMILY MEDICINE

## 2024-08-19 ASSESSMENT — PAIN SCALES - GENERAL: PAINLEVEL: NO PAIN (0)

## 2024-08-19 NOTE — LETTER
August 21, 2024      Roberto HOUSTON Thompson  [address] [place]  [place] [id number]        Roberto,     The protein in your urine is stable. This is due to your kidney disease.  Your electrolytes, kidney function and blood sugar are stable.  Your hemoglobin is down a little bit, likely due to your recent surgery.     Resulted Orders   BASIC METABOLIC PANEL   Result Value Ref Range    Sodium 136 135 - 145 mmol/L    Potassium 4.1 3.4 - 5.3 mmol/L    Chloride 100 98 - 107 mmol/L    Carbon Dioxide (CO2) 25 22 - 29 mmol/L    Anion Gap 11 7 - 15 mmol/L    Urea Nitrogen 35.0 (H) 8.0 - 23.0 mg/dL    Creatinine 2.44 (H) 0.67 - 1.17 mg/dL    GFR Estimate 29 (L) >60 mL/min/1.73m2      Comment:      eGFR calculated using 2021 CKD-EPI equation.    Calcium 9.4 8.8 - 10.4 mg/dL      Comment:      Reference intervals for this test were updated on 7/16/2024 to reflect our healthy population more accurately. There may be differences in the flagging of prior results with similar values performed with this method. Those prior results can be interpreted in the context of the updated reference intervals.    Glucose 163 (H) 70 - 99 mg/dL   Albumin Random Urine Quantitative with Creat Ratio   Result Value Ref Range    Creatinine Urine mg/dL 69.8 mg/dL      Comment:      The reference ranges have not been established in urine creatinine. The results should be integrated into the clinical context for interpretation.    Albumin Urine mg/L 123.0 mg/L      Comment:      The reference ranges have not been established in urine albumin. The results should be integrated into the clinical context for interpretation.    Albumin Urine mg/g Cr 176.22 (H) 0.00 - 17.00 mg/g Cr      Comment:      Microalbuminuria is defined as an albumin:creatinine ratio of 17 to 299 for males and 25 to 299 for females. A ratio of albumin:creatinine of 300 or higher is indicative of overt proteinuria.  Due to biologic variability, positive results should be confirmed  by a second, first-morning random or 24-hour timed urine specimen. If there is discrepancy, a third specimen is recommended. When 2 out of 3 results are in the microalbuminuria range, this is evidence for incipient nephropathy and warrants increased efforts at glucose control, blood pressure control, and institution of therapy with an angiotensin-converting-enzyme (ACE) inhibitor (if the patient can tolerate it).     **CBC with platelets FUTURE 14d   Result Value Ref Range    WBC Count 6.0 4.0 - 11.0 10e3/uL    RBC Count 3.68 (L) 4.40 - 5.90 10e6/uL    Hemoglobin 11.1 (L) 13.3 - 17.7 g/dL    Hematocrit 34.2 (L) 40.0 - 53.0 %    MCV 93 78 - 100 fL    MCH 30.2 26.5 - 33.0 pg    MCHC 32.5 31.5 - 36.5 g/dL    RDW 16.4 (H) 10.0 - 15.0 %    Platelet Count 185 150 - 450 10e3/uL     If you have any questions or concerns, please call the clinic at the number listed above.     Sincerely,        Jaylyn Bellamy MD/bmc

## 2024-08-22 ENCOUNTER — MEDICAL CORRESPONDENCE (OUTPATIENT)
Dept: HEALTH INFORMATION MANAGEMENT | Facility: CLINIC | Age: 64
End: 2024-08-22
Payer: COMMERCIAL

## 2024-09-19 ENCOUNTER — OFFICE VISIT (OUTPATIENT)
Dept: FAMILY MEDICINE | Facility: CLINIC | Age: 64
End: 2024-09-19
Payer: MEDICARE

## 2024-09-19 VITALS
OXYGEN SATURATION: 94 % | SYSTOLIC BLOOD PRESSURE: 147 MMHG | HEART RATE: 82 BPM | TEMPERATURE: 98.2 F | RESPIRATION RATE: 20 BRPM | WEIGHT: 290 LBS | DIASTOLIC BLOOD PRESSURE: 77 MMHG | BODY MASS INDEX: 42.95 KG/M2 | HEIGHT: 69 IN

## 2024-09-19 DIAGNOSIS — Z02.89 ENCOUNTER FOR OTHER ADMINISTRATIVE EXAMINATIONS: Primary | ICD-10-CM

## 2024-09-19 PROCEDURE — G2211 COMPLEX E/M VISIT ADD ON: HCPCS | Performed by: FAMILY MEDICINE

## 2024-09-19 PROCEDURE — 99213 OFFICE O/P EST LOW 20 MIN: CPT | Performed by: FAMILY MEDICINE

## 2024-09-19 ASSESSMENT — PAIN SCALES - GENERAL: PAINLEVEL: EXTREME PAIN (8)

## 2024-09-19 NOTE — PROGRESS NOTES
"  Assessment & Plan     Encounter for other administrative examinations  Paperwork completed through review of chart  Paperwork discussed with patient and family    The longitudinal plan of care for the diagnosis(es)/condition(s) as documented were addressed during this visit. Due to the added complexity in care, I will continue to support Roberto in the subsequent management and with ongoing continuity of care.        BMI  Estimated body mass index is 42.83 kg/m  as calculated from the following:    Height as of this encounter: 1.753 m (5' 9\").    Weight as of this encounter: 131.5 kg (290 lb).   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 Paperwork scanned into chart.  Interpretor was used           Review of Systems  Constitutional, HEENT, cardiovascular, pulmonary, gi and gu systems are negative, except as otherwise noted.      Objective    BP (!) 144/79   Pulse 82   Temp 98.2  F (36.8  C) (Oral)   Resp 20   Ht 1.753 m (5' 9\")   Wt 131.5 kg (290 lb)   SpO2 94%   BMI 42.83 kg/m    Body mass index is 42.83 kg/m .  Physical Exam   GENERAL: alert and no distress    No results found for any visits on 09/19/24.        Signed Electronically by: Jaylyn Bellamy MD    "

## 2024-10-16 DIAGNOSIS — N18.4 CKD (CHRONIC KIDNEY DISEASE) STAGE 4, GFR 15-29 ML/MIN (H): Primary | ICD-10-CM

## 2024-10-16 NOTE — PROGRESS NOTES
Newly aligned [facility] patient. Pt has CKD stage 4. CKD Journey referral placed.     Cora Helm RN, BSN  Nephrology Supervisor  [facility]

## 2024-10-23 ENCOUNTER — PATIENT OUTREACH (OUTPATIENT)
Dept: NEPHROLOGY | Facility: CLINIC | Age: 64
End: 2024-10-23

## 2024-10-23 NOTE — PROGRESS NOTES
New CKD Journey Enrollement.  Newly aligned [facility] patient. Riccardos Fortunato. Was supposed to follow up in August, nothing scheduled to date.

## 2024-10-30 NOTE — PROGRESS NOTES
[facility] [place] [facility]   Cardiology Clinic Note      HPI:   Mr. Roberto Thompson is a pleasant 64 year old male with medical history pertinent for subclinical CAD with severe CAC, HTN, Uncontrolled DM2, CKD4, HLD, Obesity, KELBY on CPAP, and mild carotid stenosis. He presents to cardiology clinic for 6 month follow up.     Patient previously seen by Dr. Reynaga 11/10/23. At that time, patient reported doing well without any intercurrent cardiac illness or hospitalization. He reported a decrease in his vision and was told his Coreg could be responsible. Dr. Reynaga reassured patient that Coreg is not associated with vision problems. He had reported that his blood pressures were running in the 140s at home. His Imdur was increased to 60mg daily at last visit, but patient denies ever having started this medication.     Since his last visit, patient reports feeling well and denies any symptoms or complaints. He reports that his blood pressures tend to moses 120s-140s. He rarely has SBP near 180, but reports he hasn't had that in a long time.   His primary concern today is an itchiness around his beard and in his ears that he feels is due to allergies; has not seen his PCP for this.   He reports occasional dizziness/unsteadiness on his feet, but he attributes this to his neuropathy and declines further workup at this time.      Today in clinic, he denies chest pain, palpitations, syncope, or lower extremity edema.       History obtained via family member acting as [place]  .      Interval History (11/05/24)  At last visit, patient was instructed to increase his Imdur to 60mg daily which he reports he has been taking. He reports he has only been taking Coreg once daily due to hypotension with the 2nd dose. He reports his pressures were in the 90s with the 2nd dose.   Today in clinic, patient reports intermittent chest discomfort that is occurring primarily at rest. He describes it as a stabbing sensation that does  not last long. The discomfort is mid-sternal and happens less than once per week. He is not interested in doing a lot of work up for his symptoms. He reports occasional chronic BLE edema after either sitting or walking for a long period of time. He has chronic numbness in his BLE 2/2 neuropathy.   Of note, patient with admission 7/15 - 7/18 at OSH for osteomyelitis and sepsis. 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    Today in clinic, he denies palpitations, dizziness, or syncope.     PAST MEDICAL HISTORY:  Past Medical History:   Diagnosis Date    Diabetes (H)     Hypertension        FAMILY HISTORY:  Family History   Problem Relation Age of Onset    Diabetes Mother     Hypertension Mother     Cerebrovascular Disease Mother     Kidney Disease Mother     Osteoporosis Mother     Diabetes Father     Prostate Cancer Father     Dementia Father        SOCIAL HISTORY:  Social History     Socioeconomic History    Marital status:    Tobacco Use    Smoking status: Never    Smokeless tobacco: Never   Vaping Use    Vaping status: Never Used   Substance and Sexual Activity    Alcohol use: No    Drug use: No    Sexual activity: Yes     Partners: Female     Birth control/protection: None     Social Drivers of Health     Financial Resource Strain: Low Risk  (1/9/2024)    Financial Resource Strain     Within the past 12 months, have you or your family members you live with been unable to get utilities (heat, electricity) when it was really needed?: No   Food Insecurity: No Food Insecurity (7/15/2024)    Received from [facility]     Hunger Vital Sign     Worried About Running Out of Food in the Last Year: Never true     Ran Out of Food in the Last Year: Never true   Transportation Needs: No Transportation Needs (7/15/2024)    Received from [facility] - Transportation     Lack of Transportation (Medical): No     Lack of Transportation (Non-Medical): No     [facility], [facility]    Social Connections   Interpersonal Safety: Low Risk  (8/19/2024)    Interpersonal Safety     Do you feel physically and emotionally safe where you currently live?: Yes     Within the past 12 months, have you been hit, slapped, kicked or otherwise physically hurt by someone?: No     Within the past 12 months, have you been humiliated or emotionally abused in other ways by your partner or ex-partner?: No   Housing Stability: Low Risk  (7/15/2024)    Received from [facility]     Housing Stability Vital Sign     Unable to Pay for Housing in the Last Year: No     Number of Times Moved in the Last Year: 0     Homeless in the Last Year: No       CURRENT MEDICATIONS:  Current Outpatient Medications   Medication Sig Dispense Refill    aspirin 81 MG EC tablet Take 1 tablet (81 mg) by mouth daily 90 tablet 1    atorvastatin (LIPITOR) 80 MG tablet Take 1 tablet (80 mg) by mouth daily 90 tablet 3    blood glucose (ACCU-CHEK GUIDE) test strip Use to test blood sugar 4 times daily or as directed. *Dispense ACCU-CHEK GUIDE VI or use brand compatible with patients insurance and/or device. 400 strip 2    blood glucose monitoring (ACCU-CHEK FASTCLIX) lancets Use with lanceting device. To accompany: Blood Glucose Monitor Brands: per insurance. 102 each 11    Dulaglutide (TRULICITY) 3 MG/0.5ML SOPN Inject 3 mg subcutaneously every 7 days 2 mL 3    empagliflozin (JARDIANCE) 25 MG TABS tablet Take 1 tablet (25 mg) by mouth daily Further refills per endo 30 tablet 0    fenofibrate (TRIGLIDE/LOFIBRA) 160 MG tablet Take 1 tablet (160 mg) by mouth daily 90 tablet 3    furosemide (LASIX) 20 MG tablet Take 1 tablet (20 mg) by mouth 2 times daily 180 tablet 3    GLOBAL EASE INJECT PEN NEEDLES 31G X 5 MM miscellaneous USE TO INJECT INSULIN 5 TIMES A  each 11    HUMULIN R U-500 KWIKPEN 500 UNIT/ML PEN soln Inj 113 units  subcutaneous with breakfast and 110 units subcutaneous with lunch and up to 130 units for dinner. (Max daily dose of 370 units) 45 mL 3    isosorbide mononitrate (IMDUR) 60 MG 24 hr tablet Take 1 tablet (60 mg) by mouth daily 90 tablet 3    metoprolol succinate ER (TOPROL XL) 25 MG 24 hr tablet Take 1 tablet (25 mg) by mouth daily. 90 tablet 3    senna-docusate (SENNA-PLUS) 8.6-50 MG tablet TAKE 1 TAB BY MOUTH TWO TIMES DAILY AS NEEDED FOR CONSTIPATION. 60 tablet 10    spironolactone (ALDACTONE) 25 MG tablet Take 1 tablet (25 mg) by mouth daily 90 tablet 3    UltiCare Alcohol Swabs 70 % PADS Use to swab area of injection/mary kay as directed. 200 each 11    ACE/ARB/ARNI NOT PRESCRIBED (INTENTIONAL) Please choose reason not prescribed from choices below. (Patient not taking: Reported on 11/5/2024)      silver sulfADIAZINE (SILVADENE) 1 % external cream Apply topically daily (Patient not taking: Reported on 11/5/2024) 50 g 0     Current Facility-Administered Medications   Medication Dose Route Frequency Provider Last Rate Last Admin    silver sulfADIAZINE (SILVADENE) 1 % cream   Topical Once Marianela Maldonado MD           ROS:   Refer to HPI    EXAM:  /74 (BP Location: Left arm, Patient Position: Chair, Cuff Size: Adult Large)   Pulse 79   Wt 137 kg (302 lb)   SpO2 95%   BMI 44.60 kg/m    GENERAL: Appears comfortable, in no acute distress.   HEENT: Eye symmetrical, no discharge or icterus bilaterally.   CV: RRR, +S1S2, no murmur, rub, or gallop.   RESPIRATORY: Respirations regular, even, and unlabored. Lungs CTA throughout.   GI: Soft and non distended   EXTREMITIES: trace peripheral edema.   NEUROLOGIC: Alert and oriented x 3. No focal deficits.   MUSCULOSKELETAL: No joint swelling or tenderness.   SKIN: No jaundice. No rashes or lesions.     Labs, reviewed with patient in clinic today:  CBC RESULTS:  Lab Results   Component Value Date    WBC 6.0 08/19/2024    WBC 6.5 12/29/2020    RBC 3.68 (L) 08/19/2024     "RBC 4.26 (L) 12/29/2020    HGB 11.1 (L) 08/19/2024    HGB 13.2 (L) 12/29/2020    HCT 34.2 (L) 08/19/2024    HCT 38.7 (L) 12/29/2020    MCV 93 08/19/2024    MCV 91 12/29/2020    MCH 30.2 08/19/2024    MCH 31.0 12/29/2020    MCHC 32.5 08/19/2024    MCHC 34.1 12/29/2020    RDW 16.4 (H) 08/19/2024    RDW 13.4 12/29/2020     08/19/2024     12/29/2020       CMP RESULTS:  Lab Results   Component Value Date     08/19/2024     05/26/2021    POTASSIUM 4.1 08/19/2024    POTASSIUM 4.9 02/09/2022    POTASSIUM 4.4 05/26/2021    CHLORIDE 100 08/19/2024    CHLORIDE 100 02/09/2022    CHLORIDE 101 05/26/2021    CO2 25 08/19/2024    CO2 26 02/09/2022    CO2 30 05/26/2021    ANIONGAP 11 08/19/2024    ANIONGAP 7 02/09/2022    ANIONGAP 5 05/26/2021     (H) 08/19/2024     (H) 02/09/2022     (H) 05/26/2021    BUN 35.0 (H) 08/19/2024    BUN 45 (H) 02/09/2022    BUN 39 (H) 05/26/2021    CR 2.44 (H) 08/19/2024    CR 2.34 (H) 05/26/2021    GFRESTIMATED 29 (L) 08/19/2024    GFRESTIMATED 29 (L) 05/26/2021    GFRESTBLACK 33 (L) 05/26/2021    CYN 9.4 08/19/2024    CYN 9.3 05/26/2021    BILITOTAL 0.4 12/18/2018    ALBUMIN 4.7 02/20/2024    ALBUMIN 3.9 12/29/2020    ALKPHOS 76 12/18/2018    ALT 31 12/18/2018    AST 29 08/14/2020        INR RESULTS:  No results found for: \"INR\"    No results found for: \"MAG\"  No results found for: \"NTBNPI\"  Lab Results   Component Value Date    NTBNP 94 02/24/2023    NTBNP 162 (H) 12/18/2018       LIPIDS:  Lab Results   Component Value Date    CHOL 274 11/10/2023    CHOL 212 10/27/2020     Lab Results   Component Value Date    HDL 28 11/10/2023    HDL 35 10/27/2020     Lab Results   Component Value Date    LDL  11/10/2023      Comment:      Cannot estimate LDL when triglyceride exceeds 400 mg/dL    LDL 84 11/10/2023    LDL 75 02/24/2023    LDL  10/27/2020     Cannot estimate LDL when triglyceride exceeds 400 mg/dL    LDL 95 10/27/2020     Lab Results   Component Value " Date    TRIG 1,043 11/10/2023    TRIG 652 10/27/2020       Assessment and Plan:   Mr. Thompson is a 64 year old male with a PMH of subclinical CAD with severe CAC, HTN, DM2, CKD4, HLD, Obesity, KELBY on CPAP, and mild carotid stenosis     # Subclinical CAD with severe CAC  # HLD w/ LDL goal <70  # Mild carotid stenosis  Patient denies any symptoms at this time and reports he is able to complete the activity he would like to without limitations. We discussed that his cholesterol is not well controlled, he is not interested in changing his medication regimen, but is open to rechecking his lipid panel in the coming months.   - Continue ASA 81mg daily + Lipitor 80 and fenofibrate 160; Lovaza 1g BID  - Discussed Repatha; patient is not interested  - Recheck lipid panel    Recent Labs   Lab Test 11/10/23  1124 02/24/23  1504   CHOL 274* 303*   HDL 28* 38*   LDL 84 75   TRIG 1,043* 1,186*     # HTN   Patient reports that his blood pressures were running in the 90s if he took both doses of coreg. He therefore has only been taking it once daily.   - Continue Imdur 60mg daily   - Stop Coreg 12.5mg BID due to HOTN  - Start Toprol 25mg daily, plan to uptitrate for possible anti-anginal effects if well-tolerated     # Atypical Chest Pain  Patient reporting intermittent stabbing chest discomfort that occurs primarily at rest and only rarely with exertion. It occurs less than once per week. He is very much not interested in any testing or medication changes at this time. Instructed patient to notify cardiology clinic right away if his symptoms change or increase in frequency, would consider stress test or CTA at that time.       Follow up:  6 months with MD   Chart review time today: 11 minutes  Visit time today: 20 minutes  Total time spent today: 31 minutes        Molly Castrejon PA-C  General Cardiology   11/05/24

## 2024-11-05 ENCOUNTER — OFFICE VISIT (OUTPATIENT)
Dept: CARDIOLOGY | Facility: CLINIC | Age: 64
End: 2024-11-05
Payer: MEDICARE

## 2024-11-05 VITALS
WEIGHT: 302 LBS | HEART RATE: 79 BPM | SYSTOLIC BLOOD PRESSURE: 109 MMHG | DIASTOLIC BLOOD PRESSURE: 74 MMHG | OXYGEN SATURATION: 95 % | BODY MASS INDEX: 44.6 KG/M2

## 2024-11-05 DIAGNOSIS — I20.89 STABLE ANGINA (H): Primary | ICD-10-CM

## 2024-11-05 DIAGNOSIS — E78.1 HIGH BLOOD TRIGLYCERIDES: ICD-10-CM

## 2024-11-05 PROCEDURE — 99214 OFFICE O/P EST MOD 30 MIN: CPT

## 2024-11-05 RX ORDER — METOPROLOL SUCCINATE 25 MG/1
25 TABLET, EXTENDED RELEASE ORAL DAILY
Qty: 90 TABLET | Refills: 3 | Status: SHIPPED | OUTPATIENT
Start: 2024-11-05

## 2024-11-05 NOTE — PATIENT INSTRUCTIONS
Take your medicines every day, as directed     Changes made today:  Stop Carvedilol  Start Metoprolol 25 mg daily  Fasting labs in approximately 1 month       Cardiology Care Coordinators:      Terri MAYES RN     Cardiology Rooming Staff:  Lilia MYLES EMT    Phone  [phone]      Fax [phone]    To Contact us     During Business Hours:  [phone]     If you are needing refills please contact your pharmacy.     For urgent after hour care please call the [facility] at [phone] or the [facility] at [phone] and ask to speak to the cardiologist on call.    If you are having a medical emergency, please call 911.            HOW TO CHECK YOUR BLOOD PRESSURE AT HOME:     Avoid eating, smoking, and exercising for at least 30 minutes before taking a reading.     Be sure you have taken your BP medication at least 2-3 hours before you check it.      Sit quietly for 10 minutes before a reading.      Sit in a chair with your feet flat on the floor. Rest your  arm on a table so that the arm cuff is at the same level as your heart.     Remain still during the reading.  Record your blood pressure and pulse in a log and bring to your next appointment.       [employer] allows you to communicate directly with your heart team through secure messaging.  [employer] can be accessed any time on your phone, computer, or tablet.  If you need assistance, we'd be happy to help!             Keep your Heart Appointments:     Follow up with General Cardiologist in 6 months

## 2024-11-05 NOTE — NURSING NOTE
"Chief Complaint   Patient presents with    Follow Up       Initial /74 (BP Location: Left arm, Patient Position: Chair, Cuff Size: Adult Large)   Pulse 79   Wt 137 kg (302 lb)   SpO2 95%   BMI 44.60 kg/m   Estimated body mass index is 44.6 kg/m  as calculated from the following:    Height as of 9/19/24: 1.753 m (5' 9\").    Weight as of this encounter: 137 kg (302 lb)..  BP completed using cuff size: roberto carlos MORAN CNA  "

## 2024-11-05 NOTE — NURSING NOTE
Med Reconcile: Reviewed and verified all current medications with the patient. The updated medication list was printed and given to the patient.    Return Appointment: Patient given instructions regarding scheduling next clinic visit. Patient demonstrated understanding of this information and agreed to call with further questions or concerns. Patient to follow up in 1 year.    Medication Change: Patient was educated regarding prescribed medication change, including discussion of the indication, administration, side effects, and when to report to MD or RN. Patient demonstrated understanding of this information and agreed to call with further questions or concerns.  Patient to stop coreg and start Metoprolol 25 mg daily. Patient stated that he had no questions.    Fasting labs in approximately 1 month.    Patient stated he understood all health information given and agreed to call with further questions or concerns.     Terri Marks RN, BSN  Cardiology RN Care Coordinator   [place]   Phone: [phone]  Fax: [phone] ([place]) [phone] ([place])

## 2024-11-26 ENCOUNTER — PATIENT OUTREACH (OUTPATIENT)
Dept: FAMILY MEDICINE | Facility: CLINIC | Age: 64
End: 2024-11-26
Payer: MEDICARE

## 2024-11-26 NOTE — LETTER
November 26, 2024    To  Roberto Thompson  [address] [place] [address]    Your team at [facility] cares about your health. We have reviewed your chart and based on our findings; we are making the following recommendations to better manage your health.     You are in particular need of attention regarding the following:     Schedule a DIABETIC FOLLOW UP appointment for Lab Only Visit. Patients with diabetes should see their provider regularly.    If you have already completed these items, please contact the clinic via phone or   [facility] so your care team can review and update your records. Thank you for   choosing [facility] for your healthcare needs. For any questions,   concerns, or to schedule an appointment please contact our clinic.    Healthy Regards,      Your [facility] Care Team             [place] AMBULATORY ENCOUNTER   PODIATRY NEW PATIENT / CONSULT NOTE    REQUESTING PROVIDER: Indigo Alvarez DPM    CHIEF COMPLAINT:   Chief Complaint   Patient presents with   • Office Visit     Painful toenail with discoloration on R foot started 2-3 weeks ago.        SUBJECTIVE:  HISTORY OF PRESENT ILLNESS: Mauricio Arenas is a 47 year old male seen today for painful 2nd toenail.  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  It always gets injured when he is running but this time it was a different color.    REVIEW OF SYSTEMS:  Constitutional: Negative for fever and chills.  Skin: Negative for rash.  Neurologic: Feet were negative for changes in sensory or motor function.  Endocrine: Negative for heat or cold intolerance.  Hematologic: Patient on anticoagulants. []  YES    [x]  NO  Extremities:  Edema: none.  Tenderness: right foot only  All other systems are reviewed and negative except as noted in history of present illness.    HISTORIES:  I have reviewed the past medical history, family history, social history, medications and allergies listed in the medical record as well as the nursing notes for this encounter.    OBJECTIVE  PHYSICAL EXAM:  Visit Vitals  Temp 97.3 °F (36.3 °C) (Temporal)   Ht 6' (1.829 m)   Wt 93 kg (205 lb)   BMI 27.80 kg/m²     General:  No apparent distress. Alert and oriented.  Neurological:  Protective sensation: Intact to light touch   Vascular:  Pulses: Right  Dorsalis Pedis 2/4 and Posterior Tibial 2/4.    Dermatologic:  Skin: Clean, dry and intact.Nails right 2nd toe are thick  and discolored      Musculoskeletal:  Gait analysis: [x]  Normal    [] Abnormal        RESULT REVIEW: None    ASSESSMENT:  1. Onychodystrophy        PLAN:  No orders of the defined types were placed in this encounter.      1. Patient was examined and evaluated.  The etiology of the above condition was discussed in detail with patient. Current condition was discussed at length and the  natural progressive course if left untreated. Viable alternative modes of treatment were discussed.    Recommend nail avulsion. Procedure:    A Betadine prep was performed and 3 cc of 1% lidocaine plain was utilized to anesthetize the toe. The toe is reprepped. The toe was exsanguinated. Digital tourniquet was applied. Attention was directed to the nail plate where it was avulsed in toto. The nail bed was inspected and no nail bed damage was noted. Polysporin ointment and compression bandage was applied. The digital tourniquet was released resulting in immediate hyperemic flush to the digit. The patient tolerated the procedure and anesthesia well without apparent complication and left the office with both written and verbal postoperative instructions. Signs of infection were reviewed.  Contact the clinic or ER if those signs are noted.  Procedure: Nail avulsion right 2nd toe  Procedure:    A Betadine prep was performed and 3 cc of 1% lidocaine plain was utilized to anesthetize the toe. The toe is reprepped.  Attention was directed to the nail plate where it was avulsed in toto. The nail bed was inspected and no nail bed damage was noted. Polysporin ointment and compression bandage was applied. The digital tourniquet was released resulting in immediate hyperemic flush to the digit. The patient tolerated the procedure and anesthesia well without apparent complication and left the office with both written and verbal postoperative instructions. Signs of infection were reviewed.  Contact the clinic or ER if those signs are noted.      No follow-ups on file.    Instructions provided as documented in the after visit summary.    The patient indicated understanding of the diagnosis and agreed with the plan of care.

## 2024-11-26 NOTE — TELEPHONE ENCOUNTER
Patient Quality Outreach    Patient is due for the following:   Diabetes -  A1C    Action(s) Taken:   Schedule a lab only visit for diabetes    Type of outreach:    Sent letter.    Questions for provider review:    None         Daughter at home can read English    Solange Harris MA

## 2024-12-05 ENCOUNTER — LAB (OUTPATIENT)
Dept: LAB | Facility: CLINIC | Age: 64
End: 2024-12-05
Payer: MEDICARE

## 2024-12-05 DIAGNOSIS — E78.1 HIGH BLOOD TRIGLYCERIDES: ICD-10-CM

## 2024-12-05 LAB
CHOLEST SERPL-MCNC: 249 MG/DL
FASTING STATUS PATIENT QL REPORTED: YES
HDLC SERPL-MCNC: 32 MG/DL
LDLC SERPL CALC-MCNC: ABNORMAL MG/DL
LDLC SERPL DIRECT ASSAY-MCNC: 85 MG/DL
NONHDLC SERPL-MCNC: 217 MG/DL
TRIGL SERPL-MCNC: 660 MG/DL

## 2024-12-13 DIAGNOSIS — Z79.4 TYPE 2 DIABETES MELLITUS WITH OTHER SPECIFIED COMPLICATION, WITH LONG-TERM CURRENT USE OF INSULIN (H): ICD-10-CM

## 2024-12-13 DIAGNOSIS — E11.69 TYPE 2 DIABETES MELLITUS WITH OTHER SPECIFIED COMPLICATION, WITH LONG-TERM CURRENT USE OF INSULIN (H): ICD-10-CM

## 2024-12-18 RX ORDER — DULAGLUTIDE 3 MG/.5ML
3 INJECTION, SOLUTION SUBCUTANEOUS
Qty: 2 ML | Refills: 1 | Status: SHIPPED | OUTPATIENT
Start: 2024-12-18

## 2024-12-18 NOTE — TELEPHONE ENCOUNTER
Trulicity 3 mg/0.5 mL subcutaneous pen injector     Last Written Prescription Date:  7/22/24  Last Fill Quantity: 2ml,   # refills: 3  Last Office Visit : 7/25/24  Future Office visit:  12/27/24    Routing refill request to provider for review/approval because:  Abnormal A1c  7/15/24, outside A1c    HBA1C (GLYCOSOLATED HGB)  <5.7 % 10.8 High

## 2025-01-13 DIAGNOSIS — E11.69 TYPE 2 DIABETES MELLITUS WITH OTHER SPECIFIED COMPLICATION, WITH LONG-TERM CURRENT USE OF INSULIN (H): ICD-10-CM

## 2025-01-13 DIAGNOSIS — Z79.4 TYPE 2 DIABETES MELLITUS WITH OTHER SPECIFIED COMPLICATION, WITH LONG-TERM CURRENT USE OF INSULIN (H): ICD-10-CM

## 2025-01-13 RX ORDER — INSULIN HUMAN 500 [IU]/ML
INJECTION, SOLUTION SUBCUTANEOUS
Qty: 45 ML | Refills: 3 | Status: SHIPPED | OUTPATIENT
Start: 2025-01-13

## 2025-01-13 NOTE — TELEPHONE ENCOUNTER
Medication Requested:  Humulin R U-500 (Conc) Insulin Kwikpen 500 unit/mL (3 mL) subcutaneous       Last Written Prescription Date:  6-17-24  Last Fill Quantity: 45 ml,   # refills: 3    Last Office Visit : 12-27-24  Future Office visit:  5-15-25    Refill decision: Refill pended and routed to the provider for review/determination due to the following criteria not met:   Insulin and insulin pump supplies - refilled per Endocrine clinic.       Insulin Protocol Failed01/13/2025 12:15 PM   Protocol Details Chart review required    HgbA1C in past 3 or 6 months    Medication is active on med list    Recent (6 mo) or future (90 days) visit within the authorizing provider's specialty    Medication indicated for associated diagnosis    Patient is 18 years of age or older

## 2025-01-22 ENCOUNTER — PATIENT OUTREACH (OUTPATIENT)
Dept: NEPHROLOGY | Facility: CLINIC | Age: 65
End: 2025-01-22

## 2025-01-22 DIAGNOSIS — N18.4 CKD (CHRONIC KIDNEY DISEASE) STAGE 4, GFR 15-29 ML/MIN (H): Primary | ICD-10-CM

## 2025-03-07 NOTE — TELEPHONE ENCOUNTER
Neph Tracking Flowsheet Last Filled Values       [facility] CKD Early Referral --  Per Dr. Bucio:he is not interested in joining CKD journey    Patient's Referral Dates Auto Populate Patient's Referral Dates    Journey Referral 10/16/24          Message sent to scheduling team to assist reaching out to patient to secure follow up. Patient may be good candidate to meet with Tabitha Meek- KAT at [place] given pts [place] address. If not Tabitha, pt should schedule follow up with Dr. Bucio.

## 2025-03-11 ENCOUNTER — PATIENT OUTREACH (OUTPATIENT)
Dept: FAMILY MEDICINE | Facility: CLINIC | Age: 65
End: 2025-03-11
Payer: MEDICARE

## 2025-03-11 NOTE — TELEPHONE ENCOUNTER
Patient Quality Outreach    Patient is due for the following:   Diabetes -  A1C and eGFR  Hypertension -  BP check  Physical Annual Wellness Visit    Action(s) Taken:   Schedule a Annual Wellness Visit and diabetic check    Type of outreach:    Sent letter.    Questions for provider review:    None           Solange Harris MA

## 2025-03-11 NOTE — LETTER
March 11, 2025    To  Roberto Thompson  [address]  [place] [address]    Your team at [facility] cares about your health. We have reviewed your chart and based on our findings; we are making the following recommendations to better manage your health.     You are in particular need of attention regarding the following:     Schedule a DIABETIC FOLLOW UP appointment for Office Visit. Patients with diabetes should see their provider regularly.  PREVENTATIVE VISIT: Annual Medicare Wellness:Schedule an Annual Medicare Wellness Exam. Please call your [facility] clinic to set up your appointment.    If you have already completed these items, please contact the clinic via phone or   MyChart so your care team can review and update your records. Thank you for   choosing [facility] for your healthcare needs. For any questions,   concerns, or to schedule an appointment please contact our clinic.    Healthy Regards,      Your [facility] Care Team            Electronically signed

## 2025-03-18 DIAGNOSIS — E78.00 HIGH BLOOD CHOLESTEROL: ICD-10-CM

## 2025-03-18 DIAGNOSIS — E78.5 HYPERLIPIDEMIA LDL GOAL <70: ICD-10-CM

## 2025-03-18 DIAGNOSIS — K59.00 CONSTIPATION, UNSPECIFIED CONSTIPATION TYPE: ICD-10-CM

## 2025-03-18 RX ORDER — ATORVASTATIN CALCIUM 80 MG/1
80 TABLET, FILM COATED ORAL DAILY
Qty: 90 TABLET | Refills: 0 | Status: SHIPPED | OUTPATIENT
Start: 2025-03-18

## 2025-03-18 RX ORDER — AMOXICILLIN 250 MG
CAPSULE ORAL
Qty: 60 TABLET | Refills: 4 | Status: SHIPPED | OUTPATIENT
Start: 2025-03-18

## 2025-04-17 DIAGNOSIS — Z79.4 TYPE 2 DIABETES MELLITUS WITH OTHER SPECIFIED COMPLICATION, WITH LONG-TERM CURRENT USE OF INSULIN (H): ICD-10-CM

## 2025-04-17 DIAGNOSIS — E11.69 TYPE 2 DIABETES MELLITUS WITH OTHER SPECIFIED COMPLICATION, WITH LONG-TERM CURRENT USE OF INSULIN (H): ICD-10-CM

## 2025-04-22 RX ORDER — BLOOD SUGAR DIAGNOSTIC
STRIP MISCELLANEOUS
Qty: 400 STRIP | Refills: 2 | Status: SHIPPED | OUTPATIENT
Start: 2025-04-22

## 2025-04-22 NOTE — TELEPHONE ENCOUNTER
blood glucose (ACCU-CHEK GUIDE) test strip   Start: 03/26/2024   Disp 400  R 2  Use to test blood sugar 4 times daily or as directed. *Dispense ACCU-CHEK GUIDE VI or use brand compatible with patients insurance and/or device.     Flavia Beltre PA-C  Endocrinology, Diabetes, and Metabolism  Lv 12/27/24  Nv  5/15/25    Refill decision: Medication refilled per  Medication Refill in Ambulatory Care  policy.     Request from pharmacy:  Requested Prescriptions   Pending Prescriptions Disp Refills    ACCU-CHEK GUIDE TEST test strip [Pharmacy Med Name: Accu-Chek Guide test strips] 400 strip 2     Sig: Use to test blood sugar 4 times daily or as directed. *Dispense ACCU-CHEK GUIDE VI or use brand compatible with patients insurance and/or device.       Diabetic Supplies Protocol Passed - 4/22/2025  7:48 AM        Passed - Medication is active on med list and the sig matches. RN to manually verify dose and sig if red X/fail.     If the protocol passes (green check), you do not need to verify med dose and sig.    A prescription matches if they are the same clinical intention.    For Example: once daily and every morning are the same.    The protocol can not identify upper and lower case letters as matching and will fail.     For Example: Take 1 tablet (50 mg) by mouth daily     TAKE 1 TABLET (50 MG) BY MOUTH DAILY    For all fails (red x), verify dose and sig.    If the refill does match what is on file, the RN can still proceed to approve the refill request.       If they do not match, route to the appropriate provider.             Passed - Recent (12 month) or future (90 days) visit with authorizing provider s specialty     The patient must have completed an in-person or virtual visit within the past 12 months or has a future visit scheduled within the next 90 days with the authorizing provider s specialty.  Urgent care and e-visits do not qualify as an office visit for this protocol.          Passed - Medication indicated for  associated diagnosis        Passed - Patient is 18 years of age or older

## 2025-05-05 ENCOUNTER — OFFICE VISIT (OUTPATIENT)
Dept: URGENT CARE | Facility: URGENT CARE | Age: 65
End: 2025-05-05
Payer: MEDICARE

## 2025-05-05 VITALS
RESPIRATION RATE: 19 BRPM | DIASTOLIC BLOOD PRESSURE: 74 MMHG | TEMPERATURE: 98.4 F | OXYGEN SATURATION: 95 % | SYSTOLIC BLOOD PRESSURE: 131 MMHG | HEART RATE: 79 BPM

## 2025-05-05 DIAGNOSIS — H60.393 INFECTIVE OTITIS EXTERNA, BILATERAL: ICD-10-CM

## 2025-05-05 DIAGNOSIS — N18.4 CKD (CHRONIC KIDNEY DISEASE) STAGE 4, GFR 15-29 ML/MIN (H): ICD-10-CM

## 2025-05-05 DIAGNOSIS — H91.93 BILATERAL HEARING LOSS, UNSPECIFIED HEARING LOSS TYPE: Primary | ICD-10-CM

## 2025-05-05 PROCEDURE — 3078F DIAST BP <80 MM HG: CPT | Performed by: FAMILY MEDICINE

## 2025-05-05 PROCEDURE — 99214 OFFICE O/P EST MOD 30 MIN: CPT | Performed by: FAMILY MEDICINE

## 2025-05-05 PROCEDURE — 3075F SYST BP GE 130 - 139MM HG: CPT | Performed by: FAMILY MEDICINE

## 2025-05-05 RX ORDER — CIPROFLOXACIN 500 MG/1
500 TABLET, FILM COATED ORAL DAILY
Qty: 7 TABLET | Refills: 0 | Status: SHIPPED | OUTPATIENT
Start: 2025-05-05 | End: 2025-05-12

## 2025-05-05 NOTE — PROGRESS NOTES
Urgent Care Clinic Visit  Chief Complaint   Patient presents with    Ear Problem     Trouble hearing and both ears itchy. Sx about a month. Left ear is worse.                5/5/2025    12:58 PM   Additional Questions   Roomed by Gabrielle   Accompanied by daughter         5/5/2025   Declines Weight   Did patient decline having their weight taken? Yes         (H91.93) Bilateral hearing loss, unspecified hearing loss type  (primary encounter diagnosis)  Comment:   This certainly could be chronic and he apparently has a history of some previous ear problems.  Seems to gotten worse over the last month according to his daughter.  Plan: Adult ENT  Referral            (H60.393) Infective otitis externa, bilateral  Comment:   Patient appears to have seborrheic dermatitis about the ears.  Some swelling of the ear canals.  Question of external otitis and if this could be  Plan: Adult ENT  Referral, ciprofloxacin         (CIPRO) 500 MG tablet      (N18.4) CKD (chronic kidney disease) stage 4, GFR 15-29 ml/min (H)  Comment:   Cipro dose adjusted based on kidney function.  Plan: As above.                CHIEF COMPLAINT    Diminished hearing.      HISTORY    This patient was seen with the aid of a [place] .  His daughter was also present and she speaks English.    He has difficulty hearing which has gotten worse over the last month.  Daughter seems to feel this came on kind of suddenly.  Apparently his right ear was a bit better for a while but now its gotten worse.    He currently denies ear pain or drainage.  He does say he had ear problems when he was younger.    He has a history of stage IV CKD.      REVIEW OF SYSTEMS    No fever.  No breathing problems.  No chest pains.  No nausea or abdominal pain.  No focal weakness.      Patient Active Problem List   Diagnosis    Morbid obesity (H)    Type 2 diabetes mellitus with other specified complication, with long-term current use of insulin (H)     Hypertension goal BP (blood pressure) < 140/90    High blood cholesterol    Vitamin D deficiency    High blood triglycerides    Toe amputation status, right    KELBY (obstructive sleep apnea)    Peripheral vascular disease    Moderate nonproliferative diabetic retinopathy of both eyes with macular edema (H)    Encounter for long-term (current) insulin use (H)    Burn of skin    Polyneuropathy associated with underlying disease    Stable angina    CKD (chronic kidney disease) stage 4, GFR 15-29 ml/min (H)    Osteomyelitis of finger of right hand (H)    Secondary renal hyperparathyroidism    Diabetic ulcer of left midfoot associated with type 2 diabetes mellitus, with necrosis of bone (H)       Current Outpatient Medications   Medication Sig Dispense Refill    ACCU-CHEK GUIDE TEST test strip Use to test blood sugar 4 times daily or as directed. *Dispense ACCU-CHEK GUIDE VI or use brand compatible with patients insurance and/or device. 400 strip 2    aspirin 81 MG EC tablet Take 1 tablet (81 mg) by mouth daily 90 tablet 1    atorvastatin (LIPITOR) 80 MG tablet Take 1 tablet (80 mg) by mouth daily 90 tablet 0    blood glucose monitoring (ACCU-CHEK FASTCLIX) lancets Use with lanceting device. To accompany: Blood Glucose Monitor Brands: per insurance. 102 each 11    ciprofloxacin (CIPRO) 500 MG tablet Take 1 tablet (500 mg) by mouth daily for 7 days. 7 tablet 0    Dulaglutide (TRULICITY) 4.5 MG/0.5ML SOAJ Inject 4.5 mg subcutaneously once a week. 2 mL 3    empagliflozin (JARDIANCE) 25 MG TABS tablet Take 1 tablet (25 mg) by mouth daily Further refills per endo 30 tablet 0    fenofibrate (TRIGLIDE/LOFIBRA) 160 MG tablet Take 1 tablet (160 mg) by mouth daily 90 tablet 3    furosemide (LASIX) 20 MG tablet Take 1 tablet (20 mg) by mouth 2 times daily 180 tablet 3    GLOBAL EASE INJECT PEN NEEDLES 31G X 5 MM miscellaneous USE TO INJECT INSULIN 5 TIMES A  each 11    HUMULIN R U-500 KWIKPEN 500 UNIT/ML PEN soln Inj 113  units subcutaneous with breakfast and 110 units subcutaneous with lunch and up to 130 units for dinner. (Max daily dose of 370 units) 45 mL 3    isosorbide mononitrate (IMDUR) 60 MG 24 hr tablet Take 1 tablet (60 mg) by mouth daily 90 tablet 3    metoprolol succinate ER (TOPROL XL) 25 MG 24 hr tablet Take 1 tablet (25 mg) by mouth daily. 90 tablet 3    senna-docusate (SENNA-PLUS) 8.6-50 MG tablet TAKE 1 TAB BY MOUTH TWO TIMES DAILY AS NEEDED FOR CONSTIPATION. 60 tablet 4    silver sulfADIAZINE (SILVADENE) 1 % external cream Apply topically daily 50 g 0    spironolactone (ALDACTONE) 25 MG tablet Take 1 tablet (25 mg) by mouth daily 90 tablet 3    UltiCare Alcohol Swabs 70 % PADS Use to swab area of injection/mary kay as directed. 200 each 11    ACE/ARB/ARNI NOT PRESCRIBED (INTENTIONAL) Please choose reason not prescribed from choices below. (Patient not taking: Reported on 11/5/2024)           EXAM  /74 (BP Location: Left arm, Cuff Size: Adult Large)   Pulse 79   Temp 98.4  F (36.9  C) (Tympanic)   Resp 19   SpO2 95%     General In the looks a bit older than his stated age is seated in a wheelchair.  He is alert.  Speech is clear.  No facial asymmetry.  Both ear canals appear to have some swelling and irritation without obvious drainage.  Nonlabored breathing.  No obvious motor deficit.

## 2025-05-06 ENCOUNTER — TELEPHONE (OUTPATIENT)
Dept: OTOLARYNGOLOGY | Facility: CLINIC | Age: 65
End: 2025-05-06
Payer: MEDICARE

## 2025-05-06 NOTE — TELEPHONE ENCOUNTER
This appointment wait time is appropriate. Chart review indicates hearing loss has been chronic. Patient was treated in the interim by the  provider with oral antibiotics for Infective otitis externa, bilateral.     Liliane Johansen RN on 5/6/2025 at 10:32 AM

## 2025-05-06 NOTE — TELEPHONE ENCOUNTER
[facility] Patient Care Specialty Pool  This encounter is being sent to inform the clinic that this patient has a referral from [facility] for the diagnoses of Bilateral hearing loss, unspecified hearing loss type/ Infective otitis externa, bilateral and has requested that this patient be seen within Priority 1-2 weeks. Based on the availability of our provider(s), we are unable to accommodate this request.    Were all sites offered this patient?  Yes    Does scheduling algorithm request to schedule next available?  Patient has been scheduled for the first available opening with Dr Corrales on 7/17/25.  We have informed the patient that the clinic will review their referral and reach out if a sooner appointment is medically necessary.

## 2025-05-15 ENCOUNTER — OFFICE VISIT (OUTPATIENT)
Dept: ENDOCRINOLOGY | Facility: CLINIC | Age: 65
End: 2025-05-15
Payer: MEDICARE

## 2025-05-15 VITALS
BODY MASS INDEX: 44.15 KG/M2 | SYSTOLIC BLOOD PRESSURE: 166 MMHG | HEART RATE: 59 BPM | OXYGEN SATURATION: 94 % | RESPIRATION RATE: 18 BRPM | DIASTOLIC BLOOD PRESSURE: 92 MMHG | WEIGHT: 299 LBS

## 2025-05-15 DIAGNOSIS — Z79.4 TYPE 2 DIABETES MELLITUS WITH OTHER SPECIFIED COMPLICATION, WITH LONG-TERM CURRENT USE OF INSULIN (H): Primary | ICD-10-CM

## 2025-05-15 DIAGNOSIS — E66.01 MORBID OBESITY (H): ICD-10-CM

## 2025-05-15 DIAGNOSIS — B37.2 CANDIDIASIS OF SKIN: ICD-10-CM

## 2025-05-15 DIAGNOSIS — E11.69 TYPE 2 DIABETES MELLITUS WITH OTHER SPECIFIED COMPLICATION, WITH LONG-TERM CURRENT USE OF INSULIN (H): Primary | ICD-10-CM

## 2025-05-15 LAB
ALBUMIN SERPL BCG-MCNC: 4.1 G/DL (ref 3.5–5.2)
ALP SERPL-CCNC: 70 U/L (ref 40–150)
ALT SERPL W P-5'-P-CCNC: 27 U/L (ref 0–70)
ANION GAP SERPL CALCULATED.3IONS-SCNC: 15 MMOL/L (ref 7–15)
AST SERPL W P-5'-P-CCNC: 25 U/L (ref 0–45)
BILIRUB SERPL-MCNC: 0.3 MG/DL
BUN SERPL-MCNC: 43 MG/DL (ref 8–23)
CALCIUM SERPL-MCNC: 9.9 MG/DL (ref 8.8–10.4)
CHLORIDE SERPL-SCNC: 94 MMOL/L (ref 98–107)
CHOLEST SERPL-MCNC: 525 MG/DL
CREAT SERPL-MCNC: 2.13 MG/DL (ref 0.67–1.17)
EGFRCR SERPLBLD CKD-EPI 2021: 34 ML/MIN/1.73M2
ERYTHROCYTE [DISTWIDTH] IN BLOOD BY AUTOMATED COUNT: 13.1 % (ref 10–15)
EST. AVERAGE GLUCOSE BLD GHB EST-MCNC: 361 MG/DL
FASTING STATUS PATIENT QL REPORTED: YES
FASTING STATUS PATIENT QL REPORTED: YES
GLUCOSE SERPL-MCNC: 353 MG/DL (ref 70–99)
HBA1C MFR BLD: 14.2 %
HCO3 SERPL-SCNC: 23 MMOL/L (ref 22–29)
HCT VFR BLD AUTO: 39 % (ref 40–53)
HDLC SERPL-MCNC: 27 MG/DL
HGB BLD-MCNC: 13.7 G/DL (ref 13.3–17.7)
LDLC SERPL CALC-MCNC: ABNORMAL MG/DL
MCH RBC QN AUTO: 30.7 PG (ref 26.5–33)
MCHC RBC AUTO-ENTMCNC: 35.1 G/DL (ref 31.5–36.5)
MCV RBC AUTO: 87 FL (ref 78–100)
NONHDLC SERPL-MCNC: 498 MG/DL
PLATELET # BLD AUTO: 179 10E3/UL (ref 150–450)
POTASSIUM SERPL-SCNC: 4.8 MMOL/L (ref 3.4–5.3)
PROT SERPL-MCNC: 8.1 G/DL (ref 6.4–8.3)
RBC # BLD AUTO: 4.46 10E6/UL (ref 4.4–5.9)
SODIUM SERPL-SCNC: 132 MMOL/L (ref 135–145)
TRIGL SERPL-MCNC: 1698 MG/DL
WBC # BLD AUTO: 6.4 10E3/UL (ref 4–11)

## 2025-05-15 RX ORDER — LANCETS
EACH MISCELLANEOUS
Qty: 102 EACH | Refills: 11 | Status: SHIPPED | OUTPATIENT
Start: 2025-05-15

## 2025-05-15 RX ORDER — TIRZEPATIDE 5 MG/.5ML
5 INJECTION, SOLUTION SUBCUTANEOUS
Qty: 6 ML | Refills: 0 | OUTPATIENT
Start: 2025-05-15

## 2025-05-15 RX ORDER — BLOOD SUGAR DIAGNOSTIC
STRIP MISCELLANEOUS
Qty: 400 STRIP | Refills: 2 | Status: SHIPPED | OUTPATIENT
Start: 2025-05-15

## 2025-05-15 RX ORDER — NYSTATIN 100000 [USP'U]/G
POWDER TOPICAL DAILY
Qty: 60 G | Refills: 1 | Status: SHIPPED | OUTPATIENT
Start: 2025-05-15

## 2025-05-15 RX ORDER — BLOOD-GLUCOSE METER
EACH MISCELLANEOUS
Qty: 1 KIT | Refills: 0 | Status: SHIPPED | OUTPATIENT
Start: 2025-05-15

## 2025-05-15 NOTE — NURSING NOTE
Roberto Thompson's goals for this visit include:   Chief Complaint   Patient presents with    Follow Up    Diabetes     DM       He requests these members of his care team be copied on today's visit information: yes    PCP: Jaylyn Bellamy    Referring Provider:  Referred Self, MD  No address on file    BP (!) 166/92   Pulse 59   Resp 18   Wt 135.6 kg (299 lb)   SpO2 94%   BMI 44.15 kg/m      Do you need any medication refills at today's visit? Yes    Xavier Smith, EMT

## 2025-05-15 NOTE — LETTER
5/15/2025      Roberto Thompson  [address]  [place] [address]      Dear Colleague,    Thank you for referring your patient, Roberto Thompson, to the [facility]. Please see a copy of my visit note below.    Assessment/Plan :   Type 2 DM. Roberto's blood sugars are too elevated. His hemoglobin A1C has increased from 11.5% to 14.2%. He reports that he is still taking insulin and that his blood sugars are about the same as previous. It is difficult to get a clear answer as to what he is taking. He does not want to go back on CGMS device and we do not have any glucose readings. I will send in a new prescription for an Accu-Chek glucometer. I would like him to bring the meter to his follow-up visit. I also think he would benefit from getting back on a once weekly GLPRA. I am going to send in a new prescription for Mounjaro 5 mg weekly. We will follow-up in 3-4 mos.  Candidiasis. We discussed how poorly controlled diabetes can lead to an increase in yeast infections. I will send in a new prescription for nystatin powder to apply to skin creases to help prevent ongoing skin irritation.   Pressure ulcer. He needs to reach out to his primary care provider to discuss treatment for pressure ulcers and any possible skin infection. His daughter will help him schedule a follow-up visit.       I have independently reviewed and interpreted labs, imaging as indicated.      Chief complaint:  Roberto is a 64 year old male who returns for follow-up of type 2 diabetes. His daughter is acting as  for this visit.    I have reviewed Care Everywhere including [facility], [facility], [facility], [facility], [place], [facility], [facility] , [facility], [place] lab reports, imaging reports and provider notes as indicated.      HISTORY OF PRESENT ILLNESS  Roberto is doing okay. He continues to take Humulin R U500 three times daily. He takes 113 unit(s) with breakfast, 110 unit(s) with  lunch and 130 unit(s) with supper. He reports that his morning blood sugars are usually around 150 mg/dl but will increase throughout the day. It is not uncommon for his blood sugars to be over 400 mg/dl before supper. He was previously taking Trulicity 4.5 mg weekly but he reports that insurance stopped covering the medication, so he has not taken it for months. Since our last visit, he was kicked out of his apartment and he moved in with his daughter and son. His daughter reports that he doesn't do anything all day, he just sits in his wheelchair.    Roberto monitors his blood sugars with fingerstick testing twice daily. He reports that he does not need to check more often, he can feel when his blood sugars are too high or too low. He has not had any recent issues with severe hyperglycemia and/or hypoglycemia. He also has not noticed any problems with worsening vision. He does have a lot of neuropathy and pain in his lower extremities, which is why he does not walk. He has also developed a lot of dryness and skin irritation. He feels like he has a yeast infection in his skin folds. Lastly, his daughter has noticed that he has a small sore on his backside where he sits.     He has had diabetes since 2007. He has struggled to get his hemoglobin A1C under 8%. He has used a combination of insulin and oral medications for many years. He struggles with medication compliance and was unable to consistently administer meal time insulin, so we transitioned him from MDI therapy to Humulin R U500 in April of 2023. This initially led to an improvement in his blood sugar control. Recently, he has again struggled with compliance. He also admits to eating whatever he wants to eat. His diabetes is complicated by a history of nonpoliferative diabetic retinopathy with macular edema, hyperlipidemia, HTN, obesity, CKD stage 4 and KELBY.     Endocrine relevant labs are as follows:   Latest Reference Range & Units 05/15/25 13:36   [place]  Hemoglobin A1c POCT <=5.7 % 14.2 (H)   (H): Data is abnormally high   Latest Reference Range & Units 12/27/24 09:55   Afinion Hemoglobin A1c POCT <=5.7 % 11.5 (H)   (H): Data is abnormally high   Latest Reference Range & Units 07/25/24 10:04   Afinion Hemoglobin A1c POCT <=5.7 % 10.4 (H)   (H): Data is abnormally high   Latest Reference Range & Units 08/19/24 08:19   Albumin Urine mg/g Cr 0.00 - 17.00 mg/g Cr 176.22 (H)   (H): Data is abnormally high   Latest Reference Range & Units 08/19/24 08:19   Albumin Urine mg/L mg/L 123.0     REVIEW OF SYSTEMS    10 system ROS otherwise as per the HPI or negative    Past Medical History  Past Medical History:   Diagnosis Date     Diabetes (H)      Hypertension        Medications  Current Outpatient Medications   Medication Sig Dispense Refill     ACCU-CHEK GUIDE TEST test strip Use to test blood sugar 4 times daily or as directed. *Dispense ACCU-CHEK GUIDE VI or use brand compatible with patients insurance and/or device. 400 strip 2     ACE/ARB/ARNI NOT PRESCRIBED (INTENTIONAL) Please choose reason not prescribed from choices below. (Patient not taking: Reported on 11/5/2024)       aspirin 81 MG EC tablet Take 1 tablet (81 mg) by mouth daily 90 tablet 1     atorvastatin (LIPITOR) 80 MG tablet Take 1 tablet (80 mg) by mouth daily 90 tablet 0     blood glucose monitoring (ACCU-CHEK FASTCLIX) lancets Use with lanceting device. To accompany: Blood Glucose Monitor Brands: per insurance. 102 each 11     Dulaglutide (TRULICITY) 4.5 MG/0.5ML SOAJ Inject 4.5 mg subcutaneously once a week. 2 mL 3     empagliflozin (JARDIANCE) 25 MG TABS tablet Take 1 tablet (25 mg) by mouth daily Further refills per endo 30 tablet 0     fenofibrate (TRIGLIDE/LOFIBRA) 160 MG tablet Take 1 tablet (160 mg) by mouth daily 90 tablet 3     furosemide (LASIX) 20 MG tablet Take 1 tablet (20 mg) by mouth 2 times daily 180 tablet 3     GLOBAL EASE INJECT PEN NEEDLES 31G X 5 MM miscellaneous USE TO INJECT INSULIN  5 TIMES A  each 11     HUMULIN R U-500 KWIKPEN 500 UNIT/ML PEN soln Inj 113 units subcutaneous with breakfast and 110 units subcutaneous with lunch and up to 130 units for dinner. (Max daily dose of 370 units) 45 mL 3     isosorbide mononitrate (IMDUR) 60 MG 24 hr tablet Take 1 tablet (60 mg) by mouth daily 90 tablet 3     metoprolol succinate ER (TOPROL XL) 25 MG 24 hr tablet Take 1 tablet (25 mg) by mouth daily. 90 tablet 3     senna-docusate (SENNA-PLUS) 8.6-50 MG tablet TAKE 1 TAB BY MOUTH TWO TIMES DAILY AS NEEDED FOR CONSTIPATION. 60 tablet 4     silver sulfADIAZINE (SILVADENE) 1 % external cream Apply topically daily 50 g 0     spironolactone (ALDACTONE) 25 MG tablet Take 1 tablet (25 mg) by mouth daily 90 tablet 3     UltiCare Alcohol Swabs 70 % PADS Use to swab area of injection/mary kay as directed. 200 each 11       Allergies  Allergies   Allergen Reactions     Doxycycline Other (See Comments) and Swelling     Facial swelling  facial   swelling    Facial swelling  facial   swelling       Influenza Vac Split [Influenza Virus Vaccine] Itching     And red /blue arms     Penicillins Rash         Family History  family history includes Cerebrovascular Disease in his mother; Dementia in his father; Diabetes in his father and mother; Hypertension in his mother; Kidney Disease in his mother; Osteoporosis in his mother; Prostate Cancer in his father.    Social History  Social History     Tobacco Use     Smoking status: Never     Smokeless tobacco: Never   Vaping Use     Vaping status: Never Used   Substance Use Topics     Alcohol use: No     Drug use: No       Physical Exam  BP (!) 166/92   Pulse 59   Resp 18   Wt 135.6 kg (299 lb)   SpO2 94%   BMI 44.15 kg/m    Body mass index is 44.15 kg/m .  GENERAL :  In no apparent distress  SKIN: Normal color, normal temperature, texture.  No hirsutism, alopecia or purple striae.     EYES: PERRL, EOMI, No scleral icterus,  No proptosis, conjunctival redness, stare,  retraction  RESP: Lungs clear to auscultation bilaterally  CARDIAC: Regular rate and rhythm, normal S1 S2, without murmurs, rubs or gallops    NEURO: awake, alert, responds appropriately to questions.  Cranial nerves intact.   Moves all extremities; uses wheelchair for ambulation.  No tremor of the outstretched hand.   EXTREMITIES: No clubbing, cyanosis. Edema 2+    DATA REVIEW  He did not bring in his glucometer  He also reports that his wife and him share a meter        Again, thank you for allowing me to participate in the care of your patient.        Sincerely,        Flavia Beltre PA-C    Electronically signed

## 2025-05-15 NOTE — PROGRESS NOTES
Assessment/Plan :   Type 2 DM. Roberto's blood sugars are too elevated. His hemoglobin A1C has increased from 11.5% to 14.2%. He reports that he is still taking insulin and that his blood sugars are about the same as previous. It is difficult to get a clear answer as to what he is taking. He does not want to go back on CGMS device and we do not have any glucose readings. I will send in a new prescription for an Accu-Chek glucometer. I would like him to bring the meter to his follow-up visit. I also think he would benefit from getting back on a once weekly GLPRA. I am going to send in a new prescription for Mounjaro 5 mg weekly. We will follow-up in 3-4 mos.  Candidiasis. We discussed how poorly controlled diabetes can lead to an increase in yeast infections. I will send in a new prescription for nystatin powder to apply to skin creases to help prevent ongoing skin irritation.   Pressure ulcer. He needs to reach out to his primary care provider to discuss treatment for pressure ulcers and any possible skin infection. His daughter will help him schedule a follow-up visit.       I have independently reviewed and interpreted labs, imaging as indicated.      Chief complaint:  Roberto is a 64 year old male who returns for follow-up of type 2 diabetes. His daughter is acting as  for this visit.    I have reviewed Care Everywhere including [facility], [facility], [facility], [facility], [facility] , [facility], [place] lab reports, imaging reports and provider notes as indicated.      HISTORY OF PRESENT ILLNESS  Roberto is doing okay. He continues to take Humulin R U500 three times daily. He takes 113 unit(s) with breakfast, 110 unit(s) with lunch and 130 unit(s) with supper. He reports that his morning blood sugars are usually around 150 mg/dl but will increase throughout the day. It is not uncommon for his blood sugars to be over 400 mg/dl before supper. He was previously taking Trulicity  4.5 mg weekly but he reports that insurance stopped covering the medication, so he has not taken it for months. Since our last visit, he was kicked out of his apartment and he moved in with his daughter and son. His daughter reports that he doesn't do anything all day, he just sits in his wheelchair.    Roberto monitors his blood sugars with fingerstick testing twice daily. He reports that he does not need to check more often, he can feel when his blood sugars are too high or too low. He has not had any recent issues with severe hyperglycemia and/or hypoglycemia. He also has not noticed any problems with worsening vision. He does have a lot of neuropathy and pain in his lower extremities, which is why he does not walk. He has also developed a lot of dryness and skin irritation. He feels like he has a yeast infection in his skin folds. Lastly, his daughter has noticed that he has a small sore on his backside where he sits.     He has had diabetes since 2007. He has struggled to get his hemoglobin A1C under 8%. He has used a combination of insulin and oral medications for many years. He struggles with medication compliance and was unable to consistently administer meal time insulin, so we transitioned him from MDI therapy to Humulin R U500 in April of 2023. This initially led to an improvement in his blood sugar control. Recently, he has again struggled with compliance. He also admits to eating whatever he wants to eat. His diabetes is complicated by a history of nonpoliferative diabetic retinopathy with macular edema, hyperlipidemia, HTN, obesity, CKD stage 4 and KELBY.     Endocrine relevant labs are as follows:   Latest Reference Range & Units 05/15/25 13:36   Afinion Hemoglobin A1c POCT <=5.7 % 14.2 (H)   (H): Data is abnormally high   Latest Reference Range & Units 12/27/24 09:55   Afinion Hemoglobin A1c POCT <=5.7 % 11.5 (H)   (H): Data is abnormally high   Latest Reference Range & Units 07/25/24 10:04   Afinion  Hemoglobin A1c POCT <=5.7 % 10.4 (H)   (H): Data is abnormally high   Latest Reference Range & Units 08/19/24 08:19   Albumin Urine mg/g Cr 0.00 - 17.00 mg/g Cr 176.22 (H)   (H): Data is abnormally high   Latest Reference Range & Units 08/19/24 08:19   Albumin Urine mg/L mg/L 123.0     REVIEW OF SYSTEMS    10 system ROS otherwise as per the HPI or negative    Past Medical History  Past Medical History:   Diagnosis Date    Diabetes (H)     Hypertension        Medications  Current Outpatient Medications   Medication Sig Dispense Refill    ACCU-CHEK GUIDE TEST test strip Use to test blood sugar 4 times daily or as directed. *Dispense ACCU-CHEK GUIDE VI or use brand compatible with patients insurance and/or device. 400 strip 2    ACE/ARB/ARNI NOT PRESCRIBED (INTENTIONAL) Please choose reason not prescribed from choices below. (Patient not taking: Reported on 11/5/2024)      aspirin 81 MG EC tablet Take 1 tablet (81 mg) by mouth daily 90 tablet 1    atorvastatin (LIPITOR) 80 MG tablet Take 1 tablet (80 mg) by mouth daily 90 tablet 0    blood glucose monitoring (ACCU-CHEK FASTCLIX) lancets Use with lanceting device. To accompany: Blood Glucose Monitor Brands: per insurance. 102 each 11    Dulaglutide (TRULICITY) 4.5 MG/0.5ML SOAJ Inject 4.5 mg subcutaneously once a week. 2 mL 3    empagliflozin (JARDIANCE) 25 MG TABS tablet Take 1 tablet (25 mg) by mouth daily Further refills per endo 30 tablet 0    fenofibrate (TRIGLIDE/LOFIBRA) 160 MG tablet Take 1 tablet (160 mg) by mouth daily 90 tablet 3    furosemide (LASIX) 20 MG tablet Take 1 tablet (20 mg) by mouth 2 times daily 180 tablet 3    GLOBAL EASE INJECT PEN NEEDLES 31G X 5 MM miscellaneous USE TO INJECT INSULIN 5 TIMES A  each 11    HUMULIN R U-500 KWIKPEN 500 UNIT/ML PEN soln Inj 113 units subcutaneous with breakfast and 110 units subcutaneous with lunch and up to 130 units for dinner. (Max daily dose of 370 units) 45 mL 3    isosorbide mononitrate (IMDUR) 60 MG  24 hr tablet Take 1 tablet (60 mg) by mouth daily 90 tablet 3    metoprolol succinate ER (TOPROL XL) 25 MG 24 hr tablet Take 1 tablet (25 mg) by mouth daily. 90 tablet 3    senna-docusate (SENNA-PLUS) 8.6-50 MG tablet TAKE 1 TAB BY MOUTH TWO TIMES DAILY AS NEEDED FOR CONSTIPATION. 60 tablet 4    silver sulfADIAZINE (SILVADENE) 1 % external cream Apply topically daily 50 g 0    spironolactone (ALDACTONE) 25 MG tablet Take 1 tablet (25 mg) by mouth daily 90 tablet 3    UltiCare Alcohol Swabs 70 % PADS Use to swab area of injection/mary kay as directed. 200 each 11       Allergies  Allergies   Allergen Reactions    Doxycycline Other (See Comments) and Swelling     Facial swelling  facial   swelling    Facial swelling  facial   swelling      Influenza Vac Split [Influenza Virus Vaccine] Itching     And red /blue arms    Penicillins Rash         Family History  family history includes Cerebrovascular Disease in his mother; Dementia in his father; Diabetes in his father and mother; Hypertension in his mother; Kidney Disease in his mother; Osteoporosis in his mother; Prostate Cancer in his father.    Social History  Social History     Tobacco Use    Smoking status: Never    Smokeless tobacco: Never   Vaping Use    Vaping status: Never Used   Substance Use Topics    Alcohol use: No    Drug use: No       Physical Exam  BP (!) 166/92   Pulse 59   Resp 18   Wt 135.6 kg (299 lb)   SpO2 94%   BMI 44.15 kg/m    Body mass index is 44.15 kg/m .  GENERAL :  In no apparent distress  SKIN: Normal color, normal temperature, texture.  No hirsutism, alopecia or purple striae.     EYES: PERRL, EOMI, No scleral icterus,  No proptosis, conjunctival redness, stare, retraction  RESP: Lungs clear to auscultation bilaterally  CARDIAC: Regular rate and rhythm, normal S1 S2, without murmurs, rubs or gallops    NEURO: awake, alert, responds appropriately to questions.  Cranial nerves intact.   Moves all extremities; uses wheelchair for  ambulation.  No tremor of the outstretched hand.   EXTREMITIES: No clubbing, cyanosis. Edema 2+    DATA REVIEW  He did not bring in his glucometer  He also reports that his wife and him share a meter

## 2025-05-15 NOTE — PATIENT INSTRUCTIONS
Welcome to the [facility] Endocrinology and Diabetes Clinics     Our Endocrinology Clinics are here to provide you with a team-based, collaborative approach in the diagnosis and treatment of patients with diabetes and endocrine disorders. The team is made up of Physicians, Physician Assistants, Certified Diabetes Educators, Registered Nurses, Medical Assistants, Emergency Medical Technicians, and many others, all of whom have the unified goal of providing our patients with high quality care.     Please see below for some helpful tips to best navigate and use the [facility] Endocrinology clinic:     [place] Respect: At [facility], we are committed to a respectful and safe space for all patients, visitors, and staff.  We believe that mutual respect between patients and their care team is the foundation of quality care.  It is our expectation that you will be treated with respect by your care team.  In turn, we ask that all communication with the care team (written and verbal) be respectful and free from profanity, threatening, or abusive language.  Disrespectful communication undermines our therapeutic relationship with you and may result in us being unable to continue to provide your care.    Refills: A provider must see you at least annually to prescribe and refill medications. This is to ensure your safety as well as meet insurance and compliance regulations.    Scheduling: Many of our Providers offer both in-person or video visits. Please call to schedule any needed follow ups as soon as possible because our provider schedules fill up very quickly. Our care team has the right to require an in-person visit when they believe that it is medically necessary. Please remember that for any virtual visits, you must be in the [facility] at the time of the visit, otherwise we are unable to see you and you will need to be rescheduled.    Missed Appointments: If you need to cancel or miss your  scheduled appointment, please call the clinic at [phone] to reschedule.  Please note if you repeatedly miss appointments or repeatedly miss appointments without calling to inform us ahead of time (no-show), the clinic may elect to not allow you to reschedule without speaking to a manager, may require a Partnership In Care Agreement prior to rescheduling, or could result in you no longer being able to receive care from the clinic. Providing the clinic with timely notification if you have to miss an appointment, allows us to better serve the needs of all of our patients.    Primary Care Provider: Our Endocrinologists are Specialists in their field. We expect you to have a Primary Care Provider established to handle any needs outside of your diabetes and endocrine care.  We would be happy to assist you find a Primary Care Provider, if you do not have one.    Yoolink: [employer]ink is a wonderful resource that allows you access to your Care Team via online or the fish. Please ask a member of the team if you would like help creating an account. Please note that it may take up to 2 business days for a response. Yoolink messages are not reviewed on weekends or after business hours.  Emergent or urgent care needs should never be communicated via Yoolink.  If you experience a medical emergency call 911 or go to the nearest emergency room.    Labs: It is recommended that you stay within the [facility] for labs but you are welcome to obtain ordered labs (with some exceptions) from any location of your choice as long as they are able to complete and process the needed labs. If you need us to fax orders to your preferred lab, please provide us the name and fax number of the lab you would like to go to so we can fax the orders. If your labs are drawn outside of the [facility], please have them fax the results to [phone] ([place]) or [phone] ([place]) or via [facility]. It is your  responsibility to ensure that outside lab results are sent to us.    We look forward to working with you. Please do not hesitate to reach out with any questions.    Thank you,    The Endocrine Team    [facility] Address:   [place] Address:     [place] [address]    Phone: [phone]  Fax: [phone]   [address] [place] [address]    Phone: [phone]  Fax: [phone]     [facility] [place] Cost Estimate Phone Number: [phone]    General Lab and Imaging Scheduling Phone Number: [phone]      If you are interested in exploring research opportunities in the Division of Diabetes, Endocrinology and Metabolism and within the [facility] you are welcome to view the following QR codes by scanning them using your phone's camera to access a link to more information.  Participating in a research study is voluntary. Your decision whether or not to participate will not affect your current or future relations with the [facility] or [facility]. Current available studies are:     Type 1 Diabetes  Adults     Pediatrics     Type 2 Diabetes  Weight Loss - People Treated with Diet or Metformin Only     Pediatrics and Young Adults

## 2025-05-19 DIAGNOSIS — E78.00 HIGH BLOOD CHOLESTEROL: ICD-10-CM

## 2025-05-19 DIAGNOSIS — E78.1 HYPERTRIGLYCERIDEMIA: ICD-10-CM

## 2025-05-19 DIAGNOSIS — I10 HYPERTENSION GOAL BP (BLOOD PRESSURE) < 140/90: ICD-10-CM

## 2025-05-21 RX ORDER — SPIRONOLACTONE 25 MG/1
25 TABLET ORAL DAILY
Qty: 90 TABLET | Refills: 3 | Status: SHIPPED | OUTPATIENT
Start: 2025-05-21

## 2025-05-22 RX ORDER — FENOFIBRATE 160 MG/1
160 TABLET ORAL DAILY
Qty: 90 TABLET | Refills: 0 | Status: SHIPPED | OUTPATIENT
Start: 2025-05-22

## 2025-05-22 NOTE — TELEPHONE ENCOUNTER
Last Written Prescription:  spironolactone  25 MG 5/28/24  90 : 3   Fenofibrate  160 MG   5/26/24  90 : 3  ----------------------  Last Visit Date: 11/5/24  Future Visit Date: 6/25/25  ----------------------    Refill decision: Medication refilled per  Medication Refill in Ambulatory Care  policy. spironolactone (ALDACTONE) 25 MG     Refill decision: Medication unable to be refilled by RN due to: Medication not included in refill protocol policy     Request from pharmacy:  Requested Prescriptions   Pending Prescriptions Disp Refills    spironolactone (ALDACTONE) 25 MG tablet [Pharmacy Med Name: spironolactone 25 mg tablet] 90 tablet 3     Sig: Take 1 tablet (25 mg) by mouth daily       Diuretics (Including Combos) Protocol Failed - 5/21/2025  8:24 PM        Failed - Most recent blood pressure under 140/90 in past 12 months     BP Readings from Last 3 Encounters:   05/15/25 (!) 166/92   05/05/25 131/74   12/27/24 (!) 150/85       No data recorded            Failed - Has GFR on file in past 12 months and most recent value is normal        Passed - Potassium level on file in past 12 months        Passed - Medication is active on med list and the sig matches. RN to manually verify dose and sig if red X/fail.             Passed - Medication indicated for associated diagnosis     Medication is associated with one or more of the following diagnoses:     Hypertension   Heart Failure   Hyperaldosteronism   Acne   Hirsutism   Hypokalemia   Hepatic Cirrhosis   Nephrotic Syndrome   Myocardial Infarction   Transgender Female   Cardiomyopathy  Congenital Heart Disease  Diastolic Dysfunction          Passed - Recent (12 month) or future (90 days) visit with authorizing provider's specialty (provided they have been seen in the past 15 months)     The patient must have completed an in-person or virtual visit within the past 12 months or has a future visit scheduled within the next 90 days with the authorizing provider s specialty.   Urgent care and e-visits do not qualify as an office visit for this protocol.          Passed - Patient is age 18 or older          fenofibrate (TRIGLIDE/LOFIBRA) 160 MG tablet [Pharmacy Med Name: fenofibrate 160 mg tablet] 90 tablet 3     Sig: Take 1 tablet (160 mg) by mouth daily       Antihyperlipidemic agents Passed - 5/21/2025  8:24 PM        Passed - LDL on file in the past 12 months        Passed - Medication is active on med list and the sig matches. RN to manually verify dose and sig if red X/fail.             Passed - Recent (12 month) or future (90 days) visit with authorizing provider's specialty (provided they have been seen in the past 15 months)     The patient must have completed an in-person or virtual visit within the past 12 months or has a future visit scheduled within the next 90 days with the authorizing provider s specialty.  Urgent care and e-visits do not qualify as an office visit for this protocol.          Passed - Patient is age 18 years or older

## 2025-05-22 NOTE — TELEPHONE ENCOUNTER
Refill sent until patient see Dr Pedraza 6/25/2025.       Requested Prescriptions   Pending Prescriptions Disp Refills    fenofibrate (TRIGLIDE/LOFIBRA) 160 MG tablet [Pharmacy Med Name: fenofibrate 160 mg tablet] 90 tablet 3     Sig: Take 1 tablet (160 mg) by mouth daily       Antihyperlipidemic agents Passed - 5/22/2025  8:08 AM        Passed - LDL on file in the past 12 months        Passed - Medication is active on med list and the sig matches. RN to manually verify dose and sig if red X/fail.     If the protocol passes (green check), you do not need to verify med dose and sig.    A prescription matches if they are the same clinical intention.    For Example: once daily and every morning are the same.    The protocol can not identify upper and lower case letters as matching and will fail.     For Example: Take 1 tablet (50 mg) by mouth daily     TAKE 1 TABLET (50 MG) BY MOUTH DAILY    For all fails (red x), verify dose and sig.    If the refill does match what is on file, the RN can still proceed to approve the refill request.       If they do not match, route to the appropriate provider.             Passed - Recent (12 month) or future (90 days) visit with authorizing provider's specialty (provided they have been seen in the past 15 months)     The patient must have completed an in-person or virtual visit within the past 12 months or has a future visit scheduled within the next 90 days with the authorizing provider s specialty.  Urgent care and e-visits do not qualify as an office visit for this protocol.          Passed - Patient is age 18 years or older

## 2025-05-28 DIAGNOSIS — I20.89 STABLE ANGINA: ICD-10-CM

## 2025-05-29 ENCOUNTER — TRANSFERRED RECORDS (OUTPATIENT)
Dept: HEALTH INFORMATION MANAGEMENT | Facility: CLINIC | Age: 65
End: 2025-05-29
Payer: MEDICARE

## 2025-05-29 RX ORDER — ISOSORBIDE MONONITRATE 60 MG/1
60 TABLET, EXTENDED RELEASE ORAL DAILY
Qty: 90 TABLET | Refills: 0 | Status: SHIPPED | OUTPATIENT
Start: 2025-05-29

## 2025-05-29 NOTE — TELEPHONE ENCOUNTER
Last Written Prescription:  isosorbide mononitrate (IMDUR) 60 MG 24 hr tablet 90 tablet 3 5/7/2024     ----------------------  Last Visit Date: 11/5/24  Future Visit Date: 6/25/25  ----------------------    Refill decision: Medication refilled per  Medication Refill in Ambulatory Care  policy.      Request from pharmacy:  Requested Prescriptions   Pending Prescriptions Disp Refills    isosorbide mononitrate (IMDUR) 60 MG 24 hr tablet 90 tablet 3     Sig: Take 1 tablet (60 mg) by mouth daily.       Nitrates Failed - 5/29/2025 12:14 PM        Failed - Most recent blood pressure under 140/90 in past 12 months     BP Readings from Last 3 Encounters:   05/15/25 (!) 166/92   05/05/25 131/74   12/27/24 (!) 150/85       No data recorded            Failed - Always fail criteria - needs review     Review chart. Forward refill request to provider if patient has exceeded one bottle per 3 months.           Passed - Pt is not on erectile dysfunction medications        Passed - Medication is active on med list and the sig matches. RN to manually verify dose and sig if red X/fail.     If the protocol passes (green check), you do not need to verify med dose and sig.    A prescription matches if they are the same clinical intention.    For Example: once daily and every morning are the same.    The protocol can not identify upper and lower case letters as matching and will fail.     For Example: Take 1 tablet (50 mg) by mouth daily     TAKE 1 TABLET (50 MG) BY MOUTH DAILY    For all fails (red x), verify dose and sig.    If the refill does match what is on file, the RN can still proceed to approve the refill request.       If they do not match, route to the appropriate provider.             Passed - Recent (12 month) or future (90 days) visit with authorizing provider's specialty (provided they have been seen in the past 15 months)     The patient must have completed an in-person or virtual visit within the past 12 months or has a  future visit scheduled within the next 90 days with the authorizing provider s specialty.  Urgent care and e-visits do not qualify as an office visit for this protocol.          Passed - Medication indicated for associated diagnosis     Medication is associated with one or more of the following diagnoses:    Anal fissure   Angina pectoris   Cardiac syndrome X   Congestive heart failure   Hypertension   Myocardial infarction   Pulmonary edema   Pulmonary hypertension          Passed - Patient is age 18 or older

## 2025-06-02 ENCOUNTER — OFFICE VISIT (OUTPATIENT)
Dept: FAMILY MEDICINE | Facility: CLINIC | Age: 65
End: 2025-06-02
Payer: MEDICARE

## 2025-06-02 VITALS
RESPIRATION RATE: 23 BRPM | BODY MASS INDEX: 43.4 KG/M2 | TEMPERATURE: 97.8 F | WEIGHT: 293 LBS | DIASTOLIC BLOOD PRESSURE: 74 MMHG | SYSTOLIC BLOOD PRESSURE: 152 MMHG | OXYGEN SATURATION: 96 % | HEIGHT: 69 IN | HEART RATE: 96 BPM

## 2025-06-02 DIAGNOSIS — Z00.00 MEDICARE ANNUAL WELLNESS VISIT, SUBSEQUENT: Primary | ICD-10-CM

## 2025-06-02 DIAGNOSIS — I10 HYPERTENSION GOAL BP (BLOOD PRESSURE) < 140/90: ICD-10-CM

## 2025-06-02 DIAGNOSIS — E78.5 HYPERLIPIDEMIA LDL GOAL <70: ICD-10-CM

## 2025-06-02 DIAGNOSIS — G63 POLYNEUROPATHY ASSOCIATED WITH UNDERLYING DISEASE: ICD-10-CM

## 2025-06-02 DIAGNOSIS — L29.9 ITCHY SKIN: ICD-10-CM

## 2025-06-02 DIAGNOSIS — K59.00 CONSTIPATION, UNSPECIFIED CONSTIPATION TYPE: ICD-10-CM

## 2025-06-02 DIAGNOSIS — I73.9 PERIPHERAL VASCULAR DISEASE: ICD-10-CM

## 2025-06-02 DIAGNOSIS — E11.3313 MODERATE NONPROLIFERATIVE DIABETIC RETINOPATHY OF BOTH EYES WITH MACULAR EDEMA ASSOCIATED WITH TYPE 2 DIABETES MELLITUS (H): ICD-10-CM

## 2025-06-02 DIAGNOSIS — Z79.4 ENCOUNTER FOR LONG-TERM (CURRENT) INSULIN USE (H): ICD-10-CM

## 2025-06-02 DIAGNOSIS — Z89.431 PARTIAL NONTRAUMATIC AMPUTATION OF RIGHT FOOT (H): ICD-10-CM

## 2025-06-02 DIAGNOSIS — I49.9 IRREGULARLY IRREGULAR CARDIAC RHYTHM: ICD-10-CM

## 2025-06-02 DIAGNOSIS — E66.01 MORBID OBESITY (H): ICD-10-CM

## 2025-06-02 DIAGNOSIS — E11.69 TYPE 2 DIABETES MELLITUS WITH OTHER SPECIFIED COMPLICATION, WITH LONG-TERM CURRENT USE OF INSULIN (H): ICD-10-CM

## 2025-06-02 DIAGNOSIS — Z79.4 TYPE 2 DIABETES MELLITUS WITH OTHER SPECIFIED COMPLICATION, WITH LONG-TERM CURRENT USE OF INSULIN (H): ICD-10-CM

## 2025-06-02 PROBLEM — E11.621 DIABETIC ULCER OF LEFT MIDFOOT ASSOCIATED WITH TYPE 2 DIABETES MELLITUS, WITH NECROSIS OF BONE (H): Status: RESOLVED | Noted: 2024-08-19 | Resolved: 2025-06-02

## 2025-06-02 PROBLEM — L97.424 DIABETIC ULCER OF LEFT MIDFOOT ASSOCIATED WITH TYPE 2 DIABETES MELLITUS, WITH NECROSIS OF BONE (H): Status: RESOLVED | Noted: 2024-08-19 | Resolved: 2025-06-02

## 2025-06-02 LAB
CREAT UR-MCNC: 58.8 MG/DL
MICROALBUMIN UR-MCNC: 511 MG/L
MICROALBUMIN/CREAT UR: 869.05 MG/G CR (ref 0–17)

## 2025-06-02 PROCEDURE — 82570 ASSAY OF URINE CREATININE: CPT | Performed by: FAMILY MEDICINE

## 2025-06-02 PROCEDURE — 82043 UR ALBUMIN QUANTITATIVE: CPT | Performed by: FAMILY MEDICINE

## 2025-06-02 RX ORDER — BISACODYL 5 MG/1
10 TABLET, DELAYED RELEASE ORAL DAILY PRN
Qty: 60 TABLET | Refills: 11 | Status: SHIPPED | OUTPATIENT
Start: 2025-06-02

## 2025-06-02 RX ORDER — BETAMETHASONE DIPROPIONATE 0.5 MG/G
CREAM TOPICAL 2 TIMES DAILY
COMMUNITY
Start: 2025-05-29

## 2025-06-02 RX ORDER — ATORVASTATIN CALCIUM 80 MG/1
80 TABLET, FILM COATED ORAL DAILY
Qty: 90 TABLET | Refills: 3 | Status: SHIPPED | OUTPATIENT
Start: 2025-06-02

## 2025-06-02 RX ORDER — POLYETHYLENE GLYCOL 3350 17 G/17G
1 POWDER, FOR SOLUTION ORAL DAILY
Qty: 850 G | Refills: 2 | Status: SHIPPED | OUTPATIENT
Start: 2025-06-02

## 2025-06-02 ASSESSMENT — ACTIVITIES OF DAILY LIVING (ADL): CURRENT_FUNCTION: NEEDS ASSISTANCE

## 2025-06-02 ASSESSMENT — PATIENT HEALTH QUESTIONNAIRE - PHQ9
10. IF YOU CHECKED OFF ANY PROBLEMS, HOW DIFFICULT HAVE THESE PROBLEMS MADE IT FOR YOU TO DO YOUR WORK, TAKE CARE OF THINGS AT HOME, OR GET ALONG WITH OTHER PEOPLE: NOT DIFFICULT AT ALL
SUM OF ALL RESPONSES TO PHQ QUESTIONS 1-9: 9
SUM OF ALL RESPONSES TO PHQ QUESTIONS 1-9: 9

## 2025-06-02 ASSESSMENT — PAIN SCALES - GENERAL: PAINLEVEL_OUTOF10: NO PAIN (0)

## 2025-06-02 NOTE — PROGRESS NOTES
"  Assessment & Plan     Medicare annual wellness visit, subsequent      Type 2 diabetes mellitus with other specified complication, with long-term current use of insulin (H)  Not well controlled due to pt noncompliance  Is seeing endo  - Albumin Random Urine Quantitative with Creat Ratio; Future  - Wheelchair Scooter Order for DME with OT or PT Referral; Future  - Albumin Random Urine Quantitative with Creat Ratio    Encounter for long-term (current) insulin use (H)  Seeing endo    Morbid obesity (H)  Contributes to diabetes, htn, elev cholesterol    Moderate nonproliferative diabetic retinopathy of both eyes with macular edema associated with type 2 diabetes mellitus (H)  Per optho    Partial nontraumatic amputation of right foot (H)  Requesting scooter  - Wheelchair Scooter Order for DME with OT or PT Referral; Future    Hyperlipidemia LDL goal <70  Not at goal due to uncontrolled diabetes  - atorvastatin (LIPITOR) 80 MG tablet; Take 1 tablet (80 mg) by mouth daily.    Hypertension goal BP (blood pressure) < 140/90  Elevated today    Peripheral vascular disease    - Wheelchair Scooter Order for DME with OT or PT Referral; Future    Polyneuropathy associated with underlying disease    - Wheelchair Scooter Order for DME with OT or PT Referral; Future    Constipation, unspecified constipation type  Trial of mirlax and ducolax. Likely due to inactivity and dehydration from uncontrolled diabetes  - polyethylene glycol (MIRALAX) 17 GM/Dose powder; Take 17 g (1 Capful) by mouth daily.  - bisacodyl (DULCOLAX) 5 MG EC tablet; Take 2 tablets (10 mg) by mouth daily as needed for constipation.    Itchy skin  Likely due to dehydrateion    Irregularly irregular cardiac rhythm  NSR with PACs  - EKG 12-lead complete w/read - Clinics          BMI  Estimated body mass index is 43.27 kg/m  as calculated from the following:    Height as of this encounter: 1.753 m (5' 9\").    Weight as of this encounter: 132.9 kg (293 lb).   Weight " management plan: Discussed healthy diet and exercise guidelines    Depression Screening Follow Up        6/2/2025     7:22 AM   PHQ   PHQ-9 Total Score 9    Q9: Thoughts of better off dead/self-harm past 2 weeks Several days   F/U: Thoughts of suicide or self-harm No   F/U: Safety concerns No       Patient-reported         6/2/2025     7:22 AM   Last PHQ-9   1.  Little interest or pleasure in doing things 0   2.  Feeling down, depressed, or hopeless 1   3.  Trouble falling or staying asleep, or sleeping too much 1   4.  Feeling tired or having little energy 3   5.  Poor appetite or overeating 3   6.  Feeling bad about yourself 0   7.  Trouble concentrating 0   8.  Moving slowly or restless 0   Q9: Thoughts of better off dead/self-harm past 2 weeks 1   PHQ-9 Total Score 9    In the past two weeks have you had thoughts of suicide or self harm? No   Do you have concerns about your personal safety or the safety of others? No       Patient-reported                   Follow Up Actions Taken  Crisis resource information provided in the After Visit Summary    Discussed the following ways the patient can remain in a safe environment:          Rosalva Mejia is a 64 year old, presenting for the following health issues:  Recheck Medication, Form Request (Disability parking certificate ), and RECHECK EAR(S)        6/2/2025     7:28 AM   Additional Questions   Roomed by Kaveh   Accompanied by Spouse, Daughter      History of Present Illness       Reason for visit:  Preventative   updates   He is taking medications regularly.  Request for updated disability parking certificate, recheck ears per ENT visit last week.         Annual Wellness Visit     Patient has been advised of split billing requirements and indicates understanding: Yes        Health Care Directive  Patient does not have a Health Care Directive: Discussed advance care planning with patient; information given to patient to review.  In general, how would you  rate your overall physical health? (!) POOR   Discussed with patient their rating of physical health; information has been provided.   Do you have a special diet?  Other: eats what he wants        6/2/2025   Exercise, Social Connection, Stress   Days per week of moderate/strenous exercise 0 days   Average minutes spent exercising at this level 0 min   Frequency of gathering with friends or relatives Never   Feel stress (tense, anxious, or unable to sleep) Rather much     Do you see a dentist two times every year?  (!) NO  The patient was instructed to see the dentist every 6 months.   Have you been more tired than usual lately?  (!) YES   Discussed possible causes of fatigue.   If you drink alcohol do you typically have >3 drinks per day or >7 drinks per week? No  Do you have a current opioid prescription? No  Do you use any other controlled substances or medications that are not prescribed by a provider? None  Social History     Tobacco Use    Smoking status: Never    Smokeless tobacco: Never   Vaping Use    Vaping status: Never Used   Substance Use Topics    Alcohol use: No    Drug use: No       Needs assistance for the following daily activities: (!) TRANSPORTATION, (!) SHOPPING, (!) PREPARING MEALS, (!) HOUSEWORK, (!) BATHING, and (!) MONEY MANAGEMENT  Which of the following safety concerns are present in your home?  none identified   Do you (or your family members) have any concerns about your safety while driving?  No  Do you have any of the following hearing concerns?: No hearing concerns  In the past 6 months, have you been bothered by leaking of urine? No        1/9/2024   Social Factors   Worry food won't last until get money to buy more No   Food not last or not have enough money for food? No   Do you have housing? (Housing is defined as stable permanent housing and does not include staying outside in a car, in a tent, in an abandoned building, in an overnight shelter, or couch-surfing.) Yes   Are you  worried about losing your housing? No   Lack of transportation? No   Unable to get utilities (heat,electricity)? No          6/2/2025   Fall Risk   Fallen 2 or more times in the past year? Yes   Trouble with walking or balance? Yes   Reason Gait Speed Test Not Completed Patient does not tolerate an upright or standing position (e.g. wheelchair)        Today's PHQ-9 Score:       6/2/2025     7:22 AM   PHQ-9 SCORE   PHQ-9 Total Score MyChart 9 (Mild depression)   PHQ-9 Total Score 9        Patient-reported     Last PSA:   Prostate Specific Antigen Screen   Date Value Ref Range Status   03/05/2024 0.34 0.00 - 4.50 ng/mL Final     ASCVD Risk   The ASCVD Risk score (Omar DK, et al., 2019) failed to calculate for the following reasons:    The valid total cholesterol range is 130 to 320 mg/dL        Reviewed and updated as needed this visit by Provider                        Current providers sharing in care for this patient include:  Patient Care Team:  Jaylyn Bellamy MD as PCP - General (Family Practice)  Oneida Abraham, GIOVANNI as Diabetes Educator (Diabetes Education)  Flavia Beltre PA-C as Physician Assistant (Endocrinology, Diabetes, and Metabolism)  Mitch Reynaga MD as MD (Cardiovascular Disease)  Beverley Bucio MD as Assigned Nephrology Provider  Jaylyn Bellamy MD as Assigned PCP  Flavia Beltre PA-C as Assigned Endocrinology Provider  Molly Castrejon PA-C as Physician Assistant (Physician Assistant - Medical)  Molly Castrejon PA-C as Assigned Heart and Vascular Provider  Lyndon Morataya AuD as Audiologist (Audiology)  Johnny Corrales MD as MD (Otolaryngology)    The following health maintenance items are reviewed in Epic and correct as of today:  Health Maintenance   Topic Date Due    ANNUAL REVIEW OF HM ORDERS  Never done    PNEUMOCOCCAL VACCINE 50+ YEARS (1 of 2 - PCV) Never done    ZOSTER VACCINE (1 of 2) Never done    RSV VACCINE (1 - Risk 60-74 years 1-dose series) Never done  "   DIABETIC FOOT EXAM  03/09/2024    COVID-19 VACCINE (1 - 2024-25 season) Never done    MICROALBUMIN  11/19/2024    EYE EXAM  12/15/2024    MEDICARE ANNUAL WELLNESS VISIT  03/05/2025    BMP  08/15/2025    A1C  11/15/2025    HEMOGLOBIN  11/15/2025    LIPID  05/15/2026    DTAP/TDAP/TD VACCINE (2 - Td or Tdap) 05/20/2026    FALL RISK ASSESSMENT  06/02/2026    COLORECTAL CANCER SCREENING  08/31/2028    ADVANCE CARE PLANNING  03/05/2029    PARATHYROID  Completed    PHOSPHORUS  Completed    HEPATITIS C SCREENING  Completed    PHQ-2 (once per calendar year)  Completed    URINALYSIS  Completed    ALK PHOS  Completed    HPV VACCINE  Aged Out    MENINGITIS VACCINE  Aged Out    INFLUENZA VACCINE  Discontinued       Appropriate preventive services were discussed with this patient, including applicable screening as appropriate for fall prevention, nutrition, physical activity, Tobacco-use cessation, weight loss and cognition.  Checklist reviewing preventive services available has been given to the patient.             No data to display                           Review of Systems  Constitutional, HEENT, cardiovascular, pulmonary, gi and gu systems are negative, except as otherwise noted.      Objective    BP (!) 152/74   Pulse 96   Temp 97.8  F (36.6  C) (Tympanic)   Resp 23   Ht 1.753 m (5' 9\")   Wt 132.9 kg (293 lb)   SpO2 96%   BMI 43.27 kg/m    Body mass index is 43.27 kg/m .  Physical Exam   GENERAL: alert and no distress  EYES: Eyes grossly normal to inspection, PERRL and conjunctivae and sclerae normal  HENT: ear canals and TM's normal, nose and mouth without ulcers or lesions  NECK: no adenopathy, no asymmetry, masses, or scars  RESP: lungs clear to auscultation - no rales, rhonchi or wheezes  CV: irregularly irregular rhythm, normal S1 S2, no S3 or S4, no murmur, click or rub, peripheral pulses strong, and no peripheral edema  ABDOMEN: soft, nontender, no hepatosplenomegaly, no masses and bowel sounds " normal  MS: pt with pedal edema and missing several toes on both feet  He showed me his right foot, no open sores noted. He declined taking off his left shoe  SKIN: no suspicious lesions or rashes  NEURO: Normal strength and tone, mentation intact and speech normal  PSYCH: mentation appears normal, affect normal/bright    No results found for this or any previous visit (from the past 24 hours).        Signed Electronically by: Jaylyn Bellamy MD

## 2025-06-03 ENCOUNTER — RESULTS FOLLOW-UP (OUTPATIENT)
Dept: FAMILY MEDICINE | Facility: CLINIC | Age: 65
End: 2025-06-03

## 2025-06-11 NOTE — PROGRESS NOTES
Service Date: 2025    Jaylyn Bellamy MD  [address] [place] [address]     RE:  Roberto Thompson   MRN:  [id number]   :  1960       Dear Dr. Bellamy:    It was a pleasure participating in the care of your patient, Roberto Thompson .  As you know, he is a 64 year old year old person who I met in person today for subclinical coronary disease, hypertension, hyperlipidemia, peripheral vascular disease.    Past medical history is significant for the followin.  Type 2 diabetes with secondary retinopathy and neuropathy  2.  Hypertension  3.  Hyperlipidemia  4.  Peripheral vascular disease status post partial amputation of the right foot  5.  Osteomyelitis of the right hand  6.  Chronic renal insufficiency with a current baseline GFR of 34 mL/min as of 5/15/2025  7.  Polyneuropathy  8.  Morbid obesity  9.  Vitamin D deficiency  10.  Obstructive sleep apnea currently on CPAP    Patient was originally seen by Dr. Reynaga and NIMA Brown last seen on 2024.  At that time Flavia had discontinued Coreg and started Toprol 25 mg a day.  The patient was complaining of atypical chest discomfort and further workup was contemplated but not performed.  Patient presents to reestablish cardiac care.    From a clinical standpoint, the patient lives open completely sedentary lifestyle.  For this he can walk is to the bathroom and back at home.  When he goes out of the house he typically uses a scooter and does not ambulate even with a walker.  He says it has been this way since about  when his neuropathy became significant.  His daughter also relates that he may just be a lack of motivation as both her mother and father live completely sedentary lifestyles.    She does note that he was previously more active when he was living independently, however since moving in with his daughter he literally just eats and sits and watches TV all day.    The patient otherwise denies gross chest  pain, shortness of breath, PND, orthopnea, edema, palpitations, syncope or near syncope.    10 Point Review of Systems: He does use his CPAP on a daily basis without issues    MEDICATIONS:    1.  Aspirin 81 mg a day  2.  Atorvastatin 80 mg daily  3.  Empagliflozin 25 mg a day  4.  Fenofibrate 160 mg daily  5.  Lasix 20 mg twice a day  6.  Insulin  7.  Imdur 60 mg daily  7.  Toprol-XL 25 mg daily  8.  Spironolactone 25 mg daily  9.  Tirzepatide    PHYSICAL EXAM:    Vitals: Blood pressures currently running generally in the mid 120s when he is relaxed, weight 289 pounds  General:  Patient appears comfortable, well groomed  Psych:  Patient is alert and oriented X 3  Eyes:  No gross erythema, exudate  Neck:  JVP: Difficult to read secondary to body habitus  Pulm:  CTA  CV: Regular rate rhythm with benign flow murmur, subtle  Abdomen:  soft, non tender, positive bowel sounds  Lower extremities: +2 nonpitting edema      LABS:    5/15/2025: Total cholesterol 525, HDL 27, triglycerides 1698, potassium 4.8, GFR 34, sodium 132, hemoglobin normal    EKG 6/2/2025 reveals normal sinus rhythm with PACs, late R wave progression, nonspecific T wave changes    Echocardiogram 3/31/2023 reveals ejection fraction of 55 to 60% without gross valvular pathology, diastolic dysfunction is present    Pharmacologic nuclear stress test 3/24/2023 reveals no evidence for stress-induced ischemia.    Ultrasound Doppler exam 10/6/2020 reveals no evidence for gross stenoses    Carotid ultrasound 10/6/2020 reveals mild stenoses of less than 50% bilaterally possibly greater on the left        IMPRESSION:    Jefferson is a 64-year-old gentleman who speaks Latvian with past medical history significant for peripheral vascular disease status post partial amputation of the right foot, osteomyelitis, chronic renal sufficiency with a current baseline GFR of 34 mL/min as of 5/15/2025, polyneuropathy and morbid obesity with several active issues:    1.   Subclinical coronary disease.    Apparently it has been noted that the patient has significantly elevated coronary calcium in previous progress notes.    From a clinical standpoint, patient is asymptomatic at a low level of exertion.  He is literally sedentary almost the entire day.  He has low motivation to increase his activity level continue clinical monitoring for now    2.  Hypertension    Blood pressures currently running 125 mmHg systolic, continue to follow    3.  Hyperlipidemia    5/15/2025 total cholesterol 525, triglycerides 1698.  Currently on atorvastatin 80 mg and fenofibrate 160 mg.  Obviously further optimization would be warranted    4.  Peripheral vascular disease    Carotid ultrasound 10/6/2020 revealed mild stenoses bilaterally greater on the left.    From a clinical standpoint, he has no new neurologic symptoms.        PLAN:    1.  Add ezetimibe 10 mg a day, recheck fasting lipids 3 months.  If his lipids are still uncontrolled at that time we will consider PCSK9 inhibitor then if needed.    2.  Follow-up visit 6 to 9 months with labs prior, earlier if needed.    Once again, it was a pleasure participating in the care of your patient, Roberto Thompson .  Please feel free to contact me at any time if there are any questions regarding his care in the future.      Sincerely,          Garrett Pedraza M.D.  Cardiovascular Division  [facility]      Total time:  Including pre-visit chart review, in person face to face visit, post visit charting time as well as time for coordination of care:  60min

## 2025-06-20 DIAGNOSIS — E11.69 TYPE 2 DIABETES MELLITUS WITH OTHER SPECIFIED COMPLICATION, WITH LONG-TERM CURRENT USE OF INSULIN (H): ICD-10-CM

## 2025-06-20 DIAGNOSIS — I10 HYPERTENSION GOAL BP (BLOOD PRESSURE) < 140/90: ICD-10-CM

## 2025-06-20 DIAGNOSIS — Z79.4 TYPE 2 DIABETES MELLITUS WITH OTHER SPECIFIED COMPLICATION, WITH LONG-TERM CURRENT USE OF INSULIN (H): ICD-10-CM

## 2025-06-22 RX ORDER — ALCOHOL ANTISEPTIC PADS
PADS, MEDICATED (EA) TOPICAL
Qty: 100 EACH | Refills: 11 | Status: SHIPPED | OUTPATIENT
Start: 2025-06-22

## 2025-06-23 RX ORDER — FUROSEMIDE 20 MG/1
20 TABLET ORAL 2 TIMES DAILY
Qty: 180 TABLET | Refills: 0 | Status: SHIPPED | OUTPATIENT
Start: 2025-06-23

## 2025-06-23 NOTE — TELEPHONE ENCOUNTER
Last Written Prescription:   Disp Refills Start End ANUP   furosemide (LASIX) 20 MG tablet 180 tablet 3 5/28/2024 -- No   Sig - Route: Take 1 tablet (20 mg) by mouth 2 times daily - Oral     ----------------------  Last Visit Date:   11/5/2024  [facility]    Future Visit Date:    6/25/2025 12:30 PM (45 min)  [place]   Arrive by: 12:15 PM   RETURN CARDIOLOGY   [facility] ([place])   Garrett Pedraza MD [facility] ([place])   VIRTUAL      ----------------------      Refill decision: Medication refilled per  Medication Refill in Ambulatory Care  policy.      Last Comprehensive Metabolic Panel:  Lab Results   Component Value Date     (L) 05/15/2025    POTASSIUM 4.8 05/15/2025    CHLORIDE 94 (L) 05/15/2025    CO2 23 05/15/2025    ANIONGAP 15 05/15/2025     (H) 05/15/2025    BUN 43.0 (H) 05/15/2025    CR 2.13 (H) 05/15/2025    GFRESTIMATED 34 (L) 05/15/2025    CYN 9.9 05/15/2025     BP Readings from Last 3 Encounters:   06/02/25 (!) 152/74   05/15/25 (!) 166/92   05/05/25 131/74       BP elevated >140/90, FYI sent to care team per protocol.  90-day maame refill given, pt blood pressure elevated. FYI sent to care team.    Request from pharmacy:  Requested Prescriptions   Pending Prescriptions Disp Refills    furosemide (LASIX) 20 MG tablet [Pharmacy Med Name: furosemide 20 mg tablet] 180 tablet 3     Sig: Take 1 tablet (20 mg) by mouth 2 times daily       Diuretics (Including Combos) Protocol Failed - 6/23/2025 11:08 AM        Failed - Most recent blood pressure under 140/90 in past 12 months     BP Readings from Last 3 Encounters:   06/02/25 (!) 152/74   05/15/25 (!) 166/92   05/05/25 131/74       No data recorded            Failed - Has GFR on file in past 12 months and most recent value is normal        Passed - Potassium level on file in past 12 months        Passed - Medication is active on med list and the sig matches. RN to manually verify dose and sig if red  X/fail.     If the protocol passes (green check), you do not need to verify med dose and sig.    A prescription matches if they are the same clinical intention.    For Example: once daily and every morning are the same.    The protocol can not identify upper and lower case letters as matching and will fail.     For Example: Take 1 tablet (50 mg) by mouth daily     TAKE 1 TABLET (50 MG) BY MOUTH DAILY    For all fails (red x), verify dose and sig.    If the refill does match what is on file, the RN can still proceed to approve the refill request.       If they do not match, route to the appropriate provider.             Passed - Medication indicated for associated diagnosis     Medication is associated with one or more of the following diagnoses:     Edema   Hypertension   Hypercalcemia   Heart Failure   Chronic Kidney Disease (CKD)   Cardiomyopathy   Dyspnea   Chronic Thromboembolic Pulmonary Hypertension   Pulmonary Hypertension          Passed - Recent (12 month) or future (90 days) visit with authorizing provider's specialty (provided they have been seen in the past 15 months)     The patient must have completed an in-person or virtual visit within the past 12 months or has a future visit scheduled within the next 90 days with the authorizing provider s specialty.  Urgent care and e-visits do not qualify as an office visit for this protocol.          Passed - Patient is age 18 or older

## 2025-06-25 ENCOUNTER — OFFICE VISIT (OUTPATIENT)
Dept: CARDIOLOGY | Facility: CLINIC | Age: 65
End: 2025-06-25
Payer: MEDICARE

## 2025-06-25 VITALS
SYSTOLIC BLOOD PRESSURE: 134 MMHG | HEART RATE: 96 BPM | OXYGEN SATURATION: 98 % | BODY MASS INDEX: 42.68 KG/M2 | WEIGHT: 289 LBS | DIASTOLIC BLOOD PRESSURE: 82 MMHG

## 2025-06-25 DIAGNOSIS — E78.5 DYSLIPIDEMIA: Primary | ICD-10-CM

## 2025-06-25 DIAGNOSIS — I10 HYPERTENSION GOAL BP (BLOOD PRESSURE) < 140/90: ICD-10-CM

## 2025-06-25 RX ORDER — EZETIMIBE 10 MG/1
10 TABLET ORAL DAILY
Qty: 90 TABLET | Refills: 2 | Status: SHIPPED | OUTPATIENT
Start: 2025-06-25

## 2025-06-25 NOTE — NURSING NOTE
"Chief Complaint   Patient presents with    Follow Up     Return general cardiology for 6 month follow-up    Chest Pain    Shortness of Breath    Dizziness       Initial /82 (BP Location: Right arm, Patient Position: Chair, Cuff Size: Adult Large)   Pulse 96   Wt 131.1 kg (289 lb)   SpO2 98%   BMI 42.68 kg/m   Estimated body mass index is 42.68 kg/m  as calculated from the following:    Height as of 6/2/25: 1.753 m (5' 9\").    Weight as of this encounter: 131.1 kg (289 lb)..  BP completed using cuff size: roberto carlos JIMENEZ, EMT    "

## 2025-06-25 NOTE — PATIENT INSTRUCTIONS
Take your medicines every day, as directed     Changes made today:  START Zetia 10 mg once daily   Recheck fasting lab in 3 months       Cardiology Care Coordinators:      Maritza MAYES RN     Cardiology Rooming staff:  Claritza SANTANA    Phone  [phone]      Fax [phone]    To Contact us     During Business Hours:  [phone]     If you are needing refills please contact your pharmacy.     For urgent after hour care please call the [facility] at [phone] or the [facility] at [phone] and ask to speak to the cardiologist on call.            HOW TO CHECK YOUR BLOOD PRESSURE AT HOME:     Avoid eating, smoking, and exercising for at least 30 minutes before taking a reading.     Be sure you have taken your BP medication at least 2-3 hours before you check it.      Sit quietly for 10 minutes before a reading.      Sit in a chair with your feet flat on the floor. Rest your  arm on a table so that the arm cuff is at the same level as your heart.     Remain still during the reading.  Record your blood pressure and pulse in a log and bring to your next appointment.       [employer] allows you to communicate directly with your heart team through secure messaging.  [facility] can be accessed any time on your phone, computer, or tablet.  If you need assistance, we'd be happy to help!             Keep your Heart Appointments:     Follow-up in 6-9 months with fasting lab prior

## 2025-06-25 NOTE — NURSING NOTE
Med Reconcile: Reviewed and verified all current medications with the patient. The updated medication list was printed and given to the patient./ START Zetia 10 mg P.O daily. Recheck lab/ FLP and LDL direct in 3 months.       Return Appointment  -Follow-up in 6-9 months with labs prior (BMP, FLP and Direct LDL).         Patient stated he understood all health information given and agreed to call with further questions or concerns.

## 2025-07-07 ENCOUNTER — TELEPHONE (OUTPATIENT)
Dept: FAMILY MEDICINE | Facility: CLINIC | Age: 65
End: 2025-07-07
Payer: MEDICARE

## 2025-07-07 NOTE — TELEPHONE ENCOUNTER
Patient Quality Outreach    Patient is due for the following:   Diabetes -  A1C out of range    Action(s) Taken:   No follow up needed at this time.    Type of outreach:    Chart review performed, no outreach needed.    Questions for provider review:    None         Solange Harris MA  Chart routed to None.

## 2025-08-21 DIAGNOSIS — K59.00 CONSTIPATION, UNSPECIFIED CONSTIPATION TYPE: ICD-10-CM

## 2025-08-21 RX ORDER — AMOXICILLIN 250 MG
CAPSULE ORAL
Qty: 180 TABLET | Refills: 2 | Status: SHIPPED | OUTPATIENT
Start: 2025-08-21

## 2025-08-22 DIAGNOSIS — Z79.4 TYPE 2 DIABETES MELLITUS WITH OTHER SPECIFIED COMPLICATION, WITH LONG-TERM CURRENT USE OF INSULIN (H): ICD-10-CM

## 2025-08-22 DIAGNOSIS — E11.69 TYPE 2 DIABETES MELLITUS WITH OTHER SPECIFIED COMPLICATION, WITH LONG-TERM CURRENT USE OF INSULIN (H): ICD-10-CM

## 2025-08-25 RX ORDER — INSULIN HUMAN 500 [IU]/ML
INJECTION, SOLUTION SUBCUTANEOUS
Qty: 45 ML | Refills: 3 | Status: SHIPPED | OUTPATIENT
Start: 2025-08-25

## 2025-09-04 DIAGNOSIS — I20.89 STABLE ANGINA: ICD-10-CM

## 2025-09-04 DIAGNOSIS — E78.00 HIGH BLOOD CHOLESTEROL: ICD-10-CM

## 2025-09-04 DIAGNOSIS — E78.1 HYPERTRIGLYCERIDEMIA: ICD-10-CM

## 2025-09-04 RX ORDER — ISOSORBIDE MONONITRATE 60 MG/1
60 TABLET, EXTENDED RELEASE ORAL DAILY
Qty: 90 TABLET | Refills: 2 | Status: SHIPPED | OUTPATIENT
Start: 2025-09-04

## 2025-09-04 RX ORDER — FENOFIBRATE 160 MG/1
160 TABLET ORAL DAILY
Qty: 90 TABLET | Refills: 2 | Status: SHIPPED | OUTPATIENT
Start: 2025-09-04

## (undated) RX ORDER — REGADENOSON 0.08 MG/ML
INJECTION, SOLUTION INTRAVENOUS
Status: DISPENSED
Start: 2023-03-24